# Patient Record
Sex: MALE | Race: BLACK OR AFRICAN AMERICAN | NOT HISPANIC OR LATINO | Employment: STUDENT | URBAN - METROPOLITAN AREA
[De-identification: names, ages, dates, MRNs, and addresses within clinical notes are randomized per-mention and may not be internally consistent; named-entity substitution may affect disease eponyms.]

---
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
## 2024-01-02 ENCOUNTER — TELEPHONE (OUTPATIENT)
Dept: PEDIATRIC HEMATOLOGY/ONCOLOGY | Facility: HOSPITAL | Age: 19
End: 2024-01-02
Payer: MEDICAID

## 2024-01-02 DIAGNOSIS — D57.00 SICKLE CELL PAIN CRISIS (MULTI): Primary | ICD-10-CM

## 2024-01-02 DIAGNOSIS — D57.20: ICD-10-CM

## 2024-01-02 RX ORDER — OXYCODONE HYDROCHLORIDE 5 MG/1
5 TABLET ORAL EVERY 6 HOURS PRN
Qty: 12 TABLET | Refills: 0 | Status: SHIPPED | OUTPATIENT
Start: 2024-01-02 | End: 2024-01-10 | Stop reason: HOSPADM

## 2024-01-02 RX ORDER — NAPROXEN 500 MG/1
500 TABLET ORAL EVERY 12 HOURS PRN
Qty: 60 TABLET | Refills: 0 | Status: SHIPPED | OUTPATIENT
Start: 2024-01-02 | End: 2024-01-10 | Stop reason: HOSPADM

## 2024-01-03 ENCOUNTER — APPOINTMENT (OUTPATIENT)
Dept: CARDIOLOGY | Facility: HOSPITAL | Age: 19
End: 2024-01-03
Payer: MEDICAID

## 2024-01-03 ENCOUNTER — APPOINTMENT (OUTPATIENT)
Dept: RADIOLOGY | Facility: HOSPITAL | Age: 19
End: 2024-01-03
Payer: MEDICAID

## 2024-01-03 ENCOUNTER — HOSPITAL ENCOUNTER (EMERGENCY)
Facility: HOSPITAL | Age: 19
Discharge: HOME | End: 2024-01-03
Attending: EMERGENCY MEDICINE
Payer: MEDICAID

## 2024-01-03 VITALS
HEIGHT: 70 IN | SYSTOLIC BLOOD PRESSURE: 107 MMHG | OXYGEN SATURATION: 97 % | RESPIRATION RATE: 15 BRPM | DIASTOLIC BLOOD PRESSURE: 80 MMHG | HEART RATE: 77 BPM | BODY MASS INDEX: 20.87 KG/M2 | WEIGHT: 145.8 LBS

## 2024-01-03 DIAGNOSIS — M79.602 LEFT ARM PAIN: Primary | ICD-10-CM

## 2024-01-03 LAB
ALBUMIN SERPL BCP-MCNC: 4.4 G/DL (ref 3.4–5)
ALP SERPL-CCNC: 49 U/L (ref 33–120)
ALT SERPL W P-5'-P-CCNC: 12 U/L (ref 10–52)
ANION GAP SERPL CALC-SCNC: 14 MMOL/L (ref 10–20)
AST SERPL W P-5'-P-CCNC: 24 U/L (ref 9–39)
ATRIAL RATE: 79 BPM
BASOPHILS # BLD MANUAL: 0 X10*3/UL (ref 0–0.1)
BASOPHILS NFR BLD MANUAL: 0 %
BILIRUB SERPL-MCNC: 1.7 MG/DL (ref 0–1.2)
BUN SERPL-MCNC: 12 MG/DL (ref 6–23)
CALCIUM SERPL-MCNC: 9.2 MG/DL (ref 8.6–10.3)
CHLORIDE SERPL-SCNC: 101 MMOL/L (ref 98–107)
CO2 SERPL-SCNC: 22 MMOL/L (ref 21–32)
CREAT SERPL-MCNC: 0.65 MG/DL (ref 0.5–1.3)
EOSINOPHIL # BLD MANUAL: 0.09 X10*3/UL (ref 0–0.7)
EOSINOPHIL NFR BLD MANUAL: 1 %
ERYTHROCYTE [DISTWIDTH] IN BLOOD BY AUTOMATED COUNT: 14 % (ref 11.5–14.5)
GFR SERPL CREATININE-BSD FRML MDRD: >90 ML/MIN/1.73M*2
GLUCOSE BLD MANUAL STRIP-MCNC: 71 MG/DL (ref 74–99)
GLUCOSE SERPL-MCNC: 83 MG/DL (ref 74–99)
HCT VFR BLD AUTO: 28.5 % (ref 41–52)
HGB BLD-MCNC: 10.7 G/DL (ref 13.5–17.5)
HGB RETIC QN: 35 PG (ref 28–38)
IMM GRANULOCYTES # BLD AUTO: 0.02 X10*3/UL (ref 0–0.7)
IMM GRANULOCYTES NFR BLD AUTO: 0.2 % (ref 0–0.9)
IMMATURE RETIC FRACTION: 32.9 %
INR PPP: 1.2 (ref 0.9–1.1)
LDH SERPL L TO P-CCNC: 251 U/L (ref 84–246)
LYMPHOCYTES # BLD MANUAL: 1.74 X10*3/UL (ref 1.2–4.8)
LYMPHOCYTES NFR BLD MANUAL: 20 %
MCH RBC QN AUTO: 33.1 PG (ref 26–34)
MCHC RBC AUTO-ENTMCNC: 37.5 G/DL (ref 32–36)
MCV RBC AUTO: 88 FL (ref 80–100)
MONOCYTES # BLD MANUAL: 0 X10*3/UL (ref 0.1–1)
MONOCYTES NFR BLD MANUAL: 0 %
NEUTS SEG # BLD MANUAL: 6.7 X10*3/UL (ref 1.2–7)
NEUTS SEG NFR BLD MANUAL: 77 %
NRBC BLD-RTO: 0.6 /100 WBCS (ref 0–0)
OVALOCYTES BLD QL SMEAR: ABNORMAL
P AXIS: 72 DEGREES
P OFFSET: 196 MS
P ONSET: 136 MS
PLATELET # BLD AUTO: 282 X10*3/UL (ref 150–450)
POLYCHROMASIA BLD QL SMEAR: ABNORMAL
POTASSIUM SERPL-SCNC: 3.9 MMOL/L (ref 3.5–5.3)
PR INTERVAL: 168 MS
PROT SERPL-MCNC: 6.9 G/DL (ref 6.4–8.2)
PROTHROMBIN TIME: 13.3 SECONDS (ref 9.8–12.8)
Q ONSET: 220 MS
QRS COUNT: 13 BEATS
QRS DURATION: 112 MS
QT INTERVAL: 384 MS
QTC CALCULATION(BAZETT): 440 MS
QTC FREDERICIA: 421 MS
R AXIS: 77 DEGREES
RBC # BLD AUTO: 3.23 X10*6/UL (ref 4.5–5.9)
RBC MORPH BLD: ABNORMAL
RETICS #: 0.14 X10*6/UL (ref 0.02–0.12)
RETICS/RBC NFR AUTO: 4.4 % (ref 0.5–2)
SCHISTOCYTES BLD QL SMEAR: ABNORMAL
SODIUM SERPL-SCNC: 133 MMOL/L (ref 136–145)
T AXIS: 67 DEGREES
T OFFSET: 412 MS
TARGETS BLD QL SMEAR: ABNORMAL
TOTAL CELLS COUNTED BLD: 100
VARIANT LYMPHS # BLD MANUAL: 0.17 X10*3/UL (ref 0–0.5)
VARIANT LYMPHS NFR BLD: 2 %
VENTRICULAR RATE: 79 BPM
WBC # BLD AUTO: 8.7 X10*3/UL (ref 4.4–11.3)

## 2024-01-03 PROCEDURE — 85007 BL SMEAR W/DIFF WBC COUNT: CPT | Performed by: EMERGENCY MEDICINE

## 2024-01-03 PROCEDURE — 2550000001 HC RX 255 CONTRASTS: Performed by: EMERGENCY MEDICINE

## 2024-01-03 PROCEDURE — 36415 COLL VENOUS BLD VENIPUNCTURE: CPT | Performed by: EMERGENCY MEDICINE

## 2024-01-03 PROCEDURE — 70498 CT ANGIOGRAPHY NECK: CPT | Performed by: RADIOLOGY

## 2024-01-03 PROCEDURE — 2500000002 HC RX 250 W HCPCS SELF ADMINISTERED DRUGS (ALT 637 FOR MEDICARE OP, ALT 636 FOR OP/ED): Mod: MUE | Performed by: EMERGENCY MEDICINE

## 2024-01-03 PROCEDURE — 80053 COMPREHEN METABOLIC PANEL: CPT | Performed by: EMERGENCY MEDICINE

## 2024-01-03 PROCEDURE — 85027 COMPLETE CBC AUTOMATED: CPT | Performed by: EMERGENCY MEDICINE

## 2024-01-03 PROCEDURE — 83615 LACTATE (LD) (LDH) ENZYME: CPT | Performed by: EMERGENCY MEDICINE

## 2024-01-03 PROCEDURE — 93005 ELECTROCARDIOGRAM TRACING: CPT

## 2024-01-03 PROCEDURE — 70450 CT HEAD/BRAIN W/O DYE: CPT | Performed by: RADIOLOGY

## 2024-01-03 PROCEDURE — 85610 PROTHROMBIN TIME: CPT | Performed by: EMERGENCY MEDICINE

## 2024-01-03 PROCEDURE — 85045 AUTOMATED RETICULOCYTE COUNT: CPT | Performed by: EMERGENCY MEDICINE

## 2024-01-03 PROCEDURE — 99285 EMERGENCY DEPT VISIT HI MDM: CPT | Performed by: EMERGENCY MEDICINE

## 2024-01-03 PROCEDURE — 82947 ASSAY GLUCOSE BLOOD QUANT: CPT | Mod: 59

## 2024-01-03 PROCEDURE — 70450 CT HEAD/BRAIN W/O DYE: CPT | Mod: 59

## 2024-01-03 PROCEDURE — 71045 X-RAY EXAM CHEST 1 VIEW: CPT | Performed by: STUDENT IN AN ORGANIZED HEALTH CARE EDUCATION/TRAINING PROGRAM

## 2024-01-03 PROCEDURE — 70496 CT ANGIOGRAPHY HEAD: CPT | Performed by: RADIOLOGY

## 2024-01-03 PROCEDURE — 71045 X-RAY EXAM CHEST 1 VIEW: CPT

## 2024-01-03 PROCEDURE — 70496 CT ANGIOGRAPHY HEAD: CPT

## 2024-01-03 RX ORDER — DIAZEPAM 5 MG/1
5 TABLET ORAL ONCE
Status: COMPLETED | OUTPATIENT
Start: 2024-01-03 | End: 2024-01-03

## 2024-01-03 RX ORDER — BACLOFEN 20 MG/1
20 TABLET ORAL 3 TIMES DAILY
Qty: 15 TABLET | Refills: 0 | Status: SHIPPED | OUTPATIENT
Start: 2024-01-03 | End: 2024-01-10 | Stop reason: HOSPADM

## 2024-01-03 RX ORDER — DIAZEPAM 5 MG/1
5 TABLET ORAL NIGHTLY PRN
Qty: 5 TABLET | Refills: 0 | Status: SHIPPED | OUTPATIENT
Start: 2024-01-03 | End: 2024-01-10 | Stop reason: HOSPADM

## 2024-01-03 RX ADMIN — DIAZEPAM 5 MG: 5 TABLET ORAL at 12:30

## 2024-01-03 RX ADMIN — IOHEXOL 75 ML: 350 INJECTION, SOLUTION INTRAVENOUS at 12:07

## 2024-01-03 ASSESSMENT — PAIN DESCRIPTION - LOCATION: LOCATION: ARM

## 2024-01-03 ASSESSMENT — PAIN SCALES - GENERAL: PAINLEVEL_OUTOF10: 10 - WORST POSSIBLE PAIN

## 2024-01-03 ASSESSMENT — PAIN DESCRIPTION - ORIENTATION: ORIENTATION: LEFT

## 2024-01-03 ASSESSMENT — PAIN - FUNCTIONAL ASSESSMENT: PAIN_FUNCTIONAL_ASSESSMENT: 0-10

## 2024-01-03 NOTE — ED TRIAGE NOTES
Pt arrived via ems d/t L arm pain and weakness, pt aaox4, vss, 10/10 pain on L arm says he's unable to move it.    HX: sickle cell

## 2024-01-04 ENCOUNTER — APPOINTMENT (OUTPATIENT)
Dept: RADIOLOGY | Facility: HOSPITAL | Age: 19
End: 2024-01-04
Payer: MEDICAID

## 2024-01-04 ENCOUNTER — HOSPITAL ENCOUNTER (INPATIENT)
Facility: HOSPITAL | Age: 19
LOS: 6 days | Discharge: HOME | End: 2024-01-10
Attending: PEDIATRICS | Admitting: STUDENT IN AN ORGANIZED HEALTH CARE EDUCATION/TRAINING PROGRAM
Payer: MEDICAID

## 2024-01-04 DIAGNOSIS — D57.00 SICKLE CELL CRISIS (MULTI): Primary | ICD-10-CM

## 2024-01-04 DIAGNOSIS — D57.00 SICKLE CELL DISEASE WITH CRISIS (MULTI): ICD-10-CM

## 2024-01-04 LAB
ABO GROUP (TYPE) IN BLOOD: NORMAL
ALBUMIN SERPL BCP-MCNC: 5.2 G/DL (ref 3.4–5)
ALP SERPL-CCNC: 60 U/L (ref 33–120)
ALT SERPL W P-5'-P-CCNC: 16 U/L (ref 10–52)
ANION GAP SERPL CALC-SCNC: 16 MMOL/L (ref 10–20)
ANTIBODY SCREEN: NORMAL
AST SERPL W P-5'-P-CCNC: 28 U/L (ref 9–39)
BASOPHILS # BLD MANUAL: 0 X10*3/UL (ref 0–0.1)
BASOPHILS NFR BLD MANUAL: 0 %
BILIRUB DIRECT SERPL-MCNC: 0.3 MG/DL (ref 0–0.3)
BILIRUB SERPL-MCNC: 2 MG/DL (ref 0–1.2)
BUN SERPL-MCNC: 11 MG/DL (ref 6–23)
CALCIUM SERPL-MCNC: 10.6 MG/DL (ref 8.6–10.6)
CHLORIDE SERPL-SCNC: 103 MMOL/L (ref 98–107)
CO2 SERPL-SCNC: 22 MMOL/L (ref 21–32)
CREAT SERPL-MCNC: 0.66 MG/DL (ref 0.5–1.3)
EOSINOPHIL # BLD MANUAL: 0 X10*3/UL (ref 0–0.7)
EOSINOPHIL NFR BLD MANUAL: 0 %
ERYTHROCYTE [DISTWIDTH] IN BLOOD BY AUTOMATED COUNT: 14 % (ref 11.5–14.5)
GFR SERPL CREATININE-BSD FRML MDRD: >90 ML/MIN/1.73M*2
GLUCOSE SERPL-MCNC: 84 MG/DL (ref 74–99)
HCT VFR BLD AUTO: 31.9 % (ref 41–52)
HGB BLD-MCNC: 12.2 G/DL (ref 13.5–17.5)
HGB RETIC QN: 34 PG (ref 28–38)
IMM GRANULOCYTES # BLD AUTO: 0.02 X10*3/UL (ref 0–0.7)
IMM GRANULOCYTES NFR BLD AUTO: 0.2 % (ref 0–0.9)
IMMATURE RETIC FRACTION: 33.4 %
LYMPHOCYTES # BLD MANUAL: 3.19 X10*3/UL (ref 1.2–4.8)
LYMPHOCYTES NFR BLD MANUAL: 37.1 %
MCH RBC QN AUTO: 32.2 PG (ref 26–34)
MCHC RBC AUTO-ENTMCNC: 38.2 G/DL (ref 32–36)
MCV RBC AUTO: 84 FL (ref 80–100)
MONOCYTES # BLD MANUAL: 0.52 X10*3/UL (ref 0.1–1)
MONOCYTES NFR BLD MANUAL: 6 %
NEUTS SEG # BLD MANUAL: 4.38 X10*3/UL (ref 1.2–7)
NEUTS SEG NFR BLD MANUAL: 50.9 %
NRBC BLD-RTO: 0.9 /100 WBCS (ref 0–0)
PHOSPHATE SERPL-MCNC: 3.4 MG/DL (ref 2.5–4.9)
PLATELET # BLD AUTO: 329 X10*3/UL (ref 150–450)
POTASSIUM SERPL-SCNC: 4.4 MMOL/L (ref 3.5–5.3)
PROT SERPL-MCNC: 8 G/DL (ref 6.4–8.2)
RBC # BLD AUTO: 3.79 X10*6/UL (ref 4.5–5.9)
RBC MORPH BLD: ABNORMAL
RETICS #: 0.16 X10*6/UL (ref 0.02–0.12)
RETICS/RBC NFR AUTO: 4.4 % (ref 0.5–2)
RH FACTOR (ANTIGEN D): NORMAL
SODIUM SERPL-SCNC: 137 MMOL/L (ref 136–145)
TARGETS BLD QL SMEAR: ABNORMAL
TOTAL CELLS COUNTED BLD: 116
VARIANT LYMPHS # BLD MANUAL: 0.52 X10*3/UL (ref 0–0.5)
VARIANT LYMPHS NFR BLD: 6 %
WBC # BLD AUTO: 8.6 X10*3/UL (ref 4.4–11.3)

## 2024-01-04 PROCEDURE — 85007 BL SMEAR W/DIFF WBC COUNT: CPT | Performed by: STUDENT IN AN ORGANIZED HEALTH CARE EDUCATION/TRAINING PROGRAM

## 2024-01-04 PROCEDURE — 2500000001 HC RX 250 WO HCPCS SELF ADMINISTERED DRUGS (ALT 637 FOR MEDICARE OP)

## 2024-01-04 PROCEDURE — 2500000004 HC RX 250 GENERAL PHARMACY W/ HCPCS (ALT 636 FOR OP/ED): Mod: SE | Performed by: PEDIATRICS

## 2024-01-04 PROCEDURE — 82248 BILIRUBIN DIRECT: CPT | Performed by: STUDENT IN AN ORGANIZED HEALTH CARE EDUCATION/TRAINING PROGRAM

## 2024-01-04 PROCEDURE — 85045 AUTOMATED RETICULOCYTE COUNT: CPT | Performed by: STUDENT IN AN ORGANIZED HEALTH CARE EDUCATION/TRAINING PROGRAM

## 2024-01-04 PROCEDURE — 80069 RENAL FUNCTION PANEL: CPT | Mod: CCI

## 2024-01-04 PROCEDURE — 84100 ASSAY OF PHOSPHORUS: CPT | Performed by: STUDENT IN AN ORGANIZED HEALTH CARE EDUCATION/TRAINING PROGRAM

## 2024-01-04 PROCEDURE — 99223 1ST HOSP IP/OBS HIGH 75: CPT | Performed by: STUDENT IN AN ORGANIZED HEALTH CARE EDUCATION/TRAINING PROGRAM

## 2024-01-04 PROCEDURE — 71046 X-RAY EXAM CHEST 2 VIEWS: CPT

## 2024-01-04 PROCEDURE — 85027 COMPLETE CBC AUTOMATED: CPT | Performed by: STUDENT IN AN ORGANIZED HEALTH CARE EDUCATION/TRAINING PROGRAM

## 2024-01-04 PROCEDURE — 96374 THER/PROPH/DIAG INJ IV PUSH: CPT

## 2024-01-04 PROCEDURE — 86900 BLOOD TYPING SEROLOGIC ABO: CPT | Performed by: STUDENT IN AN ORGANIZED HEALTH CARE EDUCATION/TRAINING PROGRAM

## 2024-01-04 PROCEDURE — 2500000004 HC RX 250 GENERAL PHARMACY W/ HCPCS (ALT 636 FOR OP/ED): Performed by: PEDIATRICS

## 2024-01-04 PROCEDURE — 1130000001 HC PRIVATE PED ROOM DAILY

## 2024-01-04 PROCEDURE — 2500000004 HC RX 250 GENERAL PHARMACY W/ HCPCS (ALT 636 FOR OP/ED): Mod: SE | Performed by: STUDENT IN AN ORGANIZED HEALTH CARE EDUCATION/TRAINING PROGRAM

## 2024-01-04 PROCEDURE — 96375 TX/PRO/DX INJ NEW DRUG ADDON: CPT

## 2024-01-04 PROCEDURE — 36415 COLL VENOUS BLD VENIPUNCTURE: CPT | Performed by: STUDENT IN AN ORGANIZED HEALTH CARE EDUCATION/TRAINING PROGRAM

## 2024-01-04 PROCEDURE — 71046 X-RAY EXAM CHEST 2 VIEWS: CPT | Performed by: RADIOLOGY

## 2024-01-04 PROCEDURE — 2500000004 HC RX 250 GENERAL PHARMACY W/ HCPCS (ALT 636 FOR OP/ED)

## 2024-01-04 PROCEDURE — 99285 EMERGENCY DEPT VISIT HI MDM: CPT | Performed by: PEDIATRICS

## 2024-01-04 PROCEDURE — 96376 TX/PRO/DX INJ SAME DRUG ADON: CPT

## 2024-01-04 RX ORDER — KETOROLAC TROMETHAMINE 30 MG/ML
15 INJECTION, SOLUTION INTRAMUSCULAR; INTRAVENOUS EVERY 6 HOURS SCHEDULED
Status: COMPLETED | OUTPATIENT
Start: 2024-01-04 | End: 2024-01-07

## 2024-01-04 RX ORDER — BISACODYL 5 MG
5 TABLET, DELAYED RELEASE (ENTERIC COATED) ORAL DAILY
Status: DISCONTINUED | OUTPATIENT
Start: 2024-01-04 | End: 2024-01-10 | Stop reason: HOSPADM

## 2024-01-04 RX ORDER — DEXTROSE MONOHYDRATE AND SODIUM CHLORIDE 5; .9 G/100ML; G/100ML
3 INJECTION, SOLUTION INTRAVENOUS CONTINUOUS
Status: DISCONTINUED | OUTPATIENT
Start: 2024-01-04 | End: 2024-01-10

## 2024-01-04 RX ORDER — SENNOSIDES 8.6 MG/1
17.2 TABLET ORAL 2 TIMES DAILY
Status: DISCONTINUED | OUTPATIENT
Start: 2024-01-04 | End: 2024-01-04

## 2024-01-04 RX ORDER — HYDROXYUREA 500 MG/1
1000 CAPSULE ORAL DAILY
Status: DISCONTINUED | OUTPATIENT
Start: 2024-01-04 | End: 2024-01-04

## 2024-01-04 RX ORDER — POLYETHYLENE GLYCOL 3350 17 G/17G
17 POWDER, FOR SOLUTION ORAL DAILY
Status: DISCONTINUED | OUTPATIENT
Start: 2024-01-04 | End: 2024-01-10 | Stop reason: HOSPADM

## 2024-01-04 RX ORDER — MORPHINE SULFATE 4 MG/ML
4 INJECTION INTRAVENOUS ONCE
Status: COMPLETED | OUTPATIENT
Start: 2024-01-04 | End: 2024-01-04

## 2024-01-04 RX ORDER — ACETAMINOPHEN 10 MG/ML
1000 INJECTION, SOLUTION INTRAVENOUS ONCE
Status: COMPLETED | OUTPATIENT
Start: 2024-01-04 | End: 2024-01-04

## 2024-01-04 RX ORDER — ONDANSETRON HYDROCHLORIDE 2 MG/ML
4 INJECTION, SOLUTION INTRAVENOUS EVERY 6 HOURS PRN
Status: DISCONTINUED | OUTPATIENT
Start: 2024-01-04 | End: 2024-01-10 | Stop reason: HOSPADM

## 2024-01-04 RX ORDER — OLANZAPINE 5 MG/1
5 TABLET, ORALLY DISINTEGRATING ORAL NIGHTLY
Status: DISCONTINUED | OUTPATIENT
Start: 2024-01-04 | End: 2024-01-10 | Stop reason: HOSPADM

## 2024-01-04 RX ORDER — KETOROLAC TROMETHAMINE 30 MG/ML
15 INJECTION, SOLUTION INTRAMUSCULAR; INTRAVENOUS ONCE
Status: COMPLETED | OUTPATIENT
Start: 2024-01-04 | End: 2024-01-04

## 2024-01-04 RX ORDER — MORPHINE SULFATE 4 MG/ML
7.5 INJECTION INTRAVENOUS
Status: DISCONTINUED | OUTPATIENT
Start: 2024-01-04 | End: 2024-01-04

## 2024-01-04 RX ORDER — ACETAMINOPHEN 10 MG/ML
15.79 INJECTION, SOLUTION INTRAVENOUS EVERY 6 HOURS
Status: COMPLETED | OUTPATIENT
Start: 2024-01-04 | End: 2024-01-05

## 2024-01-04 RX ORDER — MORPHINE SULFATE 4 MG/ML
7.5 INJECTION INTRAVENOUS ONCE
Status: COMPLETED | OUTPATIENT
Start: 2024-01-04 | End: 2024-01-04

## 2024-01-04 RX ORDER — HYDROXYUREA 500 MG/1
1000 CAPSULE ORAL
Status: DISCONTINUED | OUTPATIENT
Start: 2024-01-04 | End: 2024-01-10 | Stop reason: HOSPADM

## 2024-01-04 RX ORDER — SENNOSIDES 8.6 MG/1
17.2 TABLET ORAL DAILY
Status: DISCONTINUED | OUTPATIENT
Start: 2024-01-05 | End: 2024-01-10 | Stop reason: HOSPADM

## 2024-01-04 RX ORDER — FAMOTIDINE 20 MG/1
20 TABLET, FILM COATED ORAL 2 TIMES DAILY
Status: DISCONTINUED | OUTPATIENT
Start: 2024-01-04 | End: 2024-01-10 | Stop reason: HOSPADM

## 2024-01-04 RX ORDER — MORPHINE SULFATE 4 MG/ML
6.5 INJECTION INTRAVENOUS
Status: DISCONTINUED | OUTPATIENT
Start: 2024-01-04 | End: 2024-01-06

## 2024-01-04 RX ADMIN — MORPHINE SULFATE 4 MG: 4 INJECTION INTRAVENOUS at 07:34

## 2024-01-04 RX ADMIN — HYDROXYUREA 1000 MG: 500 CAPSULE ORAL at 13:16

## 2024-01-04 RX ADMIN — MORPHINE SULFATE 6.5 MG: 4 INJECTION INTRAVENOUS at 23:10

## 2024-01-04 RX ADMIN — FAMOTIDINE 20 MG: 20 TABLET ORAL at 20:56

## 2024-01-04 RX ADMIN — POLYETHYLENE GLYCOL 3350 17 G: 17 POWDER, FOR SOLUTION ORAL at 11:29

## 2024-01-04 RX ADMIN — MORPHINE SULFATE 7.5 MG: 4 INJECTION INTRAVENOUS at 06:00

## 2024-01-04 RX ADMIN — MORPHINE SULFATE 6.5 MG: 4 INJECTION INTRAVENOUS at 18:36

## 2024-01-04 RX ADMIN — MORPHINE SULFATE 6.5 MG: 4 INJECTION INTRAVENOUS at 17:02

## 2024-01-04 RX ADMIN — FAMOTIDINE 20 MG: 20 TABLET ORAL at 14:58

## 2024-01-04 RX ADMIN — SENNOSIDES 17.2 MG: 8.6 TABLET, FILM COATED ORAL at 11:29

## 2024-01-04 RX ADMIN — ACETAMINOPHEN 1000 MG: 10 INJECTION, SOLUTION INTRAVENOUS at 08:40

## 2024-01-04 RX ADMIN — KETOROLAC TROMETHAMINE 15 MG: 30 INJECTION, SOLUTION INTRAMUSCULAR; INTRAVENOUS at 07:34

## 2024-01-04 RX ADMIN — ACETAMINOPHEN 1000 MG: 10 INJECTION, SOLUTION INTRAVENOUS at 15:02

## 2024-01-04 RX ADMIN — KETOROLAC TROMETHAMINE 15 MG: 30 INJECTION, SOLUTION INTRAMUSCULAR; INTRAVENOUS at 17:57

## 2024-01-04 RX ADMIN — ACETAMINOPHEN 1000 MG: 10 INJECTION, SOLUTION INTRAVENOUS at 20:58

## 2024-01-04 RX ADMIN — KETOROLAC TROMETHAMINE 15 MG: 30 INJECTION, SOLUTION INTRAMUSCULAR; INTRAVENOUS at 23:10

## 2024-01-04 RX ADMIN — NALOXONE HYDROCHLORIDE 1 MCG/KG/HR: 0.4 INJECTION, SOLUTION INTRAMUSCULAR; INTRAVENOUS; SUBCUTANEOUS at 14:58

## 2024-01-04 RX ADMIN — DEXTROSE AND SODIUM CHLORIDE 75 ML/HR: 5; 900 INJECTION, SOLUTION INTRAVENOUS at 23:12

## 2024-01-04 RX ADMIN — ONDANSETRON 4 MG: 2 INJECTION INTRAMUSCULAR; INTRAVENOUS at 11:29

## 2024-01-04 RX ADMIN — KETOROLAC TROMETHAMINE 15 MG: 30 INJECTION, SOLUTION INTRAMUSCULAR; INTRAVENOUS at 11:28

## 2024-01-04 RX ADMIN — MORPHINE SULFATE 6.5 MG: 4 INJECTION INTRAVENOUS at 14:58

## 2024-01-04 RX ADMIN — BISACODYL 5 MG: 5 TABLET ORAL at 11:29

## 2024-01-04 RX ADMIN — DEXTROSE AND SODIUM CHLORIDE 75 ML/HR: 5; 900 INJECTION, SOLUTION INTRAVENOUS at 11:29

## 2024-01-04 RX ADMIN — MORPHINE SULFATE 6.5 MG: 4 INJECTION INTRAVENOUS at 13:18

## 2024-01-04 RX ADMIN — MORPHINE SULFATE 4 MG: 4 INJECTION INTRAVENOUS at 08:39

## 2024-01-04 RX ADMIN — MORPHINE SULFATE 6.5 MG: 4 INJECTION INTRAVENOUS at 20:56

## 2024-01-04 RX ADMIN — OLANZAPINE 5 MG: 5 TABLET, ORALLY DISINTEGRATING ORAL at 22:27

## 2024-01-04 RX ADMIN — MORPHINE SULFATE 6.5 MG: 4 INJECTION INTRAVENOUS at 11:28

## 2024-01-04 SDOH — SOCIAL STABILITY: SOCIAL INSECURITY: WERE YOU ABLE TO COMPLETE ALL THE BEHAVIORAL HEALTH SCREENINGS?: YES

## 2024-01-04 SDOH — SOCIAL STABILITY: SOCIAL INSECURITY: ABUSE: PEDIATRIC

## 2024-01-04 SDOH — SOCIAL STABILITY: SOCIAL INSECURITY: HAVE YOU HAD ANY THOUGHTS OF HARMING ANYONE ELSE?: NO

## 2024-01-04 SDOH — SOCIAL STABILITY: SOCIAL INSECURITY: ARE THERE ANY APPARENT SIGNS OF INJURIES/BEHAVIORS THAT COULD BE RELATED TO ABUSE/NEGLECT?: NO

## 2024-01-04 SDOH — ECONOMIC STABILITY: HOUSING INSECURITY: DO YOU FEEL UNSAFE GOING BACK TO THE PLACE WHERE YOU LIVE?: NO

## 2024-01-04 SDOH — SOCIAL STABILITY: SOCIAL INSECURITY
ASK PARENT OR GUARDIAN: ARE THERE TIMES WHEN YOU, YOUR CHILD(REN), OR ANY MEMBER OF YOUR HOUSEHOLD FEEL UNSAFE, HARMED, OR THREATENED AROUND PERSONS WITH WHOM YOU KNOW OR LIVE?: UNABLE TO ASSESS

## 2024-01-04 ASSESSMENT — ACTIVITIES OF DAILY LIVING (ADL)
GROOMING: INDEPENDENT
PATIENT'S MEMORY ADEQUATE TO SAFELY COMPLETE DAILY ACTIVITIES?: YES
WALKS IN HOME: INDEPENDENT
HEARING - RIGHT EAR: FUNCTIONAL
HEARING - LEFT EAR: FUNCTIONAL
ADEQUATE_TO_COMPLETE_ADL: YES
JUDGMENT_ADEQUATE_SAFELY_COMPLETE_DAILY_ACTIVITIES: YES
FEEDING YOURSELF: INDEPENDENT
TOILETING: INDEPENDENT
BATHING: INDEPENDENT
DRESSING YOURSELF: INDEPENDENT

## 2024-01-04 ASSESSMENT — PAIN - FUNCTIONAL ASSESSMENT
PAIN_FUNCTIONAL_ASSESSMENT: 0-10
PAIN_FUNCTIONAL_ASSESSMENT: UNABLE TO SELF-REPORT
PAIN_FUNCTIONAL_ASSESSMENT: 0-10
PAIN_FUNCTIONAL_ASSESSMENT: UNABLE TO SELF-REPORT
PAIN_FUNCTIONAL_ASSESSMENT: 0-10
PAIN_FUNCTIONAL_ASSESSMENT: 0-10

## 2024-01-04 ASSESSMENT — LIFESTYLE VARIABLES
SKIP TO QUESTIONS 9-10: 0
AUDIT-C TOTAL SCORE: -1
PRESCIPTION_ABUSE_PAST_12_MONTHS: NO
AUDIT-C TOTAL SCORE: -1
HOW OFTEN DO YOU HAVE 6 OR MORE DRINKS ON ONE OCCASION: LESS THAN MONTHLY
HOW OFTEN DO YOU HAVE A DRINK CONTAINING ALCOHOL: MONTHLY OR LESS
HOW MANY STANDARD DRINKS CONTAINING ALCOHOL DO YOU HAVE ON A TYPICAL DAY: PATIENT DECLINED
SUBSTANCE_ABUSE_PAST_12_MONTHS: YES

## 2024-01-04 ASSESSMENT — PAIN SCALES - GENERAL
PAINLEVEL_OUTOF10: 9
PAINLEVEL_OUTOF10: 10 - WORST POSSIBLE PAIN
PAINLEVEL_OUTOF10: 9
PAINLEVEL_OUTOF10: 9
PAINLEVEL_OUTOF10: 10 - WORST POSSIBLE PAIN

## 2024-01-04 NOTE — H&P
History & Physical  Service: Pediatric Hematology / Oncology  Attending: Sandro Christianson*    Subjective   Reason for Admission: VOE    HPI:  Josue Rangel is a 19yo M with Hgb SC presenting with 10/10 pain of his left arm. The pain started 3 days ago but worsened yesterday after working at [employer]. The pain is consistent with his prior VOE's. He tried oxycodone, naproxen, and tylenol at home but this was inadequate so he presented initially to [facility]'s ED yesterday (1/3/24). There, he was unable to move the left arm and had weakness, so a CT head wo contrat and CT angio of the head was performed which were negative. He also had a chest xray performed which was negative for signs of acute chest. He was discharged home with naproxen and oxycodone prescriptions. However, pain worsened, so he soon re-presented to the ED via EMS, this time main campus. He endorses pain throughout entirety of left arm. No trauma to the site. It hurts too much to make attempts to move it. No pain elsewhere. No fevers, cough, congestion, nor chest pain. He does have a diagnosis of asthma but cannot recall the last time he needed rescue inhaler because it has been so long. He does endorse mild shortness of breath, but it is improving since presentation to the ED today.    ED Course:  Vitals: T HR BP RR SpO2  Labs:  - CBCd: 8.6>12.2/31.9<329 (baseline hgb possibly ~11.5)  - retic: 4.4% (baseline possibly 4.3)  - RFP: 137/4.4103/2211/0.66<84 Ca 10.6, Phos 3.4, Alb 5.2*  - HFP: AST 28, ALT 16, AP 60, Tbili 2.0*, Dbili 0.3, Tpro 8.0  - T&S collected  Imaging: chest x-ray repeated for endorsement of shortness of breath, similar to prior exam, no consolidation  Interventions: IV placed, gave 6.5 (0.1mg/kg) of IV morphine and IV toradol. Pain not improved, given 4mg (0.06mg/kg) IV morphine. Not improved, repeated 4mg dose and given IV tylenol. Decision made to admit for pain control.     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 Smokes marijuana but will not discuss frequency    Past Medical History:  Past Medical History:   Diagnosis Date    Personal history of diseases of the blood and blood-forming organs and certain disorders involving the immune mechanism     History of sickle cell anemia       Surgical History:  Past Surgical History:   Procedure Laterality Date    CT GUIDED PERCUTANEOUS BIOPSY BONE DEEP  7/17/2023    CT GUIDED PERCUTANEOUS BIOPSY BONE DEEP [place] [facility] [place] HEAD ANGIO WO IV CONTRAST  10/8/2014    MR HEAD ANGIO WO IV CONTRAST 10/8/2014 [facility] CLINICAL LEGACY    MR HEAD ANGIO WO IV CONTRAST  7/10/2023    MR HEAD ANGIO WO IV CONTRAST [place] [facility] [place]    [place] NECK ANGIO WO IV CONTRAST  10/8/2014    MR NECK ANGIO WO IV CONTRAST 10/8/2014 [facility] CLINICAL LEGACY    MR NECK ANGIO WO IV CONTRAST  7/10/2023    MR NECK ANGIO WO IV CONTRAST [place]2023 [facility] [place]       Family History:  Family History   Problem Relation Name Age of Onset    Diabetes Paternal Grandmother      Diabetes Paternal Grandfather         Medications Prior to Admission:  Current Outpatient Medications   Medication Instructions    acetaminophen (Tylenol) 325 mg tablet TAKE 2 TABLETS BY MOUTH EVERY 6 HOURS AS NEEDED    albuterol 2.5 mg /3 mL (0.083 %) nebulizer solution USE 1 UNIT DOSE EVERY 4-6 HOURS AS NEEDED FOR WHEEZING .    albuterol 90 mcg/actuation inhaler Inhale 2-4 puffs every 4-6 hours as needed for shortness of breath or wheezing    alum-mag hydroxide-simeth (Mylanta) 200-200-20 mg/5 mL oral suspension Antacid 200-200-20 MG/5ML Oral Suspension   Quantity: 180  Refills: 0        Start : 20-Jul-2022   Active    amoxicillin-pot clavulanate (Augmentin) 875-125 mg  tablet TAKE 1 TABLET BY MOUTH EVERY 12 HOURS UNTIL 8/12/23    amphetamine-dextroamphetamine XR (Adderall XR) 15 mg 24 hr capsule Adderall XR 15 MG Oral Capsule Extended Release 24 Hour   Quantity: 30  Refills: 0        Start : 2-Sep-2016   Active    ARIPiprazole (Abilify) 5 mg tablet ARIPiprazole 5 MG Oral Tablet   Quantity: 3  Refills: 0        Start : 12-Aug-2022   Active    baclofen (LIORESAL) 20 mg, oral, 3 times daily, Take as needed for muscle spasms    cholecalciferol (Vitamin D-3) 50 mcg (2,000 unit) capsule 1 capsule, oral, Daily    cyclobenzaprine (Flexeril) 5 mg tablet TAKE 1/2 TABLET BY MOUTH EVERY 8 HOURS    diazePAM (VALIUM) 5 mg, oral, Nightly PRN    docusate sodium (Colace) 100 mg capsule TAKE 1 CAPSULE BY MOUTH TWICE DAILY AS NEEDED FOR CONSTIPATION    folic acid (FOLVITE) 1 mg, oral, Daily    food supplemt, lactose-reduced (Ensure Original) 0.04-1.05 gram-kcal/mL liquid Ensure Oral Liquid   Refills: 0        Start : 3-Aug-2017   Active    hydroxyurea (HYDREA) 1,000 mg, oral, Daily, Take at the same time each day.    ibuprofen 200 mg tablet 1 tablet, oral, Daily PRN    loratadine (Claritin) 10 mg tablet 1 tablet, oral, Nightly    multivitamin (Chewable-Flory) tablet,chewable 1 tablet, oral, Daily    naproxen (NAPROSYN) 500 mg, oral, Every 12 hours PRN    OLANZapine (ZYPREXA) 2.5 mg, oral, Nightly    OLANZapine (ZYPREXA) 5 mg, oral, Nightly    oxyCODONE (Roxicodone) 10 mg immediate release tablet TAKE 1 TABLET BY MOUTH EVERY 6 HOURS    oxyCODONE (ROXICODONE) 5 mg, oral, Every 6 hours PRN    polyethylene glycol (MIRALAX) 17 g, oral, Daily    pseudoephedrine (Sudafed) 30 mg tablet TAKE 2 TABLETS BY MOUTH EVERY NIGHT AT BEDTIME WHILE HAVING PRIPAISM TAKE 1 TABLET BY MOUTH EVERY 6 HOURS AS NEEDED FOR PRIAPISM UNTIL GOES AWAY    sennosides (Senokot) 8.6 mg tablet 1 tablet, oral, Daily PRN    Vitamin D3 400 Units, oral, Daily       Allergies:  No Known Allergies     Social History:  Social History  "    Socioeconomic History    Marital status: Single     Spouse name: Not on file    Number of children: Not on file    Years of education: Not on file    Highest education level: Not on file   Occupational History    Not on file   Tobacco Use    Smoking status: Never    Smokeless tobacco: Never   Substance and Sexual Activity    Alcohol use: Yes     Comment: per patient; socially    Drug use: Yes     Types: Marijuana    Sexual activity: Not on file   Other Topics Concern    Not on file   Social History Narrative    Not on file     Social Determinants of Health     Financial Resource Strain: Not on file   Food Insecurity: Not on file   Transportation Needs: Not on file   Physical Activity: Not on file   Stress: Not on file   Social Connections: Not on file   Intimate Partner Violence: Not on file   Housing Stability: Not on file       Review of Systems   All other systems reviewed and are negative.  Except as described in the HPI    Objective   Vitals:      1/4/2024     7:36 AM 1/4/2024     8:43 AM 1/4/2024     9:25 AM 1/4/2024    10:20 AM 1/4/2024    10:28 AM 1/4/2024     1:00 PM 1/4/2024     4:20 PM   Vitals   Systolic 144    122 120 101   Diastolic 88    72 70 54   Heart Rate 68 71 68  68 65 60   Temp 36.8 °C (98.3 °F)    36.4 °C (97.5 °F) 36.6 °C (97.9 °F) 36.4 °C (97.5 °F)   Resp 18 18 16  24 24 20   Height (in)     1.778 m (5' 10\")     Weight (lb)    137.35 137.35     BMI    19.71 kg/m2 19.71 kg/m2     BSA (m2)    1.75 m2 1.75 m2         Physical Exam  Constitutional:       Appearance: He is not ill-appearing.   HENT:      Head: Normocephalic and atraumatic.      Mouth/Throat:      Mouth: Mucous membranes are moist.   Eyes:      Extraocular Movements: Extraocular movements intact.      Conjunctiva/sclera: Conjunctivae normal.   Cardiovascular:      Rate and Rhythm: Normal rate and regular rhythm.      Heart sounds: No murmur heard.  Pulmonary:      Effort: Pulmonary effort is normal. No respiratory distress.    "   Breath sounds: Normal breath sounds.   Abdominal:      General: There is no distension.      Palpations: Abdomen is soft.      Tenderness: There is no abdominal tenderness.   Musculoskeletal:      Comments: Holding left arm against his torso inside his hoodie. Unable to move fingers, elbow, nor shoulder on the left due to pain. Can barely move left wrist and winces in pain. With passive motion of left fingers and wrist, grunts and winces in pain. Right arm and bilateral legs unaffected   Skin:     General: Skin is warm and dry.      Capillary Refill: Capillary refill takes less than 2 seconds.   Neurological:      Mental Status: He is alert.   Psychiatric:      Comments: Oriented x3 but will give non-serious answers first prior to giving accurate answers. Laughing, smiling despite reporting 9/10 pain. Very talkative and makes jokes.         Lab Results:  Results for orders placed or performed during the hospital encounter of 01/04/24 (from the past 24 hour(s))   CBC and Auto Differential   Result Value Ref Range    WBC 8.6 4.4 - 11.3 x10*3/uL    nRBC 0.9 (H) 0.0 - 0.0 /100 WBCs    RBC 3.79 (L) 4.50 - 5.90 x10*6/uL    Hemoglobin 12.2 (L) 13.5 - 17.5 g/dL    Hematocrit 31.9 (L) 41.0 - 52.0 %    MCV 84 80 - 100 fL    MCH 32.2 26.0 - 34.0 pg    MCHC 38.2 (H) 32.0 - 36.0 g/dL    RDW 14.0 11.5 - 14.5 %    Platelets 329 150 - 450 x10*3/uL    Immature Granulocytes %, Automated 0.2 0.0 - 0.9 %    Immature Granulocytes Absolute, Automated 0.02 0.00 - 0.70 x10*3/uL   Hepatic Function Panel   Result Value Ref Range    Albumin 5.2 (H) 3.4 - 5.0 g/dL    Bilirubin, Total 2.0 (H) 0.0 - 1.2 mg/dL    Bilirubin, Direct 0.3 0.0 - 0.3 mg/dL    Alkaline Phosphatase 60 33 - 120 U/L    ALT 16 10 - 52 U/L    AST 28 9 - 39 U/L    Total Protein 8.0 6.4 - 8.2 g/dL   Reticulocytes   Result Value Ref Range    Retic % 4.4 (H) 0.5 - 2.0 %    Retic Absolute 0.164 (H) 0.022 - 0.118 x10*6/uL    Reticulocyte Hemoglobin 34 28 - 38 pg    Immature  Retic fraction 33.4 (H) <=16.0 %   Type And Screen   Result Value Ref Range    ABO TYPE O     Rh TYPE POS     ANTIBODY SCREEN NEG    Phosphorus   Result Value Ref Range    Phosphorus 3.4 2.5 - 4.9 mg/dL   Basic Metabolic Panel   Result Value Ref Range    Glucose 84 74 - 99 mg/dL    Sodium 137 136 - 145 mmol/L    Potassium 4.4 3.5 - 5.3 mmol/L    Chloride 103 98 - 107 mmol/L    Bicarbonate 22 21 - 32 mmol/L    Anion Gap 16 10 - 20 mmol/L    Urea Nitrogen 11 6 - 23 mg/dL    Creatinine 0.66 0.50 - 1.30 mg/dL    eGFR >90 >60 mL/min/1.73m*2    Calcium 10.6 8.6 - 10.6 mg/dL   Manual Differential   Result Value Ref Range    Neutrophils %, Manual 50.9 40.0 - 80.0 %    Lymphocytes %, Manual 37.1 13.0 - 44.0 %    Monocytes %, Manual 6.0 2.0 - 10.0 %    Eosinophils %, Manual 0.0 0.0 - 6.0 %    Basophils %, Manual 0.0 0.0 - 2.0 %    Atypical Lymphocytes %, Manual 6.0 0.0 - 2.0 %    Seg Neutrophils Absolute, Manual 4.38 1.20 - 7.00 x10*3/uL    Lymphocytes Absolute, Manual 3.19 1.20 - 4.80 x10*3/uL    Monocytes Absolute, Manual 0.52 0.10 - 1.00 x10*3/uL    Eosinophils Absolute, Manual 0.00 0.00 - 0.70 x10*3/uL    Basophils Absolute, Manual 0.00 0.00 - 0.10 x10*3/uL    Atypical Lymphs Absolute, Manual 0.52 (H) 0.00 - 0.50 x10*3/uL    Total Cells Counted 116     RBC Morphology See Below     Target Cells Many        Imaging Results:  XR chest 2 views  Narrative: Interpreted By:  Aj Fry  and Saba Zaman   STUDY:  XR CHEST 2 VIEWS; ;  1/4/2024 6:32 am      INDICATION:  Signs/Symptoms:back pain, sickle  cell.      COMPARISON:  Chest radiograph on 01/03/2024.      ACCESSION NUMBER(S):  [id number]      ORDERING CLINICIAN:  ISSA JACOBS      FINDINGS:  PA and lateral views of the chest.      The cardiomediastinal silhouette is at the upper limits of normal in  size.      Mild perihilar interstitial prominence bilaterally is similar to the  prior exam. No consolidation, pleural effusion, or pneumothorax.      No acute  osseous abnormality. H-shaped vertebral bodies again noted,  consistent with patient's history of sickle cell disease.      Impression: Mild interstitial prominence similar to prior exam. No new infiltrate  seen.      I personally reviewed the images/study and I agree with the findings  as stated. This study was interpreted at [facility], [place], [place], [place].      MACRO:  None      Signed by: Aj Fry 1/4/2024 8:58 AM  Dictation workstation:   [id number]      Assessment/Plan   Hospital Problems:  Principal Problem:    Sickle cell crisis (CMS/HCC)      Josue is a 18 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 He received morphine x3, IV toradol, and IV tylenol in the ED. Pain now 9/10. Despite his pain, he is laughing, smiling, and making jokes. Considered this to be a reaction to IV morphine, so starting him on a smaller dose of scheduled morphine - 0.1mg/kg rather than 0.12mg/kg as to not cause further adverse effects. However in speaking with our sickle cell team who knows him well, this may also be his baseline personality. We will monitor pain closely and escalate to 0.12mg/kg of morphine if needed. He is complaining of itching so will initiate narcan drip. Reassured that his shortness of breath is improving since presentation to the ED, and no evidence of ACS on CXR performed there. However he does have multiple prior episodes of ACS so we will monitor closely.    Plan:  HEME  #HgB SC with VOE  [x] blood consent signed in EMR  - IV morphine 0.1mg/kg (6.5mg) q2h scheduled  - IV Toradol 0.5mg/kg q6h scheduled   - IV Tylenol q6h scheduled  - c/h Hydroxyurea 1000mg daily M-F  - baseline Hgb ~11.5, baseline retic ~4.3  #Opiate induced pruritus  - Narcan drip 1mcg/kg/hr    CV  #Access: pIV    RESP  #Acute Chest Syndrome Prevention  [x] RT notified  - IS and EZPAP    FEN/GI  #Nutrition  - regular diet  #Hydration  - 3/4 mIVF with  D5NS  #Constipation prevention  - Senna 17.2mg daily  - Miralax 17g daily  #Nausea  - IV zofran 4mg q6h PRN  #GI prophylaxis  - PO Pepcid 20mg BID while on toradol    ID  #Fever plan  - if febrile > or = 38.5C, blood culture and ceftriaxone. If respiratory concerns, obtain 2 view CXR to evaluate for ACS, add azithromycin if positive    PSYCHOSOCIAL  #Depression  - c/h olanzapine 5mg at bedtime    Labs:  - CBCd, retic qAM; T&S q72h (next due 1/7)      Patient discussed with Dr. Estuardo Rodriguez MD   Pediatrics PGY-2

## 2024-01-04 NOTE — HOSPITAL COURSE
Heme/Onc Course 1/4/24 - ***  Josue was admitted for VOE of his left arm. We treated him per our sickle cell VOE pathway with scheduled IV morphine, IV tylenol, and IV toradol. He transitioned to ***. He developed itching so we started him on a narcan drip.

## 2024-01-04 NOTE — LETTER
January 10, 2024     Patient: Josue Rangel   YOB: 2005   Date of Visit: 1/4/2024       To Whom It May Concern:    Jouse Rangel was admitted from 1/4/2024 to 1/10/2024?. Please excuse Josue for his absence from work on these days as he was admitted to [facility]. He is clear to go back to work on 1/12/2024.    If you have any questions or concerns, please don't hesitate to call.         Sincerely,       Luciana Vásquez MD  Pediatrics        CC: No Recipients

## 2024-01-04 NOTE — ED PROVIDER NOTES
HPI   Chief Complaint   Patient presents with    Sickle Cell Pain Crisis       17 yo with sickle cell  here with pain crisis. He is reporting left arm pain. He thinks it got worse after working at [employer] yesterday. No known injuries. Seen at [facility] yesterday. CXR neg. Sent home on oxycodone and naprosyn.   Pain acutely worsened overnight and he called 911.   Rates pain a 10/10 on arrival and is moaning.   Afebrile.                            Edison Coma Scale Score: 15         NIH Stroke Scale: 3          Patient History   Past Medical History:   Diagnosis Date    Personal history of diseases of the blood and blood-forming organs and certain disorders involving the immune mechanism     History of sickle cell anemia     Past Surgical History:   Procedure Laterality Date    CT GUIDED PERCUTANEOUS BIOPSY BONE DEEP  7/17/2023    CT GUIDED PERCUTANEOUS BIOPSY BONE DEEP 7[place]17/2023 [facility] [place]    MR HEAD ANGIO WO IV CONTRAST  10/8/2014    MR HEAD ANGIO WO IV CONTRAST 10/8/2014 [facility] CLINICAL LEGACY    MR HEAD ANGIO WO IV CONTRAST  7/10/2023    MR HEAD ANGIO WO IV CONTRAST 7/10/2023 [facility] [place]    MR NECK ANGIO WO IV CONTRAST  10/8/2014    MR NECK ANGIO WO IV CONTRAST 10/8/2014 [facility] CLINICAL LEGACY    MR NECK ANGIO WO IV CONTRAST  7/10/2023    MR NECK ANGIO WO IV CONTRAST 7/10/2023 [facility] [place] [facility]     Family History   Problem Relation Name Age of Onset    Diabetes Paternal Grandmother      Diabetes Paternal Grandfather       Social History     Tobacco Use    Smoking status: Not on file    Smokeless tobacco: Not on file   Substance Use Topics    Alcohol use: Not on file    Drug use: Not on file       Physical Exam   ED Triage Vitals [01/04/24 0547]   Temp Heart Rate Resp BP   36.5 °C (97.7 °F) 94 26 102/82      SpO2 Temp Source Heart Rate Source Patient Position   99 % Axillary -- --      BP Location FiO2 (%)     -- --       Physical Exam  Vitals and nursing note reviewed.   Constitutional:       Appearance: He is well-developed.       Comments: Moaning, tensing arms, closing eyes tightly, appears very uncomfortable.    HENT:      Head: Normocephalic and atraumatic.   Eyes:      Extraocular Movements: Extraocular movements intact.      Conjunctiva/sclera: Conjunctivae normal.      Pupils: Pupils are equal, round, and reactive to light.   Cardiovascular:      Rate and Rhythm: Regular rhythm. Tachycardia present.      Pulses: Normal pulses.      Heart sounds: No murmur heard.  Pulmonary:      Effort: Pulmonary effort is normal. No respiratory distress.      Breath sounds: Normal breath sounds.   Abdominal:      Palpations: Abdomen is soft.      Tenderness: There is no abdominal tenderness.   Musculoskeletal:         General: No swelling or deformity.   Skin:     General: Skin is warm and dry.      Capillary Refill: Capillary refill takes less than 2 seconds.   Neurological:      Mental Status: He is alert.   Psychiatric:         Mood and Affect: Mood normal.         ED Course & MDM   Diagnoses as of 01/04/24 0827   Sickle cell crisis (CMS/[facility])       Medical Decision Making  17 yo with sickle cell disease here with pain crisis.   Started with PIV, labs, and IV morphine.   Following, he appeared much more comfortable but still rated pain a 10/10. Given second dose of IV morphine and toradol.   Labs and cxr pending.   Signed out to Dr. Melton at 0700.         Procedure  Procedures    0828 ATTENDING ADDENDUM  Pt signed out to me by Dr. Brock at 0700. Pt is still reporting 10/10 pain. IV morphine and IV acetaminophen ordered. Discussed with hematology who accepted him for admission to the floor. CXR showed no sign of acute chest syndrome. Stable for transfer to floor.   KASI Brock MD  01/04/24 0888

## 2024-01-05 LAB
ALBUMIN SERPL BCP-MCNC: 5.2 G/DL (ref 3.4–5)
ANION GAP SERPL CALC-SCNC: 20 MMOL/L (ref 10–20)
BASOPHILS # BLD AUTO: 0.04 X10*3/UL (ref 0–0.1)
BASOPHILS NFR BLD AUTO: 0.6 %
BUN SERPL-MCNC: 11 MG/DL (ref 6–23)
CALCIUM SERPL-MCNC: 10.4 MG/DL (ref 8.6–10.6)
CHLORIDE SERPL-SCNC: 103 MMOL/L (ref 98–107)
CO2 SERPL-SCNC: 20 MMOL/L (ref 21–32)
CREAT SERPL-MCNC: 0.65 MG/DL (ref 0.5–1.3)
EOSINOPHIL # BLD AUTO: 0.25 X10*3/UL (ref 0–0.7)
EOSINOPHIL NFR BLD AUTO: 3.5 %
ERYTHROCYTE [DISTWIDTH] IN BLOOD BY AUTOMATED COUNT: 13.8 % (ref 11.5–14.5)
GFR SERPL CREATININE-BSD FRML MDRD: >90 ML/MIN/1.73M*2
GLUCOSE BLD MANUAL STRIP-MCNC: 78 MG/DL (ref 74–99)
GLUCOSE SERPL-MCNC: 81 MG/DL (ref 74–99)
HCT VFR BLD AUTO: 28.2 % (ref 41–52)
HGB BLD-MCNC: 10.4 G/DL (ref 13.5–17.5)
HGB RETIC QN: 33 PG (ref 28–38)
IMM GRANULOCYTES # BLD AUTO: 0.01 X10*3/UL (ref 0–0.7)
IMM GRANULOCYTES NFR BLD AUTO: 0.1 % (ref 0–0.9)
IMMATURE RETIC FRACTION: 30.1 %
LYMPHOCYTES # BLD AUTO: 3.36 X10*3/UL (ref 1.2–4.8)
LYMPHOCYTES NFR BLD AUTO: 47.2 %
MCH RBC QN AUTO: 32.4 PG (ref 26–34)
MCHC RBC AUTO-ENTMCNC: 36.9 G/DL (ref 32–36)
MCV RBC AUTO: 88 FL (ref 80–100)
MONOCYTES # BLD AUTO: 0.69 X10*3/UL (ref 0.1–1)
MONOCYTES NFR BLD AUTO: 9.7 %
NEUTROPHILS # BLD AUTO: 2.77 X10*3/UL (ref 1.2–7.7)
NEUTROPHILS NFR BLD AUTO: 38.9 %
NRBC BLD-RTO: 0.8 /100 WBCS (ref 0–0)
PHOSPHATE SERPL-MCNC: 3.3 MG/DL (ref 2.5–4.9)
PLATELET # BLD AUTO: 276 X10*3/UL (ref 150–450)
POLYCHROMASIA BLD QL SMEAR: NORMAL
POTASSIUM SERPL-SCNC: 4.5 MMOL/L (ref 3.5–5.3)
RBC # BLD AUTO: 3.21 X10*6/UL (ref 4.5–5.9)
RBC MORPH BLD: NORMAL
RETICS #: 0.13 X10*6/UL (ref 0.02–0.12)
RETICS/RBC NFR AUTO: 4 % (ref 0.5–2)
SCHISTOCYTES BLD QL SMEAR: NORMAL
SODIUM SERPL-SCNC: 138 MMOL/L (ref 136–145)
TARGETS BLD QL SMEAR: NORMAL
WBC # BLD AUTO: 7.1 X10*3/UL (ref 4.4–11.3)

## 2024-01-05 PROCEDURE — 85025 COMPLETE CBC W/AUTO DIFF WBC: CPT

## 2024-01-05 PROCEDURE — 99233 SBSQ HOSP IP/OBS HIGH 50: CPT | Performed by: STUDENT IN AN ORGANIZED HEALTH CARE EDUCATION/TRAINING PROGRAM

## 2024-01-05 PROCEDURE — 1130000001 HC PRIVATE PED ROOM DAILY

## 2024-01-05 PROCEDURE — 2500000004 HC RX 250 GENERAL PHARMACY W/ HCPCS (ALT 636 FOR OP/ED)

## 2024-01-05 PROCEDURE — 36415 COLL VENOUS BLD VENIPUNCTURE: CPT

## 2024-01-05 PROCEDURE — 2500000001 HC RX 250 WO HCPCS SELF ADMINISTERED DRUGS (ALT 637 FOR MEDICARE OP)

## 2024-01-05 PROCEDURE — 85045 AUTOMATED RETICULOCYTE COUNT: CPT

## 2024-01-05 PROCEDURE — 82947 ASSAY GLUCOSE BLOOD QUANT: CPT

## 2024-01-05 RX ORDER — MORPHINE SULFATE 4 MG/ML
3.5 INJECTION INTRAVENOUS EVERY 4 HOURS PRN
Status: DISCONTINUED | OUTPATIENT
Start: 2024-01-05 | End: 2024-01-05

## 2024-01-05 RX ORDER — MORPHINE SULFATE 4 MG/ML
3.5 INJECTION INTRAVENOUS EVERY 2 HOUR PRN
Status: DISCONTINUED | OUTPATIENT
Start: 2024-01-05 | End: 2024-01-06

## 2024-01-05 RX ORDER — ACETAMINOPHEN 10 MG/ML
15.79 INJECTION, SOLUTION INTRAVENOUS EVERY 6 HOURS
Status: DISPENSED | OUTPATIENT
Start: 2024-01-05 | End: 2024-01-06

## 2024-01-05 RX ADMIN — HYDROXYUREA 1000 MG: 500 CAPSULE ORAL at 09:34

## 2024-01-05 RX ADMIN — MORPHINE SULFATE 6.5 MG: 4 INJECTION INTRAVENOUS at 16:50

## 2024-01-05 RX ADMIN — MORPHINE SULFATE 6.5 MG: 4 INJECTION INTRAVENOUS at 05:05

## 2024-01-05 RX ADMIN — FAMOTIDINE 20 MG: 20 TABLET ORAL at 20:41

## 2024-01-05 RX ADMIN — POLYETHYLENE GLYCOL 3350 17 G: 17 POWDER, FOR SOLUTION ORAL at 09:34

## 2024-01-05 RX ADMIN — MORPHINE SULFATE 6.5 MG: 4 INJECTION INTRAVENOUS at 01:00

## 2024-01-05 RX ADMIN — MORPHINE SULFATE 6.5 MG: 4 INJECTION INTRAVENOUS at 13:21

## 2024-01-05 RX ADMIN — SENNOSIDES 17.2 MG: 8.6 TABLET, FILM COATED ORAL at 09:34

## 2024-01-05 RX ADMIN — ACETAMINOPHEN 1000 MG: 10 INJECTION, SOLUTION INTRAVENOUS at 03:06

## 2024-01-05 RX ADMIN — MORPHINE SULFATE 6.5 MG: 4 INJECTION INTRAVENOUS at 20:00

## 2024-01-05 RX ADMIN — MORPHINE SULFATE 6.5 MG: 4 INJECTION INTRAVENOUS at 03:06

## 2024-01-05 RX ADMIN — BISACODYL 5 MG: 5 TABLET ORAL at 09:34

## 2024-01-05 RX ADMIN — MORPHINE SULFATE 6.5 MG: 4 INJECTION INTRAVENOUS at 22:28

## 2024-01-05 RX ADMIN — ONDANSETRON 4 MG: 2 INJECTION INTRAMUSCULAR; INTRAVENOUS at 11:11

## 2024-01-05 RX ADMIN — ACETAMINOPHEN 1000 MG: 10 INJECTION, SOLUTION INTRAVENOUS at 09:34

## 2024-01-05 RX ADMIN — OLANZAPINE 5 MG: 5 TABLET, ORALLY DISINTEGRATING ORAL at 20:41

## 2024-01-05 RX ADMIN — MORPHINE SULFATE 3.5 MG: 4 INJECTION INTRAVENOUS at 23:01

## 2024-01-05 RX ADMIN — KETOROLAC TROMETHAMINE 15 MG: 30 INJECTION, SOLUTION INTRAMUSCULAR; INTRAVENOUS at 05:10

## 2024-01-05 RX ADMIN — FAMOTIDINE 20 MG: 20 TABLET ORAL at 09:46

## 2024-01-05 RX ADMIN — KETOROLAC TROMETHAMINE 15 MG: 30 INJECTION, SOLUTION INTRAMUSCULAR; INTRAVENOUS at 18:04

## 2024-01-05 RX ADMIN — KETOROLAC TROMETHAMINE 15 MG: 30 INJECTION, SOLUTION INTRAMUSCULAR; INTRAVENOUS at 12:08

## 2024-01-05 RX ADMIN — MORPHINE SULFATE 6.5 MG: 4 INJECTION INTRAVENOUS at 11:11

## 2024-01-05 RX ADMIN — MORPHINE SULFATE 6.5 MG: 4 INJECTION INTRAVENOUS at 09:34

## 2024-01-05 RX ADMIN — MORPHINE SULFATE 6.5 MG: 4 INJECTION INTRAVENOUS at 15:01

## 2024-01-05 RX ADMIN — MORPHINE SULFATE 6.5 MG: 4 INJECTION INTRAVENOUS at 07:12

## 2024-01-05 RX ADMIN — ACETAMINOPHEN 1000 MG: 10 INJECTION, SOLUTION INTRAVENOUS at 22:28

## 2024-01-05 RX ADMIN — DEXTROSE AND SODIUM CHLORIDE 75 ML/HR: 5; 900 INJECTION, SOLUTION INTRAVENOUS at 15:01

## 2024-01-05 ASSESSMENT — PAIN SCALES - GENERAL
PAINLEVEL_OUTOF10: 9
PAINLEVEL_OUTOF10: 10 - WORST POSSIBLE PAIN
PAINLEVEL_OUTOF10: 9

## 2024-01-05 ASSESSMENT — PAIN - FUNCTIONAL ASSESSMENT
PAIN_FUNCTIONAL_ASSESSMENT: 0-10

## 2024-01-05 NOTE — CONSULTS
"Nutrition Initial Assessment:     Josue is a 18 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     Nutrition History:  Food and Nutrient History: Met with Josue today at bedside. Initially, he stated he ate \"1.5 meals\" per day and was drinking his Ensure Plus (350kcal, 13g protein) here and there. He stated he doesn't feel like eating, he doesn't get hungry, and he has to force himself to eat. As the conversation went on, Josue somewhat went back on these statements and said he does drink the Ensure multiple times per day, eats snacks throughout the workday, and eats a large meal for dinner at the end of the day (whole frozen pizza plus a bag of chicken nuggets). He is aware that he has lost weight. His goal is to gain weight and build muscle. Stated his goal was 180lbs, but he would be happy to hit 160lbs. He states he has tried many things to gain weight like making shakes, making smoothies, trying to eat more fats, drinking 4 Ensures/d, etc. He feels like he is too active to gain weight. 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 Does his best to stay hydrated but sometimes falls behind on drinking throughout the day. He feels frustrated at lack of progress and repeated many times that he has tried everything and it doesn't work for him. Unclear to this writer how long he stays on an intervention before he decides it isn't working for him. 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 Reports he has been on an appetite stimulant in the past and he felt it didn't work. Was not open to trying one again as he doesn't want another medication to take.    Appetite: poor - states he does not get hungry  Energy intake: difficult to asses  Oral Problems: None  Nutrition Assistance Programs: Home care: [facility]    Current Anthropometrics:  Weight: 62.7 kg (138 lb 3.7 oz), 22 %ile (Z= -0.76) " "based on CDC (Boys, 2-20 Years) weight-for-age data using vitals from 1/5/2024.  Height/Length: 177.8 cm (5' 10\"), 56 %ile (Z= 0.14) based on CDC (Boys, 2-20 Years) Stature-for-age data based on Stature recorded on 1/4/2024.  BMI: Body mass index is 19.83 kg/m²., 11 %ile (Z= -1.23) based on CDC (Boys, 2-20 Years) BMI-for-age data using weight from 1/5/2024 and height from 1/4/2024.  Head Circumference:  , No head circumference on file for this encounter.  Mid Upper Arm Circumference (cm): 26.5 (~5%ile based on Aba 1981)   Desirable Body Weight: IBW/kg (Dietitian Calculated):  (82kg per patient goal),       Anthropometric History:   Wt Readings from Last 6 Encounters:   01/05/24 62.7 kg (138 lb 3.7 oz) (22 %, Z= -0.76)*   01/03/24 66.1 kg (145 lb 12.8 oz) (35 %, Z= -0.39)*   10/30/23 66.9 kg (147 lb 7.8 oz) (38 %, Z= -0.30)*   06/28/21 63.1 kg (42 %, Z= -0.21)*   02/08/21 62.2 kg (43 %, Z= -0.19)*   02/26/20 56 kg (32 %, Z= -0.47)*     * Growth percentiles are based on CDC (Boys, 2-20 Years) data.     Additional weights:  7/19/23: 63.3kg  10/20/22: 64.4kg (stated weight)  1/23: 67.2kg  4/20: 69.6kg  6/1: 54.4kg (ED)  7/6: 64.5kg (ED)  7/8: 70kg  7/17: 63.3kg    BMI Readings from Last 6 Encounters:   01/05/24 19.83 kg/m² (11 %, Z= -1.23)*   01/03/24 20.92 kg/m² (23 %, Z= -0.74)*   10/30/23 21.38 kg/m² (30 %, Z= -0.51)*   06/28/21 20.63 kg/m² (39 %, Z= -0.28)*   02/08/21 20.38 kg/m² (39 %, Z= -0.28)*   02/26/20 19.29 kg/m² (32 %, Z= -0.47)*     * Growth percentiles are based on CDC (Boys, 2-20 Years) data.       Nutrition Focused Physical Exam Findings:  Subcutaneous Fat Loss:   Orbital Fat Pads: Mild-Moderate (slight dark circles and slight hollowing)  Buccal Fat Pads: Mild-Moderate (flat cheeks, minimal bounce)  Triceps: Mild-moderate (less than ample fat tissue) (well developed bicep muscle)  Ribs Lower Back Mid-Axillary Line: Mild-Moderate (ribs protrude)  Muscle Wasting:  Temporalis: Well nourished " (well-defined muscle)  Pectoralis (Clavicular Region): Mild-Moderate (some protrusion of clavicle)  Deltoid/Trapezius: Mild-Moderate (slight protrusion of acromion process)  Trapezius/Infraspinatus/Supraspinatus (Scapular Region): Well nourished (bones not prominent, muscle taut)  Physical Findings:  Hair: Negative  Eyes: Negative  Mouth: Negative  Nails: Negative  Skin: Negative    Nutrition Significant Labs, Tests, Procedures: CBC Trend:   Results from last 7 days   Lab Units 01/05/24  0849 01/04/24  0600 01/03/24  1211   WBC AUTO x10*3/uL 7.1 8.6 8.7   RBC AUTO x10*6/uL 3.21* 3.79* 3.23*   HEMOGLOBIN g/dL 10.4* 12.2* 10.7*   HEMATOCRIT % 28.2* 31.9* 28.5*   MCV fL 88 84 88   PLATELETS AUTO x10*3/uL 276 329 282        Current Facility-Administered Medications:     bisacodyl (Dulcolax) EC tablet 5 mg, 5 mg, oral, Daily, Patt Mcbride MD, 5 mg at 01/05/24 0934    D5 % and 0.9 % sodium chloride infusion, 75 mL/hr, intravenous, Continuous, Patt Mcbride MD, Last Rate: 75 mL/hr at 01/05/24 1501, 75 mL/hr at 01/05/24 1501    famotidine (Pepcid) tablet 20 mg, 20 mg, oral, BID, Patt Mcbride MD, 20 mg at 01/05/24 0946    hydroxyurea (Hydrea) capsule 1,000 mg, 1,000 mg, oral, Once per day on Mon Tue Wed Thu Fri, Mary Rodriguez MD, 1,000 mg at 01/05/24 0934    ketorolac (Toradol) injection 15 mg, 15 mg, intravenous, q6h SERVANDO, Patt Mcbride MD, 15 mg at 01/05/24 1208    morphine injection 3.5 mg, 3.5 mg, intravenous, q2h PRN, Patt Mcbride MD    morphine injection 6.5 mg, 6.5 mg, intravenous, q2h, Mary Rodriguez MD, 6.5 mg at 01/05/24 1501    naloxone (Narcan) 5 mg in dextrose 5 % in water (D5W) 50 mL (0.1 mg/mL) infusion, 1.5 mcg/kg/hr (Dosing Weight), intravenous, Continuous, Rg Francois MD, Last Rate: 0.95 mL/hr at 01/04/24 1822, 1.5 mcg/kg/hr at 01/04/24 1822    OLANZapine zydis (ZyPREXA) disintegrating tablet 5 mg, 5 mg, oral, Nightly, Mary Rodriguez MD, 5 mg at 01/04/24 7322     ondansetron (Zofran) injection 4 mg, 4 mg, intravenous, q6h PRN, Patt Mcbride MD, 4 mg at 01/05/24 1111    polyethylene glycol (Glycolax, Miralax) packet 17 g, 17 g, oral, Daily, Patt Mcbride MD, 17 g at 01/05/24 0934    sennosides (Senokot) tablet 17.2 mg, 17.2 mg, oral, Daily, Mary Rodriguez MD, 17.2 mg at 01/05/24 0934  I/O:   Intake/Output Summary (Last 24 hours) at 1/5/2024 1633  Last data filed at 1/5/2024 1508  Gross per 24 hour   Intake 2196.95 ml   Output 1 ml   Net 2195.95 ml       Current Diet/Nutrition Support:   Diet: regular    Estimated Needs:   Total Energy Estimated Needs (kCal): 3407 kCal (0989-6070)   Method for Estimating Needs: WHO x 1.3 (AF) x 1.5-1.7 (SF)   Total Protein Estimated Needs (g/kg): 1.7 g/kg (1.6-1.8)  Method for Estimating Needs: increased needs for goals of muscle growth  Total Fluid Estimated Needs (mL): 2354 mL   Method for Estimating Needs: holiday-segar     Nutrition Diagnosis:  Diagnosis Status: New  Malnutrition Diagnosis: 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  Additional Assessment Information: Josue has lost weight recently, which he attributes to his very physical job and his activity level. He does receive Ensure through homecare, and has placed orders for the past 4 months. He feels he has tried many interventions to gain weight but nothing has been successful.      Nutrition Intervention:   Nutrition Prescription  Individualized Nutrition Prescription Provided for : high calorie diet, continue ONS  Food and/or Nutrient Delivery Interventions  Interventions: Meals and snacks  Goal: Goal of ~3400kcal/d    Recommendations and Plan:   - Provided handouts on high calorie nutrition therapy  - Will provide calorie count goal to give guidance for Josue  - Continue Ensure 2/d  minimum. Consider increasing amount to 3-4x/d.  - RD to follow.     Monitoring/Evaluation:   Body Composition/Growth/Weight History  Monitoring and Evaluation Plan: Weight  Weight: Measured weight  Criteria: > 63kg    Follow up: Provided inpatient RDN contact information    Time Spent (min): 60 minutes  Nutrition Follow-Up Needed?: Dietitian to reassess per policy    MARYCARMEN Gutierrez, RDN, LDN  Pager: [phone]  Phone: [phone]

## 2024-01-05 NOTE — PROGRESS NOTES
Josue Rangel is a 18 y.o. male on day 1 of admission presenting with Sickle cell crisis (CMS/HCC).    Subjective   Hemodynamically stable overnight. Narcan drip increased from 1 to 1.5 mcg/kg for continued pruritus. He rates pain in his left arm a 9/10 this morning. No stool since admission. Yesterday there was concern about reaction to morphine based on how he was acting so morphine was kept at 0.1mg/kg, but sickle cell team knows him well and reports that this is his baseline personality. However, morphine remained at 0.1mg/kg with option to go up if necessary, which he did not require.        Objective     Physical Exam  Constitutional:       General: He is not in acute distress.     Appearance: Normal appearance. He is not ill-appearing or toxic-appearing.   HENT:      Head: Normocephalic.      Nose: Nose normal.      Mouth/Throat:      Mouth: Mucous membranes are moist.   Eyes:      Extraocular Movements: Extraocular movements intact.      Conjunctiva/sclera: Conjunctivae normal.   Cardiovascular:      Rate and Rhythm: Normal rate and regular rhythm.      Pulses: Normal pulses.      Heart sounds: Normal heart sounds.   Pulmonary:      Effort: Pulmonary effort is normal.      Breath sounds: Normal breath sounds.   Abdominal:      General: Abdomen is flat.      Palpations: Abdomen is soft.      Tenderness: There is no abdominal tenderness. There is no guarding.   Musculoskeletal:      Cervical back: Normal range of motion and neck supple.      Comments: Holding left arm extended and not wanting to move it. Winces with slight passive range of motion of the left hand    Skin:     General: Skin is warm and dry.      Capillary Refill: Capillary refill takes less than 2 seconds.   Neurological:      General: No focal deficit present.      Mental Status: He is alert and oriented to person, place, and time. Mental status is at baseline.   Psychiatric:         Mood and Affect: Mood normal.         Behavior: Behavior  "normal.         Thought Content: Thought content normal.         Judgment: Judgment normal.         Last Recorded Vitals  Blood pressure 123/70, pulse 59, temperature 36.7 °C (98.1 °F), temperature source Oral, resp. rate 18, height 1.778 m (5' 10\"), weight 62.3 kg (137 lb 5.6 oz), SpO2 97 %.  Intake/Output last 24 hours:    Intake/Output Summary (Last 24 hours) at 1/5/2024 0942  Last data filed at 1/5/2024 0719  Gross per 24 hour   Intake 2112.75 ml   Output 0 ml   Net 2112.75 ml        Relevant Results  Results for orders placed or performed during the hospital encounter of 01/04/24 (from the past 96 hour(s))   CBC and Auto Differential   Result Value Ref Range    WBC 8.6 4.4 - 11.3 x10*3/uL    nRBC 0.9 (H) 0.0 - 0.0 /100 WBCs    RBC 3.79 (L) 4.50 - 5.90 x10*6/uL    Hemoglobin 12.2 (L) 13.5 - 17.5 g/dL    Hematocrit 31.9 (L) 41.0 - 52.0 %    MCV 84 80 - 100 fL    MCH 32.2 26.0 - 34.0 pg    MCHC 38.2 (H) 32.0 - 36.0 g/dL    RDW 14.0 11.5 - 14.5 %    Platelets 329 150 - 450 x10*3/uL    Immature Granulocytes %, Automated 0.2 0.0 - 0.9 %    Immature Granulocytes Absolute, Automated 0.02 0.00 - 0.70 x10*3/uL   Hepatic Function Panel   Result Value Ref Range    Albumin 5.2 (H) 3.4 - 5.0 g/dL    Bilirubin, Total 2.0 (H) 0.0 - 1.2 mg/dL    Bilirubin, Direct 0.3 0.0 - 0.3 mg/dL    Alkaline Phosphatase 60 33 - 120 U/L    ALT 16 10 - 52 U/L    AST 28 9 - 39 U/L    Total Protein 8.0 6.4 - 8.2 g/dL   Reticulocytes   Result Value Ref Range    Retic % 4.4 (H) 0.5 - 2.0 %    Retic Absolute 0.164 (H) 0.022 - 0.118 x10*6/uL    Reticulocyte Hemoglobin 34 28 - 38 pg    Immature Retic fraction 33.4 (H) <=16.0 %   Type And Screen   Result Value Ref Range    ABO TYPE O     Rh TYPE POS     ANTIBODY SCREEN NEG    Phosphorus   Result Value Ref Range    Phosphorus 3.4 2.5 - 4.9 mg/dL   Basic Metabolic Panel   Result Value Ref Range    Glucose 84 74 - 99 mg/dL    Sodium 137 136 - 145 mmol/L    Potassium 4.4 3.5 - 5.3 mmol/L    Chloride 103 " 98 - 107 mmol/L    Bicarbonate 22 21 - 32 mmol/L    Anion Gap 16 10 - 20 mmol/L    Urea Nitrogen 11 6 - 23 mg/dL    Creatinine 0.66 0.50 - 1.30 mg/dL    eGFR >90 >60 mL/min/1.73m*2    Calcium 10.6 8.6 - 10.6 mg/dL   Manual Differential   Result Value Ref Range    Neutrophils %, Manual 50.9 40.0 - 80.0 %    Lymphocytes %, Manual 37.1 13.0 - 44.0 %    Monocytes %, Manual 6.0 2.0 - 10.0 %    Eosinophils %, Manual 0.0 0.0 - 6.0 %    Basophils %, Manual 0.0 0.0 - 2.0 %    Atypical Lymphocytes %, Manual 6.0 0.0 - 2.0 %    Seg Neutrophils Absolute, Manual 4.38 1.20 - 7.00 x10*3/uL    Lymphocytes Absolute, Manual 3.19 1.20 - 4.80 x10*3/uL    Monocytes Absolute, Manual 0.52 0.10 - 1.00 x10*3/uL    Eosinophils Absolute, Manual 0.00 0.00 - 0.70 x10*3/uL    Basophils Absolute, Manual 0.00 0.00 - 0.10 x10*3/uL    Atypical Lymphs Absolute, Manual 0.52 (H) 0.00 - 0.50 x10*3/uL    Total Cells Counted 116     RBC Morphology See Below     Target Cells Many    CBC and Auto Differential   Result Value Ref Range    WBC 7.1 4.4 - 11.3 x10*3/uL    nRBC 0.8 (H) 0.0 - 0.0 /100 WBCs    RBC 3.21 (L) 4.50 - 5.90 x10*6/uL    Hemoglobin 10.4 (L) 13.5 - 17.5 g/dL    Hematocrit 28.2 (L) 41.0 - 52.0 %    MCV 88 80 - 100 fL    MCH 32.4 26.0 - 34.0 pg    MCHC 36.9 (H) 32.0 - 36.0 g/dL    RDW 13.8 11.5 - 14.5 %    Platelets      Neutrophils %      Immature Granulocytes %, Automated      Lymphocytes %      Monocytes %      Eosinophils %      Basophils %      Neutrophils Absolute      Lymphocytes Absolute      Monocytes Absolute      Eosinophils Absolute      Basophils Absolute     Reticulocytes   Result Value Ref Range    Retic % 4.0 (H) 0.5 - 2.0 %    Retic Absolute 0.128 (H) 0.022 - 0.118 x10*6/uL    Reticulocyte Hemoglobin 33 28 - 38 pg    Immature Retic fraction 30.1 (H) <=16.0 %           Assessment/Plan   Principal Problem:    Sickle cell crisis (CMS/HCC)    Josue is a 18 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  This morning, he continues to rate left arm pain at a 9/10. He was holding his left arm still but was comfortable appearing and thus will keep morphine at 0.1mg/kg dose at this time and add 2 PRN morphine half doses (3mg) for breakthrough pain. If he requires both PRN morphine doses, then will increase his scheduled morphine to 0.12mg/kg. Will continue Narcan drip at 1.5mcg/kg and monitor pruritus. If significantly worsening or impairing for him, will increase to 2mcg/kg, however want to avoid this for now so as not to reverse the effects of morphine.   He was short of breath at presentation to the ED, and no evidence of ACS on CXR performed there and today he reports comfortable work of breathing and does not appear short of breathing. However he does have multiple prior episodes of ACS so we will monitor closely.  He has not had a stool yet, we will continue to monitor throughout the day and if he is still not having a bowel movement, we will consider adding on lactulose.      Plan:  HEME  #HgB SC with VOE  [x] blood consent signed in EMR  - IV morphine 0.1mg/kg (6.5mg) q2h scheduled  - x2 doses morphine 3.5 mg (0.05 mg/kg) q2h PRN for breakthrough pain  - IV Toradol 0.5mg/kg q6h scheduled   - IV Tylenol q6h scheduled  - c/h Hydroxyurea 1000mg daily M-F  - baseline Hgb ~11.5, baseline retic ~4.3  #Opiate induced pruritus  - Narcan drip 1.5mcg/kg/hr  - Continue to monitor     CV  #Access: pIV     RESP  #Acute Chest Syndrome Prevention  [x] RT notified  - IS and EZPAP     FEN/GI  #Nutrition  - regular diet  #Hydration  - 3/4 mIVF with D5NS  #Constipation prevention  - Senna 17.2mg daily  - Miralax 17g daily  - Dulcolax 5mg daily  - Consider lactulose   #Nausea  - IV zofran 4mg q6h PRN  #GI prophylaxis  - PO Pepcid 20mg BID while on toradol     ID  #Fever plan  - if febrile > or = 38.5C, blood culture and ceftriaxone. If respiratory concerns, obtain 2 view CXR to evaluate for ACS, add azithromycin if positive      PSYCHOSOCIAL  #Depression  - c/h olanzapine 5mg at bedtime     Labs:  - CBCd, retic, RFP qAM; T&S q72h (next due 1/7)      Discussed with Dr. Estuardo Chavez MD  Pediatrics, PGY-1

## 2024-01-06 LAB
ALBUMIN SERPL BCP-MCNC: 4.4 G/DL (ref 3.4–5)
ANION GAP SERPL CALC-SCNC: 13 MMOL/L (ref 10–20)
BASOPHILS # BLD MANUAL: 0.15 X10*3/UL (ref 0–0.1)
BASOPHILS NFR BLD MANUAL: 1.7 %
BUN SERPL-MCNC: 10 MG/DL (ref 6–23)
CALCIUM SERPL-MCNC: 9.6 MG/DL (ref 8.6–10.6)
CHLORIDE SERPL-SCNC: 104 MMOL/L (ref 98–107)
CO2 SERPL-SCNC: 25 MMOL/L (ref 21–32)
CREAT SERPL-MCNC: 0.71 MG/DL (ref 0.5–1.3)
EOSINOPHIL # BLD MANUAL: 0.15 X10*3/UL (ref 0–0.7)
EOSINOPHIL NFR BLD MANUAL: 1.7 %
ERYTHROCYTE [DISTWIDTH] IN BLOOD BY AUTOMATED COUNT: 13.9 % (ref 11.5–14.5)
GFR SERPL CREATININE-BSD FRML MDRD: >90 ML/MIN/1.73M*2
GLUCOSE SERPL-MCNC: 103 MG/DL (ref 74–99)
HCT VFR BLD AUTO: 28.9 % (ref 41–52)
HGB BLD-MCNC: 11 G/DL (ref 13.5–17.5)
HGB RETIC QN: 32 PG (ref 28–38)
IMM GRANULOCYTES # BLD AUTO: 0.03 X10*3/UL (ref 0–0.7)
IMM GRANULOCYTES NFR BLD AUTO: 0.3 % (ref 0–0.9)
IMMATURE RETIC FRACTION: 31.9 %
LYMPHOCYTES # BLD MANUAL: 2.9 X10*3/UL (ref 1.2–4.8)
LYMPHOCYTES NFR BLD MANUAL: 31.9 %
MCH RBC QN AUTO: 32.4 PG (ref 26–34)
MCHC RBC AUTO-ENTMCNC: 38.1 G/DL (ref 32–36)
MCV RBC AUTO: 85 FL (ref 80–100)
MONOCYTES # BLD MANUAL: 0.32 X10*3/UL (ref 0.1–1)
MONOCYTES NFR BLD MANUAL: 3.5 %
NEUTS SEG # BLD MANUAL: 5.57 X10*3/UL (ref 1.2–7)
NEUTS SEG NFR BLD MANUAL: 61.2 %
NRBC BLD-RTO: 0.7 /100 WBCS (ref 0–0)
PHOSPHATE SERPL-MCNC: 4.4 MG/DL (ref 2.5–4.9)
PLATELET # BLD AUTO: 293 X10*3/UL (ref 150–450)
POTASSIUM SERPL-SCNC: 4.5 MMOL/L (ref 3.5–5.3)
RBC # BLD AUTO: 3.39 X10*6/UL (ref 4.5–5.9)
RBC MORPH BLD: ABNORMAL
RETICS #: 0.13 X10*6/UL (ref 0.02–0.12)
RETICS/RBC NFR AUTO: 3.7 % (ref 0.5–2)
SCHISTOCYTES BLD QL SMEAR: ABNORMAL
SODIUM SERPL-SCNC: 137 MMOL/L (ref 136–145)
STOMATOCYTES BLD QL SMEAR: ABNORMAL
TARGETS BLD QL SMEAR: ABNORMAL
TOTAL CELLS COUNTED BLD: 116
WBC # BLD AUTO: 9.1 X10*3/UL (ref 4.4–11.3)

## 2024-01-06 PROCEDURE — 85007 BL SMEAR W/DIFF WBC COUNT: CPT

## 2024-01-06 PROCEDURE — 85027 COMPLETE CBC AUTOMATED: CPT

## 2024-01-06 PROCEDURE — 2500000004 HC RX 250 GENERAL PHARMACY W/ HCPCS (ALT 636 FOR OP/ED)

## 2024-01-06 PROCEDURE — 36415 COLL VENOUS BLD VENIPUNCTURE: CPT

## 2024-01-06 PROCEDURE — 99233 SBSQ HOSP IP/OBS HIGH 50: CPT | Performed by: STUDENT IN AN ORGANIZED HEALTH CARE EDUCATION/TRAINING PROGRAM

## 2024-01-06 PROCEDURE — 85045 AUTOMATED RETICULOCYTE COUNT: CPT

## 2024-01-06 PROCEDURE — 1130000001 HC PRIVATE PED ROOM DAILY

## 2024-01-06 PROCEDURE — 2500000001 HC RX 250 WO HCPCS SELF ADMINISTERED DRUGS (ALT 637 FOR MEDICARE OP)

## 2024-01-06 PROCEDURE — 80069 RENAL FUNCTION PANEL: CPT

## 2024-01-06 RX ORDER — MORPHINE SULFATE 4 MG/ML
4.7 INJECTION INTRAVENOUS
Status: DISCONTINUED | OUTPATIENT
Start: 2024-01-06 | End: 2024-01-07

## 2024-01-06 RX ORDER — ACETAMINOPHEN 10 MG/ML
1000 INJECTION, SOLUTION INTRAVENOUS EVERY 6 HOURS
Status: DISPENSED | OUTPATIENT
Start: 2024-01-06 | End: 2024-01-07

## 2024-01-06 RX ORDER — ACETAMINOPHEN 325 MG/1
975 TABLET ORAL ONCE
Status: COMPLETED | OUTPATIENT
Start: 2024-01-06 | End: 2024-01-06

## 2024-01-06 RX ORDER — OXYCODONE HYDROCHLORIDE 5 MG/1
10 TABLET ORAL ONCE
Status: COMPLETED | OUTPATIENT
Start: 2024-01-06 | End: 2024-01-06

## 2024-01-06 RX ADMIN — ACETAMINOPHEN 1000 MG: 10 INJECTION, SOLUTION INTRAVENOUS at 10:46

## 2024-01-06 RX ADMIN — DEXTROSE AND SODIUM CHLORIDE 75 ML/HR: 5; 900 INJECTION, SOLUTION INTRAVENOUS at 05:42

## 2024-01-06 RX ADMIN — MORPHINE SULFATE 4.7 MG: 4 INJECTION INTRAVENOUS at 18:59

## 2024-01-06 RX ADMIN — KETOROLAC TROMETHAMINE 15 MG: 30 INJECTION, SOLUTION INTRAMUSCULAR; INTRAVENOUS at 05:39

## 2024-01-06 RX ADMIN — ACETAMINOPHEN 975 MG: 325 TABLET ORAL at 04:14

## 2024-01-06 RX ADMIN — MORPHINE SULFATE 4.7 MG: 4 INJECTION INTRAVENOUS at 23:05

## 2024-01-06 RX ADMIN — FAMOTIDINE 20 MG: 20 TABLET ORAL at 08:51

## 2024-01-06 RX ADMIN — ACETAMINOPHEN 1000 MG: 10 INJECTION, SOLUTION INTRAVENOUS at 23:05

## 2024-01-06 RX ADMIN — OLANZAPINE 5 MG: 5 TABLET, ORALLY DISINTEGRATING ORAL at 21:17

## 2024-01-06 RX ADMIN — MORPHINE SULFATE 6.5 MG: 4 INJECTION INTRAVENOUS at 08:34

## 2024-01-06 RX ADMIN — KETOROLAC TROMETHAMINE 15 MG: 30 INJECTION, SOLUTION INTRAMUSCULAR; INTRAVENOUS at 00:01

## 2024-01-06 RX ADMIN — BISACODYL 5 MG: 5 TABLET ORAL at 08:35

## 2024-01-06 RX ADMIN — MORPHINE SULFATE 4.7 MG: 4 INJECTION INTRAVENOUS at 17:17

## 2024-01-06 RX ADMIN — MORPHINE SULFATE 4.7 MG: 4 INJECTION INTRAVENOUS at 21:17

## 2024-01-06 RX ADMIN — FAMOTIDINE 20 MG: 20 TABLET ORAL at 21:17

## 2024-01-06 RX ADMIN — SENNOSIDES 17.2 MG: 8.6 TABLET, FILM COATED ORAL at 08:35

## 2024-01-06 RX ADMIN — MORPHINE SULFATE 4.7 MG: 4 INJECTION INTRAVENOUS at 13:15

## 2024-01-06 RX ADMIN — MORPHINE SULFATE 4.7 MG: 4 INJECTION INTRAVENOUS at 15:38

## 2024-01-06 RX ADMIN — ACETAMINOPHEN 1000 MG: 10 INJECTION, SOLUTION INTRAVENOUS at 17:17

## 2024-01-06 RX ADMIN — OXYCODONE HYDROCHLORIDE 10 MG: 5 TABLET ORAL at 03:21

## 2024-01-06 RX ADMIN — MORPHINE SULFATE 6.5 MG: 4 INJECTION INTRAVENOUS at 10:46

## 2024-01-06 RX ADMIN — KETOROLAC TROMETHAMINE 15 MG: 30 INJECTION, SOLUTION INTRAMUSCULAR; INTRAVENOUS at 19:59

## 2024-01-06 RX ADMIN — POLYETHYLENE GLYCOL 3350 17 G: 17 POWDER, FOR SOLUTION ORAL at 08:35

## 2024-01-06 RX ADMIN — KETOROLAC TROMETHAMINE 15 MG: 30 INJECTION, SOLUTION INTRAMUSCULAR; INTRAVENOUS at 14:02

## 2024-01-06 RX ADMIN — MORPHINE SULFATE 6.5 MG: 4 INJECTION INTRAVENOUS at 00:00

## 2024-01-06 RX ADMIN — MORPHINE SULFATE 6.5 MG: 4 INJECTION INTRAVENOUS at 05:39

## 2024-01-06 ASSESSMENT — PAIN SCALES - GENERAL
PAINLEVEL_OUTOF10: 10 - WORST POSSIBLE PAIN
PAINLEVEL_OUTOF10: 9

## 2024-01-06 ASSESSMENT — PAIN - FUNCTIONAL ASSESSMENT
PAIN_FUNCTIONAL_ASSESSMENT: UNABLE TO SELF-REPORT
PAIN_FUNCTIONAL_ASSESSMENT: 0-10

## 2024-01-06 NOTE — PROGRESS NOTES
"Josue Rangel is a 18 y.o. male on day 2 of admission presenting with Sickle cell crisis (CMS/HCC).    Subjective   Overnight he had increased pain around 2300 so he received x1 breakthrough dose of morphine. He lost PIV at 0200 so he was given 1x oxycodone 10 mg and PO tylenol. PIV was replaced at 0500 and he was put back on scheduled morphine. His narcan drip tube was clogged so he had pruritus this morning but tube was fixed and pruritus has since been improving.     Objective     Physical Exam  Constitutional:       Comments: Half asleep, comfortable, no acute distress. Itching   HENT:      Head: Normocephalic.      Nose: Nose normal.   Eyes:      Extraocular Movements: Extraocular movements intact.      Conjunctiva/sclera: Conjunctivae normal.   Cardiovascular:      Rate and Rhythm: Normal rate and regular rhythm.      Pulses: Normal pulses.      Heart sounds: Normal heart sounds.   Pulmonary:      Effort: Pulmonary effort is normal.      Breath sounds: Normal breath sounds.   Abdominal:      General: Abdomen is flat.      Palpations: Abdomen is soft.   Musculoskeletal:      Cervical back: Normal range of motion and neck supple.      Comments: Appreciated him moving left arm during sleep, improved from yesterday where he was not able to move left arm   Skin:     General: Skin is warm.      Capillary Refill: Capillary refill takes less than 2 seconds.   Neurological:      Mental Status: Mental status is at baseline.       Last Recorded Vitals  Blood pressure 111/54, pulse 77, temperature 36.5 °C (97.7 °F), temperature source Oral, resp. rate 16, height 1.778 m (5' 10\"), weight 62.7 kg (138 lb 3.7 oz), SpO2 95 %.  Intake/Output last 24 hours:    Intake/Output Summary (Last 24 hours) at 1/6/2024 0753  Last data filed at 1/6/2024 0400  Gross per 24 hour   Intake 2115.66 ml   Output 1 ml   Net 2114.66 ml       Relevant Results  Results for orders placed or performed during the hospital encounter of 01/04/24 (from " the past 24 hour(s))   POCT GLUCOSE   Result Value Ref Range    POCT Glucose 78 74 - 99 mg/dL   CBC and Auto Differential   Result Value Ref Range    WBC 9.1 4.4 - 11.3 x10*3/uL    nRBC 0.7 (H) 0.0 - 0.0 /100 WBCs    RBC 3.39 (L) 4.50 - 5.90 x10*6/uL    Hemoglobin 11.0 (L) 13.5 - 17.5 g/dL    Hematocrit 28.9 (L) 41.0 - 52.0 %    MCV 85 80 - 100 fL    MCH 32.4 26.0 - 34.0 pg    MCHC 38.1 (H) 32.0 - 36.0 g/dL    RDW 13.9 11.5 - 14.5 %    Platelets 293 150 - 450 x10*3/uL    Immature Granulocytes %, Automated 0.3 0.0 - 0.9 %    Immature Granulocytes Absolute, Automated 0.03 0.00 - 0.70 x10*3/uL   Reticulocytes   Result Value Ref Range    Retic % 3.7 (H) 0.5 - 2.0 %    Retic Absolute 0.127 (H) 0.022 - 0.118 x10*6/uL    Reticulocyte Hemoglobin 32 28 - 38 pg    Immature Retic fraction 31.9 (H) <=16.0 %   Renal Function Panel   Result Value Ref Range    Glucose 103 (H) 74 - 99 mg/dL    Sodium 137 136 - 145 mmol/L    Potassium 4.5 3.5 - 5.3 mmol/L    Chloride 104 98 - 107 mmol/L    Bicarbonate 25 21 - 32 mmol/L    Anion Gap 13 10 - 20 mmol/L    Urea Nitrogen 10 6 - 23 mg/dL    Creatinine 0.71 0.50 - 1.30 mg/dL    eGFR >90 >60 mL/min/1.73m*2    Calcium 9.6 8.6 - 10.6 mg/dL    Phosphorus 4.4 2.5 - 4.9 mg/dL    Albumin 4.4 3.4 - 5.0 g/dL   Manual Differential   Result Value Ref Range    Neutrophils %, Manual 61.2 40.0 - 80.0 %    Lymphocytes %, Manual 31.9 13.0 - 44.0 %    Monocytes %, Manual 3.5 2.0 - 10.0 %    Eosinophils %, Manual 1.7 0.0 - 6.0 %    Basophils %, Manual 1.7 0.0 - 2.0 %    Seg Neutrophils Absolute, Manual 5.57 1.20 - 7.00 x10*3/uL    Lymphocytes Absolute, Manual 2.90 1.20 - 4.80 x10*3/uL    Monocytes Absolute, Manual 0.32 0.10 - 1.00 x10*3/uL    Eosinophils Absolute, Manual 0.15 0.00 - 0.70 x10*3/uL    Basophils Absolute, Manual 0.15 (H) 0.00 - 0.10 x10*3/uL    Total Cells Counted 116     RBC Morphology See Below     RBC Fragments Few     Target Cells Many     Stomatocytes Few           Assessment/Plan    Principal Problem:    Sickle cell crisis (CMS/HCC)    Josue is a 18 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 He was asleep this morning and appeared comfortable. During my exam, I appreciated that he was able to move his left arm around during his half asleep state whereas on examinations yesterday he was not able to move his left arm. Because of improved exam, will switch him to 0.075mg/kg (4.7mg) morphine this morning and monitor pain status. Will continue Narcan drip at 1.5mcg/kg and monitor pruritus. Will continue scheduled IV Toradol and IV tylenol.   He was short of breath at presentation to the ED, and no evidence of ACS on CXR performed there. He does have multiple prior episodes of ACS so we will monitor closely. There have not been any concerns for acute chest for this admission.      Plan:  HEME  #HgB SC with VOE  [x] blood consent signed in EMR  - IV morphine 0.0.75mg/kg (4.7mg) q2h scheduled  - IV Toradol 0.5mg/kg q6h scheduled   - IV Tylenol q6h scheduled  - c/h Hydroxyurea 1000mg daily M-F  - baseline Hgb ~11.5, baseline retic ~4.3  #Opiate induced pruritus  - Narcan drip 1.5mcg/kg/hr  - Continue to monitor     CV  #Access: pIV     RESP  #Acute Chest Syndrome Prevention  [x] RT notified  - IS and EZPAP     FEN/GI  #Nutrition  - regular diet  #Hydration  - 3/4 mIVF with D5NS  #Constipation prevention  - Senna 17.2mg daily  - Miralax 17g daily  - Dulcolax 5mg daily  #Nausea  - IV zofran 4mg q6h PRN  #GI prophylaxis  - PO Pepcid 20mg BID while on toradol     ID  #Fever plan  - if febrile > or = 38.5C, blood culture and ceftriaxone. If respiratory concerns, obtain 2 view CXR to evaluate for ACS, add azithromycin if positive     PSYCHOSOCIAL  #Depression  - c/h olanzapine 5mg at bedtime     Labs:  - CBCd, retic, RFP qAM; T&S q72h (next due 1/7)      Discussed with Dr. Estuardo Chavez MD  Pediatrics, PGY-1

## 2024-01-07 ENCOUNTER — APPOINTMENT (OUTPATIENT)
Dept: RADIOLOGY | Facility: HOSPITAL | Age: 19
End: 2024-01-07
Payer: MEDICAID

## 2024-01-07 LAB
ABO GROUP (TYPE) IN BLOOD: NORMAL
ALBUMIN SERPL BCP-MCNC: 4.2 G/DL (ref 3.4–5)
ANION GAP SERPL CALC-SCNC: 11 MMOL/L (ref 10–20)
ANTIBODY SCREEN: NORMAL
BASOPHILS # BLD MANUAL: 0 X10*3/UL (ref 0–0.1)
BASOPHILS NFR BLD MANUAL: 0 %
BUN SERPL-MCNC: 7 MG/DL (ref 6–23)
CALCIUM SERPL-MCNC: 9.4 MG/DL (ref 8.6–10.6)
CHLORIDE SERPL-SCNC: 105 MMOL/L (ref 98–107)
CO2 SERPL-SCNC: 27 MMOL/L (ref 21–32)
CREAT SERPL-MCNC: 0.57 MG/DL (ref 0.5–1.3)
EOSINOPHIL # BLD MANUAL: 0.25 X10*3/UL (ref 0–0.7)
EOSINOPHIL NFR BLD MANUAL: 3.5 %
ERYTHROCYTE [DISTWIDTH] IN BLOOD BY AUTOMATED COUNT: 13.8 % (ref 11.5–14.5)
GFR SERPL CREATININE-BSD FRML MDRD: >90 ML/MIN/1.73M*2
GLUCOSE SERPL-MCNC: 83 MG/DL (ref 74–99)
HCT VFR BLD AUTO: 26.8 % (ref 41–52)
HGB BLD-MCNC: 10.2 G/DL (ref 13.5–17.5)
HGB RETIC QN: 31 PG (ref 28–38)
IMM GRANULOCYTES # BLD AUTO: 0.02 X10*3/UL (ref 0–0.7)
IMM GRANULOCYTES NFR BLD AUTO: 0.3 % (ref 0–0.9)
IMMATURE RETIC FRACTION: 30.5 %
LYMPHOCYTES # BLD MANUAL: 2.02 X10*3/UL (ref 1.2–4.8)
LYMPHOCYTES NFR BLD MANUAL: 28.1 %
MCH RBC QN AUTO: 33.8 PG (ref 26–34)
MCHC RBC AUTO-ENTMCNC: 38.1 G/DL (ref 32–36)
MCV RBC AUTO: 89 FL (ref 80–100)
MONOCYTES # BLD MANUAL: 0.5 X10*3/UL (ref 0.1–1)
MONOCYTES NFR BLD MANUAL: 7 %
NEUTS SEG # BLD MANUAL: 4.23 X10*3/UL (ref 1.2–7)
NEUTS SEG NFR BLD MANUAL: 58.8 %
NRBC BLD-RTO: 0.4 /100 WBCS (ref 0–0)
PHOSPHATE SERPL-MCNC: 4.5 MG/DL (ref 2.5–4.9)
PLATELET # BLD AUTO: 262 X10*3/UL (ref 150–450)
POTASSIUM SERPL-SCNC: 4.1 MMOL/L (ref 3.5–5.3)
RBC # BLD AUTO: 3.02 X10*6/UL (ref 4.5–5.9)
RBC MORPH BLD: NORMAL
RETICS #: 0.09 X10*6/UL (ref 0.02–0.12)
RETICS/RBC NFR AUTO: 3.1 % (ref 0.5–2)
RH FACTOR (ANTIGEN D): NORMAL
SODIUM SERPL-SCNC: 139 MMOL/L (ref 136–145)
STOMATOCYTES BLD QL SMEAR: NORMAL
TARGETS BLD QL SMEAR: NORMAL
TOTAL CELLS COUNTED BLD: 114
VARIANT LYMPHS # BLD MANUAL: 0.19 X10*3/UL (ref 0–0.5)
VARIANT LYMPHS NFR BLD: 2.6 %
WBC # BLD AUTO: 7.2 X10*3/UL (ref 4.4–11.3)

## 2024-01-07 PROCEDURE — 85027 COMPLETE CBC AUTOMATED: CPT

## 2024-01-07 PROCEDURE — 85045 AUTOMATED RETICULOCYTE COUNT: CPT

## 2024-01-07 PROCEDURE — 85007 BL SMEAR W/DIFF WBC COUNT: CPT

## 2024-01-07 PROCEDURE — 99233 SBSQ HOSP IP/OBS HIGH 50: CPT | Performed by: STUDENT IN AN ORGANIZED HEALTH CARE EDUCATION/TRAINING PROGRAM

## 2024-01-07 PROCEDURE — 73060 X-RAY EXAM OF HUMERUS: CPT | Mod: LEFT SIDE | Performed by: INTERNAL MEDICINE

## 2024-01-07 PROCEDURE — 2500000004 HC RX 250 GENERAL PHARMACY W/ HCPCS (ALT 636 FOR OP/ED)

## 2024-01-07 PROCEDURE — 2500000001 HC RX 250 WO HCPCS SELF ADMINISTERED DRUGS (ALT 637 FOR MEDICARE OP)

## 2024-01-07 PROCEDURE — 73030 X-RAY EXAM OF SHOULDER: CPT | Mod: LT

## 2024-01-07 PROCEDURE — 73090 X-RAY EXAM OF FOREARM: CPT | Mod: LEFT SIDE | Performed by: INTERNAL MEDICINE

## 2024-01-07 PROCEDURE — 73030 X-RAY EXAM OF SHOULDER: CPT | Mod: LEFT SIDE | Performed by: INTERNAL MEDICINE

## 2024-01-07 PROCEDURE — 73130 X-RAY EXAM OF HAND: CPT | Mod: LEFT SIDE | Performed by: INTERNAL MEDICINE

## 2024-01-07 PROCEDURE — 73090 X-RAY EXAM OF FOREARM: CPT | Mod: LT

## 2024-01-07 PROCEDURE — 73080 X-RAY EXAM OF ELBOW: CPT | Mod: LEFT SIDE | Performed by: INTERNAL MEDICINE

## 2024-01-07 PROCEDURE — 73130 X-RAY EXAM OF HAND: CPT | Mod: LT

## 2024-01-07 PROCEDURE — 80069 RENAL FUNCTION PANEL: CPT

## 2024-01-07 PROCEDURE — 36415 COLL VENOUS BLD VENIPUNCTURE: CPT

## 2024-01-07 PROCEDURE — 73060 X-RAY EXAM OF HUMERUS: CPT | Mod: LT

## 2024-01-07 PROCEDURE — 1130000001 HC PRIVATE PED ROOM DAILY

## 2024-01-07 PROCEDURE — 73080 X-RAY EXAM OF ELBOW: CPT | Mod: LT

## 2024-01-07 PROCEDURE — 86850 RBC ANTIBODY SCREEN: CPT

## 2024-01-07 RX ORDER — MORPHINE SULFATE 4 MG/ML
3.1 INJECTION INTRAVENOUS ONCE
Status: COMPLETED | OUTPATIENT
Start: 2024-01-07 | End: 2024-01-07

## 2024-01-07 RX ORDER — MORPHINE SULFATE 4 MG/ML
7.5 INJECTION INTRAVENOUS
Status: DISCONTINUED | OUTPATIENT
Start: 2024-01-07 | End: 2024-01-09

## 2024-01-07 RX ORDER — NAPROXEN 375 MG/1
375 TABLET ORAL EVERY 12 HOURS
Status: DISCONTINUED | OUTPATIENT
Start: 2024-01-07 | End: 2024-01-10 | Stop reason: HOSPADM

## 2024-01-07 RX ORDER — MORPHINE SULFATE 4 MG/ML
6.3 INJECTION INTRAVENOUS
Status: DISCONTINUED | OUTPATIENT
Start: 2024-01-07 | End: 2024-01-07

## 2024-01-07 RX ORDER — ACETAMINOPHEN 10 MG/ML
1000 INJECTION, SOLUTION INTRAVENOUS EVERY 6 HOURS
Status: COMPLETED | OUTPATIENT
Start: 2024-01-07 | End: 2024-01-08

## 2024-01-07 RX ADMIN — FAMOTIDINE 20 MG: 20 TABLET ORAL at 09:27

## 2024-01-07 RX ADMIN — MORPHINE SULFATE 6.3 MG: 4 INJECTION INTRAVENOUS at 17:05

## 2024-01-07 RX ADMIN — NAPROXEN 375 MG: 375 TABLET ORAL at 20:47

## 2024-01-07 RX ADMIN — MORPHINE SULFATE 6.3 MG: 4 INJECTION INTRAVENOUS at 15:27

## 2024-01-07 RX ADMIN — MORPHINE SULFATE 4.7 MG: 4 INJECTION INTRAVENOUS at 11:37

## 2024-01-07 RX ADMIN — BISACODYL 5 MG: 5 TABLET ORAL at 09:27

## 2024-01-07 RX ADMIN — ACETAMINOPHEN 1000 MG: 10 INJECTION, SOLUTION INTRAVENOUS at 17:05

## 2024-01-07 RX ADMIN — MORPHINE SULFATE 4.7 MG: 4 INJECTION INTRAVENOUS at 02:51

## 2024-01-07 RX ADMIN — MORPHINE SULFATE 7.5 MG: 4 INJECTION INTRAVENOUS at 23:14

## 2024-01-07 RX ADMIN — OLANZAPINE 5 MG: 5 TABLET, ORALLY DISINTEGRATING ORAL at 20:47

## 2024-01-07 RX ADMIN — FAMOTIDINE 20 MG: 20 TABLET ORAL at 20:47

## 2024-01-07 RX ADMIN — MORPHINE SULFATE 4.7 MG: 4 INJECTION INTRAVENOUS at 01:05

## 2024-01-07 RX ADMIN — DEXTROSE AND SODIUM CHLORIDE 75 ML/HR: 5; 900 INJECTION, SOLUTION INTRAVENOUS at 05:06

## 2024-01-07 RX ADMIN — MORPHINE SULFATE 6.3 MG: 4 INJECTION INTRAVENOUS at 18:55

## 2024-01-07 RX ADMIN — POLYETHYLENE GLYCOL 3350 17 G: 17 POWDER, FOR SOLUTION ORAL at 09:28

## 2024-01-07 RX ADMIN — MORPHINE SULFATE 3.1 MG: 4 INJECTION INTRAVENOUS at 22:16

## 2024-01-07 RX ADMIN — NALOXONE HYDROCHLORIDE 1.5 MCG/KG/HR: 0.4 INJECTION, SOLUTION INTRAMUSCULAR; INTRAVENOUS; SUBCUTANEOUS at 17:40

## 2024-01-07 RX ADMIN — KETOROLAC TROMETHAMINE 15 MG: 30 INJECTION, SOLUTION INTRAMUSCULAR; INTRAVENOUS at 07:44

## 2024-01-07 RX ADMIN — MORPHINE SULFATE 4.7 MG: 4 INJECTION INTRAVENOUS at 06:49

## 2024-01-07 RX ADMIN — MORPHINE SULFATE 4.7 MG: 4 INJECTION INTRAVENOUS at 05:06

## 2024-01-07 RX ADMIN — DEXTROSE AND SODIUM CHLORIDE 75 ML/HR: 5; 900 INJECTION, SOLUTION INTRAVENOUS at 21:23

## 2024-01-07 RX ADMIN — MORPHINE SULFATE 3.1 MG: 4 INJECTION INTRAVENOUS at 13:05

## 2024-01-07 RX ADMIN — KETOROLAC TROMETHAMINE 15 MG: 30 INJECTION, SOLUTION INTRAMUSCULAR; INTRAVENOUS at 01:46

## 2024-01-07 RX ADMIN — ACETAMINOPHEN 1000 MG: 10 INJECTION, SOLUTION INTRAVENOUS at 23:14

## 2024-01-07 RX ADMIN — ACETAMINOPHEN 1000 MG: 10 INJECTION, SOLUTION INTRAVENOUS at 11:37

## 2024-01-07 RX ADMIN — ACETAMINOPHEN 1000 MG: 10 INJECTION, SOLUTION INTRAVENOUS at 05:06

## 2024-01-07 RX ADMIN — MORPHINE SULFATE 4.7 MG: 4 INJECTION INTRAVENOUS at 09:27

## 2024-01-07 RX ADMIN — SENNOSIDES 17.2 MG: 8.6 TABLET, FILM COATED ORAL at 09:27

## 2024-01-07 RX ADMIN — MORPHINE SULFATE 6.3 MG: 4 INJECTION INTRAVENOUS at 20:47

## 2024-01-07 ASSESSMENT — PAIN - FUNCTIONAL ASSESSMENT
PAIN_FUNCTIONAL_ASSESSMENT: 0-10

## 2024-01-07 ASSESSMENT — PAIN SCALES - GENERAL
PAINLEVEL_OUTOF10: 10 - WORST POSSIBLE PAIN

## 2024-01-07 NOTE — PROGRESS NOTES
"Josue Rangel is a 18 y.o. male on day 3 of admission presenting with Sickle cell crisis (CMS/HCC).    Subjective   No acute events overnight. Continues to have 9-10 pain in his left arm and now in his lower back.        Objective     Physical Exam  Constitutional:       Appearance: He is not toxic-appearing.      Comments: Uncomfortable, groaning   HENT:      Head: Normocephalic.   Eyes:      Conjunctiva/sclera: Conjunctivae normal.   Cardiovascular:      Rate and Rhythm: Normal rate and regular rhythm.      Pulses: Normal pulses.      Heart sounds: Normal heart sounds.      Comments: Left arm brachial, radial pulse 2+   Carotid pulses 2+  Pulmonary:      Effort: Pulmonary effort is normal.      Breath sounds: Normal breath sounds.   Abdominal:      General: Abdomen is flat.      Palpations: Abdomen is soft.   Musculoskeletal:         General: No swelling.      Cervical back: Normal range of motion and neck supple.      Comments: Laying with left arm still slightly bent at elbow, unable to move it due to pain. Expresses discomfort on light palpation/manipulation of left arm   Skin:     General: Skin is warm.      Capillary Refill: Capillary refill takes less than 2 seconds.   Neurological:      General: No focal deficit present.      Mental Status: He is alert.   Psychiatric:         Thought Content: Thought content normal.         Last Recorded Vitals  Blood pressure 115/55, pulse 65, temperature 36.9 °C (98.4 °F), temperature source Axillary, resp. rate 16, height 1.778 m (5' 10\"), weight 62.7 kg (138 lb 3.7 oz), SpO2 97 %.  Intake/Output last 24 hours:    Intake/Output Summary (Last 24 hours) at 1/7/2024 0658  Last data filed at 1/7/2024 0651  Gross per 24 hour   Intake 3329.35 ml   Output --   Net 3329.35 ml        Relevant Results  Results for orders placed or performed during the hospital encounter of 01/04/24 (from the past 96 hour(s))   CBC and Auto Differential   Result Value Ref Range    WBC 8.6 4.4 - " 11.3 x10*3/uL    nRBC 0.9 (H) 0.0 - 0.0 /100 WBCs    RBC 3.79 (L) 4.50 - 5.90 x10*6/uL    Hemoglobin 12.2 (L) 13.5 - 17.5 g/dL    Hematocrit 31.9 (L) 41.0 - 52.0 %    MCV 84 80 - 100 fL    MCH 32.2 26.0 - 34.0 pg    MCHC 38.2 (H) 32.0 - 36.0 g/dL    RDW 14.0 11.5 - 14.5 %    Platelets 329 150 - 450 x10*3/uL    Immature Granulocytes %, Automated 0.2 0.0 - 0.9 %    Immature Granulocytes Absolute, Automated 0.02 0.00 - 0.70 x10*3/uL   Hepatic Function Panel   Result Value Ref Range    Albumin 5.2 (H) 3.4 - 5.0 g/dL    Bilirubin, Total 2.0 (H) 0.0 - 1.2 mg/dL    Bilirubin, Direct 0.3 0.0 - 0.3 mg/dL    Alkaline Phosphatase 60 33 - 120 U/L    ALT 16 10 - 52 U/L    AST 28 9 - 39 U/L    Total Protein 8.0 6.4 - 8.2 g/dL   Reticulocytes   Result Value Ref Range    Retic % 4.4 (H) 0.5 - 2.0 %    Retic Absolute 0.164 (H) 0.022 - 0.118 x10*6/uL    Reticulocyte Hemoglobin 34 28 - 38 pg    Immature Retic fraction 33.4 (H) <=16.0 %   Type And Screen   Result Value Ref Range    ABO TYPE O     Rh TYPE POS     ANTIBODY SCREEN NEG    Phosphorus   Result Value Ref Range    Phosphorus 3.4 2.5 - 4.9 mg/dL   Basic Metabolic Panel   Result Value Ref Range    Glucose 84 74 - 99 mg/dL    Sodium 137 136 - 145 mmol/L    Potassium 4.4 3.5 - 5.3 mmol/L    Chloride 103 98 - 107 mmol/L    Bicarbonate 22 21 - 32 mmol/L    Anion Gap 16 10 - 20 mmol/L    Urea Nitrogen 11 6 - 23 mg/dL    Creatinine 0.66 0.50 - 1.30 mg/dL    eGFR >90 >60 mL/min/1.73m*2    Calcium 10.6 8.6 - 10.6 mg/dL   Manual Differential   Result Value Ref Range    Neutrophils %, Manual 50.9 40.0 - 80.0 %    Lymphocytes %, Manual 37.1 13.0 - 44.0 %    Monocytes %, Manual 6.0 2.0 - 10.0 %    Eosinophils %, Manual 0.0 0.0 - 6.0 %    Basophils %, Manual 0.0 0.0 - 2.0 %    Atypical Lymphocytes %, Manual 6.0 0.0 - 2.0 %    Seg Neutrophils Absolute, Manual 4.38 1.20 - 7.00 x10*3/uL    Lymphocytes Absolute, Manual 3.19 1.20 - 4.80 x10*3/uL    Monocytes Absolute, Manual 0.52 0.10 - 1.00  x10*3/uL    Eosinophils Absolute, Manual 0.00 0.00 - 0.70 x10*3/uL    Basophils Absolute, Manual 0.00 0.00 - 0.10 x10*3/uL    Atypical Lymphs Absolute, Manual 0.52 (H) 0.00 - 0.50 x10*3/uL    Total Cells Counted 116     RBC Morphology See Below     Target Cells Many    Renal Function Panel   Result Value Ref Range    Glucose 81 74 - 99 mg/dL    Sodium 138 136 - 145 mmol/L    Potassium 4.5 3.5 - 5.3 mmol/L    Chloride 103 98 - 107 mmol/L    Bicarbonate 20 (L) 21 - 32 mmol/L    Anion Gap 20 10 - 20 mmol/L    Urea Nitrogen 11 6 - 23 mg/dL    Creatinine 0.65 0.50 - 1.30 mg/dL    eGFR >90 >60 mL/min/1.73m*2    Calcium 10.4 8.6 - 10.6 mg/dL    Phosphorus 3.3 2.5 - 4.9 mg/dL    Albumin 5.2 (H) 3.4 - 5.0 g/dL   CBC and Auto Differential   Result Value Ref Range    WBC 7.1 4.4 - 11.3 x10*3/uL    nRBC 0.8 (H) 0.0 - 0.0 /100 WBCs    RBC 3.21 (L) 4.50 - 5.90 x10*6/uL    Hemoglobin 10.4 (L) 13.5 - 17.5 g/dL    Hematocrit 28.2 (L) 41.0 - 52.0 %    MCV 88 80 - 100 fL    MCH 32.4 26.0 - 34.0 pg    MCHC 36.9 (H) 32.0 - 36.0 g/dL    RDW 13.8 11.5 - 14.5 %    Platelets 276 150 - 450 x10*3/uL    Neutrophils % 38.9 40.0 - 80.0 %    Immature Granulocytes %, Automated 0.1 0.0 - 0.9 %    Lymphocytes % 47.2 13.0 - 44.0 %    Monocytes % 9.7 2.0 - 10.0 %    Eosinophils % 3.5 0.0 - 6.0 %    Basophils % 0.6 0.0 - 2.0 %    Neutrophils Absolute 2.77 1.20 - 7.70 x10*3/uL    Immature Granulocytes Absolute, Automated 0.01 0.00 - 0.70 x10*3/uL    Lymphocytes Absolute 3.36 1.20 - 4.80 x10*3/uL    Monocytes Absolute 0.69 0.10 - 1.00 x10*3/uL    Eosinophils Absolute 0.25 0.00 - 0.70 x10*3/uL    Basophils Absolute 0.04 0.00 - 0.10 x10*3/uL   Reticulocytes   Result Value Ref Range    Retic % 4.0 (H) 0.5 - 2.0 %    Retic Absolute 0.128 (H) 0.022 - 0.118 x10*6/uL    Reticulocyte Hemoglobin 33 28 - 38 pg    Immature Retic fraction 30.1 (H) <=16.0 %   Morphology   Result Value Ref Range    RBC Morphology See Below     Polychromasia Mild     RBC Fragments Few      Target Cells Many    POCT GLUCOSE   Result Value Ref Range    POCT Glucose 78 74 - 99 mg/dL   CBC and Auto Differential   Result Value Ref Range    WBC 9.1 4.4 - 11.3 x10*3/uL    nRBC 0.7 (H) 0.0 - 0.0 /100 WBCs    RBC 3.39 (L) 4.50 - 5.90 x10*6/uL    Hemoglobin 11.0 (L) 13.5 - 17.5 g/dL    Hematocrit 28.9 (L) 41.0 - 52.0 %    MCV 85 80 - 100 fL    MCH 32.4 26.0 - 34.0 pg    MCHC 38.1 (H) 32.0 - 36.0 g/dL    RDW 13.9 11.5 - 14.5 %    Platelets 293 150 - 450 x10*3/uL    Immature Granulocytes %, Automated 0.3 0.0 - 0.9 %    Immature Granulocytes Absolute, Automated 0.03 0.00 - 0.70 x10*3/uL   Reticulocytes   Result Value Ref Range    Retic % 3.7 (H) 0.5 - 2.0 %    Retic Absolute 0.127 (H) 0.022 - 0.118 x10*6/uL    Reticulocyte Hemoglobin 32 28 - 38 pg    Immature Retic fraction 31.9 (H) <=16.0 %   Renal Function Panel   Result Value Ref Range    Glucose 103 (H) 74 - 99 mg/dL    Sodium 137 136 - 145 mmol/L    Potassium 4.5 3.5 - 5.3 mmol/L    Chloride 104 98 - 107 mmol/L    Bicarbonate 25 21 - 32 mmol/L    Anion Gap 13 10 - 20 mmol/L    Urea Nitrogen 10 6 - 23 mg/dL    Creatinine 0.71 0.50 - 1.30 mg/dL    eGFR >90 >60 mL/min/1.73m*2    Calcium 9.6 8.6 - 10.6 mg/dL    Phosphorus 4.4 2.5 - 4.9 mg/dL    Albumin 4.4 3.4 - 5.0 g/dL   Manual Differential   Result Value Ref Range    Neutrophils %, Manual 61.2 40.0 - 80.0 %    Lymphocytes %, Manual 31.9 13.0 - 44.0 %    Monocytes %, Manual 3.5 2.0 - 10.0 %    Eosinophils %, Manual 1.7 0.0 - 6.0 %    Basophils %, Manual 1.7 0.0 - 2.0 %    Seg Neutrophils Absolute, Manual 5.57 1.20 - 7.00 x10*3/uL    Lymphocytes Absolute, Manual 2.90 1.20 - 4.80 x10*3/uL    Monocytes Absolute, Manual 0.32 0.10 - 1.00 x10*3/uL    Eosinophils Absolute, Manual 0.15 0.00 - 0.70 x10*3/uL    Basophils Absolute, Manual 0.15 (H) 0.00 - 0.10 x10*3/uL    Total Cells Counted 116     RBC Morphology See Below     RBC Fragments Few     Target Cells Many     Stomatocytes Few            Assessment/Plan    Principal Problem:    Sickle cell crisis (CMS/HCC)    Josue is a 18 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 He appears more uncomfortable today, visibly grunting and unwilling/unable to move left arm due to pain. Now with newly developed lower back pain as well. This is most likely a worsening vaso-occlusive episode coinciding with the decreased morphine dose from yesterday. Will obtain x-ray of the left shoulder and left arm to evaluate for left arm pain outside of VOE since it has been ongoing for multiple days now. Will give him a 0.05mg/kg dose of morphine and increase scheduled morphine back to 0.1mg/kg. Will continue Narcan drip at 1.5mcg/kg and monitor pruritus. Will continue scheduled IV tylenol. He completed 3 days of IV toradol so will transition to PO naproxen.   He has not had any bowel movements today, but had one yesterday. We will continue current bowel regimen and continue to monitor output.   He was short of breath at presentation to the ED, and no evidence of ACS on CXR performed there. He does have multiple prior episodes of ACS so we will monitor closely. There have not been any concerns for acute chest for this admission.      Plan:  HEME  #HgB SC with VOE  [x] blood consent signed in EMR  [ ] Follow-up left arm x-ray (1/7)  - IV Morphine 0.1mg/kg (6.3mg) q2h scheduled  - IV Tylenol 1000mg q6h scheduled  - PO Naproxen 375mg q12h scheduled   - s/p IV Toradol (1/4-1/7)   - s/p Morphine 0.05mg/kg for breakthrough pain (1/6, 1/7)  - c/h Hydroxyurea 1000mg daily M-F  - baseline Hgb ~11.5, baseline retic ~4.3  #Opiate induced pruritus  - Narcan drip 1.5mcg/kg/hr  - Continue to monitor     CV  #Access: pIV     RESP  #Acute Chest Syndrome Prevention  [x] RT notified  - IS and EZPAP     FEN/GI  #Nutrition  - regular diet  #Hydration  - 3/4 mIVF with D5NS  #Constipation prevention  - Senna 17.2mg daily  - Miralax 17g daily  - Dulcolax 5mg daily  #Nausea  - IV zofran 4mg  q6h PRN  #GI prophylaxis  - PO Pepcid 20mg BID while on toradol     ID  #Fever plan  - if febrile > or = 38.5C, blood culture and ceftriaxone. If respiratory concerns, obtain 2 view CXR to evaluate for ACS, add azithromycin if positive     PSYCHOSOCIAL  #Depression  - c/h olanzapine 5mg at bedtime     Labs:  - CBCd, retic, RFP qAM; T&S q72h (next due 1/10)        Discussed with Dr. Estuardo Chavez MD  Pediatrics, PGY-1

## 2024-01-08 LAB
ALBUMIN SERPL BCP-MCNC: 4.4 G/DL (ref 3.4–5)
ANION GAP SERPL CALC-SCNC: 12 MMOL/L (ref 10–20)
BASOPHILS # BLD AUTO: 0.04 X10*3/UL (ref 0–0.1)
BASOPHILS NFR BLD AUTO: 0.5 %
BUN SERPL-MCNC: 9 MG/DL (ref 6–23)
CALCIUM SERPL-MCNC: 9.6 MG/DL (ref 8.6–10.6)
CHLORIDE SERPL-SCNC: 103 MMOL/L (ref 98–107)
CO2 SERPL-SCNC: 26 MMOL/L (ref 21–32)
CREAT SERPL-MCNC: 0.68 MG/DL (ref 0.5–1.3)
EOSINOPHIL # BLD AUTO: 0.28 X10*3/UL (ref 0–0.7)
EOSINOPHIL NFR BLD AUTO: 3.4 %
ERYTHROCYTE [DISTWIDTH] IN BLOOD BY AUTOMATED COUNT: 14 % (ref 11.5–14.5)
GFR SERPL CREATININE-BSD FRML MDRD: >90 ML/MIN/1.73M*2
GLUCOSE SERPL-MCNC: 110 MG/DL (ref 74–99)
HCT VFR BLD AUTO: 27.7 % (ref 41–52)
HGB BLD-MCNC: 10.1 G/DL (ref 13.5–17.5)
HGB RETIC QN: 32 PG (ref 28–38)
IMM GRANULOCYTES # BLD AUTO: 0.03 X10*3/UL (ref 0–0.7)
IMM GRANULOCYTES NFR BLD AUTO: 0.4 % (ref 0–0.9)
IMMATURE RETIC FRACTION: 30 %
LYMPHOCYTES # BLD AUTO: 3.26 X10*3/UL (ref 1.2–4.8)
LYMPHOCYTES NFR BLD AUTO: 39.9 %
MCH RBC QN AUTO: 32.6 PG (ref 26–34)
MCHC RBC AUTO-ENTMCNC: 36.5 G/DL (ref 32–36)
MCV RBC AUTO: 89 FL (ref 80–100)
MONOCYTES # BLD AUTO: 0.83 X10*3/UL (ref 0.1–1)
MONOCYTES NFR BLD AUTO: 10.2 %
NEUTROPHILS # BLD AUTO: 3.73 X10*3/UL (ref 1.2–7.7)
NEUTROPHILS NFR BLD AUTO: 45.6 %
NRBC BLD-RTO: 0.6 /100 WBCS (ref 0–0)
PHOSPHATE SERPL-MCNC: 4.7 MG/DL (ref 2.5–4.9)
PLATELET # BLD AUTO: 266 X10*3/UL (ref 150–450)
POTASSIUM SERPL-SCNC: 4.1 MMOL/L (ref 3.5–5.3)
RBC # BLD AUTO: 3.1 X10*6/UL (ref 4.5–5.9)
RETICS #: 0.09 X10*6/UL (ref 0.02–0.12)
RETICS/RBC NFR AUTO: 2.8 % (ref 0.5–2)
SODIUM SERPL-SCNC: 137 MMOL/L (ref 136–145)
WBC # BLD AUTO: 8.2 X10*3/UL (ref 4.4–11.3)

## 2024-01-08 PROCEDURE — 80069 RENAL FUNCTION PANEL: CPT

## 2024-01-08 PROCEDURE — 2500000004 HC RX 250 GENERAL PHARMACY W/ HCPCS (ALT 636 FOR OP/ED)

## 2024-01-08 PROCEDURE — 1130000001 HC PRIVATE PED ROOM DAILY

## 2024-01-08 PROCEDURE — 36415 COLL VENOUS BLD VENIPUNCTURE: CPT

## 2024-01-08 PROCEDURE — 85025 COMPLETE CBC W/AUTO DIFF WBC: CPT

## 2024-01-08 PROCEDURE — 2500000001 HC RX 250 WO HCPCS SELF ADMINISTERED DRUGS (ALT 637 FOR MEDICARE OP)

## 2024-01-08 PROCEDURE — 99233 SBSQ HOSP IP/OBS HIGH 50: CPT | Performed by: PEDIATRICS

## 2024-01-08 PROCEDURE — 85045 AUTOMATED RETICULOCYTE COUNT: CPT

## 2024-01-08 PROCEDURE — 97161 PT EVAL LOW COMPLEX 20 MIN: CPT | Mod: GP

## 2024-01-08 RX ORDER — ACETAMINOPHEN 10 MG/ML
1000 INJECTION, SOLUTION INTRAVENOUS EVERY 6 HOURS SCHEDULED
Status: DISCONTINUED | OUTPATIENT
Start: 2024-01-08 | End: 2024-01-10

## 2024-01-08 RX ADMIN — OLANZAPINE 5 MG: 5 TABLET, ORALLY DISINTEGRATING ORAL at 21:14

## 2024-01-08 RX ADMIN — MORPHINE SULFATE 7.5 MG: 4 INJECTION INTRAVENOUS at 02:58

## 2024-01-08 RX ADMIN — NAPROXEN 375 MG: 375 TABLET ORAL at 21:14

## 2024-01-08 RX ADMIN — ACETAMINOPHEN 1000 MG: 10 INJECTION, SOLUTION INTRAVENOUS at 11:41

## 2024-01-08 RX ADMIN — MORPHINE SULFATE 7.5 MG: 4 INJECTION INTRAVENOUS at 15:13

## 2024-01-08 RX ADMIN — MORPHINE SULFATE 7.5 MG: 4 INJECTION INTRAVENOUS at 13:21

## 2024-01-08 RX ADMIN — ACETAMINOPHEN 1000 MG: 10 INJECTION, SOLUTION INTRAVENOUS at 17:38

## 2024-01-08 RX ADMIN — HYDROXYUREA 1000 MG: 500 CAPSULE ORAL at 09:28

## 2024-01-08 RX ADMIN — MORPHINE SULFATE 7.5 MG: 4 INJECTION INTRAVENOUS at 17:09

## 2024-01-08 RX ADMIN — MORPHINE SULFATE 7.5 MG: 4 INJECTION INTRAVENOUS at 08:32

## 2024-01-08 RX ADMIN — MORPHINE SULFATE 7.5 MG: 4 INJECTION INTRAVENOUS at 19:04

## 2024-01-08 RX ADMIN — MORPHINE SULFATE 7.5 MG: 4 INJECTION INTRAVENOUS at 23:43

## 2024-01-08 RX ADMIN — BISACODYL 5 MG: 5 TABLET ORAL at 08:32

## 2024-01-08 RX ADMIN — SENNOSIDES 17.2 MG: 8.6 TABLET, FILM COATED ORAL at 08:32

## 2024-01-08 RX ADMIN — MORPHINE SULFATE 7.5 MG: 4 INJECTION INTRAVENOUS at 05:35

## 2024-01-08 RX ADMIN — MORPHINE SULFATE 7.5 MG: 4 INJECTION INTRAVENOUS at 21:14

## 2024-01-08 RX ADMIN — MORPHINE SULFATE 7.5 MG: 4 INJECTION INTRAVENOUS at 01:02

## 2024-01-08 RX ADMIN — NAPROXEN 375 MG: 375 TABLET ORAL at 08:32

## 2024-01-08 RX ADMIN — POLYETHYLENE GLYCOL 3350 17 G: 17 POWDER, FOR SOLUTION ORAL at 08:32

## 2024-01-08 RX ADMIN — ACETAMINOPHEN 1000 MG: 10 INJECTION, SOLUTION INTRAVENOUS at 05:35

## 2024-01-08 RX ADMIN — MORPHINE SULFATE 7.5 MG: 4 INJECTION INTRAVENOUS at 07:17

## 2024-01-08 RX ADMIN — DEXTROSE AND SODIUM CHLORIDE 75 ML/HR: 5; 900 INJECTION, SOLUTION INTRAVENOUS at 08:32

## 2024-01-08 RX ADMIN — MORPHINE SULFATE 7.5 MG: 4 INJECTION INTRAVENOUS at 11:41

## 2024-01-08 RX ADMIN — FAMOTIDINE 20 MG: 20 TABLET ORAL at 21:14

## 2024-01-08 RX ADMIN — FAMOTIDINE 20 MG: 20 TABLET ORAL at 09:28

## 2024-01-08 ASSESSMENT — PAIN SCALES - GENERAL
PAINLEVEL_OUTOF10: 9
PAINLEVEL_OUTOF10: 7
PAINLEVEL_OUTOF10: 10 - WORST POSSIBLE PAIN
PAINLEVEL_OUTOF10: 9
PAINLEVEL_OUTOF10: 10 - WORST POSSIBLE PAIN
PAINLEVEL_OUTOF10: 10 - WORST POSSIBLE PAIN
PAINLEVEL_OUTOF10: 9
PAINLEVEL_OUTOF10: 9
PAINLEVEL_OUTOF10: 8
PAINLEVEL_OUTOF10: 9

## 2024-01-08 ASSESSMENT — PAIN - FUNCTIONAL ASSESSMENT
PAIN_FUNCTIONAL_ASSESSMENT: 0-10

## 2024-01-08 ASSESSMENT — PAIN DESCRIPTION - LOCATION
LOCATION: SHOULDER
LOCATION: ARM

## 2024-01-08 NOTE — PROGRESS NOTES
Physical Therapy                                           Physical Therapy Evaluation    Patient Name: Josue Rangel  MRN: [id number]  Today's Date: 1/8/2024   Time Calculation  Start Time: 1433  Stop Time: 1442  Time Calculation (min): 9 min       Assessment/Plan   Assessment:  PT Assessment  PT Assessment Results: Decreased range of motion, Decreased strength, Pain  Rehab Prognosis: Good  Plan:  IP PT Plan  Treatment/Interventions: Strengthening, Range of motion, Therapeutic exercise  PT Plan: Skilled PT  PT Frequency: 5 times per week  PT Discharge Recommendations: No further acute PT    Subjective   General Visit Information:  General  Reason for Referral: Adm with sickle cell pain crisis involving L UE, progressing to LBP  Past Medical History Relevant to Rehab: Hx of sickle cell dz, previous ACS, depression  Family/Caregiver Present: No  Prior to Session Communication: Physician  Patient Position Received: Bed, 3 rail up  Developmental History:     Prior Function:     Pain:  Pain Assessment  Pain Assessment: 0-10  Pain Score: 9  Pain Location: Arm  Pain Orientation: Left  Clinical Progression:  (worse with touch or active movement)     Objective   Medical History:     Precautions:     Home Living:     Education:     Vital Signs:      Behavior:       Activity Tolerance:        Extremity Assessments:  RUE   RUE : Within Functional Limits, LUE   LUE:  (Minimal active movement due to pain; able to move fingers and elevate shoulder girdle but nothing through his elbow. Able to isometrically contract throughout L UE), RLE   RLE : Within Functional Limits, LLE   LLE : Within Functional Limits  Functional Assessments:  Bed Mobility  Bed Mobility:  (Would not demonstrate, but reported that he was independently able to get to the BR and back supporting his R UE)      OP EDUCATION:  Education  Individual(s) Educated:  (patient)  Verbal Home Program:  (sit in chair every time he gets up to the BR; isometrics through L  UE; actively move fingers and shoulder L)    Encounter Problems       Encounter Problems (Active)       IP PT Peds Mobility       Patient will demonstrate increased strength by demonstrating 4/5 strength in L UE        Start:  01/08/24    Expected End:  01/15/24            Patient will demonstrate baseline PROM of LUE across 2 sessions        Start:  01/08/24    Expected End:  01/15/24

## 2024-01-08 NOTE — PROGRESS NOTES
"Daily Progress Note  [facility]  Pediatric Hematology & Oncology    Patient's Name: Josue Rangel  : 2005  MR#: [id number]  Attending Physician: Dwayne Chaudhry MD M*  LOS: Hospital Day: 5    Subjective   Received 0.05 mg/kg morphine bolus x1 and increased scheduled morphine to 0.12mg/kg for \"off the charts\" pain. This morning reports pain is 9.5/10, but does not want to continue to go up in pain medicine.         Objective     Diet:  Dietary Orders (From admission, onward)       Start     Ordered    24 1058  Pediatric diet Regular  Diet effective now        Question:  Diet type  Answer:  Regular    24 1057    24 1034  May Participate in Room Service  Once        Question:  .  Answer:  Yes    24 1033                    Medications:  Scheduled Meds: bisacodyl, 5 mg, oral, Daily  famotidine, 20 mg, oral, BID  hydroxyurea, 1,000 mg, oral, Once per day on   morphine, 7.5 mg, intravenous, q2h  naproxen, 375 mg, oral, q12h  OLANZapine zydis, 5 mg, oral, Nightly  polyethylene glycol, 17 g, oral, Daily  sennosides, 17.2 mg, oral, Daily      Continuous Infusions: D5 % and 0.9 % sodium chloride, 75 mL/hr, Last Rate: 75 mL/hr (24 0832)  naloxone, 1.5 mcg/kg/hr (Dosing Weight), Last Rate: 1.5 mcg/kg/hr (24 1740)      PRN Meds: PRN medications: ondansetron    Peripheral IV 24 20 G Right Forearm (Active)   Site Assessment Clean;Dry;Intact 24 1326   Dressing Type Transparent 24 1326   Line Status Infusing 24 1326   Tubing Change Tubing changed 24 0544   Dressing Status Clean;Dry;Occlusive 24 1326       Vitals:  Temp:  [36.4 °C (97.5 °F)-37.2 °C (99 °F)] 36.6 °C (97.9 °F)  Heart Rate:  [61-72] 63  Resp:  [14-18] 16  BP: ()/(47-85) 116/63  Temp (24hrs), Av.7 °C (98 °F), Min:36.4 °C (97.5 °F), Max:37.2 °C (99 °F)    Wt Readings from Last 3 Encounters:   24 62.7 kg (138 lb 3.7 oz) (22 %, " Z= -0.76)*   01/03/24 66.1 kg (145 lb 12.8 oz) (35 %, Z= -0.39)*   10/30/23 66.9 kg (147 lb 7.8 oz) (38 %, Z= -0.30)*     * Growth percentiles are based on [facility] (Boys, 2-20 Years) data.        I/O:    Intake/Output Summary (Last 24 hours) at 1/8/2024 1415  Last data filed at 1/8/2024 1326  Gross per 24 hour   Intake 2310.08 ml   Output 1 ml   Net 2309.08 ml        Exam:   Physical Exam  Constitutional:       General: He is not in acute distress.  HENT:      Head: Normocephalic and atraumatic.      Nose: Nose normal.      Mouth/Throat:      Mouth: Mucous membranes are moist.   Eyes:      Extraocular Movements: Extraocular movements intact.      Conjunctiva/sclera: Conjunctivae normal.   Cardiovascular:      Rate and Rhythm: Normal rate and regular rhythm.      Heart sounds: No murmur heard.     No friction rub. No gallop.   Pulmonary:      Effort: Pulmonary effort is normal.      Breath sounds: Normal breath sounds.   Abdominal:      General: There is no distension.      Palpations: Abdomen is soft.      Tenderness: There is no abdominal tenderness.   Musculoskeletal:      Comments: L arm flexed at elbow when asleep this AM, but held extended on exam. LUE ROM significantly limited by pain but able to wiggle fingers, shrug shoulder, and isometrically contract forearm & upper arm (evaluated w/ PT)   Skin:     General: Skin is warm and dry.      Capillary Refill: Capillary refill takes less than 2 seconds.   Neurological:      General: No focal deficit present.      Mental Status: He is alert and oriented to person, place, and time.   Psychiatric:         Mood and Affect: Mood normal.         Behavior: Behavior normal.         Lab Studies Reviewed:  Results for orders placed or performed during the hospital encounter of 01/04/24 (from the past 24 hour(s))   CBC and Auto Differential   Result Value Ref Range    WBC 8.2 4.4 - 11.3 x10*3/uL    nRBC 0.6 (H) 0.0 - 0.0 /100 WBCs    RBC 3.10 (L) 4.50 - 5.90 x10*6/uL     Hemoglobin 10.1 (L) 13.5 - 17.5 g/dL    Hematocrit 27.7 (L) 41.0 - 52.0 %    MCV 89 80 - 100 fL    MCH 32.6 26.0 - 34.0 pg    MCHC 36.5 (H) 32.0 - 36.0 g/dL    RDW 14.0 11.5 - 14.5 %    Platelets 266 150 - 450 x10*3/uL    Neutrophils % 45.6 40.0 - 80.0 %    Immature Granulocytes %, Automated 0.4 0.0 - 0.9 %    Lymphocytes % 39.9 13.0 - 44.0 %    Monocytes % 10.2 2.0 - 10.0 %    Eosinophils % 3.4 0.0 - 6.0 %    Basophils % 0.5 0.0 - 2.0 %    Neutrophils Absolute 3.73 1.20 - 7.70 x10*3/uL    Immature Granulocytes Absolute, Automated 0.03 0.00 - 0.70 x10*3/uL    Lymphocytes Absolute 3.26 1.20 - 4.80 x10*3/uL    Monocytes Absolute 0.83 0.10 - 1.00 x10*3/uL    Eosinophils Absolute 0.28 0.00 - 0.70 x10*3/uL    Basophils Absolute 0.04 0.00 - 0.10 x10*3/uL   Reticulocytes   Result Value Ref Range    Retic % 2.8 (H) 0.5 - 2.0 %    Retic Absolute 0.086 0.022 - 0.118 x10*6/uL    Reticulocyte Hemoglobin 32 28 - 38 pg    Immature Retic fraction 30.0 (H) <=16.0 %   Renal Function Panel   Result Value Ref Range    Glucose 110 (H) 74 - 99 mg/dL    Sodium 137 136 - 145 mmol/L    Potassium 4.1 3.5 - 5.3 mmol/L    Chloride 103 98 - 107 mmol/L    Bicarbonate 26 21 - 32 mmol/L    Anion Gap 12 10 - 20 mmol/L    Urea Nitrogen 9 6 - 23 mg/dL    Creatinine 0.68 0.50 - 1.30 mg/dL    eGFR >90 >60 mL/min/1.73m*2    Calcium 9.6 8.6 - 10.6 mg/dL    Phosphorus 4.7 2.5 - 4.9 mg/dL    Albumin 4.4 3.4 - 5.0 g/dL       Imaging Studies Reviewed:  1/7 LUE XR  FINDINGS:  Three views of the left hand. AP and lateral views of the left  forearm. Four views of the left elbow. AP and lateral views of the  left humerus. AP and Y-views of the left shoulder.      No acute osseous changes. No retained radiopaque foreign body. Joint  spaces are unremarkable. No elbow joint effusion.       Impression:     No acute osseous changes of the left upper extremity.           Assessment/Plan   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 We will not attempt  to wean his morphine today given that he backtracked two steps on the VOE pain pathway yesterday after an attempted wean. We will consider a PCA if his pain worsens. Detailed plan below.    VOE of HgbSC  - Morphine 0.12mg/kg q2hr  - Naproxen 375mg q12hr  - Tylenol q6hr  - Hydroxyurea 1000mg M-F  - 3/4 mIVF D5NS  - PT    Acute chest syndrome prophylaxis  - Incentive spirometry  - EZPAP    Opiate induced pruritus  - Narcan 1.5mcg/kg/hr    Bowel regimen  - Senna 17.2mg daily  - Miralax 17g daily  - Dulcolax 5mg daily    GI prophylaxis  - Pepcid 20mg BID     Depression  - C/h olanzapine 5mg nightly    Labs: Daily CBC, retic, RFP    Patient seen and discussed with my attending, Dr. Gonzalez.    Marie Saleh MD  PGY-1, Pediatrics      HEMATOLOGY-ONCOLOGY ATTENDING ADDENDUM NOTE  I have read the resident's note above and made corrections and additions directly within the note. I personally examined the patient and obtained a history from the patient/guardian. Josue is admitted with vasocclussive pain related to his sickle cell disease, most notable in his L upper extremity. He believes his pain is better than yesterday, but not completely captured, but would prefer not to continue to escalate his pain regimen. On exam, he is conversing appropriately and pleasant, in no distress, but holding his L arm flexed and using his R arm only for tasks. He has no evidence of acute neurological dysfunction. At this time, will make no changes to his pain regimen and hope to capture his pain. He requires admission for IV pain management that is currently not completely controlled.     Dwayne Chaudhry MD MPH  1/8/2024  Pediatric Hematology-Oncology Attending

## 2024-01-09 ENCOUNTER — TELEPHONE (OUTPATIENT)
Dept: PEDIATRIC HEMATOLOGY/ONCOLOGY | Facility: HOSPITAL | Age: 19
End: 2024-01-09
Payer: MEDICAID

## 2024-01-09 LAB
ALBUMIN SERPL BCP-MCNC: 4.2 G/DL (ref 3.4–5)
ANION GAP SERPL CALC-SCNC: 13 MMOL/L (ref 10–20)
BASOPHILS # BLD AUTO: 0.03 X10*3/UL (ref 0–0.1)
BASOPHILS NFR BLD AUTO: 0.4 %
BUN SERPL-MCNC: 11 MG/DL (ref 6–23)
CALCIUM SERPL-MCNC: 9.5 MG/DL (ref 8.6–10.6)
CHLORIDE SERPL-SCNC: 104 MMOL/L (ref 98–107)
CO2 SERPL-SCNC: 25 MMOL/L (ref 21–32)
CREAT SERPL-MCNC: 0.62 MG/DL (ref 0.5–1.3)
EGFRCR SERPLBLD CKD-EPI 2021: >90 ML/MIN/1.73M*2
EOSINOPHIL # BLD AUTO: 0.34 X10*3/UL (ref 0–0.7)
EOSINOPHIL NFR BLD AUTO: 4.2 %
ERYTHROCYTE [DISTWIDTH] IN BLOOD BY AUTOMATED COUNT: 13.8 % (ref 11.5–14.5)
GLUCOSE SERPL-MCNC: 92 MG/DL (ref 74–99)
HCT VFR BLD AUTO: 26.6 % (ref 41–52)
HGB BLD-MCNC: 9.9 G/DL (ref 13.5–17.5)
HGB RETIC QN: 31 PG (ref 28–38)
IMM GRANULOCYTES # BLD AUTO: 0.01 X10*3/UL (ref 0–0.7)
IMM GRANULOCYTES NFR BLD AUTO: 0.1 % (ref 0–0.9)
IMMATURE RETIC FRACTION: 29.7 %
LYMPHOCYTES # BLD AUTO: 2.84 X10*3/UL (ref 1.2–4.8)
LYMPHOCYTES NFR BLD AUTO: 35 %
MCH RBC QN AUTO: 32.6 PG (ref 26–34)
MCHC RBC AUTO-ENTMCNC: 37.2 G/DL (ref 32–36)
MCV RBC AUTO: 88 FL (ref 80–100)
MONOCYTES # BLD AUTO: 0.51 X10*3/UL (ref 0.1–1)
MONOCYTES NFR BLD AUTO: 6.3 %
NEUTROPHILS # BLD AUTO: 4.38 X10*3/UL (ref 1.2–7.7)
NEUTROPHILS NFR BLD AUTO: 54 %
NRBC BLD-RTO: 0.7 /100 WBCS (ref 0–0)
PHOSPHATE SERPL-MCNC: 4.6 MG/DL (ref 2.5–4.9)
PLATELET # BLD AUTO: 241 X10*3/UL (ref 150–450)
POTASSIUM SERPL-SCNC: 4.4 MMOL/L (ref 3.5–5.3)
RBC # BLD AUTO: 3.04 X10*6/UL (ref 4.5–5.9)
RETICS #: 0.09 X10*6/UL (ref 0.02–0.12)
RETICS/RBC NFR AUTO: 3.1 % (ref 0.5–2)
SODIUM SERPL-SCNC: 138 MMOL/L (ref 136–145)
WBC # BLD AUTO: 8.1 X10*3/UL (ref 4.4–11.3)

## 2024-01-09 PROCEDURE — 2500000001 HC RX 250 WO HCPCS SELF ADMINISTERED DRUGS (ALT 637 FOR MEDICARE OP)

## 2024-01-09 PROCEDURE — 2500000004 HC RX 250 GENERAL PHARMACY W/ HCPCS (ALT 636 FOR OP/ED)

## 2024-01-09 PROCEDURE — 1130000001 HC PRIVATE PED ROOM DAILY

## 2024-01-09 PROCEDURE — 80069 RENAL FUNCTION PANEL: CPT

## 2024-01-09 PROCEDURE — 85025 COMPLETE CBC W/AUTO DIFF WBC: CPT

## 2024-01-09 PROCEDURE — 85045 AUTOMATED RETICULOCYTE COUNT: CPT

## 2024-01-09 PROCEDURE — 2500000005 HC RX 250 GENERAL PHARMACY W/O HCPCS

## 2024-01-09 PROCEDURE — 99233 SBSQ HOSP IP/OBS HIGH 50: CPT | Performed by: PEDIATRICS

## 2024-01-09 PROCEDURE — 97110 THERAPEUTIC EXERCISES: CPT | Mod: GP

## 2024-01-09 PROCEDURE — 36415 COLL VENOUS BLD VENIPUNCTURE: CPT

## 2024-01-09 RX ORDER — MORPHINE SULFATE 4 MG/ML
6.3 INJECTION INTRAVENOUS
Status: DISCONTINUED | OUTPATIENT
Start: 2024-01-09 | End: 2024-01-09

## 2024-01-09 RX ORDER — MORPHINE SULFATE 4 MG/ML
5 INJECTION INTRAVENOUS
Status: DISCONTINUED | OUTPATIENT
Start: 2024-01-09 | End: 2024-01-10

## 2024-01-09 RX ADMIN — ACETAMINOPHEN 1000 MG: 10 INJECTION, SOLUTION INTRAVENOUS at 06:11

## 2024-01-09 RX ADMIN — MORPHINE SULFATE 5 MG: 4 INJECTION INTRAVENOUS at 21:41

## 2024-01-09 RX ADMIN — MORPHINE SULFATE 6.3 MG: 4 INJECTION INTRAVENOUS at 17:51

## 2024-01-09 RX ADMIN — SODIUM BICARBONATE 0.2 ML: 84 INJECTION, SOLUTION INTRAVENOUS at 19:29

## 2024-01-09 RX ADMIN — MORPHINE SULFATE 7.5 MG: 4 INJECTION INTRAVENOUS at 05:26

## 2024-01-09 RX ADMIN — ACETAMINOPHEN 1000 MG: 10 INJECTION, SOLUTION INTRAVENOUS at 12:16

## 2024-01-09 RX ADMIN — ACETAMINOPHEN 1000 MG: 10 INJECTION, SOLUTION INTRAVENOUS at 18:02

## 2024-01-09 RX ADMIN — BISACODYL 5 MG: 5 TABLET ORAL at 09:18

## 2024-01-09 RX ADMIN — MORPHINE SULFATE 7.5 MG: 4 INJECTION INTRAVENOUS at 09:17

## 2024-01-09 RX ADMIN — MORPHINE SULFATE 6.3 MG: 4 INJECTION INTRAVENOUS at 19:29

## 2024-01-09 RX ADMIN — OLANZAPINE 5 MG: 5 TABLET, ORALLY DISINTEGRATING ORAL at 21:27

## 2024-01-09 RX ADMIN — FAMOTIDINE 20 MG: 20 TABLET ORAL at 09:17

## 2024-01-09 RX ADMIN — NAPROXEN 375 MG: 375 TABLET ORAL at 09:17

## 2024-01-09 RX ADMIN — DEXTROSE AND SODIUM CHLORIDE 75 ML/HR: 5; 900 INJECTION, SOLUTION INTRAVENOUS at 03:39

## 2024-01-09 RX ADMIN — MORPHINE SULFATE 7.5 MG: 4 INJECTION INTRAVENOUS at 07:06

## 2024-01-09 RX ADMIN — MORPHINE SULFATE 7.5 MG: 4 INJECTION INTRAVENOUS at 03:33

## 2024-01-09 RX ADMIN — MORPHINE SULFATE 6.3 MG: 4 INJECTION INTRAVENOUS at 15:06

## 2024-01-09 RX ADMIN — ACETAMINOPHEN 1000 MG: 10 INJECTION, SOLUTION INTRAVENOUS at 00:20

## 2024-01-09 RX ADMIN — NAPROXEN 375 MG: 375 TABLET ORAL at 21:27

## 2024-01-09 RX ADMIN — MORPHINE SULFATE 7.5 MG: 4 INJECTION INTRAVENOUS at 12:25

## 2024-01-09 RX ADMIN — NALOXONE HYDROCHLORIDE 1.5 MCG/KG/HR: 0.4 INJECTION, SOLUTION INTRAMUSCULAR; INTRAVENOUS; SUBCUTANEOUS at 21:27

## 2024-01-09 RX ADMIN — MORPHINE SULFATE 7.5 MG: 4 INJECTION INTRAVENOUS at 01:32

## 2024-01-09 RX ADMIN — POLYETHYLENE GLYCOL 3350 17 G: 17 POWDER, FOR SOLUTION ORAL at 09:16

## 2024-01-09 RX ADMIN — MORPHINE SULFATE 5 MG: 4 INJECTION INTRAVENOUS at 23:39

## 2024-01-09 RX ADMIN — SENNOSIDES 17.2 MG: 8.6 TABLET, FILM COATED ORAL at 09:16

## 2024-01-09 RX ADMIN — HYDROXYUREA 1000 MG: 500 CAPSULE ORAL at 09:17

## 2024-01-09 RX ADMIN — FAMOTIDINE 20 MG: 20 TABLET ORAL at 21:27

## 2024-01-09 ASSESSMENT — PAIN - FUNCTIONAL ASSESSMENT
PAIN_FUNCTIONAL_ASSESSMENT: 0-10

## 2024-01-09 ASSESSMENT — PAIN SCALES - GENERAL
PAINLEVEL_OUTOF10: 8
PAINLEVEL_OUTOF10: 6
PAINLEVEL_OUTOF10: 8
PAINLEVEL_OUTOF10: 7
PAINLEVEL_OUTOF10: 8
PAINLEVEL_OUTOF10: 8

## 2024-01-09 ASSESSMENT — PAIN DESCRIPTION - LOCATION
LOCATION: ARM
LOCATION: ARM
LOCATION: SHOULDER
LOCATION: ARM
LOCATION: ARM
LOCATION: SHOULDER
LOCATION: ARM
LOCATION: ARM

## 2024-01-09 ASSESSMENT — PAIN DESCRIPTION - ORIENTATION
ORIENTATION: LEFT

## 2024-01-09 NOTE — PROGRESS NOTES
Daily Progress Note  [facility]  Pediatric Hematology & Oncology    Patient's Name: Josue Rangel  : 2005  MR#: [id number]  Attending Physician: Dwayne Chaudhry MD MPH  LOS: Hospital Day: 6    Subjective   No acute events overnight. He did not require any breakthrough morphine doses in the past 24hrs. Pain scores improved to 7/8 overnight.         Objective     Diet:  Dietary Orders (From admission, onward)       Start     Ordered    24 1058  Pediatric diet Regular  Diet effective now        Question:  Diet type  Answer:  Regular    24 1057    24 1034  May Participate in Room Service  Once        Question:  .  Answer:  Yes    24 1033                    Medications:  Scheduled Meds: acetaminophen, 1,000 mg, intravenous, q6h SERVANDO  bisacodyl, 5 mg, oral, Daily  famotidine, 20 mg, oral, BID  hydroxyurea, 1,000 mg, oral, Once per day on   morphine, 7.5 mg, intravenous, q2h  naproxen, 375 mg, oral, q12h  OLANZapine zydis, 5 mg, oral, Nightly  polyethylene glycol, 17 g, oral, Daily  sennosides, 17.2 mg, oral, Daily      Continuous Infusions: D5 % and 0.9 % sodium chloride, 75 mL/hr, Last Rate: 75 mL/hr (24 0339)  naloxone, 1.5 mcg/kg/hr (Dosing Weight), Last Rate: 1.5 mcg/kg/hr (24 1740)      PRN Meds: PRN medications: ondansetron    Peripheral IV 24 20 G Right Forearm (Active)   Site Assessment Clean;Dry;Intact 24   Dressing Type Transparent 24   Line Status Infusing 24   Tubing Change Tubing changed 24 0544   Dressing Status Clean;Dry;Occlusive 24       Vitals:  Temp:  [36.5 °C (97.7 °F)-37.1 °C (98.8 °F)] 36.6 °C (97.9 °F)  Heart Rate:  [63-89] 70  Resp:  [14-20] 16  BP: (102-131)/(51-76) 102/58  Temp (24hrs), Av.7 °C (98.1 °F), Min:36.5 °C (97.7 °F), Max:37.1 °C (98.8 °F)    Wt Readings from Last 3 Encounters:   24 62.7 kg (138 lb 3.7 oz) (22 %, Z= -0.76)*    01/03/24 66.1 kg (145 lb 12.8 oz) (35 %, Z= -0.39)*   10/30/23 66.9 kg (147 lb 7.8 oz) (38 %, Z= -0.30)*     * Growth percentiles are based on [facility] (Boys, 2-20 Years) data.        I/O:    Intake/Output Summary (Last 24 hours) at 1/9/2024 0844  Last data filed at 1/9/2024 0700  Gross per 24 hour   Intake 2893.33 ml   Output 0 ml   Net 2893.33 ml        Exam:   Physical Exam  Constitutional:       General: He is not in acute distress.  HENT:      Head: Normocephalic and atraumatic.      Nose: Nose normal.      Mouth/Throat:      Mouth: Mucous membranes are moist.   Eyes:      Extraocular Movements: Extraocular movements intact.      Conjunctiva/sclera: Conjunctivae normal.   Cardiovascular:      Rate and Rhythm: Normal rate and regular rhythm.      Heart sounds: No murmur heard.     No friction rub. No gallop.   Pulmonary:      Effort: Pulmonary effort is normal.      Breath sounds: Normal breath sounds.   Abdominal:      General: There is no distension.      Palpations: Abdomen is soft.      Tenderness: There is no abdominal tenderness.   Musculoskeletal:      Comments: Spontaneously moving L arm, though still favoring to small degree   Skin:     General: Skin is warm and dry.      Capillary Refill: Capillary refill takes less than 2 seconds.   Neurological:      General: No focal deficit present.      Mental Status: He is alert and oriented to person, place, and time.   Psychiatric:         Mood and Affect: Mood normal.         Behavior: Behavior normal.         Lab Studies Reviewed:  Results for orders placed or performed during the hospital encounter of 01/04/24 (from the past 24 hour(s))   CBC and Auto Differential   Result Value Ref Range    WBC 8.1 4.4 - 11.3 x10*3/uL    nRBC 0.7 (H) 0.0 - 0.0 /100 WBCs    RBC 3.04 (L) 4.50 - 5.90 x10*6/uL    Hemoglobin 9.9 (L) 13.5 - 17.5 g/dL    Hematocrit 26.6 (L) 41.0 - 52.0 %    MCV 88 80 - 100 fL    MCH 32.6 26.0 - 34.0 pg    MCHC 37.2 (H) 32.0 - 36.0 g/dL    RDW 13.8  11.5 - 14.5 %    Platelets      Neutrophils %      Immature Granulocytes %, Automated      Lymphocytes %      Monocytes %      Eosinophils %      Basophils %      Neutrophils Absolute      Lymphocytes Absolute      Monocytes Absolute      Eosinophils Absolute      Basophils Absolute     Reticulocytes   Result Value Ref Range    Retic % 3.1 (H) 0.5 - 2.0 %    Retic Absolute 0.093 0.022 - 0.118 x10*6/uL    Reticulocyte Hemoglobin 31 28 - 38 pg    Immature Retic fraction 29.7 (H) <=16.0 %           Assessment/Plan   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 His pain scores have improved for the first time since admission and he is active and moving his L arm spontaneously so we will wean his morphine and monitor his response. Detailed plan below.     VOE of HgbSC  - Morphine 0.10mg/kg q2hr  - Naproxen 375mg q12hr  - Tylenol q6hr  - Hydroxyurea 1000mg M-F  - 3/4 mIVF D5NS, will transition to KVO as drinking   - PT     Acute chest syndrome prophylaxis  - Incentive spirometry  - EZPAP     Opiate induced pruritus  - Narcan 1.5mcg/kg/hr     Bowel regimen  - Senna 17.2mg daily  - Miralax 17g daily  - Dulcolax 5mg daily     GI prophylaxis  - Pepcid 20mg BID      Depression  - C/h olanzapine 5mg nightly     Labs: Daily CBC, retic, RFP    Patient seen and discussed with my attending, Dr. Gonzalez.    Marie Saleh MD  PGY-1, Pediatrics

## 2024-01-09 NOTE — PROGRESS NOTES
Physical Therapy                            Physical Therapy Treatment    Patient Name: Josue Rangel  MRN: [id number]  Today's Date: 1/9/2024   Time Calculation  Start Time: 1150  Stop Time: 1213  Time Calculation (min): 23 min       Assessment/Plan   Assessment:  PT Assessment  PT Assessment Results: Decreased range of motion, Decreased strength, Pain  Rehab Prognosis: Good  Plan:  IP PT Plan  Treatment/Interventions: Strengthening, Range of motion, Therapeutic activity  PT Plan: Skilled PT  PT Frequency: 5 times per week  PT Discharge Recommendations: No further acute PT    Subjective   General Visit Info:  PT  Visit  PT Received On: 01/09/24  General  Reason for Referral: Adm with sickle cell pain crisis involving L UE, progressing to LBP  Past Medical History Relevant to Rehab: Hx of sickle cell dz, previous ACS, depression  Family/Caregiver Present: Yes  Prior to Session Communication: Physician  Patient Position Received: Bed, 3 rail up  Pain:  Pain Assessment  Pain Assessment: 0-10  Pain Score: 6  Pain Location: Arm  Pain Orientation: Left  Clinical Progression:  (worse with touch or active movement)  Response to Interventions: reported that his back was much better today     Objective   Behavior:    Behavior  Behavior: Cooperative  Cognition:       Treatment:  Therapeutic Activity  Therapeutic Activity Performed: Yes  Therapeutic Activity 1: Pt. walked ~300 feet to the activity room; using both arms, was able to throw a mini-basketball towards a hoop over 100 times, L UE leading the way. Able to fully flex shoulder and extend elbow while playing, and did not report pain limiting his activity.  Education  Individual(s) Educated:  (patient)  Verbal Home Program:  (sit in chair every time he gets up to the BR; isometrics through L UE; actively move fingers and shoulder L)    Encounter Problems       Encounter Problems (Active)       IP PT Peds Mobility       Patient will demonstrate increased strength by  demonstrating 4/5 strength in L UE  (Progressing)       Start:  01/08/24    Expected End:  01/15/24            Patient will demonstrate baseline PROM of LUE across 2 sessions  (Progressing)       Start:  01/08/24    Expected End:  01/15/24

## 2024-01-09 NOTE — PROGRESS NOTES
Music Therapy Note    Josueparam Rangel was referred by Marie Saleh     Therapy Session  Referral Type: New referral this admission  Visit Type: New visit  Session Start Time: 1400  Session End Time: 1520  Intervention Delivery: In-person  Conflict of Service: None               Treatment/Interventions  Areas of Focus: Locus of control, Normalization, Relaxation, Self-expression, Socialization  Music Therapy Interventions: Active music engagement, Music sharing/discussion, Improvisation, Songwriting/composition  Interruption: Yes  Interrupted by: Staff  Interruption Duration (min): 5 minutes  Interruption Outcome: Session resumed  Patient Fell Asleep at End of Session: No    Post-assessment  Total Session Time (min): 80 minutes         Music Therapy Intern (MTI) introduced himself and services to pt and asked pt about their favorite genres of music and instruments. Pt agreed to having a session and asked for the pt to bring in the keyboard. Pt and MTI worked through [employer] on the keyboard and once pt had become familiar with the main coleman, MTI added guitar chords to accompany them. Pt and MTI spent the remainder of the session creating various songs together using the piano, drums, and guitar. Pt stated that the session re-ignited their excitement for music and the enjoyment to play the piano.     Will continue to provide music therapy services to pt.     Brad Mckinnon  Music Therapy Intern

## 2024-01-09 NOTE — TELEPHONE ENCOUNTER
Grandmother called to inquire if [facility] to have HU refill during this hospital stay using current lab results.  We will refill HU closer to DC dates and review labs at that time.

## 2024-01-10 ENCOUNTER — APPOINTMENT (OUTPATIENT)
Dept: RADIOLOGY | Facility: HOSPITAL | Age: 19
End: 2024-01-10
Payer: MEDICAID

## 2024-01-10 VITALS
HEIGHT: 70 IN | BODY MASS INDEX: 19.79 KG/M2 | RESPIRATION RATE: 20 BRPM | DIASTOLIC BLOOD PRESSURE: 61 MMHG | TEMPERATURE: 98.6 F | HEART RATE: 96 BPM | WEIGHT: 138.23 LBS | SYSTOLIC BLOOD PRESSURE: 124 MMHG | OXYGEN SATURATION: 100 %

## 2024-01-10 LAB
ABO GROUP (TYPE) IN BLOOD: NORMAL
ALBUMIN SERPL BCP-MCNC: 3.9 G/DL (ref 3.4–5)
ANION GAP SERPL CALC-SCNC: 12 MMOL/L (ref 10–20)
ANTIBODY SCREEN: NORMAL
BASOPHILS # BLD AUTO: 0.04 X10*3/UL (ref 0–0.1)
BASOPHILS NFR BLD AUTO: 0.5 %
BUN SERPL-MCNC: 9 MG/DL (ref 6–23)
CALCIUM SERPL-MCNC: 9.6 MG/DL (ref 8.6–10.6)
CHLORIDE SERPL-SCNC: 103 MMOL/L (ref 98–107)
CO2 SERPL-SCNC: 27 MMOL/L (ref 21–32)
CREAT SERPL-MCNC: 0.58 MG/DL (ref 0.5–1.3)
EGFRCR SERPLBLD CKD-EPI 2021: >90 ML/MIN/1.73M*2
EOSINOPHIL # BLD AUTO: 0.29 X10*3/UL (ref 0–0.7)
EOSINOPHIL NFR BLD AUTO: 3.9 %
ERYTHROCYTE [DISTWIDTH] IN BLOOD BY AUTOMATED COUNT: 13.7 % (ref 11.5–14.5)
GLUCOSE SERPL-MCNC: 72 MG/DL (ref 74–99)
HCT VFR BLD AUTO: 24.7 % (ref 41–52)
HGB BLD-MCNC: 9.6 G/DL (ref 13.5–17.5)
HGB RETIC QN: 30 PG (ref 28–38)
IMM GRANULOCYTES # BLD AUTO: 0.01 X10*3/UL (ref 0–0.7)
IMM GRANULOCYTES NFR BLD AUTO: 0.1 % (ref 0–0.9)
IMMATURE RETIC FRACTION: 37.6 %
LYMPHOCYTES # BLD AUTO: 3.26 X10*3/UL (ref 1.2–4.8)
LYMPHOCYTES NFR BLD AUTO: 43.9 %
MCH RBC QN AUTO: 34.4 PG (ref 26–34)
MCHC RBC AUTO-ENTMCNC: 38.9 G/DL (ref 32–36)
MCV RBC AUTO: 89 FL (ref 80–100)
MONOCYTES # BLD AUTO: 0.68 X10*3/UL (ref 0.1–1)
MONOCYTES NFR BLD AUTO: 9.2 %
NEUTROPHILS # BLD AUTO: 3.15 X10*3/UL (ref 1.2–7.7)
NEUTROPHILS NFR BLD AUTO: 42.4 %
NRBC BLD-RTO: 0.8 /100 WBCS (ref 0–0)
PHOSPHATE SERPL-MCNC: 4.7 MG/DL (ref 2.5–4.9)
PLATELET # BLD AUTO: 232 X10*3/UL (ref 150–450)
POTASSIUM SERPL-SCNC: 4.2 MMOL/L (ref 3.5–5.3)
RBC # BLD AUTO: 2.79 X10*6/UL (ref 4.5–5.9)
RETICS #: 0.09 X10*6/UL (ref 0.02–0.12)
RETICS/RBC NFR AUTO: 3.2 % (ref 0.5–2)
RH FACTOR (ANTIGEN D): NORMAL
SODIUM SERPL-SCNC: 138 MMOL/L (ref 136–145)
WBC # BLD AUTO: 7.4 X10*3/UL (ref 4.4–11.3)

## 2024-01-10 PROCEDURE — 97530 THERAPEUTIC ACTIVITIES: CPT | Mod: GP

## 2024-01-10 PROCEDURE — 94760 N-INVAS EAR/PLS OXIMETRY 1: CPT

## 2024-01-10 PROCEDURE — 2500000001 HC RX 250 WO HCPCS SELF ADMINISTERED DRUGS (ALT 637 FOR MEDICARE OP)

## 2024-01-10 PROCEDURE — 71046 X-RAY EXAM CHEST 2 VIEWS: CPT

## 2024-01-10 PROCEDURE — 36415 COLL VENOUS BLD VENIPUNCTURE: CPT

## 2024-01-10 PROCEDURE — 85025 COMPLETE CBC W/AUTO DIFF WBC: CPT

## 2024-01-10 PROCEDURE — 2500000004 HC RX 250 GENERAL PHARMACY W/ HCPCS (ALT 636 FOR OP/ED)

## 2024-01-10 PROCEDURE — 99239 HOSP IP/OBS DSCHRG MGMT >30: CPT | Performed by: STUDENT IN AN ORGANIZED HEALTH CARE EDUCATION/TRAINING PROGRAM

## 2024-01-10 PROCEDURE — 82565 ASSAY OF CREATININE: CPT

## 2024-01-10 PROCEDURE — 86900 BLOOD TYPING SEROLOGIC ABO: CPT

## 2024-01-10 PROCEDURE — 85045 AUTOMATED RETICULOCYTE COUNT: CPT

## 2024-01-10 PROCEDURE — 99233 SBSQ HOSP IP/OBS HIGH 50: CPT | Performed by: PEDIATRICS

## 2024-01-10 RX ORDER — ACETAMINOPHEN 325 MG/1
650 TABLET ORAL EVERY 6 HOURS
Qty: 90 TABLET | Refills: 2 | Status: ON HOLD | OUTPATIENT
Start: 2024-01-10 | End: 2024-02-15 | Stop reason: SDUPTHER

## 2024-01-10 RX ORDER — OXYCODONE HYDROCHLORIDE 5 MG/1
5 TABLET ORAL EVERY 6 HOURS
Status: DISCONTINUED | OUTPATIENT
Start: 2024-01-10 | End: 2024-01-10

## 2024-01-10 RX ORDER — OXYCODONE HYDROCHLORIDE 5 MG/1
TABLET ORAL
Qty: 10 TABLET | Refills: 0 | Status: SHIPPED | OUTPATIENT
Start: 2024-01-10 | End: 2024-01-14

## 2024-01-10 RX ORDER — OXYCODONE HYDROCHLORIDE 5 MG/1
7.5 TABLET ORAL EVERY 6 HOURS
Status: DISCONTINUED | OUTPATIENT
Start: 2024-01-10 | End: 2024-01-10 | Stop reason: HOSPADM

## 2024-01-10 RX ORDER — BISACODYL 5 MG
5 TABLET, DELAYED RELEASE (ENTERIC COATED) ORAL DAILY PRN
Qty: 30 TABLET | Refills: 0 | Status: ON HOLD | OUTPATIENT
Start: 2024-01-10 | End: 2024-02-15

## 2024-01-10 RX ORDER — ACETAMINOPHEN 325 MG/1
650 TABLET ORAL EVERY 6 HOURS
Status: DISCONTINUED | OUTPATIENT
Start: 2024-01-10 | End: 2024-01-10 | Stop reason: HOSPADM

## 2024-01-10 RX ORDER — NAPROXEN 375 MG/1
375 TABLET ORAL EVERY 12 HOURS
Qty: 60 TABLET | Refills: 2 | Status: ON HOLD | OUTPATIENT
Start: 2024-01-10 | End: 2024-02-15 | Stop reason: SDUPTHER

## 2024-01-10 RX ORDER — POLYETHYLENE GLYCOL 3350 17 G/17G
17 POWDER, FOR SOLUTION ORAL DAILY
Qty: 30 PACKET | Refills: 3 | Status: ON HOLD | OUTPATIENT
Start: 2024-01-11 | End: 2024-02-15 | Stop reason: SDUPTHER

## 2024-01-10 RX ORDER — SENNOSIDES 8.6 MG/1
1 TABLET ORAL DAILY
Qty: 30 TABLET | Refills: 0 | Status: ON HOLD | OUTPATIENT
Start: 2024-01-11 | End: 2024-02-15

## 2024-01-10 RX ADMIN — MORPHINE SULFATE 5 MG: 4 INJECTION INTRAVENOUS at 07:02

## 2024-01-10 RX ADMIN — NAPROXEN 375 MG: 375 TABLET ORAL at 09:03

## 2024-01-10 RX ADMIN — HYDROXYUREA 1000 MG: 500 CAPSULE ORAL at 09:03

## 2024-01-10 RX ADMIN — POLYETHYLENE GLYCOL 3350 17 G: 17 POWDER, FOR SOLUTION ORAL at 09:04

## 2024-01-10 RX ADMIN — ACETAMINOPHEN 1000 MG: 10 INJECTION, SOLUTION INTRAVENOUS at 00:06

## 2024-01-10 RX ADMIN — MORPHINE SULFATE 5 MG: 4 INJECTION INTRAVENOUS at 05:06

## 2024-01-10 RX ADMIN — MORPHINE SULFATE 5 MG: 4 INJECTION INTRAVENOUS at 09:03

## 2024-01-10 RX ADMIN — OXYCODONE HYDROCHLORIDE 7.5 MG: 5 TABLET ORAL at 10:18

## 2024-01-10 RX ADMIN — OXYCODONE HYDROCHLORIDE 7.5 MG: 5 TABLET ORAL at 16:33

## 2024-01-10 RX ADMIN — FAMOTIDINE 20 MG: 20 TABLET ORAL at 09:09

## 2024-01-10 RX ADMIN — ACETAMINOPHEN 1000 MG: 10 INJECTION, SOLUTION INTRAVENOUS at 06:01

## 2024-01-10 RX ADMIN — SENNOSIDES 17.2 MG: 8.6 TABLET, FILM COATED ORAL at 09:03

## 2024-01-10 RX ADMIN — BISACODYL 5 MG: 5 TABLET ORAL at 09:04

## 2024-01-10 RX ADMIN — MORPHINE SULFATE 5 MG: 4 INJECTION INTRAVENOUS at 01:30

## 2024-01-10 RX ADMIN — MORPHINE SULFATE 5 MG: 4 INJECTION INTRAVENOUS at 03:17

## 2024-01-10 RX ADMIN — ACETAMINOPHEN 650 MG: 325 TABLET ORAL at 13:19

## 2024-01-10 RX ADMIN — ACETAMINOPHEN 650 MG: 325 TABLET ORAL at 18:56

## 2024-01-10 ASSESSMENT — PAIN SCALES - GENERAL: PAINLEVEL_OUTOF10: 8

## 2024-01-10 ASSESSMENT — PAIN DESCRIPTION - ORIENTATION
ORIENTATION: LEFT

## 2024-01-10 ASSESSMENT — PAIN - FUNCTIONAL ASSESSMENT
PAIN_FUNCTIONAL_ASSESSMENT: 0-10

## 2024-01-10 ASSESSMENT — PAIN DESCRIPTION - LOCATION
LOCATION: SHOULDER

## 2024-01-10 NOTE — DISCHARGE INSTRUCTIONS
You have an appointment with Dr. Smith at 1:15PM on     For the next couple days please do the following:  - Take Tylenol every 6 hours as prescribed for the next 2-3 days and then only as needed for pain  - Take Naproxen every 12 hours as prescribed for the next 2-3 days and then only as needed for pain  - Take Oxycodone 5 mg (1 tablet) every 6 hours when you get home for 24 hours, then every 8 hours for 24 hours, and then every 12 hours for 24 hours, and then only once as needed for your pain    Please also make sure to take your bowel regimen daily as prescribed to prevent constipation. If you begin to stool every day or have diarrhea, you can stop taking Dulcolax. If the diarrhea continues, you can stop taking Senna.

## 2024-01-10 NOTE — PROGRESS NOTES
"Josue Rangel is a 18 y.o. male on day 6 of admission presenting with Sickle cell crisis (CMS/HCC).      Subjective   Lost his peripheral IV access overnight, which was replaced. Morphine was weaned to 0.075 mg/kg q2h at approximately 2100 for improving pain scores. Did not require and PRN breakthrough doses. Pain scores ranged from 6-8, but overall reports he has been more active with his left arm, which was affected on the onset of his VOE. Has not stooled in last 24 hours, but has been taking and amenable to his full stool regimen.        Objective     Last Recorded Vitals  Blood pressure 127/71, pulse 84, temperature 36.5 °C (97.7 °F), temperature source Oral, resp. rate 20, height 1.778 m (5' 10\"), weight 62.7 kg (138 lb 3.7 oz), SpO2 97 %.    Intake/Output Summary (Last 24 hours) at 1/10/2024 1015  Last data filed at 1/10/2024 0919  Gross per 24 hour   Intake 1761.03 ml   Output --   Net 1761.03 ml       Physical Exam:  Physical Exam  Constitutional:       General: He is not in acute distress. Walking to and from bathroom. Responsive to questioning.  HENT:      Head: Normocephalic and atraumatic.      Nose: Nose normal.      Mouth: Mucous membranes are moist.   Eyes:      Extraocular Movements: Extraocular movements intact.      Conjunctiva/sclera: Conjunctivae normal.   Cardiovascular:      Rate and Rhythm: Normal rate and regular rhythm.      Heart sounds: No murmur heard.   Pulmonary:      Effort: Pulmonary effort is normal.      Breath sounds: Normal breath sounds.   Abdominal:      General: There is no distension.      Palpations: Abdomen is soft.      Tenderness: There is no abdominal tenderness.   Musculoskeletal:      Comments: Moving and using both arms equally, no tenderness to palpation   Skin:     General: Skin is warm and dry.      Capillary Refill: Capillary refill takes less than 2 seconds.   Neurological:      General: No focal deficit present.      Mental Status: He is alert and oriented to " person, place, and time.   Psychiatric:         Mood and Affect: Mood normal.         Behavior: Behavior normal.     Current Medications  acetaminophen, 650 mg, oral, q6h  bisacodyl, 5 mg, oral, Daily  famotidine, 20 mg, oral, BID  hydroxyurea, 1,000 mg, oral, Once per day on Mon Tue Wed Thu Fri  naproxen, 375 mg, oral, q12h  OLANZapine zydis, 5 mg, oral, Nightly  oxyCODONE, 7.5 mg, oral, q6h  polyethylene glycol, 17 g, oral, Daily  sennosides, 17.2 mg, oral, Daily          Continuous medications  D5 % and 0.9 % sodium chloride, 3 mL/hr, Last Rate: 3 mL/hr (01/09/24 1216)  naloxone, 1.5 mcg/kg/hr (Dosing Weight), Last Rate: 1.5 mcg/kg/hr (01/09/24 2127)      PRN medications  PRN medications: ondansetron         Results for orders placed or performed during the hospital encounter of 01/04/24 (from the past 24 hour(s))   CBC and Auto Differential   Result Value Ref Range    WBC 7.4 4.4 - 11.3 x10*3/uL    nRBC 0.8 (H) 0.0 - 0.0 /100 WBCs    RBC 2.79 (L) 4.50 - 5.90 x10*6/uL    Hemoglobin 9.6 (L) 13.5 - 17.5 g/dL    Hematocrit 24.7 (L) 41.0 - 52.0 %    MCV 89 80 - 100 fL    MCH 34.4 (H) 26.0 - 34.0 pg    MCHC 38.9 (H) 32.0 - 36.0 g/dL    RDW 13.7 11.5 - 14.5 %    Platelets 232 150 - 450 x10*3/uL    Neutrophils % 42.4 40.0 - 80.0 %    Immature Granulocytes %, Automated 0.1 0.0 - 0.9 %    Lymphocytes % 43.9 13.0 - 44.0 %    Monocytes % 9.2 2.0 - 10.0 %    Eosinophils % 3.9 0.0 - 6.0 %    Basophils % 0.5 0.0 - 2.0 %    Neutrophils Absolute 3.15 1.20 - 7.70 x10*3/uL    Immature Granulocytes Absolute, Automated 0.01 0.00 - 0.70 x10*3/uL    Lymphocytes Absolute 3.26 1.20 - 4.80 x10*3/uL    Monocytes Absolute 0.68 0.10 - 1.00 x10*3/uL    Eosinophils Absolute 0.29 0.00 - 0.70 x10*3/uL    Basophils Absolute 0.04 0.00 - 0.10 x10*3/uL   Reticulocytes   Result Value Ref Range    Retic % 3.2 (H) 0.5 - 2.0 %    Retic Absolute 0.089 0.022 - 0.118 x10*6/uL    Reticulocyte Hemoglobin 30 28 - 38 pg    Immature Retic fraction 37.6 (H)  <=16.0 %     Results from last 7 days   Lab Units 01/04/24  0600   ALK PHOS U/L 60   BILIRUBIN TOTAL mg/dL 2.0*   BILIRUBIN DIRECT mg/dL 0.3   PROTEIN TOTAL g/dL 8.0   ALT U/L 16   AST U/L 28     Results from last 7 days   Lab Units 01/09/24  0555 01/08/24  0609 01/07/24  0545   SODIUM mmol/L 138 137 139   POTASSIUM mmol/L 4.4 4.1 4.1   CHLORIDE mmol/L 104 103 105   CO2 mmol/L 25 26 27   BUN mg/dL 11 9 7   CREATININE mg/dL 0.62 0.68 0.57   GLUCOSE mg/dL 92 110* 83   CALCIUM mg/dL 9.5 9.6 9.4     Results from last 7 days   Lab Units 01/10/24  0542 01/09/24  0555 01/08/24  0609   WBC AUTO x10*3/uL 7.4 8.1 8.2   HEMOGLOBIN g/dL 9.6* 9.9* 10.1*   HEMATOCRIT % 24.7* 26.6* 27.7*   PLATELETS AUTO x10*3/uL 232 241 266   NEUTROS PCT AUTO % 42.4 54.0 45.6   LYMPHS PCT AUTO % 43.9 35.0 39.9   MONOS PCT AUTO % 9.2 6.3 10.2   EOS PCT AUTO % 3.9 4.2 3.4     Results from last 7 days   Lab Units 01/09/24  0555 01/08/24  0609 01/07/24  0545 01/06/24  0538 01/04/24  0600 01/03/24  1211   SODIUM mmol/L 138 137 139   < > 138  137 133*   POTASSIUM mmol/L 4.4 4.1 4.1   < > 4.5  4.4 3.9   CHLORIDE mmol/L 104 103 105   < > 103  103 101   CO2 mmol/L 25 26 27   < > 20*  22 22   BUN mg/dL 11 9 7   < > 11  11 12   CREATININE mg/dL 0.62 0.68 0.57   < > 0.65  0.66 0.65   CALCIUM mg/dL 9.5 9.6 9.4   < > 10.4  10.6 9.2   PROTEIN TOTAL g/dL  --   --   --   --  8.0 6.9   BILIRUBIN TOTAL mg/dL  --   --   --   --  2.0* 1.7*   ALK PHOS U/L  --   --   --   --  60 49   ALT U/L  --   --   --   --  16 12   AST U/L  --   --   --   --  28 24   GLUCOSE mg/dL 92 110* 83   < > 81  84 83    < > = values in this interval not displayed.                 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  I agree with the  dietitian's malnutrition diagnosis.         Assessment/Plan   Principal Problem:    Sickle cell crisis (CMS/HCC)    Josue is a 17yo w/ HgbSC admitted w/ VOE of the left arm. His pain scores  continue to improve and he is active and moving his L arm spontaneously, is on minimal morphine q2h at this time and is able to wean to oral opiates today for more prudent pain management. Since patient has been on prolonged course of opiates, will work with pharmacy team to determine oxycodone dosing and wean for him. If tolerating PO pain medication and more active, can consider discharge later today.      VOE of HgbSC  - Discontinue Morphine 0.075mg/kg q2hr -> begin PO oxycodone 7.5 mg PO Q 6 hours then will taper over 3 days  - Naproxen 375mg q12hr  - IV Tylenol q6hr -> PO Tylenol 650 mg q6h  - Hydroxyurea 1000mg M-F  - D5NS KVO -> Discontinue when tolerating all PO medications  - PT     Acute chest syndrome prophylaxis  - Incentive spirometry  - EZPAP     Opiate induced pruritus  - Narcan 1.5mcg/kg/hr -> Discontinue when off morphine infusion     Bowel regimen  - Senna 17.2mg daily  - Miralax 17g daily  - Dulcolax 5mg daily   - Can consider Lactulose or Mag citrate if continues to be unable to stool    GI prophylaxis  - Pepcid 20mg BID      Depression  - C/h olanzapine 5mg nightly     Labs: Daily CBC, retic, RFP if admission continued        Patient seen and staffed    Luciana Vásquez MD  I saw and evaluated the patient. I personally obtained the key and critical portions of the history and physical exam or was physically present for key and critical portions performed by the resident/fellow. I reviewed the resident/fellow's documentation and discussed the patient with the resident/fellow. I agree with the resident/fellow's medical decision making as documented in the note.    Paula Cody MD

## 2024-01-10 NOTE — ED PROVIDER NOTES
HPI   Chief Complaint   Patient presents with    Extremity Weakness       Chief complaint: Left arm pain    History of present illness: Patient is a 18-year-old male with history of sickle cell anemia presenting to the emergency department with complaints of inability to move his left arm.  According to the patient, his symptoms started yesterday evening with discomfort however the patient states that today, he lost the ability to move his left arm.  The patient states that his left arm is in a lot of pain.  The patient denies any chest pain or shortness of breath.  EMS was contacted.  The patient was brought to the emergency department for further evaluation.         Last Known Well Time: 09:00     ------------------------------------------------------------------------------------------------------------------------------------------      Interval: Baseline  Time: 9:09 AM  Person Administering Scale: Derick Olmedo MD     1a  Level of consciousness: 0=alert; keenly responsive  1b. LOC questions:  0=Performs both tasks correctly  1c. LOC commands: 0=Performs both tasks correctly  2.  Best Gaze: 0=normal  3. Visual: 0=No visual loss  4. Facial Palsy: 0=Normal symmetric movement  5a. Motor left arm: 0=No drift, limb holds 90 (or 45) degrees for full 10 seconds  5b.  Motor right arm: 0=No drift, limb holds 90 (or 45) degrees for full 10 seconds  6a. motor left le=No drift, limb holds 90 (or 45) degrees for full 10 seconds  6b  Motor right le=No drift, limb holds 90 (or 45) degrees for full 10 seconds  7. Limb Ataxia: 0=Absent  8.  Sensory: 0=Normal; no sensory loss  9. Best Language:  0=No aphasia, normal  10. Dysarthria: 0=Normal  11. Extinction and Inattention: 0=No abnormality    Total:   0        VAN: VAN: Negative     --------------------------------------------------------------------------------------------------------------    IV Thrombolysis Given: No; Thrombolysis contraindication reason: Working  diagnosis is NOT a suspected ischemic stroke          History provided by:  EMS personnel and patient   used: No                        No data recorded                Patient History   Past Medical History:   Diagnosis Date    Personal history of diseases of the blood and blood-forming organs and certain disorders involving the immune mechanism     History of sickle cell anemia     Past Surgical History:   Procedure Laterality Date    CT GUIDED PERCUTANEOUS BIOPSY BONE DEEP  7/17/2023    CT GUIDED PERCUTANEOUS BIOPSY BONE DEEP 7[place]17/2023 [facility] [place]    [place] HEAD ANGIO WO IV CONTRAST  10/8/2014    MR HEAD ANGIO WO IV CONTRAST 10/8/2014 [facility]    MR HEAD ANGIO WO IV CONTRAST  7/10/2023    MR HEAD ANGIO WO IV CONTRAST 7/10/2023 [facility] [place]    [place] NECK ANGIO WO IV CONTRAST  10/8/2014    MR NECK ANGIO WO IV CONTRAST 10/8/2014 [facility]    MR NECK ANGIO WO IV CONTRAST  7/10/2023    MR NECK ANGIO WO IV CONTRAST 7/10/2023 [facility] [place]     Family History   Problem Relation Name Age of Onset    Diabetes Paternal Grandmother      Diabetes Paternal Grandfather       Social History     Tobacco Use    Smoking status: Never    Smokeless tobacco: Never   Substance Use Topics    Alcohol use: Yes     Comment: per patient; socially    Drug use: Yes     Types: Marijuana       Physical Exam   ED Triage Vitals   Temp Heart Rate Resp BP   -- 01/03/24 1211 01/03/24 1315 01/03/24 1211    77 15 119/77      SpO2 Temp src Heart Rate Source Patient Position   01/03/24 1211 -- -- 01/03/24 1211   99 %   Sitting      BP Location FiO2 (%)     01/03/24 1211 --     Left arm        Physical Exam  Vitals and nursing note reviewed.   Constitutional:       General: He is not in acute distress.     Appearance: He is well-developed.   HENT:      Head: Normocephalic and atraumatic.   Eyes:      Conjunctiva/sclera: Conjunctivae normal.   Cardiovascular:      Rate and Rhythm: Normal rate and regular rhythm.      Heart sounds: No  murmur heard.  Pulmonary:      Effort: Pulmonary effort is normal. No respiratory distress.      Breath sounds: Normal breath sounds.   Abdominal:      Palpations: Abdomen is soft.      Tenderness: There is no abdominal tenderness.   Musculoskeletal:         General: No swelling.      Left upper arm: Tenderness present. No bony tenderness.      Cervical back: Neck supple.   Skin:     General: Skin is warm and dry.      Capillary Refill: Capillary refill takes less than 2 seconds.   Neurological:      Mental Status: He is alert.   Psychiatric:         Mood and Affect: Mood normal.         ED Course & MDM   Diagnoses as of 01/10/24 0907   Left arm pain       Medical Decision Making  Prior to his arrival in the emergency department, given the patient was unable to move his extremity, prehospital stroke team was activated.  I met the patient at the ambulance bay door however, given the patient's pain as well as his ability to move his left upper extremity, it quickly became apparent that the patient's presentation was not consistent with an ischemic stroke.    CBC demonstrated a hemoglobin of 10.7 but no other significant abnormalities the patient's Chem-7 and LFTs were essentially within normal limits.  The patient's reticulocyte count was appropriate and INR is 1.2.  Chest x-ray demonstrated no significant acute abnormalities CAT scan of the patient's head demonstrated no evidence of intracranial hemorrhage or infarct.  CT angiography of the patient's head and neck demonstrated no significant acute abnormalities.  The patient's EKG demonstrated a normal sinus rhythm with a rate of 79 bpm isoelectric ST segments narrow QRS complexes and a QTc of 440.    Patient presents to the emergency department with complaints of pain in his left upper extremity.  On presentation to the emergency department, it appears the patient is having musculoskeletal versus sickle cell pain of his left upper extremity.  The patient was given a  dose of Valium in the emergency department with significant improvement of his symptoms.  Given the patient's otherwise negative workup, I am comfortable the patient being discharged home this time.  The patient was given a prescription for baclofen and Valium for home use he was instructed to return immediately to the emergency department for worsening symptoms but otherwise follow-up with his primary care physician.  The patient expressed understanding and agreement.  The patient was then discharged home in otherwise stable condition.    Amount and/or Complexity of Data Reviewed  Labs: ordered. Decision-making details documented in ED Course.  Radiology: ordered. Decision-making details documented in ED Course.  ECG/medicine tests: ordered and independent interpretation performed.        Procedure  Procedures     Derick Olmedo MD  01/10/24 0914

## 2024-01-10 NOTE — PROGRESS NOTES
Physical Therapy                            Physical Therapy Treatment    Patient Name: Josue Rangel  MRN: [id number]  Today's Date: 1/10/2024   Time Calculation  Start Time: 1346  Stop Time: 1406  Time Calculation (min): 20 min       Assessment/Plan   Assessment:  PT Assessment  PT Assessment Results:  (Doing well; OK for discharge from PT perspective)  Plan:  IP PT Plan  Treatment/Interventions: Strengthening, Range of motion, Therapeutic activity  PT Plan: Skilled PT  PT Frequency: 5 times per week  PT Discharge Recommendations: No further acute PT    Subjective   General Visit Info:  PT  Visit  PT Received On: 01/10/24  General  Family/Caregiver Present: No  Patient Position Received: Bed, 3 rail up  General Comment: Hoping to go home today  Pain:  Pain Assessment  Clinical Progression:  (not rated; reported that it was much better today. No limitations in activity due to pain)     Objective   Behavior:    Behavior  Behavior: Alert, Cooperative  Cognition:       Treatment:  Therapeutic Activity  Therapeutic Activity Performed: Yes  Therapeutic Activity 1: Lots of basketball shooting this session, reaching overhead, leading with L UE. Not limited by pain in L UE       Encounter Problems       Encounter Problems (Active)       IP PT Peds Mobility       Patient will demonstrate increased strength by demonstrating 4/5 strength in L UE  (Progressing)       Start:  01/08/24    Expected End:  01/15/24            Patient will demonstrate baseline PROM of LUE across 2 sessions  (Progressing)       Start:  01/08/24    Expected End:  01/15/24

## 2024-01-11 DIAGNOSIS — D57.20: ICD-10-CM

## 2024-01-11 RX ORDER — HYDROXYUREA 500 MG/1
1000 CAPSULE ORAL DAILY
Qty: 60 CAPSULE | Refills: 0 | Status: SHIPPED | OUTPATIENT
Start: 2024-01-11 | End: 2024-03-01 | Stop reason: SDUPTHER

## 2024-01-11 NOTE — DISCHARGE SUMMARY
Discharge Diagnosis  Sickle cell crisis (CMS/[facility])      Test Results Pending At Discharge  Pending Labs       No current pending labs.            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 XRs of forearm, humerus and shoulder were obtained which was overall reassuring and showed no acute osseous changes. Justino was initiated on the sickle cell pain pathway with scheduled Morphine, Toradol and Tylenol. Morphine was slowly weaned as patient's pain improved, and was transitioned to oral analgesia with oxycodone with continued improvement. Throughout his hospitalization, Justino received physical therapy, in which he overall had improved ROM of left arm. He complained of shortness of breath and pleuritic chest pain, in which a CXR was obtained and there were no findings to suggest acute chest syndrome. Justino did not have nay fever or increased O2 requirement throughout his hospitalization. He did not require any PRBC transfusions. He is overall clinically improved and is comfortable being discharged home with scheduled oxycodone, and naproxen x 2 days and then as needed. He will follow up with the sickle cell team on 2/1/24.    Pertinent Physical Exam At Time of Discharge  Physical Exam  Vitals and nursing note reviewed.   Constitutional:       General: He is not in acute distress.     Appearance: Normal appearance. He is not toxic-appearing.   HENT:      Head: Normocephalic and atraumatic.      Nose: Nose normal.      Mouth/Throat:      Mouth: Mucous membranes are moist.   Eyes:      Extraocular Movements: Extraocular movements intact.   Cardiovascular:      Rate and Rhythm: Normal rate and regular rhythm.      Pulses: Normal pulses.      Heart sounds: Normal heart sounds. No murmur heard.  Pulmonary:      Effort: Pulmonary effort is normal. No respiratory distress.      Breath sounds:  Normal breath sounds. No rhonchi.   Abdominal:      General: Abdomen is flat. Bowel sounds are normal. There is no distension.      Palpations: Abdomen is soft.      Tenderness: There is no abdominal tenderness.   Musculoskeletal:      Cervical back: Neck supple.      Comments: Improvement ROM of left elbow and shoulder   Skin:     General: Skin is warm.      Capillary Refill: Capillary refill takes less than 2 seconds.      Findings: No rash.   Neurological:      General: No focal deficit present.      Mental Status: He is alert and oriented to person, place, and time. Mental status is at baseline.   Psychiatric:         Mood and Affect: Mood normal.         Home Medications     Medication List      START taking these medications     bisacodyl 5 mg EC tablet; Commonly known as: Dulcolax; Take 1 tablet (5   mg) by mouth once daily as needed for constipation. Do not crush, chew, or   split.   polyethylene glycol 17 gram packet; Commonly known as: Glycolax,   Miralax; Take 17 g by mouth once daily. Do not start before January 11, 2024.; Replaces: polyethylene glycol 17 gram/dose powder     CHANGE how you take these medications     acetaminophen 325 mg tablet; Commonly known as: Tylenol; Take 2 tablets   (650 mg) by mouth every 6 hours.; What changed: when to take this, reasons   to take this   albuterol 90 mcg/actuation inhaler; What changed: Another medication   with the same name was removed. Continue taking this medication, and   follow the directions you see here.   naproxen 375 mg tablet; Commonly known as: Naprosyn; Take 1 tablet (375   mg) by mouth every 12 hours. For the next 2-3 days, take every 12 hours,   then only as needed for continued pain.; What changed: medication   strength, how much to take, when to take this, reasons to take this,   additional instructions   oxyCODONE 5 mg immediate release tablet; Commonly known as: Roxicodone;   Take 1 tablet (5 mg) by mouth every 6 hours for 1 day, THEN 1  tablet (5   mg) every 8 hours for 1 day, THEN 1 tablet (5 mg) every 12 hours for 1   day, THEN 1 tablet (5 mg) if needed for severe pain (7 - 10) for up to 1   day.; Start taking on: January 10, 2024; What changed: medication   strength, See the new instructions., Another medication with the same name   was removed. Continue taking this medication, and follow the directions   you see here.   sennosides 8.6 mg tablet; Commonly known as: Senokot; Take 1 tablet (8.6   mg) by mouth once daily. Do not start before January 11, 2024.; What   changed: when to take this, reasons to take this     CONTINUE taking these medications     Adderall XR 15 mg 24 hr capsule; Generic drug:   amphetamine-dextroamphetamine XR   alum-mag hydroxide-simeth 200-200-20 mg/5 mL oral suspension; Commonly   known as: Mylanta   Chewable-Flory tablet,chewable   Ensure Original 0.04-1.05 gram-kcal/mL liquid; Generic drug: food   supplemt, lactose-reduced   folic acid 1 mg tablet; Commonly known as: Folvite; Take 1 tablet (1 mg)   by mouth once daily.   loratadine 10 mg tablet; Commonly known as: Claritin     STOP taking these medications     amoxicillin-pot clavulanate 875-125 mg tablet; Commonly known as:   Augmentin   ARIPiprazole 5 mg tablet; Commonly known as: Abilify   baclofen 20 mg tablet; Commonly known as: Lioresal   cholecalciferol 50 mcg (2,000 unit) capsule; Commonly known as: Vitamin   D-3   cyclobenzaprine 5 mg tablet; Commonly known as: Flexeril   diazePAM 5 mg tablet; Commonly known as: Valium   docusate sodium 100 mg capsule; Commonly known as: Colace   ibuprofen 200 mg tablet   OLANZapine 2.5 mg tablet; Commonly known as: ZyPREXA   OLANZapine 5 mg tablet; Commonly known as: ZyPREXA   polyethylene glycol 17 gram/dose powder; Commonly known as: Miralax;   Replaced by: polyethylene glycol 17 gram packet   pseudoephedrine 30 mg tablet; Commonly known as: Sudafed   Vitamin D3 10 MCG (400 UNIT) tablet; Generic drug: cholecalciferol        Outpatient Follow-Up  Future Appointments   Date Time Provider Department Center   2/1/2024  1:15 PM Janeth Abdi MD [id number] [facility]       Heather Howadr MD

## 2024-01-26 NOTE — PROGRESS NOTES
Patient ID: Josue Rangel is a 18 y.o. male with sickle cell disease,  type SC, here today for a comprehensive visit with labs. He has had several complications related to his disease necessitating initiation of hydroxyurea therapy. He is here independently as he is of legal age.   Referring Physician: Janeth Abdi MD  [id number]  Pediatrics-[place] [address]  Primary Care Provider: Lauren Witt MD    Date of Service:  2/1/2024    SUBJECTIVE:  VISIT TYPE:   Sickle Cell Follow Up  Comprehensive Visit         INTERVAL HISTORY:  Since patient's last sickle cell follow up visit on 10/30/2024:   ED:   1/3/24 Left arm pain and r/o stroke due to reports of unable to move Lt arm  Hospitalizations:   1/4 -1/10/2024   Illness: None   Sickle Cell Pain: Only this last crisis that stated on 1/3/24 that he feels is still ongoing today.  Denies the offer of sending medications to [facility] to pickup on his way out today to have some available.  Concerns: Pain not resolving continues to have pain today and every day since discharge     SPECIALIST AND HEALTH SURVEILLANCE STATUS:   Dermatology on 3/14/20  for a mole on his forehead and they said the mole remains  healthy and does not need removal. Follow up PRN; UTD   [facility] last done on: 8/12/2022 - Dr. Lauren Church; Due now  Ophthalmolology last seen on: Follow up 6/16/2023; scheduled 1/5/2024 was no show- Due now  Dental last seen: 08/2019 - Due now   Pulmonology last seen 1n: 06/21, diagnosed with mild intermittent asthma, was to follow up with pulm in 1 year so is overdue. Sleep study was done which showed increased periodic limb movements and poor sleep (frequent awakenings, decreased REM and patient  had his mobile device with him when falling asleep and was on it for the prolonged periods of times he awakened in between sleep cycles).  Cardiology :April 2022. Due now for ECHO and to be seen by a provider  Psych- Currently not  seeing Psychologist at [place] with plan to transfer care to [facility]  He is not seeing LAMONT Kimball at this time but willing to schedule an appointment today   Adult Opioid Agreement - last completed on 7/31/2023;UTD   Pain Screen (> 8 yrs) -due today   Immunizations due today: none due- flu given 10/30/23; UTD   Labs due today:  HU labs  Medication Refills Needed: None    MEDICATION ADHERENCE (missed doses within the last 2 weeks)  HU- missed last night as ws not home ( Last refill was   Sudafed - missed last night as was not home  Olanzapine- missed it today as was not at home   Ensure- taking intermittently     PMH: Several admissions to [facility] for both fever and acute chest syndrome. He was admitted to [facility] in 2005 for  fever, in December 2005 for acute chest syndrome necessitating transfusion, and in December 2006 for RSV. He was admitted in June 2012 for acute chest syndrome requiring ICU management and exchange transfusion. Following this episode of acute chest syndrome,  he was initiated on hydroxyurea therapy.  Since that time, he has been doing much better.  He also has ADHD and was on meds in the past but is no longer.  He had a sleep study in Aug 2012 for history of snoring and restless sleep which was not concerning.  Admission in October 2015 for fever.  Admitted Sept 2016 with pain and ACS - was transfused. Admitted December 2017 for pain.     Josue was hospitalized on 1/16/2022 for pain and seizure- like activity in the ED with mental status changes while inpatient that necessitated an EEG. EEG was normal.  Hospitalized 4/10-12/2023- VOE shoulder and neck, eye pain  Hospitalized 6/1/2023 for CP, VOE and  altered mental status. CT head to r/o stroke was negative   Hospitalized 7/8/2023 for VOE, and altered mental status. Neurology workup normal. Right leg pain eventually localized to right SI joint and hip, prompting an MRI of the right hip, which revealed right  SI joint effusion and surrounding marrow edema concerning for septic arthritis versus osteomyelitis versus infarction. He was started on Rocephin per ID recommendations and underwent drainage of the joint effusion and bone biopsy with IR, which revealed no pathogens (though pre-treated with Rocephin). Blood cultures x2 showed no growth. Pain and gait improved and he was transitioned to a prolonged course of oral antibiotics with Augmentin per ID recommendations prior to discharge   Hospitalized 1/4/2024 for Left arm pain. XR's of left shoulder, forearm and humerus were negative for AVN. He also c/o SOB and pleuritic chest pain-CXR done and negative for ACS     Fully vaccinated against COVID 19     Surgical History:  None    Social History:  Josue reports that he has never smoked. He has never used smokeless tobacco. He reports current alcohol use. He reports current drug use. Drug: Marijuana.    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    Patient was previously enrolled as a 10th grade student at [employer] for [employer] in [place]  and was completing a credit recovery program.  Currently not in school.  Patient has not been back to this school since school ended June 2022.  Patient is unsure if he passed all his classes and earned all his credits. Patient believes he had about 10 credits. Works at [employer] 5 days per week (40hrs). He is not registered for school or enrolled.  He is currently in [place] and is taking Olanzapine. See note by YAIMA Rao       Review of Systems   Constitutional:  Positive for fatigue (Tired all the time). Negative for activity change, appetite change (Ensure intermittenly) and fever.        Working out a couple hours/day intermittently   HENT:  Negative for congestion, dental problem, rhinorrhea, sneezing and sore throat.    Eyes:  Negative for pain, discharge and visual disturbance.   Respiratory:  Positive for shortness of breath (with  "activity). Negative for cough.    Cardiovascular:  Negative for chest pain.   Gastrointestinal:  Negative for abdominal pain, constipation, diarrhea, nausea and vomiting.   Genitourinary:  Negative for dysuria, hematuria and penile pain.   Musculoskeletal:  Positive for arthralgias (Today he states still has pain LT arm, 5/10 back pain and left arm has not taken any medication in the last 2 days as he has not had his medications as not at home where the medications are located.) and back pain (5/10 middle back pain that started 2 days ago- Tylenol this morning.). Negative for joint swelling.   Skin:  Positive for rash (left side of face is red).   Neurological:  Positive for dizziness (All the time-takes a nap) and headaches (2 times/ week-Tylenol. Drinks a lot of water). Negative for light-headedness.   Psychiatric/Behavioral:  Positive for decreased concentration, dysphoric mood and sleep disturbance (4 hours of sleep per night but reports sleeping all day. Mind wonders all the time). Negative for hallucinations, self-injury and suicidal ideas. The patient is nervous/anxious (He states he has been well but has been tired a lot and sleeps a lot.  Report being tired and miserable, mind is all over the place, always thinking about everything especially his health. States he has other worries that he would not like to discuss.).         Stopped taking Olanzapine, not seeing counselor at this time     OBJECTIVE:    VS:  /76 (BP Location: Right arm, Patient Position: Sitting, BP Cuff Size: Large adult)   Pulse 91   Temp 36.8 °C (98.2 °F) (Oral)   Resp 20   Ht 1.772 m (5' 9.76\")   Wt 64.1 kg (141 lb 5 oz)   SpO2 97%   BMI 20.41 kg/m²   BSA: 1.78 meters squared    Physical Exam  Vitals reviewed.   Constitutional:       General: He is not in acute distress.     Appearance: Normal appearance. He is normal weight. He is not ill-appearing, toxic-appearing or diaphoretic.   HENT:      Head: Normocephalic and " atraumatic.      Right Ear: Tympanic membrane, ear canal and external ear normal. There is no impacted cerumen.      Left Ear: Tympanic membrane, ear canal and external ear normal. There is no impacted cerumen.      Nose: No congestion or rhinorrhea.      Mouth/Throat:      Mouth: Mucous membranes are moist.      Pharynx: No oropharyngeal exudate or posterior oropharyngeal erythema.   Eyes:      General: Scleral icterus (mild) present.         Right eye: No discharge.         Left eye: No discharge.      Extraocular Movements: Extraocular movements intact.      Conjunctiva/sclera: Conjunctivae normal.      Pupils: Pupils are equal, round, and reactive to light.   Cardiovascular:      Rate and Rhythm: Normal rate and regular rhythm.      Pulses: Normal pulses.      Heart sounds: Normal heart sounds. No murmur heard.     No gallop.   Pulmonary:      Effort: Pulmonary effort is normal. No respiratory distress.      Breath sounds: Normal breath sounds. No stridor. No wheezing, rhonchi or rales.   Chest:      Chest wall: No tenderness.   Abdominal:      General: Abdomen is flat. Bowel sounds are normal. There is no distension.      Palpations: Abdomen is soft. There is no mass.      Tenderness: There is no abdominal tenderness. There is no right CVA tenderness, left CVA tenderness, guarding or rebound.      Hernia: No hernia is present.   Musculoskeletal:         General: No swelling or tenderness. Normal range of motion.      Cervical back: Normal range of motion. No tenderness.   Lymphadenopathy:      Cervical: No cervical adenopathy.   Skin:     General: Skin is warm.      Capillary Refill: Capillary refill takes less than 2 seconds.   Neurological:      General: No focal deficit present.      Mental Status: He is alert. Mental status is at baseline.   Psychiatric:         Mood and Affect: Mood is depressed. Affect is flat.         Speech: Speech normal.         Behavior: Behavior is cooperative.          Laboratory:    Results for orders placed or performed during the hospital encounter of 02/01/24   CBC and Auto Differential   Result Value Ref Range    WBC 6.9 4.4 - 11.3 x10*3/uL    nRBC 0.6 (H) 0.0 - 0.0 /100 WBCs    RBC 3.54 (L) 4.50 - 5.90 x10*6/uL    Hemoglobin 11.7 (L) 13.5 - 17.5 g/dL    Hematocrit 30.2 (L) 41.0 - 52.0 %    MCV 85 80 - 100 fL    MCH 33.1 26.0 - 34.0 pg    MCHC 38.7 (H) 32.0 - 36.0 g/dL    RDW 13.5 11.5 - 14.5 %    Platelets 366 150 - 450 x10*3/uL    Neutrophils % 56.0 40.0 - 80.0 %    Immature Granulocytes %, Automated 0.3 0.0 - 0.9 %    Lymphocytes % 29.1 13.0 - 44.0 %    Monocytes % 9.7 2.0 - 10.0 %    Eosinophils % 4.3 0.0 - 6.0 %    Basophils % 0.6 0.0 - 2.0 %    Neutrophils Absolute 3.88 1.20 - 7.70 x10*3/uL    Immature Granulocytes Absolute, Automated 0.02 0.00 - 0.70 x10*3/uL    Lymphocytes Absolute 2.02 1.20 - 4.80 x10*3/uL    Monocytes Absolute 0.67 0.10 - 1.00 x10*3/uL    Eosinophils Absolute 0.30 0.00 - 0.70 x10*3/uL    Basophils Absolute 0.04 0.00 - 0.10 x10*3/uL   Reticulocytes   Result Value Ref Range    Retic % 3.7 (H) 0.5 - 2.0 %    Retic Absolute 0.132 (H) 0.022 - 0.118 x10*6/uL    Reticulocyte Hemoglobin 33 28 - 38 pg    Immature Retic fraction 33.6 (H) <=16.0 %       Current Outpatient Medications   Medication Instructions    acetaminophen (TYLENOL) 650 mg, oral, Every 6 hours    albuterol 90 mcg/actuation inhaler Inhale 2-4 puffs every 4-6 hours as needed for shortness of breath or wheezing    amphetamine-dextroamphetamine XR (Adderall XR) 15 mg 24 hr capsule Adderall XR 15 MG Oral Capsule Extended Release 24 Hour   Quantity: 30  Refills: 0        Start : 2-Sep-2016   Active    bisacodyl (DULCOLAX) 5 mg, oral, Daily PRN, Do not crush, chew, or split.    dimethicone-colloidal oatmeaL (Aveeno) 1.2 % lotion 1 Application, Every 12 hours    docusate sodium (COLACE) 100 mg, oral, 2 times daily    food supplemt, lactose-reduced (Ensure Original) 0.04-1.05 gram-kcal/mL  liquid Ensure Oral Liquid   Refills: 0        Start : 3-Aug-2017   Active    hydroxyurea (HYDREA) 1,000 mg, oral, Daily, Take at the same time each day.    loratadine (Claritin) 10 mg tablet 1 tablet, oral, Nightly    multivitamin (Chewable-Flory) tablet,chewable 1 tablet, oral, Daily    naproxen (NAPROSYN) 375 mg, oral, Every 12 hours, For the next 2-3 days, take every 12 hours, then only as needed for continued pain.    polyethylene glycol (GLYCOLAX, MIRALAX) 17 g, oral, Daily    sennosides (SENOKOT) 8.6 mg, oral, Daily    Vitamin D3 2,000 Units, oral, Daily        ASSESSMENT and PLAN:    Josue is a 18-year-old boy with sickle cell disease, type SC, here today for a HU monitoring visit. He has a significant psych history that includes depression, anxiety, insomnia,  SI,  auditory hallucinations, alcohol/ Marijuana use. He is not getting counseling through [facility] or taking Olanzapine that was prescribed. Adherence to HU has been suboptimal based on his consistent MCV of 85. His CBC is consistent with hemoglobin SC disease with an ANC above MTD. His most recent ECHO shows trivial mitral valve regurgitation but otherwise normal. He is not enrolled in school at this  time. Treating depression will help him outline his future goals (graduating high school or learning a trade or both).  His other active issues include:   1.He continues to have mild malnutrition due to increased metabolic demands of sickle cell disease.  2. 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  Per opthalmology, they will continue to observe for now; may do laser Panretinal Photocoagulation (laser treatement)  to the temporal retina if progression occurs. Pain survey classifies his pain as Asymptomatic, high risk for chronic pain. His active issues include:   5/10 back and left leg pain.        Plan     Hydroxyurea  HU  dose is 1000mg daily Monday to Friday. His ANC is 3,880 (equivalent to 11mg/kg/5 days)  No changes made today as his ANC is slightly above MTD along with suboptimal adherence overall.   HU labs due once every 4 weeks    Sickle Cell Pain  Naprosyn 500mg once given in clinic today    Sickle Cell Retinopathy  Re-schedule with ophthalmology for missed appointment   Potential Panretinal Photocoagulation laser treatment for progression  Patient given number to schedule an  appointment     Depression   To continue Olanzapine 5mg at bedtime  Appt made with LAMONT Kimball for 2024     Psychosocial  His overall mental health needs to be taken care of to be successful in any school endeavors.   School resources for adults and trade programs have been discussed with Josue and his grandmother in previous visits.  Transition skills discussed and patient demonstrated effectively (refilling prescriptions and scheduling appointments)     Mild Malnutrition  He is to continue Ensure BID and would like Chocolate or Vanilla flavor.     Teaching:   Impending Transition to the adult sickle cell team   Mental health management      Meds sent to pharmacy: Naprosyn increased to 500mg [id number]      Specialist Appts Due:   [facility]   Ophthalmology   Dental  Pulmonology   Cardiology (ECHO and to be seen by a provider)  Denisa Amaro; Appt made for 2024     Pain screenin24 Asymptomatic, high risk for chronic pain. Will not repeat at this time. Patient will transfer to  adult sickle cell team after 2024 appointment.        RTC in 2months  with monthly labs for HU monitoring. 2024 will be his final visit with the pediatric sickle cell team       DOTTY Henderson, ALISSONP-C

## 2024-02-01 ENCOUNTER — DOCUMENTATION (OUTPATIENT)
Dept: PEDIATRIC HEMATOLOGY/ONCOLOGY | Facility: HOSPITAL | Age: 19
End: 2024-02-01
Payer: MEDICAID

## 2024-02-01 ENCOUNTER — HOSPITAL ENCOUNTER (OUTPATIENT)
Dept: PEDIATRIC HEMATOLOGY/ONCOLOGY | Facility: HOSPITAL | Age: 19
Discharge: HOME | End: 2024-02-01
Payer: MEDICAID

## 2024-02-01 ENCOUNTER — SOCIAL WORK (OUTPATIENT)
Dept: PEDIATRIC HEMATOLOGY/ONCOLOGY | Facility: HOSPITAL | Age: 19
End: 2024-02-01
Payer: MEDICAID

## 2024-02-01 VITALS
RESPIRATION RATE: 20 BRPM | SYSTOLIC BLOOD PRESSURE: 117 MMHG | HEIGHT: 70 IN | OXYGEN SATURATION: 97 % | HEART RATE: 91 BPM | WEIGHT: 141.31 LBS | BODY MASS INDEX: 20.23 KG/M2 | DIASTOLIC BLOOD PRESSURE: 76 MMHG | TEMPERATURE: 98.2 F

## 2024-02-01 DIAGNOSIS — D57.1 SICKLE CELL DISEASE WITHOUT CRISIS (MULTI): Primary | ICD-10-CM

## 2024-02-01 DIAGNOSIS — D57.20: ICD-10-CM

## 2024-02-01 DIAGNOSIS — D57.00 SICKLE-CELL DISEASE WITH PAIN (MULTI): ICD-10-CM

## 2024-02-01 LAB
BASOPHILS # BLD AUTO: 0.04 X10*3/UL (ref 0–0.1)
BASOPHILS NFR BLD AUTO: 0.6 %
EOSINOPHIL # BLD AUTO: 0.3 X10*3/UL (ref 0–0.7)
EOSINOPHIL NFR BLD AUTO: 4.3 %
ERYTHROCYTE [DISTWIDTH] IN BLOOD BY AUTOMATED COUNT: 13.5 % (ref 11.5–14.5)
HCT VFR BLD AUTO: 30.2 % (ref 41–52)
HGB BLD-MCNC: 11.7 G/DL (ref 13.5–17.5)
HGB RETIC QN: 33 PG (ref 28–38)
IMM GRANULOCYTES # BLD AUTO: 0.02 X10*3/UL (ref 0–0.7)
IMM GRANULOCYTES NFR BLD AUTO: 0.3 % (ref 0–0.9)
IMMATURE RETIC FRACTION: 33.6 %
LYMPHOCYTES # BLD AUTO: 2.02 X10*3/UL (ref 1.2–4.8)
LYMPHOCYTES NFR BLD AUTO: 29.1 %
MCH RBC QN AUTO: 33.1 PG (ref 26–34)
MCHC RBC AUTO-ENTMCNC: 38.7 G/DL (ref 32–36)
MCV RBC AUTO: 85 FL (ref 80–100)
MONOCYTES # BLD AUTO: 0.67 X10*3/UL (ref 0.1–1)
MONOCYTES NFR BLD AUTO: 9.7 %
NEUTROPHILS # BLD AUTO: 3.88 X10*3/UL (ref 1.2–7.7)
NEUTROPHILS NFR BLD AUTO: 56 %
NRBC BLD-RTO: 0.6 /100 WBCS (ref 0–0)
PLATELET # BLD AUTO: 366 X10*3/UL (ref 150–450)
RBC # BLD AUTO: 3.54 X10*6/UL (ref 4.5–5.9)
RETICS #: 0.13 X10*6/UL (ref 0.02–0.12)
RETICS/RBC NFR AUTO: 3.7 % (ref 0.5–2)
WBC # BLD AUTO: 6.9 X10*3/UL (ref 4.4–11.3)

## 2024-02-01 PROCEDURE — 36415 COLL VENOUS BLD VENIPUNCTURE: CPT | Performed by: NURSE PRACTITIONER

## 2024-02-01 PROCEDURE — 85045 AUTOMATED RETICULOCYTE COUNT: CPT | Performed by: NURSE PRACTITIONER

## 2024-02-01 PROCEDURE — 2500000001 HC RX 250 WO HCPCS SELF ADMINISTERED DRUGS (ALT 637 FOR MEDICARE OP): Mod: SE | Performed by: NURSE PRACTITIONER

## 2024-02-01 PROCEDURE — 85025 COMPLETE CBC W/AUTO DIFF WBC: CPT | Performed by: NURSE PRACTITIONER

## 2024-02-01 PROCEDURE — 99215 OFFICE O/P EST HI 40 MIN: CPT | Performed by: PEDIATRICS

## 2024-02-01 RX ORDER — NAPROXEN 250 MG/1
500 TABLET ORAL ONCE
Status: COMPLETED | OUTPATIENT
Start: 2024-02-01 | End: 2024-02-01

## 2024-02-01 RX ORDER — DOCUSATE SODIUM 100 MG/1
100 CAPSULE, LIQUID FILLED ORAL 2 TIMES DAILY
Status: ON HOLD | COMMUNITY
Start: 2024-01-11 | End: 2024-02-15 | Stop reason: SDUPTHER

## 2024-02-01 RX ORDER — IBUPROFEN 800 MG
2000 TABLET ORAL DAILY
COMMUNITY
Start: 2024-01-10 | End: 2024-05-02 | Stop reason: WASHOUT

## 2024-02-01 RX ADMIN — NAPROXEN 500 MG: 250 TABLET ORAL at 14:29

## 2024-02-01 ASSESSMENT — ENCOUNTER SYMPTOMS
FATIGUE: 1
HALLUCINATIONS: 0
CONSTIPATION: 0
APPETITE CHANGE: 0
HEMATURIA: 0
LIGHT-HEADEDNESS: 0
EYE DISCHARGE: 0
LOSS OF SENSATION IN FEET: 0
EYE PAIN: 0
VOMITING: 0
DYSURIA: 0
COUGH: 0
BACK PAIN: 1
SORE THROAT: 0
DEPRESSION: 0
SHORTNESS OF BREATH: 1
DYSPHORIC MOOD: 1
DIARRHEA: 0
NAUSEA: 0
RHINORRHEA: 0
SLEEP DISTURBANCE: 1
DECREASED CONCENTRATION: 1
ABDOMINAL PAIN: 0
JOINT SWELLING: 0
HEADACHES: 1
DIZZINESS: 1
OCCASIONAL FEELINGS OF UNSTEADINESS: 0
ACTIVITY CHANGE: 0
FEVER: 0

## 2024-02-01 ASSESSMENT — PAIN DESCRIPTION - LOCATION: LOCATION: BACK

## 2024-02-01 ASSESSMENT — PAIN INTENSITY VAS: VAS_PAIN_GENERAL: 5

## 2024-02-01 ASSESSMENT — PAIN SCALES - GENERAL: PAINLEVEL: 4

## 2024-02-01 ASSESSMENT — PAIN - FUNCTIONAL ASSESSMENT: PAIN_FUNCTIONAL_ASSESSMENT: VAS (VISUAL ANALOG SCALE)

## 2024-02-01 NOTE — PATIENT INSTRUCTIONS
Please call if there is a fever greater than 101, pallor, lethargy, pain not responsive to home pain medications, signs and symptoms of splenic sequestration or any other questions or concerns regarding your child's care.    You can reach a member of the sickle cell care team at any time by calling [phone].     Please make an appointment with your PMD  Please make an appointment with the ophthalmologist by [phone]   To schedule an appoinment with [facility] please call [phone]   To schedule an appointment with the lung doctor (pulmonology) please call [phone]   Please schedule an appointment with pediatric cardiology by calling [phone] for an ECHO    Return to clinic in 2023 for your final visit with the pediatric sickle cell team prior to transferring to adult sickle cell care

## 2024-02-07 NOTE — PROGRESS NOTES
"SW met with pt while in [place]. Pt comes to appt by himself as he is now over 17.     Pt lives with Dylan, her , gr Aunt and Dylan's son, 18 year old-Demetrio. Grandparents are , but Dylan is remarried.  Pat Dylan- Addis Bo, had custody of pt until age of 17.  No guardian now. FISH has spoken to Dylan about guardianship for pt. in the past due to mental health.  He does not have any diagnosed cognitive delays despite struggles in school.     Custody: As a minor- Grandmother, Grandfather    Mom was incarcerated. Dylan stated that bio mom tried to get custody back of all five of her children in past years. However, Dylan didn't feel it was a good plan.  Her legal issues were significant. Mom had visits 1-2 times per month on weekends.  Pt rotated between relatives for visits ([place], X- Gpa, Bio mom and gma). Bio mom is  now.  Mom had approached court to get custody back of pt in 2019.  Dylan felt it was financial in nature. Mom did not show up in court.     Dad incarcerated.  He is in [facility].     Ins: [facility], [facility]-needs new application since over 17    Income: SSI, job, FS    Pt is working at [employer], 3rd shift, FT X 4 months.     Add:[id number] [facility] [id number]    Phone: [phone]-, [phone]-Gma cell  -aunt Karolyn- [phone]  -pt-[phone]    Today, in 2/24, pt has poor eye contact, is on his phone playing a game, but engaged in conversation.  He explained that his \"mindset wanders and it keeps me from doing things.\" Per pt, he \"wouldn't mind changing that problem\". Pt acknowledges that he still has both auditory and visual hallucinations at times.  He was not that talkative about these symptoms today. He is interested in meds to help quiet his thoughts.  After meeting with Denisa Amaro today in clinic, he has scheduled an appt with her for further evaluation on 2/13/24. He would rather meet with Denisa than go to a mental health agency at this time " "because he feels comfortable coming to [facility] clinic, per pt.     In 7/23, pt was not taking any meds for his mental health.  He no longer felt he was depressed.  He admitted to having about two panic attacks weekly and would wake up with anxiety sometimes.  He also admitted to having visual and auditory hallucinations.  At times, he would ask others if they see and hear what he does. He did not feel they impact his life so he was not concerned about it.  He talked about using several calming and meditation apps to relax. When he has anxiety, his body goes numb so he drinks water. Sw spoke with a and she is trying very hard to guide him in adult life and tried to support him, especially around his medical health.    In 4/23-Pt was tired but talkative.  He stated that he is taking his psych. meds which gives him more energy. Pt isn't sure where he is going for his mental health care at this point ([facility]  He feels like he has a few pills left.   Sw talked with pt about how important it was to stay on his medicine, to not run out of his medicine. Pt stated he was still working at [employer] 24 hours/week, 5 days a week. He was not attending school and does not want to be in any school or GED program. He will let us know when he was ready for a program. [facility] has provided info on several programs without follow through. Sw talked with a around this issue and his desire to live independently, despite his cognitive and mental health issues.    At 10/22 clinic appt pt was withdrawn, \"tired\" and made very poor to no eye contact.  He sat up when FISH requested him to, but after a few minutes layed back down. Pt stated that he has increased insomnia, causing his tiredness, has decreased energy, and has felt down for about two weeks.  He states that he is no longer taking his medication as he \"didn't feel his meds. were working anymore\". He also stated that he didn't want to take meds anymore as he gets tired of " "taking his medicine. He stated \"I want to be normal and feel like a human again\". He denies SI today but stated his last thought was a month ago, without plan, but unsure if he would follow through. His goal is \"making everyday worth it.\"  Pt clearly was showing signs of depression, similar to how he was prior to treatment with Zoloft.     In 10/22, NABIL discussed signs and symptoms of depression with pt.  Pt acknowledged changes in himself.  He had not talked with his therapist in a few weeks.  He was seeing Jennifer through [facility] at the time.  NABIL called Jennifer during the meeting with pt that day.  She scheduled an in person appt with pt in two days.  NABIL requested that a psychiatry appt also be coordinated, hopefully on Monday as well.  Jennifer will contact his psych NP at [facility] to arrange.  Jennifer shared with Gma and NABIL confirmed that Gma will take pt to that appt.  Nabil also put the appt in pt's phone calendar.  Pt did not seem to need an ED evaluation as he denies SI today.  MD concurred.     Pt is no longer working at [employer] at the .  He doesn't like the job. He is unemployed.  NABIL gave him the handout for [employer], a program where he can get his diploma/ged and get vocational training. They also help educate around life skills for adulthood. NABIL faxed referral and provided pt contact info. NABIL also gave Gma the [employer] of [employer] contact info as they could help with vocational and supported housing as well.     In 5/21, pt was talkative, smiling, casey a beautiful picture with a thoughtful saying on the dry erase board in the clinic room.  This is much more like his baseline behavior. NABIL encouraged pt to continue taking his meds as it appears as it is helping his depression.  NABIL told pt that it was nice to see the positive changes in him. Pt and Aunt state that he is lifting weights at home and takes walks down the street as well as being a bit more social with family.  Aunt did mention when meeting with her alone " "about Gma's frustration level.  FISH gave Aunt  EAP program to offer Gma since she works at .   Pt does remember his last visit when he sat in the corner of the room, with his hoodie up and made no eye contact and didn't speak to staff.     FISH spoke with Yasir Richardson in 4/21 via phone as she was frustrated with pt's defiance and lethargy. FISH encouraged Dylan to remind herself that his behavior changes are likely caused by his depression.  He has started on Certraline and continues his Adderol.  FISH suggested that Dylan speak with his counselor and psych NP regarding potential med. increase as Gma starting to see minor positive changes since on the medicine.    FISH spoke with Yasir Richardson at length prior to the appt. on 2/22/21.  She has significant concerns regarding his mental health.  She explained that it started when he connected to the son of his bio dad's ex girlfrend.  He is a bad influence, per Gma.  He opened pt up to social media, websites involving sexual contacts virtually with females.  Per gma, pt became disrespectful, on computer and phone throughout the night, and therefore, tired for school and not completing work. He isolates himself in his dark bedroom and won't open up or talk about what is bothering him. Dylan believes that he is cutting as she has seen thin lines on his hands.  FISH explained reasons for cutting for Dylan as she was unaware. At the appt today, pt admitted to being \"sad\" with RN and upon questioning about SI, pt stated \"you don't want to know the answer to that\". Per MD, pt does not have an active suicide plan. Pt's affect was very flat, he kept his hoodie on and did not make eye contact.  He sat in the corner of the room and did not converse with staff as he had done in the past.  This was a remarkable difference for him, even compared to the subtle changes at his appt in our clinic three months ago.  FISH spoke with pt's therapist from [facility] via phone during the appt- Tiny [phone].  She " had an appt at 6PM that day with pt. and would continue to assess the situation. FISH gained Tiny's insight regarding the need to send pt to the ED from clinic.  Tiny stated it sounded like pt's current baseline at this time.  She would continue to assess at their appt at 6PM and send him to the ED if SI were expressed.  Per Tiny, he has been slow to open up about his emotions and to put forth the effort into working on his issues.  He will not do a video appt with Tiny, only via phone.  He continues to be resistant in working on symptoms of depression.  She has been tracking self harm thoughts. FISH also spoke with Gma at the appt so everyone was on the same page.  Both Gma and Aunt were told how to make house safe.  There are no guns in the house, she will remove the knives from kitchen and meds are always locked up.  Family to monitor pt frequently and encourage less time in his room alone.  FISH provided Suicide Hotline 24/7 number to pt directly and to Gma.      Pt was also seeing a psychiatric NP, [facility] at [facility]. Pt started on Cirtraline on 2/21.  Tiny is contacting his psychiatrist at [facility] regarding whether this decline is due to the new med. Pt is also completing cognitive testing with Christina to put full picture in place.    11/2020- Had incident a few weeks ago when he was texting with a boy who was sharing bad advice.  Aunt felt the boy is a terrible influence. Pt made some negative and concerning comments and then tried to cut on his arms, leaving very superficial lines. Fish discussed feelings with pt but he didn't really want to talk, which is very unusual for him.  [facility] set up an urgent counseling session which helped.  FISH provided the Suicide Prevention Hotline to pt, explaining there is someone 24/7 he could talk with. He and Aunt put number right in their phones.    Education: Pt is no longer in 11th grade at [employer]. In the past, he was excited to be at his last HS as he loves to draw and  loves [employer] games. Received  in past. His grades went from A,Bs to D F's.  He does not want to be in school at this time. [employer] and Nabil have shared various programs with him to complete diploma/GED and for vocational programming.    Pt had repeated 3rd grade twice.  The first time it was due to overall academics.  The second time it was because he failed the [employer] reading test by 4 pts.  a states that he struggles on tests.  However, he still has issues with attention, speaking out of turn and getting out of seat. Teachers in past years did not feel he needed an IEP. He does have a 504 plan.  It is more an issue of attention and focus.     Dylan attended all the school mtgs. Pt was back on ADHD meds in the past mainly due to hyperactivity but his behavior had been better so he was taken off those meds.  a did get him back on meds for ADHD, as his behaviors got in the way of his learning.  a  described that he was very talkative in class, didn't finish reading directions on class work, and didn't always listen in class. Last year, teacher sent daily notes home for all students so Dylan can see on a daily basis how he is doing behaviorally. He typically is moving around during the clinic visit but very polite.  However, he tried his best to not interrupt the conversation with his questions. Pt had MFE completed through [employer] [facility] in 2012 but no IEP was needed. He was retested by Dr. Johnson.  Per a, Dr Johnson feels he has slight dyslexia and ADHD.  [facility] has given school the report but an IEP has not been written.   He has a 504. KULWINDER HAMILTON met with [facility] and will assist in getting an IEP. Pt took Adderol 15 mg. SW discussed with a about getting another opinion about his ADHD meds from psychiatry as he continues to have attention and focusing problems in school. The dose of his Adderol hadn't been changed in several years.     Close family member went through a traumatic event of a kidnapping.     Pt attended summer  school. Kids began making fun of pt as he is two years older then most. Sw to made counseling referral to [facility] as pt had a lot of things to handle with family situation.    Pt had started attending groups through [employer].  They went on field trips, volunteer in the community and have lectures about positive issues. He liked it.    Transp: Bus    P: Remain avail for supportive counselling and to coordinate school accommodations.  Collaborated with [facility] medical team about needs of patient and family.  -psychiatry info provided. Dr Joelle Meza again  referral to [facility]  Denisa Amaro  [facility] 360 program  Bd of LEIGH[facility]

## 2024-02-07 NOTE — PROGRESS NOTES
School  ([employer]) briefly met with patient during clinic visit.      Patient is not currently enrolled in school.  Patient last completed 10th grade at [employer] in [place], June 2022. Patient is unsure if he passed all his classes and the number of credits he earned. Patient believes he had about 10 credits. Patient enrolled at [employer] at the start of 2023 but only attended for a few weeks.  Patient stopped because he didn't like that all the work was on the computer; it was not a good fit for him.      Today patient states he is not interested in returning to school at this time. Patient has been working at [employer] the past 3 months.      Navigation-Patient requested assistance with scheduling overdue specialists visits.     [facility] will remain available for educational support.

## 2024-02-10 ENCOUNTER — CLINICAL SUPPORT (OUTPATIENT)
Dept: EMERGENCY MEDICINE | Facility: HOSPITAL | Age: 19
End: 2024-02-10
Payer: MEDICAID

## 2024-02-10 ENCOUNTER — HOSPITAL ENCOUNTER (EMERGENCY)
Facility: HOSPITAL | Age: 19
Discharge: OTHER INSTITUTION | End: 2024-02-11
Attending: EMERGENCY MEDICINE | Admitting: PEDIATRICS
Payer: MEDICAID

## 2024-02-10 DIAGNOSIS — D57.00 VASO-OCCLUSIVE PAIN DUE TO SICKLE CELL DISEASE (MULTI): Primary | ICD-10-CM

## 2024-02-10 LAB
ALBUMIN SERPL BCP-MCNC: 4.7 G/DL (ref 3.4–5)
ALP SERPL-CCNC: 65 U/L (ref 33–120)
ALT SERPL W P-5'-P-CCNC: 13 U/L (ref 10–52)
ANION GAP SERPL CALC-SCNC: 14 MMOL/L (ref 10–20)
AST SERPL W P-5'-P-CCNC: 37 U/L (ref 9–39)
BASOPHILS # BLD AUTO: 0.04 X10*3/UL (ref 0–0.1)
BASOPHILS NFR BLD AUTO: 0.4 %
BILIRUB SERPL-MCNC: 1.2 MG/DL (ref 0–1.2)
BUN SERPL-MCNC: 11 MG/DL (ref 6–23)
CALCIUM SERPL-MCNC: 10.2 MG/DL (ref 8.6–10.6)
CHLORIDE SERPL-SCNC: 106 MMOL/L (ref 98–107)
CO2 SERPL-SCNC: 25 MMOL/L (ref 21–32)
CREAT SERPL-MCNC: 0.72 MG/DL (ref 0.5–1.3)
EGFRCR SERPLBLD CKD-EPI 2021: >90 ML/MIN/1.73M*2
EOSINOPHIL # BLD AUTO: 0.27 X10*3/UL (ref 0–0.7)
EOSINOPHIL NFR BLD AUTO: 3 %
ERYTHROCYTE [DISTWIDTH] IN BLOOD BY AUTOMATED COUNT: 13.8 % (ref 11.5–14.5)
GLUCOSE SERPL-MCNC: 84 MG/DL (ref 74–99)
HCT VFR BLD AUTO: 30.2 % (ref 41–52)
HGB BLD-MCNC: 11.7 G/DL (ref 13.5–17.5)
IMM GRANULOCYTES # BLD AUTO: 0.04 X10*3/UL (ref 0–0.7)
IMM GRANULOCYTES NFR BLD AUTO: 0.4 % (ref 0–0.9)
LYMPHOCYTES # BLD AUTO: 2.56 X10*3/UL (ref 1.2–4.8)
LYMPHOCYTES NFR BLD AUTO: 28.1 %
MCH RBC QN AUTO: 33 PG (ref 26–34)
MCHC RBC AUTO-ENTMCNC: 38.7 G/DL (ref 32–36)
MCV RBC AUTO: 85 FL (ref 80–100)
MONOCYTES # BLD AUTO: 0.72 X10*3/UL (ref 0.1–1)
MONOCYTES NFR BLD AUTO: 7.9 %
NEUTROPHILS # BLD AUTO: 5.48 X10*3/UL (ref 1.2–7.7)
NEUTROPHILS NFR BLD AUTO: 60.2 %
NRBC BLD-RTO: 0.9 /100 WBCS (ref 0–0)
PLATELET # BLD AUTO: 262 X10*3/UL (ref 150–450)
POTASSIUM SERPL-SCNC: 4.8 MMOL/L (ref 3.5–5.3)
PROT SERPL-MCNC: 8.2 G/DL (ref 6.4–8.2)
RBC # BLD AUTO: 3.55 X10*6/UL (ref 4.5–5.9)
SODIUM SERPL-SCNC: 140 MMOL/L (ref 136–145)
WBC # BLD AUTO: 9.1 X10*3/UL (ref 4.4–11.3)

## 2024-02-10 PROCEDURE — 80053 COMPREHEN METABOLIC PANEL: CPT | Performed by: EMERGENCY MEDICINE

## 2024-02-10 PROCEDURE — 36415 COLL VENOUS BLD VENIPUNCTURE: CPT | Performed by: EMERGENCY MEDICINE

## 2024-02-10 PROCEDURE — 85025 COMPLETE CBC W/AUTO DIFF WBC: CPT | Performed by: EMERGENCY MEDICINE

## 2024-02-10 PROCEDURE — 2500000004 HC RX 250 GENERAL PHARMACY W/ HCPCS (ALT 636 FOR OP/ED): Mod: SE | Performed by: EMERGENCY MEDICINE

## 2024-02-10 PROCEDURE — 96374 THER/PROPH/DIAG INJ IV PUSH: CPT

## 2024-02-10 PROCEDURE — 93005 ELECTROCARDIOGRAM TRACING: CPT

## 2024-02-10 PROCEDURE — 99285 EMERGENCY DEPT VISIT HI MDM: CPT | Performed by: EMERGENCY MEDICINE

## 2024-02-10 PROCEDURE — 99285 EMERGENCY DEPT VISIT HI MDM: CPT | Mod: 25 | Performed by: EMERGENCY MEDICINE

## 2024-02-10 PROCEDURE — 96376 TX/PRO/DX INJ SAME DRUG ADON: CPT

## 2024-02-10 PROCEDURE — 2500000004 HC RX 250 GENERAL PHARMACY W/ HCPCS (ALT 636 FOR OP/ED): Mod: SE

## 2024-02-10 RX ORDER — MORPHINE SULFATE 4 MG/ML
8 INJECTION INTRAVENOUS ONCE
Status: COMPLETED | OUTPATIENT
Start: 2024-02-10 | End: 2024-02-10

## 2024-02-10 RX ORDER — MORPHINE SULFATE 4 MG/ML
8 INJECTION INTRAVENOUS EVERY 2 HOUR PRN
Status: COMPLETED | OUTPATIENT
Start: 2024-02-10 | End: 2024-02-10

## 2024-02-10 RX ORDER — MORPHINE SULFATE 4 MG/ML
INJECTION INTRAVENOUS
Status: COMPLETED
Start: 2024-02-10 | End: 2024-02-10

## 2024-02-10 RX ADMIN — MORPHINE SULFATE 8 MG: 4 INJECTION INTRAVENOUS at 20:57

## 2024-02-10 RX ADMIN — MORPHINE SULFATE 8 MG: 4 INJECTION INTRAVENOUS at 23:35

## 2024-02-10 RX ADMIN — MORPHINE SULFATE 8 MG: 4 INJECTION INTRAVENOUS at 21:29

## 2024-02-10 ASSESSMENT — PAIN SCALES - GENERAL
PAINLEVEL_OUTOF10: 10 - WORST POSSIBLE PAIN

## 2024-02-10 ASSESSMENT — PAIN DESCRIPTION - PAIN TYPE: TYPE: CHRONIC PAIN;ACUTE PAIN

## 2024-02-10 ASSESSMENT — PAIN - FUNCTIONAL ASSESSMENT
PAIN_FUNCTIONAL_ASSESSMENT: 0-10

## 2024-02-10 ASSESSMENT — PAIN DESCRIPTION - FREQUENCY: FREQUENCY: CONSTANT/CONTINUOUS

## 2024-02-10 ASSESSMENT — PAIN DESCRIPTION - ONSET: ONSET: ONGOING

## 2024-02-10 ASSESSMENT — COLUMBIA-SUICIDE SEVERITY RATING SCALE - C-SSRS
2. HAVE YOU ACTUALLY HAD ANY THOUGHTS OF KILLING YOURSELF?: NO
6. HAVE YOU EVER DONE ANYTHING, STARTED TO DO ANYTHING, OR PREPARED TO DO ANYTHING TO END YOUR LIFE?: NO
1. IN THE PAST MONTH, HAVE YOU WISHED YOU WERE DEAD OR WISHED YOU COULD GO TO SLEEP AND NOT WAKE UP?: NO

## 2024-02-10 ASSESSMENT — PAIN DESCRIPTION - LOCATION: LOCATION: GENERALIZED

## 2024-02-10 ASSESSMENT — PAIN DESCRIPTION - PROGRESSION: CLINICAL_PROGRESSION: NOT CHANGED

## 2024-02-10 ASSESSMENT — PAIN DESCRIPTION - DESCRIPTORS: DESCRIPTORS: ACHING;BURNING;STABBING

## 2024-02-11 ENCOUNTER — APPOINTMENT (OUTPATIENT)
Dept: RADIOLOGY | Facility: HOSPITAL | Age: 19
End: 2024-02-11
Payer: MEDICAID

## 2024-02-11 ENCOUNTER — HOSPITAL ENCOUNTER (INPATIENT)
Facility: HOSPITAL | Age: 19
LOS: 5 days | Discharge: HOME | End: 2024-02-16
Attending: PEDIATRICS | Admitting: PEDIATRICS
Payer: MEDICAID

## 2024-02-11 VITALS
OXYGEN SATURATION: 99 % | HEIGHT: 70 IN | RESPIRATION RATE: 16 BRPM | BODY MASS INDEX: 20.04 KG/M2 | HEART RATE: 80 BPM | WEIGHT: 140 LBS | DIASTOLIC BLOOD PRESSURE: 78 MMHG | SYSTOLIC BLOOD PRESSURE: 129 MMHG

## 2024-02-11 DIAGNOSIS — D57.00 SICKLE CELL DISEASE WITH CRISIS (MULTI): ICD-10-CM

## 2024-02-11 DIAGNOSIS — R07.9 CHEST PAIN, UNSPECIFIED TYPE: ICD-10-CM

## 2024-02-11 DIAGNOSIS — D57.00 VASOOCCLUSIVE SICKLE CELL CRISIS (MULTI): Primary | ICD-10-CM

## 2024-02-11 DIAGNOSIS — F31.81 BIPOLAR 2 DISORDER (MULTI): ICD-10-CM

## 2024-02-11 LAB
ATRIAL RATE: 101 BPM
P AXIS: 134 DEGREES
P OFFSET: 207 MS
P ONSET: 138 MS
PR INTERVAL: 162 MS
Q ONSET: 219 MS
QRS COUNT: 17 BEATS
QRS DURATION: 78 MS
QT INTERVAL: 314 MS
QTC CALCULATION(BAZETT): 419 MS
QTC FREDERICIA: 380 MS
R AXIS: 72 DEGREES
T AXIS: -58 DEGREES
T OFFSET: 376 MS
VENTRICULAR RATE: 107 BPM

## 2024-02-11 PROCEDURE — 2500000001 HC RX 250 WO HCPCS SELF ADMINISTERED DRUGS (ALT 637 FOR MEDICARE OP)

## 2024-02-11 PROCEDURE — 2500000004 HC RX 250 GENERAL PHARMACY W/ HCPCS (ALT 636 FOR OP/ED): Mod: SE | Performed by: STUDENT IN AN ORGANIZED HEALTH CARE EDUCATION/TRAINING PROGRAM

## 2024-02-11 PROCEDURE — 2500000004 HC RX 250 GENERAL PHARMACY W/ HCPCS (ALT 636 FOR OP/ED)

## 2024-02-11 PROCEDURE — 96376 TX/PRO/DX INJ SAME DRUG ADON: CPT

## 2024-02-11 PROCEDURE — 1210000001 HC SEMI-PRIVATE ROOM DAILY

## 2024-02-11 PROCEDURE — 99223 1ST HOSP IP/OBS HIGH 75: CPT

## 2024-02-11 PROCEDURE — 71046 X-RAY EXAM CHEST 2 VIEWS: CPT | Performed by: RADIOLOGY

## 2024-02-11 PROCEDURE — 93010 ELECTROCARDIOGRAM REPORT: CPT | Performed by: PEDIATRICS

## 2024-02-11 PROCEDURE — 71046 X-RAY EXAM CHEST 2 VIEWS: CPT

## 2024-02-11 PROCEDURE — 1130000001 HC PRIVATE PED ROOM DAILY

## 2024-02-11 RX ORDER — HYDROXYUREA 500 MG/1
1000 CAPSULE ORAL DAILY
Status: DISCONTINUED | OUTPATIENT
Start: 2024-02-11 | End: 2024-02-11

## 2024-02-11 RX ORDER — POLYETHYLENE GLYCOL 3350 17 G/17G
0.5 POWDER, FOR SOLUTION ORAL DAILY
Status: DISCONTINUED | OUTPATIENT
Start: 2024-02-11 | End: 2024-02-12

## 2024-02-11 RX ORDER — HYDROXYUREA 500 MG/1
1000 CAPSULE ORAL
Status: DISCONTINUED | OUTPATIENT
Start: 2024-02-12 | End: 2024-02-11

## 2024-02-11 RX ORDER — MORPHINE SULFATE 4 MG/ML
5 INJECTION INTRAVENOUS EVERY 4 HOURS
Status: DISCONTINUED | OUTPATIENT
Start: 2024-02-11 | End: 2024-02-13

## 2024-02-11 RX ORDER — KETOROLAC TROMETHAMINE 15 MG/ML
15 INJECTION, SOLUTION INTRAMUSCULAR; INTRAVENOUS EVERY 6 HOURS SCHEDULED
Status: CANCELLED | OUTPATIENT
Start: 2024-02-11 | End: 2024-02-14

## 2024-02-11 RX ORDER — CHOLECALCIFEROL (VITAMIN D3) 25 MCG
1000 TABLET ORAL DAILY
Status: DISCONTINUED | OUTPATIENT
Start: 2024-02-11 | End: 2024-02-16 | Stop reason: HOSPADM

## 2024-02-11 RX ORDER — MORPHINE SULFATE 4 MG/ML
6 INJECTION INTRAVENOUS ONCE
Status: DISCONTINUED | OUTPATIENT
Start: 2024-02-11 | End: 2024-02-11

## 2024-02-11 RX ORDER — MORPHINE SULFATE 4 MG/ML
8 INJECTION INTRAVENOUS ONCE
Status: COMPLETED | OUTPATIENT
Start: 2024-02-11 | End: 2024-02-11

## 2024-02-11 RX ORDER — MORPHINE SULFATE 4 MG/ML
7 INJECTION INTRAVENOUS
Status: DISCONTINUED | OUTPATIENT
Start: 2024-02-11 | End: 2024-02-11

## 2024-02-11 RX ORDER — MORPHINE SULFATE 4 MG/ML
8 INJECTION INTRAVENOUS
Status: CANCELLED | OUTPATIENT
Start: 2024-02-11

## 2024-02-11 RX ORDER — SENNOSIDES 8.6 MG/1
2 TABLET ORAL 2 TIMES DAILY
Status: CANCELLED | OUTPATIENT
Start: 2024-02-11

## 2024-02-11 RX ORDER — KETOROLAC TROMETHAMINE 30 MG/ML
15 INJECTION, SOLUTION INTRAMUSCULAR; INTRAVENOUS EVERY 6 HOURS SCHEDULED
Status: COMPLETED | OUTPATIENT
Start: 2024-02-11 | End: 2024-02-14

## 2024-02-11 RX ORDER — BISACODYL 5 MG
5 TABLET, DELAYED RELEASE (ENTERIC COATED) ORAL DAILY
Status: DISCONTINUED | OUTPATIENT
Start: 2024-02-11 | End: 2024-02-16 | Stop reason: HOSPADM

## 2024-02-11 RX ORDER — POLYETHYLENE GLYCOL 3350 17 G/17G
17 POWDER, FOR SOLUTION ORAL DAILY
Status: CANCELLED | OUTPATIENT
Start: 2024-02-11

## 2024-02-11 RX ORDER — ACETAMINOPHEN 10 MG/ML
15 INJECTION, SOLUTION INTRAVENOUS EVERY 6 HOURS SCHEDULED
Status: CANCELLED | OUTPATIENT
Start: 2024-02-11 | End: 2024-02-12

## 2024-02-11 RX ORDER — MORPHINE SULFATE 4 MG/ML
6 INJECTION INTRAVENOUS
Status: DISCONTINUED | OUTPATIENT
Start: 2024-02-11 | End: 2024-02-11

## 2024-02-11 RX ORDER — CYANOCOBALAMIN (VITAMIN B-12) 500 MCG
400 TABLET ORAL DAILY
Status: DISCONTINUED | OUTPATIENT
Start: 2024-02-11 | End: 2024-02-11

## 2024-02-11 RX ORDER — DOCUSATE SODIUM 50 MG/5ML
100 LIQUID ORAL 2 TIMES DAILY
Status: CANCELLED | OUTPATIENT
Start: 2024-02-11

## 2024-02-11 RX ORDER — FAMOTIDINE 40 MG/1
20 TABLET, FILM COATED ORAL 2 TIMES DAILY
Status: CANCELLED | OUTPATIENT
Start: 2024-02-11

## 2024-02-11 RX ORDER — SENNOSIDES 8.6 MG/1
17.2 TABLET ORAL 2 TIMES DAILY
Status: DISCONTINUED | OUTPATIENT
Start: 2024-02-11 | End: 2024-02-16 | Stop reason: HOSPADM

## 2024-02-11 RX ORDER — ACETAMINOPHEN 10 MG/ML
1000 INJECTION, SOLUTION INTRAVENOUS EVERY 6 HOURS SCHEDULED
Status: COMPLETED | OUTPATIENT
Start: 2024-02-11 | End: 2024-02-12

## 2024-02-11 RX ORDER — HYDROXYUREA 500 MG/1
1000 CAPSULE ORAL
Status: DISCONTINUED | OUTPATIENT
Start: 2024-02-12 | End: 2024-02-12

## 2024-02-11 RX ORDER — DEXTROSE MONOHYDRATE AND SODIUM CHLORIDE 5; .9 G/100ML; G/100ML
75 INJECTION, SOLUTION INTRAVENOUS CONTINUOUS
Status: DISCONTINUED | OUTPATIENT
Start: 2024-02-11 | End: 2024-02-14

## 2024-02-11 RX ORDER — MORPHINE SULFATE 4 MG/ML
8 INJECTION INTRAVENOUS
Status: DISCONTINUED | OUTPATIENT
Start: 2024-02-11 | End: 2024-02-11

## 2024-02-11 RX ORDER — MORPHINE SULFATE 4 MG/ML
6 INJECTION INTRAVENOUS EVERY 4 HOURS
Status: DISCONTINUED | OUTPATIENT
Start: 2024-02-11 | End: 2024-02-11

## 2024-02-11 RX ADMIN — MORPHINE SULFATE 8 MG: 4 INJECTION INTRAVENOUS at 07:12

## 2024-02-11 RX ADMIN — MORPHINE SULFATE 5 MG: 4 INJECTION INTRAVENOUS at 23:09

## 2024-02-11 RX ADMIN — DEXTROSE AND SODIUM CHLORIDE 75 ML/HR: 5; 900 INJECTION, SOLUTION INTRAVENOUS at 07:12

## 2024-02-11 RX ADMIN — KETOROLAC TROMETHAMINE 15 MG: 30 INJECTION, SOLUTION INTRAMUSCULAR; INTRAVENOUS at 23:57

## 2024-02-11 RX ADMIN — NALOXONE HYDROCHLORIDE 1 MCG/KG/HR: 0.4 INJECTION, SOLUTION INTRAMUSCULAR; INTRAVENOUS; SUBCUTANEOUS at 12:46

## 2024-02-11 RX ADMIN — ACETAMINOPHEN 1000 MG: 10 INJECTION, SOLUTION INTRAVENOUS at 21:14

## 2024-02-11 RX ADMIN — ACETAMINOPHEN 1000 MG: 10 INJECTION, SOLUTION INTRAVENOUS at 14:59

## 2024-02-11 RX ADMIN — MORPHINE SULFATE 5 MG: 4 INJECTION INTRAVENOUS at 18:51

## 2024-02-11 RX ADMIN — MORPHINE SULFATE 5 MG: 4 INJECTION INTRAVENOUS at 12:47

## 2024-02-11 RX ADMIN — DEXTROSE AND SODIUM CHLORIDE 75 ML/HR: 5; 900 INJECTION, SOLUTION INTRAVENOUS at 21:15

## 2024-02-11 RX ADMIN — KETOROLAC TROMETHAMINE 15 MG: 30 INJECTION, SOLUTION INTRAMUSCULAR; INTRAVENOUS at 07:17

## 2024-02-11 RX ADMIN — FAMOTIDINE 20 MG: 10 INJECTION INTRAVENOUS at 11:07

## 2024-02-11 RX ADMIN — KETOROLAC TROMETHAMINE 15 MG: 30 INJECTION, SOLUTION INTRAMUSCULAR; INTRAVENOUS at 18:51

## 2024-02-11 RX ADMIN — ACETAMINOPHEN 1000 MG: 10 INJECTION, SOLUTION INTRAVENOUS at 09:26

## 2024-02-11 RX ADMIN — FAMOTIDINE 20 MG: 10 INJECTION INTRAVENOUS at 21:14

## 2024-02-11 RX ADMIN — BISACODYL 5 MG: 5 TABLET, COATED ORAL at 09:27

## 2024-02-11 RX ADMIN — SENNOSIDES 17.2 MG: 8.6 TABLET, FILM COATED ORAL at 21:14

## 2024-02-11 RX ADMIN — KETOROLAC TROMETHAMINE 15 MG: 30 INJECTION, SOLUTION INTRAMUSCULAR; INTRAVENOUS at 12:46

## 2024-02-11 RX ADMIN — MORPHINE SULFATE 8 MG: 4 INJECTION INTRAVENOUS at 01:05

## 2024-02-11 SDOH — SOCIAL STABILITY: SOCIAL INSECURITY: WERE YOU ABLE TO COMPLETE ALL THE BEHAVIORAL HEALTH SCREENINGS?: YES

## 2024-02-11 SDOH — ECONOMIC STABILITY: HOUSING INSECURITY: DO YOU FEEL UNSAFE GOING BACK TO THE PLACE WHERE YOU LIVE?: NO

## 2024-02-11 SDOH — SOCIAL STABILITY: SOCIAL INSECURITY: DOES ANYONE TRY TO KEEP YOU FROM HAVING/CONTACTING OTHER FRIENDS OR DOING THINGS OUTSIDE YOUR HOME?: NO

## 2024-02-11 SDOH — SOCIAL STABILITY: SOCIAL INSECURITY: DO YOU FEEL ANYONE HAS EXPLOITED OR TAKEN ADVANTAGE OF YOU FINANCIALLY OR OF YOUR PERSONAL PROPERTY?: NO

## 2024-02-11 SDOH — SOCIAL STABILITY: SOCIAL INSECURITY: HAVE YOU HAD ANY THOUGHTS OF HARMING ANYONE ELSE?: NO

## 2024-02-11 SDOH — SOCIAL STABILITY: SOCIAL INSECURITY: DO YOU FEEL UNSAFE GOING BACK TO THE PLACE WHERE YOU ARE LIVING?: NO

## 2024-02-11 SDOH — SOCIAL STABILITY: SOCIAL INSECURITY: ABUSE: PEDIATRIC

## 2024-02-11 SDOH — SOCIAL STABILITY: SOCIAL INSECURITY: ARE THERE ANY APPARENT SIGNS OF INJURIES/BEHAVIORS THAT COULD BE RELATED TO ABUSE/NEGLECT?: NO

## 2024-02-11 SDOH — SOCIAL STABILITY: SOCIAL INSECURITY: ARE YOU OR HAVE YOU BEEN THREATENED OR ABUSED PHYSICALLY, EMOTIONALLY, OR SEXUALLY BY ANYONE?: NO

## 2024-02-11 SDOH — SOCIAL STABILITY: SOCIAL INSECURITY: HAS ANYONE EVER THREATENED TO HURT YOUR FAMILY OR YOUR PETS?: NO

## 2024-02-11 ASSESSMENT — PAIN SCALES - GENERAL
PAINLEVEL_OUTOF10: 10 - WORST POSSIBLE PAIN

## 2024-02-11 ASSESSMENT — LIFESTYLE VARIABLES
HAVE PEOPLE ANNOYED YOU BY CRITICIZING YOUR DRINKING: NO
EVER HAD A DRINK FIRST THING IN THE MORNING TO STEADY YOUR NERVES TO GET RID OF A HANGOVER: NO
EVER FELT BAD OR GUILTY ABOUT YOUR DRINKING: NO
HAVE YOU EVER FELT YOU SHOULD CUT DOWN ON YOUR DRINKING: NO

## 2024-02-11 ASSESSMENT — ACTIVITIES OF DAILY LIVING (ADL)
GROOMING: INDEPENDENT
FEEDING YOURSELF: INDEPENDENT
PATIENT'S MEMORY ADEQUATE TO SAFELY COMPLETE DAILY ACTIVITIES?: YES
HEARING - LEFT EAR: FUNCTIONAL
DRESSING YOURSELF: INDEPENDENT
ADEQUATE_TO_COMPLETE_ADL: YES
BATHING: INDEPENDENT
JUDGMENT_ADEQUATE_SAFELY_COMPLETE_DAILY_ACTIVITIES: YES
WALKS IN HOME: INDEPENDENT
HEARING - RIGHT EAR: FUNCTIONAL
TOILETING: INDEPENDENT

## 2024-02-11 ASSESSMENT — PAIN - FUNCTIONAL ASSESSMENT
PAIN_FUNCTIONAL_ASSESSMENT: 0-10

## 2024-02-11 ASSESSMENT — PAIN DESCRIPTION - LOCATION: LOCATION: LEG

## 2024-02-11 NOTE — ED TRIAGE NOTES
Pt to ED c/o sickle cell pain that started earlier today. Pt states his pain is all over, including chest pain with shortness of breath. Pt took his home meds for SCC with little to no relief.

## 2024-02-11 NOTE — HOSPITAL COURSE
"Josue Rangel is a 18 y.o. 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 History is taken from the patient, it is limited due to pain and difficulty responding to questions. He states he started to feel pain \"all over\" yesterday. He denies sick symptoms, no cough, fever, congestion. Denies sick contacts. He does describe some SOB \"when trying to run.\" When asked if he has SOB at rest, does not provide clear answer. He endorses chest pain though that there is pain all over, pain is worse in left leg. He presented to the  adult ED yesterday evening due to continued pain unrelieved by tylenol and naproxen at home.     ED Course:  VS T not taken HR 99 /84 RR 18  Pain 10/10  Labs:  CBCd 9.1>11.7/30.2<262  CMP 14/04.8/106/25/11/0.72<84 LFT normal  EKG: Abnormal  Imaging:   - none  Interventions:  - Morphine 0.12 mg/kg x 4    Hospital course (2/11-*)    Overnight on 2/12 transitioned to Dilaudid with improved pain. Weaned to oral pain regimen *** Patient evaluated by Denisa and concern for bipolar disorder. Started Lamictal and olanzapine on 2/14.  "

## 2024-02-11 NOTE — H&P
"History Of Present Illness  Josue Rangel is a 18 y.o. 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 History is taken from the patient, it is limited due to pain and difficulty responding to questions. He states he started to feel pain \"all over\" yesterday. He denies sick symptoms, no cough, fever, congestion. Denies sick contacts. He does describe some SOB \"when trying to run.\" When asked if he has SOB at rest, does not provide clear answer. He endorses chest pain though that there is pain all over, pain is worse in left leg. He presented to the  adult ED yesterday evening due to continued pain unrelieved by tylenol and naproxen at home.   After arrival, patient very sleepy though arousable and able to answer basic questions. States he is very itchy, pulling his leads off.     PMH: HgbSC, retinopathy. Does have extensive psych history, prior notes stating he should be taking olanzapine though patient states he does not take this.   SH: Lives at home with grandma. Uses cannabis, did not use other drugs yesterday.   Meds: Hydroxyurea 1000mg daily, VitD  Allergies: NKDA    Adult ED Course:  VS T not taken HR 99 /84 RR 18  Pain 10/10  Labs:  CBCd 9.1>11.7/30.2<262  CMP 14/04.8/106/25/11/0.72<84 LFT normal  EKG: Abnormal  Imaging:   - none  Interventions:  - Morphine 0.12 mg/kg x 4     Past Medical History  Past Medical History:   Diagnosis Date    Personal history of diseases of the blood and blood-forming organs and certain disorders involving the immune mechanism     History of sickle cell anemia       Surgical History  Past Surgical History:   Procedure Laterality Date    CT GUIDED PERCUTANEOUS BIOPSY BONE DEEP  7/17/2023    CT GUIDED PERCUTANEOUS BIOPSY BONE D[place] [facility] [place] HEAD ANGIO WO IV CONTRAST  10/8/2014    MR HEAD ANGIO WO IV CONTRAST 10/8/2014 [facility]    MR HEAD ANGIO WO IV CONTRAST  7/10/2023    MR HEAD ANGIO WO IV " CONTRAST [place]2023 [facility] [place]    [place] NECK ANGIO WO IV CONTRAST  10/8/2014    MR NECK ANGIO WO IV CONTRAST 10/8/2014 [facility]    MR NECK ANGIO WO IV CONTRAST  7/10/2023    MR NECK ANGIO WO IV CONTRAST 7[place]10/2023 [facility] [place]        Social History  He reports that he has never smoked. He has never used smokeless tobacco. He reports current alcohol use. He reports current drug use. Drug: Marijuana.    Family History  Family History   Problem Relation Name Age of Onset    Diabetes Paternal Grandmother      Diabetes Paternal Grandfather          Allergies  Patient has no known allergies.    ROS reviewed and as stated in HPI.     Physical Exam  Vitals reviewed.   Constitutional:       General: He is in acute distress.      Appearance: He is not ill-appearing, toxic-appearing or diaphoretic.      Comments: Sleepy, in 10/10 pain   HENT:      Head: Normocephalic and atraumatic.      Right Ear: External ear normal.      Left Ear: External ear normal.      Nose: Nose normal. No congestion or rhinorrhea.      Mouth/Throat:      Mouth: Mucous membranes are moist.   Eyes:      Extraocular Movements: Extraocular movements intact.      Pupils: Pupils are equal, round, and reactive to light.   Cardiovascular:      Rate and Rhythm: Normal rate and regular rhythm.      Pulses: Normal pulses.      Heart sounds: No murmur heard.  Pulmonary:      Effort: Pulmonary effort is normal. No respiratory distress.      Breath sounds: Normal breath sounds. No stridor. No wheezing or rhonchi.   Abdominal:      General: Abdomen is flat. There is no distension.      Palpations: Abdomen is soft.      Tenderness: There is no abdominal tenderness. There is no guarding.   Musculoskeletal:         General: No swelling or tenderness. Normal range of motion.      Cervical back: Normal range of motion.      Comments: Tenderness to palpation of extremities. Normal ROM. No lesions.   Skin:     General: Skin is warm.      Capillary Refill: Capillary refill  takes less than 2 seconds.      Coloration: Skin is not pale.      Findings: No rash.   Neurological:      General: No focal deficit present.      Mental Status: He is alert and oriented to person, place, and time.      Motor: No weakness.          Last Recorded Vitals  Blood pressure 111/63, pulse 87, temperature 36.5 °C (97.7 °F), temperature source Oral, resp. rate 18, weight 64.6 kg (142 lb 6.7 oz), SpO2 98 %.    Relevant Results  Scheduled medications  acetaminophen, 1,000 mg, intravenous, q6h SERVANDO  bisacodyl, 5 mg, oral, Daily  cholecalciferol, 1,000 Units, oral, Daily  famotidine, 20 mg, intravenous, q12h SERVANDO  [START ON 2/12/2024] hydroxyurea, 1,000 mg, oral, Once per day on Mon Tue Wed Thu Fri  ketorolac, 15 mg, intravenous, q6h SERVANDO  morphine, 5 mg, intravenous, q4h  polyethylene glycol, 0.5 g/kg, oral, Daily  sennosides, 17.2 mg, oral, BID      Continuous medications  D5 % and 0.9 % sodium chloride, 75 mL/hr, Last Rate: 75 mL/hr (02/11/24 0948)  naloxone, 1 mcg/kg/hr      PRN medications  Results for orders placed or performed during the hospital encounter of 02/10/24 (from the past 24 hour(s))   ECG 12 lead   Result Value Ref Range    Ventricular Rate 107 BPM    Atrial Rate 101 BPM    VA Interval 162 ms    QRS Duration 78 ms    QT Interval 314 ms    QTC Calculation(Bazett) 419 ms    P Axis 134 degrees    R Axis 72 degrees    T Axis -58 degrees    QRS Count 17 beats    Q Onset 219 ms    P Onset 138 ms    P Offset 207 ms    T Offset 376 ms    QTC Fredericia 380 ms   Comprehensive metabolic panel   Result Value Ref Range    Glucose 84 74 - 99 mg/dL    Sodium 140 136 - 145 mmol/L    Potassium 4.8 3.5 - 5.3 mmol/L    Chloride 106 98 - 107 mmol/L    Bicarbonate 25 21 - 32 mmol/L    Anion Gap 14 10 - 20 mmol/L    Urea Nitrogen 11 6 - 23 mg/dL    Creatinine 0.72 0.50 - 1.30 mg/dL    eGFR >90 >60 mL/min/1.73m*2    Calcium 10.2 8.6 - 10.6 mg/dL    Albumin 4.7 3.4 - 5.0 g/dL    Alkaline Phosphatase 65 33 - 120 U/L     Total Protein 8.2 6.4 - 8.2 g/dL    AST 37 9 - 39 U/L    Bilirubin, Total 1.2 0.0 - 1.2 mg/dL    ALT 13 10 - 52 U/L   CBC and Auto Differential   Result Value Ref Range    WBC 9.1 4.4 - 11.3 x10*3/uL    nRBC 0.9 (H) 0.0 - 0.0 /100 WBCs    RBC 3.55 (L) 4.50 - 5.90 x10*6/uL    Hemoglobin 11.7 (L) 13.5 - 17.5 g/dL    Hematocrit 30.2 (L) 41.0 - 52.0 %    MCV 85 80 - 100 fL    MCH 33.0 26.0 - 34.0 pg    MCHC 38.7 (H) 32.0 - 36.0 g/dL    RDW 13.8 11.5 - 14.5 %    Platelets 262 150 - 450 x10*3/uL    Neutrophils % 60.2 40.0 - 80.0 %    Immature Granulocytes %, Automated 0.4 0.0 - 0.9 %    Lymphocytes % 28.1 13.0 - 44.0 %    Monocytes % 7.9 2.0 - 10.0 %    Eosinophils % 3.0 0.0 - 6.0 %    Basophils % 0.4 0.0 - 2.0 %    Neutrophils Absolute 5.48 1.20 - 7.70 x10*3/uL    Immature Granulocytes Absolute, Automated 0.04 0.00 - 0.70 x10*3/uL    Lymphocytes Absolute 2.56 1.20 - 4.80 x10*3/uL    Monocytes Absolute 0.72 0.10 - 1.00 x10*3/uL    Eosinophils Absolute 0.27 0.00 - 0.70 x10*3/uL    Basophils Absolute 0.04 0.00 - 0.10 x10*3/uL     ECG 12 lead    Result Date: 2/11/2024  Undetermined rhythm ST & T wave abnormality, consider inferior ischemia Abnormal ECG When compared with ECG of 03-JAN-2024 12:06, Current undetermined rhythm precludes rhythm comparison, needs review QRS duration has decreased ST now depressed in Inferior leads ST elevation now present in Lateral leads T wave inversion now evident in Inferior leads See ED provider note for full interpretation and clinical correlation Confirmed by Selena Granados ([id number]) on 2/11/2024 4:00:53 AM        Assessment/Plan   Principal Problem:    Vasoocclusive sickle cell crisis (CMS/HCC)    Josue is a 19 yo male with 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 He is currently hemodynamically stable though in significant pain upon arrival. Received morphine x4 in ED though not since 0100, thus started on pain plan with morphine, Toradol, tylenol on arrival. Due to  subjective SOB from patient in conjunction with chest pain, will obtain CXR to monitor for ACS. Patient has though remained afebrile, without tachypnea, and saturating appropriately on RA. ECG abnormal in ED, thus will repeat on floor in setting of chest pain. Hgb was stable at his baseline of 11 with retic 3.7, will monitor counts daily. Detailed plan below.     Addendum post rounds: Due to sleepiness, difficulty responding appropriately to questioning after morphine, morphine spaced to q4 and made 5mg. Pruritus thus started narcan ggt.     HEME  #HgB SC with VOE  [X] blood consent signed in EMR  - IV morphine 5mg q4h scheduled  - IV Toradol 0.5mg/kg q6h scheduled   - IV Tylenol 15 mg/kg q6h scheduled  - baseline Hgb 11, baseline retic 3.7  - Hydroxyurea 1000 mg daily (M-F)  #Opioid induced pruritus  - Narcan ggt    CV  #Access: pIV  [ ] repeat ECG    RESP  #Acute Chest Syndrome Prevention  [X] RT notified  - IS and EZPAP  [ ] 2 view CXR    FEN/GI  #Nutrition  - regular diet  - Vit D 1000U daily  #Hydration  - 3/4 mIVF with D5NS  #Constipation prevention  - Miralax Daily  - Dulcolax Daily  - Senna BID   #GI prophylaxis  - IV Pepcid 20mg BID while on toradol    ID  #Fever plan  - if febrile > or = 38.5C, blood culture and ceftriaxone    Psych  #Depression  - Olanzapine 5mg at bedtime    Labs:  - CBCd, retic qAM; T&S q72h (next due 2/12)     Discussed with Dr. Tomlin.     Eda Enriquez MD  Pediatrics, PGY-1

## 2024-02-11 NOTE — ED PROVIDER NOTES
CC: Sickle Cell Pain Crisis     HPI:  Patient is a 18-year-old male with past medical history significant for sickle cell disease.  Patient is presenting emergency department treatment of sickle cell pain crisis.  Patient reports no obvious provoking factors for his current pain crisis.  Patient reports that he has been using all prescribed medication without any significant relief in symptoms.  Patient is reporting pain that is worse in the lower extremities and otherwise diffuse in nature.  Patient denying any fevers/chills, cough, shortness of breath.    Records Reviewed:  Recent available ED and inpatient notes reviewed in EMR.    PMHx/PSHx:  Per HPI.   - has a past medical history of Personal history of diseases of the blood and blood-forming organs and certain disorders involving the immune mechanism.  - has a past surgical history that includes MR angio head wo IV contrast (10/8/2014); MR angio neck wo IV contrast (10/8/2014); MR angio head wo IV contrast (7/10/2023); MR angio neck wo IV contrast (7/10/2023); and CT guided percutaneous biopsy bone deep (7/17/2023).    Medications:  Reviewed in EMR. See EMR for complete list of medications and doses.    Allergies:  Patient has no known allergies.    Social History:  - Tobacco:  reports that he has never smoked. He has never used smokeless tobacco.   - Alcohol:  reports current alcohol use.   - Illicit Drugs:  reports current drug use. Drug: Marijuana.     ROS:  Per HPI.       ???????????????????????????????????????????????????????????????  Triage Vitals:  T    HR 99  /84  RR 18  O2 100 % None (Room air)    Physical Exam  Vitals and nursing note reviewed.   Constitutional:       Appearance: Normal appearance.   HENT:      Head: Normocephalic and atraumatic.      Nose: Nose normal.      Mouth/Throat:      Pharynx: Oropharynx is clear.   Eyes:      Extraocular Movements: Extraocular movements intact.      Pupils: Pupils are equal, round, and reactive to  light.   Cardiovascular:      Rate and Rhythm: Normal rate and regular rhythm.      Pulses: Normal pulses.   Pulmonary:      Effort: Pulmonary effort is normal.      Breath sounds: Normal breath sounds.   Abdominal:      General: There is no distension.      Tenderness: There is no abdominal tenderness. There is no right CVA tenderness, left CVA tenderness or guarding.   Musculoskeletal:         General: No swelling or deformity. Normal range of motion.      Cervical back: Normal range of motion.      Right lower leg: No edema.      Left lower leg: No edema.   Skin:     General: Skin is warm and dry.      Capillary Refill: Capillary refill takes less than 2 seconds.   Neurological:      General: No focal deficit present.      Mental Status: He is alert and oriented to person, place, and time.   Psychiatric:         Mood and Affect: Mood normal.         Behavior: Behavior normal.       ???????????????????????????????????????????????????????????????    Assessment and Plan:  Patient is a 18-year-old male with past medical history as noted above presented to emergency room with chief concern of sickle pain crisis.  Patient denying any fevers/chills/cough/severe chest pain/shortness of breath and therefore have a low suspicion for acute chest in this patient.  Patient's labs are obtained and appear to be at baseline.  Patient is administered 8 mg of morphine x 3 without any significant relief in pain.  Given patient's lack of relief patient is discussed with the admissions coordinator and I notified that patient is still a patient of pediatric hematology oncology.  I contacted pediatric hematology oncology team who accepts the patient for admission.  I discussed patient case with transfer center who will arrange internal transfer to [facility] for admission.  Patient admitted for sickle cell pain crisis in stable condition.    ED Course:  Diagnoses as of 02/12/24 1201   Vaso-occlusive pain due to  sickle cell disease (CMS/[facility])        Social Determinants Limiting Care:      Disposition:  Admit    Arpit Hi MD       Procedures ? SmartLinks last updated 2/11/2024 3:42 AM        Aprit Hi MD  Resident  02/12/24 1205

## 2024-02-12 ENCOUNTER — APPOINTMENT (OUTPATIENT)
Dept: PEDIATRIC CARDIOLOGY | Facility: HOSPITAL | Age: 19
End: 2024-02-12
Payer: MEDICAID

## 2024-02-12 ENCOUNTER — APPOINTMENT (OUTPATIENT)
Dept: RADIOLOGY | Facility: HOSPITAL | Age: 19
End: 2024-02-12
Payer: MEDICAID

## 2024-02-12 LAB
ABO GROUP (TYPE) IN BLOOD: NORMAL
ALBUMIN SERPL BCP-MCNC: 3.9 G/DL (ref 3.4–5)
ANION GAP SERPL CALC-SCNC: 11 MMOL/L (ref 10–20)
ANTIBODY SCREEN: NORMAL
ATRIAL RATE: 70 BPM
BASOPHILS # BLD AUTO: 0.04 X10*3/UL (ref 0–0.1)
BASOPHILS NFR BLD AUTO: 0.4 %
BUN SERPL-MCNC: 8 MG/DL (ref 6–23)
CALCIUM SERPL-MCNC: 9.6 MG/DL (ref 8.6–10.6)
CHLORIDE SERPL-SCNC: 105 MMOL/L (ref 98–107)
CO2 SERPL-SCNC: 28 MMOL/L (ref 21–32)
CREAT SERPL-MCNC: 0.73 MG/DL (ref 0.5–1.3)
EGFRCR SERPLBLD CKD-EPI 2021: >90 ML/MIN/1.73M*2
EOSINOPHIL # BLD AUTO: 0.29 X10*3/UL (ref 0–0.7)
EOSINOPHIL NFR BLD AUTO: 2.8 %
ERYTHROCYTE [DISTWIDTH] IN BLOOD BY AUTOMATED COUNT: 14 % (ref 11.5–14.5)
GLUCOSE SERPL-MCNC: 78 MG/DL (ref 74–99)
HCT VFR BLD AUTO: 27 % (ref 41–52)
HGB BLD-MCNC: 10 G/DL (ref 13.5–17.5)
HGB RETIC QN: 32 PG (ref 28–38)
IMM GRANULOCYTES # BLD AUTO: 0.03 X10*3/UL (ref 0–0.7)
IMM GRANULOCYTES NFR BLD AUTO: 0.3 % (ref 0–0.9)
IMMATURE RETIC FRACTION: 37.5 %
LYMPHOCYTES # BLD AUTO: 1.81 X10*3/UL (ref 1.2–4.8)
LYMPHOCYTES NFR BLD AUTO: 17.2 %
MCH RBC QN AUTO: 31.9 PG (ref 26–34)
MCHC RBC AUTO-ENTMCNC: 37 G/DL (ref 32–36)
MCV RBC AUTO: 86 FL (ref 80–100)
MONOCYTES # BLD AUTO: 1.03 X10*3/UL (ref 0.1–1)
MONOCYTES NFR BLD AUTO: 9.8 %
NEUTROPHILS # BLD AUTO: 7.34 X10*3/UL (ref 1.2–7.7)
NEUTROPHILS NFR BLD AUTO: 69.5 %
NRBC BLD-RTO: 0.6 /100 WBCS (ref 0–0)
P AXIS: 63 DEGREES
P OFFSET: 202 MS
P ONSET: 142 MS
PHOSPHATE SERPL-MCNC: 4.5 MG/DL (ref 2.5–4.9)
PLATELET # BLD AUTO: 229 X10*3/UL (ref 150–450)
POTASSIUM SERPL-SCNC: 4.1 MMOL/L (ref 3.5–5.3)
PR INTERVAL: 156 MS
Q ONSET: 220 MS
QRS COUNT: 12 BEATS
QRS DURATION: 106 MS
QT INTERVAL: 376 MS
QTC CALCULATION(BAZETT): 406 MS
QTC FREDERICIA: 396 MS
R AXIS: 44 DEGREES
RBC # BLD AUTO: 3.13 X10*6/UL (ref 4.5–5.9)
RETICS #: 0.11 X10*6/UL (ref 0.02–0.12)
RETICS/RBC NFR AUTO: 3.7 % (ref 0.5–2)
RH FACTOR (ANTIGEN D): NORMAL
SODIUM SERPL-SCNC: 140 MMOL/L (ref 136–145)
T AXIS: 11 DEGREES
T OFFSET: 408 MS
VENTRICULAR RATE: 70 BPM
WBC # BLD AUTO: 10.5 X10*3/UL (ref 4.4–11.3)

## 2024-02-12 PROCEDURE — 71046 X-RAY EXAM CHEST 2 VIEWS: CPT | Performed by: RADIOLOGY

## 2024-02-12 PROCEDURE — 36415 COLL VENOUS BLD VENIPUNCTURE: CPT

## 2024-02-12 PROCEDURE — 99233 SBSQ HOSP IP/OBS HIGH 50: CPT

## 2024-02-12 PROCEDURE — 2500000004 HC RX 250 GENERAL PHARMACY W/ HCPCS (ALT 636 FOR OP/ED): Performed by: STUDENT IN AN ORGANIZED HEALTH CARE EDUCATION/TRAINING PROGRAM

## 2024-02-12 PROCEDURE — 85045 AUTOMATED RETICULOCYTE COUNT: CPT

## 2024-02-12 PROCEDURE — 85025 COMPLETE CBC W/AUTO DIFF WBC: CPT

## 2024-02-12 PROCEDURE — 80069 RENAL FUNCTION PANEL: CPT

## 2024-02-12 PROCEDURE — 2500000004 HC RX 250 GENERAL PHARMACY W/ HCPCS (ALT 636 FOR OP/ED)

## 2024-02-12 PROCEDURE — 93005 ELECTROCARDIOGRAM TRACING: CPT

## 2024-02-12 PROCEDURE — 86901 BLOOD TYPING SEROLOGIC RH(D): CPT

## 2024-02-12 PROCEDURE — 2500000001 HC RX 250 WO HCPCS SELF ADMINISTERED DRUGS (ALT 637 FOR MEDICARE OP)

## 2024-02-12 PROCEDURE — 1130000001 HC PRIVATE PED ROOM DAILY

## 2024-02-12 PROCEDURE — 93010 ELECTROCARDIOGRAM REPORT: CPT | Performed by: STUDENT IN AN ORGANIZED HEALTH CARE EDUCATION/TRAINING PROGRAM

## 2024-02-12 PROCEDURE — 71046 X-RAY EXAM CHEST 2 VIEWS: CPT

## 2024-02-12 RX ORDER — HYDROMORPHONE HYDROCHLORIDE 1 MG/ML
1 INJECTION, SOLUTION INTRAMUSCULAR; INTRAVENOUS; SUBCUTANEOUS ONCE
Status: COMPLETED | OUTPATIENT
Start: 2024-02-12 | End: 2024-02-12

## 2024-02-12 RX ORDER — HYDROXYUREA 500 MG/1
1000 CAPSULE ORAL DAILY
Status: DISCONTINUED | OUTPATIENT
Start: 2024-02-13 | End: 2024-02-16 | Stop reason: HOSPADM

## 2024-02-12 RX ORDER — FAMOTIDINE 20 MG/1
20 TABLET, FILM COATED ORAL 2 TIMES DAILY
Status: DISCONTINUED | OUTPATIENT
Start: 2024-02-12 | End: 2024-02-14

## 2024-02-12 RX ORDER — MORPHINE SULFATE 4 MG/ML
2 INJECTION INTRAVENOUS ONCE AS NEEDED
Status: COMPLETED | OUTPATIENT
Start: 2024-02-12 | End: 2024-02-12

## 2024-02-12 RX ORDER — POLYETHYLENE GLYCOL 3350 17 G/17G
17 POWDER, FOR SOLUTION ORAL 2 TIMES DAILY
Status: DISCONTINUED | OUTPATIENT
Start: 2024-02-12 | End: 2024-02-16 | Stop reason: HOSPADM

## 2024-02-12 RX ORDER — ACETAMINOPHEN 10 MG/ML
1000 INJECTION, SOLUTION INTRAVENOUS EVERY 6 HOURS SCHEDULED
Status: COMPLETED | OUTPATIENT
Start: 2024-02-12 | End: 2024-02-13

## 2024-02-12 RX ADMIN — BISACODYL 5 MG: 5 TABLET, COATED ORAL at 09:28

## 2024-02-12 RX ADMIN — NALOXONE HYDROCHLORIDE 1 MCG/KG/HR: 0.4 INJECTION, SOLUTION INTRAMUSCULAR; INTRAVENOUS; SUBCUTANEOUS at 18:27

## 2024-02-12 RX ADMIN — HYDROMORPHONE HYDROCHLORIDE 1 MG: 1 INJECTION, SOLUTION INTRAMUSCULAR; INTRAVENOUS; SUBCUTANEOUS at 23:31

## 2024-02-12 RX ADMIN — ACETAMINOPHEN 1000 MG: 10 INJECTION, SOLUTION INTRAVENOUS at 18:26

## 2024-02-12 RX ADMIN — SENNOSIDES 17.2 MG: 8.6 TABLET, FILM COATED ORAL at 21:33

## 2024-02-12 RX ADMIN — DEXTROSE AND SODIUM CHLORIDE 75 ML/HR: 5; 900 INJECTION, SOLUTION INTRAVENOUS at 18:26

## 2024-02-12 RX ADMIN — POLYETHYLENE GLYCOL 3350 17 G: 17 POWDER, FOR SOLUTION ORAL at 09:31

## 2024-02-12 RX ADMIN — MORPHINE SULFATE 5 MG: 4 INJECTION INTRAVENOUS at 21:19

## 2024-02-12 RX ADMIN — KETOROLAC TROMETHAMINE 15 MG: 30 INJECTION, SOLUTION INTRAMUSCULAR; INTRAVENOUS at 06:03

## 2024-02-12 RX ADMIN — MORPHINE SULFATE 5 MG: 4 INJECTION INTRAVENOUS at 09:28

## 2024-02-12 RX ADMIN — MORPHINE SULFATE 5 MG: 4 INJECTION INTRAVENOUS at 04:32

## 2024-02-12 RX ADMIN — SENNOSIDES 17.2 MG: 8.6 TABLET, FILM COATED ORAL at 09:28

## 2024-02-12 RX ADMIN — ACETAMINOPHEN 1000 MG: 10 INJECTION, SOLUTION INTRAVENOUS at 12:14

## 2024-02-12 RX ADMIN — MORPHINE SULFATE 2 MG: 4 INJECTION INTRAVENOUS at 01:16

## 2024-02-12 RX ADMIN — ACETAMINOPHEN 1000 MG: 10 INJECTION, SOLUTION INTRAVENOUS at 04:33

## 2024-02-12 RX ADMIN — KETOROLAC TROMETHAMINE 15 MG: 30 INJECTION, SOLUTION INTRAMUSCULAR; INTRAVENOUS at 12:14

## 2024-02-12 RX ADMIN — POLYETHYLENE GLYCOL 3350 17 G: 17 POWDER, FOR SOLUTION ORAL at 21:33

## 2024-02-12 RX ADMIN — FAMOTIDINE 20 MG: 20 TABLET, FILM COATED ORAL at 21:33

## 2024-02-12 RX ADMIN — FAMOTIDINE 20 MG: 20 TABLET, FILM COATED ORAL at 11:20

## 2024-02-12 RX ADMIN — Medication 1000 UNITS: at 09:27

## 2024-02-12 RX ADMIN — MORPHINE SULFATE 5 MG: 4 INJECTION INTRAVENOUS at 12:45

## 2024-02-12 RX ADMIN — HYDROXYUREA 1000 MG: 500 CAPSULE ORAL at 09:26

## 2024-02-12 RX ADMIN — MORPHINE SULFATE 5 MG: 4 INJECTION INTRAVENOUS at 16:58

## 2024-02-12 RX ADMIN — KETOROLAC TROMETHAMINE 15 MG: 30 INJECTION, SOLUTION INTRAMUSCULAR; INTRAVENOUS at 18:26

## 2024-02-12 RX ADMIN — DEXTROSE AND SODIUM CHLORIDE 75 ML/HR: 5; 900 INJECTION, SOLUTION INTRAVENOUS at 12:24

## 2024-02-12 ASSESSMENT — PAIN SCALES - GENERAL
PAINLEVEL_OUTOF10: 10 - WORST POSSIBLE PAIN

## 2024-02-12 ASSESSMENT — PAIN - FUNCTIONAL ASSESSMENT
PAIN_FUNCTIONAL_ASSESSMENT: 0-10

## 2024-02-12 ASSESSMENT — VISUAL ACUITY: OU: 1

## 2024-02-12 ASSESSMENT — PAIN INTENSITY VAS: VAS_PAIN_GENERAL: 10

## 2024-02-12 NOTE — PROGRESS NOTES
"Josue Rangel is a 18 y.o. male on day 1 of admission presenting with Vasoocclusive sickle cell crisis (CMS/HCC).    Subjective   Overnight, the patient states he experienced an increase in pain primarily focused on the right side of his chest and abdomen. He was given one dose of 2mg morphine. This morning, he continues to state he is in severe pain, still focused on the right side. He also states he has increased shortness of breath and chest tightness. Josue took a long shower this morning in attempt to improve his symptoms. He denies any fever, chills, nausea, vomiting, cough, or congestion. He is not able to rate his pain on a scale of 1/10.     Due to his increased shortness of breath, a repeat CXR has been ordered this morning. Alterations have also been made to increase his bowel regiment, as he has not had a bowel movement for a few days.        Objective     Physical Exam  HENT:      Head: Normocephalic and atraumatic.      Right Ear: External ear normal.      Left Ear: External ear normal.      Nose: Nose normal.      Mouth/Throat:      Lips: Pink.      Mouth: Mucous membranes are moist.   Eyes:      General: Lids are normal. Vision grossly intact.   Cardiovascular:      Rate and Rhythm: Normal rate and regular rhythm.   Pulmonary:      Effort: Pulmonary effort is normal.      Breath sounds: Normal breath sounds.   Abdominal:      Tenderness: There is generalized abdominal tenderness.      Comments: Slight    Musculoskeletal:      Cervical back: Normal range of motion.   Skin:     General: Skin is warm and dry.   Neurological:      General: No focal deficit present.      Mental Status: He is alert and oriented to person, place, and time.   Psychiatric:         Attention and Perception: Attention normal.         Behavior: Behavior is cooperative.         Last Recorded Vitals  Blood pressure 117/58, pulse 97, temperature 36.8 °C (98.2 °F), temperature source Oral, resp. rate 18, height 1.778 m (5' 10\"), " weight 64.6 kg (142 lb 6.7 oz), SpO2 97 %.  Intake/Output last 3 Shifts:  I/O last 3 completed shifts:  In: 2916.2 (45.1 mL/kg) [P.O.:1200; I.V.:1326.1 (20.5 mL/kg); IV Piggyback:390.1]  Out: 0 (0 mL/kg)   Weight: 64.6 kg     Relevant Results    Scheduled medications  acetaminophen, 1,000 mg, intravenous, q6h SERVANDO  bisacodyl, 5 mg, oral, Daily  cholecalciferol, 1,000 Units, oral, Daily  famotidine, 20 mg, oral, BID  hydroxyurea, 1,000 mg, oral, Once per day on Mon Tue Wed Thu Fri  ketorolac, 15 mg, intravenous, q6h SERVANDO  morphine, 5 mg, intravenous, q4h  polyethylene glycol, 17 g, oral, BID  sennosides, 17.2 mg, oral, BID      Continuous medications  D5 % and 0.9 % sodium chloride, 75 mL/hr, Last Rate: 75 mL/hr (02/11/24 2115)  naloxone, 1 mcg/kg/hr, Last Rate: 1 mcg/kg/hr (02/11/24 1844)      Results for orders placed or performed during the hospital encounter of 02/11/24 (from the past 24 hour(s))   CBC and Auto Differential   Result Value Ref Range    WBC 10.5 4.4 - 11.3 x10*3/uL    nRBC 0.6 (H) 0.0 - 0.0 /100 WBCs    RBC 3.13 (L) 4.50 - 5.90 x10*6/uL    Hemoglobin 10.0 (L) 13.5 - 17.5 g/dL    Hematocrit 27.0 (L) 41.0 - 52.0 %    MCV 86 80 - 100 fL    MCH 31.9 26.0 - 34.0 pg    MCHC 37.0 (H) 32.0 - 36.0 g/dL    RDW 14.0 11.5 - 14.5 %    Platelets 229 150 - 450 x10*3/uL    Neutrophils % 69.5 40.0 - 80.0 %    Immature Granulocytes %, Automated 0.3 0.0 - 0.9 %    Lymphocytes % 17.2 13.0 - 44.0 %    Monocytes % 9.8 2.0 - 10.0 %    Eosinophils % 2.8 0.0 - 6.0 %    Basophils % 0.4 0.0 - 2.0 %    Neutrophils Absolute 7.34 1.20 - 7.70 x10*3/uL    Immature Granulocytes Absolute, Automated 0.03 0.00 - 0.70 x10*3/uL    Lymphocytes Absolute 1.81 1.20 - 4.80 x10*3/uL    Monocytes Absolute 1.03 (H) 0.10 - 1.00 x10*3/uL    Eosinophils Absolute 0.29 0.00 - 0.70 x10*3/uL    Basophils Absolute 0.04 0.00 - 0.10 x10*3/uL   Reticulocytes   Result Value Ref Range    Retic % 3.7 (H) 0.5 - 2.0 %    Retic Absolute 0.114 0.022 - 0.118  x10*6/uL    Reticulocyte Hemoglobin 32 28 - 38 pg    Immature Retic fraction 37.5 (H) <=16.0 %   Renal Function Panel   Result Value Ref Range    Glucose 78 74 - 99 mg/dL    Sodium 140 136 - 145 mmol/L    Potassium 4.1 3.5 - 5.3 mmol/L    Chloride 105 98 - 107 mmol/L    Bicarbonate 28 21 - 32 mmol/L    Anion Gap 11 10 - 20 mmol/L    Urea Nitrogen 8 6 - 23 mg/dL    Creatinine 0.73 0.50 - 1.30 mg/dL    eGFR >90 >60 mL/min/1.73m*2    Calcium 9.6 8.6 - 10.6 mg/dL    Phosphorus 4.5 2.5 - 4.9 mg/dL    Albumin 3.9 3.4 - 5.0 g/dL   ECG 12 lead   Result Value Ref Range    Ventricular Rate 70 BPM    Atrial Rate 70 BPM    WV Interval 156 ms    QRS Duration 106 ms    QT Interval 376 ms    QTC Calculation(Bazett) 406 ms    P Axis 63 degrees    R Axis 44 degrees    T Axis 11 degrees    QRS Count 12 beats    Q Onset 220 ms    P Onset 142 ms    P Offset 202 ms    T Offset 408 ms    QTC Fredericia 396 ms      ECG 12 lead    Result Date: 2/12/2024  Normal sinus rhythm with sinus arrhythmia Incomplete right bundle branch block Borderline ECG When compared with ECG of 12-FEB-2024 09:52, (unconfirmed) No significant change was found    ECG 12 lead    Result Date: 2/12/2024  Normal sinus rhythm with sinus arrhythmia Normal ECG When compared with ECG of 10-FEB-2024 20:21, Previous ECG has undetermined rhythm, needs review QRS duration has increased ST no longer depressed in Inferior leads ST elevation has replaced ST depression in Anterior leads ST less elevated in Lateral leads T wave inversion no longer evident in Inferior leads    XR chest 2 views    Result Date: 2/11/2024  Interpreted By:  Nina Tucker, STUDY: XR CHEST 2 VIEWS;  2/11/2024 12:28 pm   INDICATION: Signs/Symptoms:Concern for ACS, in and out please.   COMPARISON: 01/10/2024.   ACCESSION NUMBER(S): [id number]   ORDERING CLINICIAN: KESHIA CASTILLO   FINDINGS: Increased interstitial markings with peribronchial thickening.   No focal consolidation.   Lungs are well inflated.    No pleural effusion or pneumothorax.   Cardiac silhouette is normal in size. Pulmonary vessels are within normal limits.   Visualized upper abdomen is unremarkable.   Bony thorax is intact.       1.  Increased lung markings with peribronchial thickening, most consistent with viral type infection versus reactive airway disease. 2. No focal consolidation. Cardiac silhouette is normal.     Signed by: Nina Tucker 2/11/2024 2:15 PM Dictation workstation:   [id number]    ECG 12 lead    Result Date: 2/11/2024  Undetermined rhythm ST & T wave abnormality, consider inferior ischemia Abnormal ECG When compared with ECG of 03-JAN-2024 12:06, Current undetermined rhythm precludes rhythm comparison, needs review QRS duration has decreased ST now depressed in Inferior leads ST elevation now present in Lateral leads T wave inversion now evident in Inferior leads See ED provider note for full interpretation and clinical correlation Confirmed by Selena Granados ([id number]) on 2/11/2024 4:00:53 AM        Assessment/Plan   Principal Problem:    Vasoocclusive sickle cell crisis (CMS/HCC)  Josue is a 19 yo male with 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 He has remained hemodynamically stable with a significant increase in pain reported. He has been receiving morphine, Toradol, and tylenol for pain management with his pain still being reported as a 10/10. In discussion with sickle cell team, will continue current pain plan as has shown sensitivity to morphine during this hospital stay and in the past. With his reported SOB and chest tightness, a repeat CXR has been ordered today. However reassuring that O2 sats have remained stable % on RA, and air exchange is appropriate on exam. The patient was advised to continue his use of IS, and ambulation has been promoted. Due to the patient not having a bowel movement for a few days now, his bowel regiment was increased. His repeat EKG showed sinus rhythm, ruling out  any concerns for ST abnormalities from his previous EKGs. Detailed plan as follows:     HEME  #HgB SC with VOE  [X] blood consent signed in EMR  - IV morphine 5mg q4h scheduled  - IV Toradol 0.5mg/kg q6h scheduled   - IV Tylenol 15 mg/kg q6h scheduled  - baseline Hgb 11, baseline retic 3.7  - Hydroxyurea 1000 mg daily (M-F)  #Opioid induced pruritus  - Narcan ggt     CV  #Access: pIV  [ X] repeat ECG: sinus rhythm      RESP  #Acute Chest Syndrome Prevention  [X] RT notified  - IS and EZPAP  [X] 2 view CXR 2/11: Increased lung markings with peribronchial thickening, most  consistent with viral type infection versus reactive airway disease. No focal consolidation. Cardiac silhouette is normal.  [X] 2 view repeat CXR 2/12: No focal parenchymal consolidation identified.       FEN/GI  #Nutrition  - regular diet  - Vit D 1000U daily  #Hydration  - 3/4 mIVF with D5NS  #Constipation prevention  - Miralax BID  - Dulcolax Daily  - Senna BID   #GI prophylaxis  - PO Pepcid 20mg BID while on toradol     ID  #Fever plan  - if febrile > or = 38.5C, blood culture and ceftriaxone     Psych  #Depression  - Pt states he does not take his Olanzapine 5mg      Labs:  - CBCd, retic qAM; T&S q72h (next due 2/12)      Discussed with Dr. Tomlin.     THAO RETANA, PA-S       Eda Enriquez MD  Pediatrics, PGY-1

## 2024-02-13 LAB
BASOPHILS # BLD AUTO: 0.05 X10*3/UL (ref 0–0.1)
BASOPHILS NFR BLD AUTO: 0.4 %
EOSINOPHIL # BLD AUTO: 0.19 X10*3/UL (ref 0–0.7)
EOSINOPHIL NFR BLD AUTO: 1.7 %
ERYTHROCYTE [DISTWIDTH] IN BLOOD BY AUTOMATED COUNT: 13.8 % (ref 11.5–14.5)
HCT VFR BLD AUTO: 25.9 % (ref 41–52)
HGB BLD-MCNC: 9.7 G/DL (ref 13.5–17.5)
HGB RETIC QN: 31 PG (ref 28–38)
IMM GRANULOCYTES # BLD AUTO: 0.06 X10*3/UL (ref 0–0.7)
IMM GRANULOCYTES NFR BLD AUTO: 0.5 % (ref 0–0.9)
IMMATURE RETIC FRACTION: 31.4 %
LYMPHOCYTES # BLD AUTO: 1.63 X10*3/UL (ref 1.2–4.8)
LYMPHOCYTES NFR BLD AUTO: 14.6 %
MCH RBC QN AUTO: 32.1 PG (ref 26–34)
MCHC RBC AUTO-ENTMCNC: 37.5 G/DL (ref 32–36)
MCV RBC AUTO: 86 FL (ref 80–100)
MONOCYTES # BLD AUTO: 1.05 X10*3/UL (ref 0.1–1)
MONOCYTES NFR BLD AUTO: 9.4 %
NEUTROPHILS # BLD AUTO: 8.22 X10*3/UL (ref 1.2–7.7)
NEUTROPHILS NFR BLD AUTO: 73.4 %
NRBC BLD-RTO: 0.3 /100 WBCS (ref 0–0)
PLATELET # BLD AUTO: 243 X10*3/UL (ref 150–450)
RBC # BLD AUTO: 3.02 X10*6/UL (ref 4.5–5.9)
RETICS #: 0.12 X10*6/UL (ref 0.02–0.12)
RETICS/RBC NFR AUTO: 3.9 % (ref 0.5–2)
WBC # BLD AUTO: 11.2 X10*3/UL (ref 4.4–11.3)

## 2024-02-13 PROCEDURE — 85045 AUTOMATED RETICULOCYTE COUNT: CPT

## 2024-02-13 PROCEDURE — 1130000001 HC PRIVATE PED ROOM DAILY

## 2024-02-13 PROCEDURE — 2500000001 HC RX 250 WO HCPCS SELF ADMINISTERED DRUGS (ALT 637 FOR MEDICARE OP)

## 2024-02-13 PROCEDURE — 99233 SBSQ HOSP IP/OBS HIGH 50: CPT

## 2024-02-13 PROCEDURE — 85025 COMPLETE CBC W/AUTO DIFF WBC: CPT

## 2024-02-13 PROCEDURE — 2500000004 HC RX 250 GENERAL PHARMACY W/ HCPCS (ALT 636 FOR OP/ED): Performed by: STUDENT IN AN ORGANIZED HEALTH CARE EDUCATION/TRAINING PROGRAM

## 2024-02-13 PROCEDURE — 2500000004 HC RX 250 GENERAL PHARMACY W/ HCPCS (ALT 636 FOR OP/ED)

## 2024-02-13 PROCEDURE — 2500000002 HC RX 250 W HCPCS SELF ADMINISTERED DRUGS (ALT 637 FOR MEDICARE OP, ALT 636 FOR OP/ED): Performed by: STUDENT IN AN ORGANIZED HEALTH CARE EDUCATION/TRAINING PROGRAM

## 2024-02-13 PROCEDURE — 36415 COLL VENOUS BLD VENIPUNCTURE: CPT

## 2024-02-13 RX ORDER — LIDOCAINE 560 MG/1
1 PATCH PERCUTANEOUS; TOPICAL; TRANSDERMAL DAILY
Status: DISCONTINUED | OUTPATIENT
Start: 2024-02-13 | End: 2024-02-16 | Stop reason: HOSPADM

## 2024-02-13 RX ORDER — ACETAMINOPHEN 10 MG/ML
1000 INJECTION, SOLUTION INTRAVENOUS EVERY 6 HOURS SCHEDULED
Status: DISPENSED | OUTPATIENT
Start: 2024-02-13 | End: 2024-02-14

## 2024-02-13 RX ORDER — HYDROMORPHONE HYDROCHLORIDE 1 MG/ML
0.6 INJECTION, SOLUTION INTRAMUSCULAR; INTRAVENOUS; SUBCUTANEOUS ONCE
Status: COMPLETED | OUTPATIENT
Start: 2024-02-13 | End: 2024-02-13

## 2024-02-13 RX ORDER — HYDROMORPHONE HYDROCHLORIDE 1 MG/ML
0.6 INJECTION, SOLUTION INTRAMUSCULAR; INTRAVENOUS; SUBCUTANEOUS
Status: DISCONTINUED | OUTPATIENT
Start: 2024-02-13 | End: 2024-02-14

## 2024-02-13 RX ORDER — HYDROMORPHONE HYDROCHLORIDE 1 MG/ML
0.8 INJECTION, SOLUTION INTRAMUSCULAR; INTRAVENOUS; SUBCUTANEOUS ONCE
Status: COMPLETED | OUTPATIENT
Start: 2024-02-13 | End: 2024-02-13

## 2024-02-13 RX ORDER — OLANZAPINE 5 MG/1
5 TABLET, ORALLY DISINTEGRATING ORAL ONCE
Status: COMPLETED | OUTPATIENT
Start: 2024-02-13 | End: 2024-02-13

## 2024-02-13 RX ADMIN — HYDROMORPHONE HYDROCHLORIDE 0.6 MG: 1 INJECTION, SOLUTION INTRAMUSCULAR; INTRAVENOUS; SUBCUTANEOUS at 18:00

## 2024-02-13 RX ADMIN — FAMOTIDINE 20 MG: 20 TABLET, FILM COATED ORAL at 09:06

## 2024-02-13 RX ADMIN — Medication 1000 UNITS: at 09:06

## 2024-02-13 RX ADMIN — KETOROLAC TROMETHAMINE 15 MG: 30 INJECTION, SOLUTION INTRAMUSCULAR; INTRAVENOUS at 18:04

## 2024-02-13 RX ADMIN — ACETAMINOPHEN 1000 MG: 10 INJECTION, SOLUTION INTRAVENOUS at 12:27

## 2024-02-13 RX ADMIN — KETOROLAC TROMETHAMINE 15 MG: 30 INJECTION, SOLUTION INTRAMUSCULAR; INTRAVENOUS at 00:17

## 2024-02-13 RX ADMIN — NALOXONE HYDROCHLORIDE 1 MCG/KG/HR: 0.4 INJECTION, SOLUTION INTRAMUSCULAR; INTRAVENOUS; SUBCUTANEOUS at 17:19

## 2024-02-13 RX ADMIN — SENNOSIDES 17.2 MG: 8.6 TABLET, FILM COATED ORAL at 09:06

## 2024-02-13 RX ADMIN — HYDROMORPHONE HYDROCHLORIDE 0.6 MG: 1 INJECTION, SOLUTION INTRAMUSCULAR; INTRAVENOUS; SUBCUTANEOUS at 05:57

## 2024-02-13 RX ADMIN — ACETAMINOPHEN 1000 MG: 10 INJECTION, SOLUTION INTRAVENOUS at 00:18

## 2024-02-13 RX ADMIN — ACETAMINOPHEN 1000 MG: 10 INJECTION, SOLUTION INTRAVENOUS at 05:30

## 2024-02-13 RX ADMIN — HYDROMORPHONE HYDROCHLORIDE 0.6 MG: 1 INJECTION, SOLUTION INTRAMUSCULAR; INTRAVENOUS; SUBCUTANEOUS at 20:57

## 2024-02-13 RX ADMIN — POLYETHYLENE GLYCOL 3350 17 G: 17 POWDER, FOR SOLUTION ORAL at 20:58

## 2024-02-13 RX ADMIN — HYDROMORPHONE HYDROCHLORIDE 0.6 MG: 1 INJECTION, SOLUTION INTRAMUSCULAR; INTRAVENOUS; SUBCUTANEOUS at 15:06

## 2024-02-13 RX ADMIN — SENNOSIDES 17.2 MG: 8.6 TABLET, FILM COATED ORAL at 20:57

## 2024-02-13 RX ADMIN — POLYETHYLENE GLYCOL 3350 17 G: 17 POWDER, FOR SOLUTION ORAL at 09:06

## 2024-02-13 RX ADMIN — FAMOTIDINE 20 MG: 20 TABLET, FILM COATED ORAL at 20:58

## 2024-02-13 RX ADMIN — KETOROLAC TROMETHAMINE 15 MG: 30 INJECTION, SOLUTION INTRAMUSCULAR; INTRAVENOUS at 05:30

## 2024-02-13 RX ADMIN — HYDROMORPHONE HYDROCHLORIDE 0.6 MG: 1 INJECTION, SOLUTION INTRAMUSCULAR; INTRAVENOUS; SUBCUTANEOUS at 12:27

## 2024-02-13 RX ADMIN — OLANZAPINE 5 MG: 5 TABLET, ORALLY DISINTEGRATING ORAL at 20:57

## 2024-02-13 RX ADMIN — DEXTROSE AND SODIUM CHLORIDE 75 ML/HR: 5; 900 INJECTION, SOLUTION INTRAVENOUS at 02:47

## 2024-02-13 RX ADMIN — KETOROLAC TROMETHAMINE 15 MG: 30 INJECTION, SOLUTION INTRAMUSCULAR; INTRAVENOUS at 11:29

## 2024-02-13 RX ADMIN — BISACODYL 5 MG: 5 TABLET, COATED ORAL at 09:06

## 2024-02-13 RX ADMIN — ACETAMINOPHEN 1000 MG: 10 INJECTION, SOLUTION INTRAVENOUS at 18:04

## 2024-02-13 RX ADMIN — HYDROMORPHONE HYDROCHLORIDE 0.6 MG: 1 INJECTION, SOLUTION INTRAMUSCULAR; INTRAVENOUS; SUBCUTANEOUS at 09:06

## 2024-02-13 RX ADMIN — HYDROXYUREA 1000 MG: 500 CAPSULE ORAL at 09:05

## 2024-02-13 RX ADMIN — HYDROMORPHONE HYDROCHLORIDE 0.8 MG: 1 INJECTION, SOLUTION INTRAMUSCULAR; INTRAVENOUS; SUBCUTANEOUS at 02:45

## 2024-02-13 RX ADMIN — DEXTROSE AND SODIUM CHLORIDE 75 ML/HR: 5; 900 INJECTION, SOLUTION INTRAVENOUS at 17:19

## 2024-02-13 ASSESSMENT — PAIN SCALES - GENERAL
PAINLEVEL_OUTOF10: 10 - WORST POSSIBLE PAIN
PAINLEVEL_OUTOF10: 9
PAINLEVEL_OUTOF10: 10 - WORST POSSIBLE PAIN
PAINLEVEL_OUTOF10: 9
PAINLEVEL_OUTOF10: 10 - WORST POSSIBLE PAIN

## 2024-02-13 ASSESSMENT — PAIN - FUNCTIONAL ASSESSMENT
PAIN_FUNCTIONAL_ASSESSMENT: 0-10

## 2024-02-13 ASSESSMENT — VISUAL ACUITY: OU: 1

## 2024-02-13 NOTE — PROGRESS NOTES
Massage Therapy / Acupuncture Note:  I was asked to visit with Josue today. When I walked into the room he was screaming and withering in pain.  He asked me to work on his left leg and ribs.  I held his leg to have him get used to my touch at first.  When he calmed down I was able to use deep, long strokes up the leg.  I then moved to his ribs and started with holding them to have him get used to my touch. I was able to use long, smooth strokes up his side.  He stated that this helped with his pain.  I will continue to follow.

## 2024-02-13 NOTE — PROGRESS NOTES
Music Therapy Note    Therapy Session  Referral Type: Referral from previous admission  Visit Type: New visit  Session Start Time: 1440  Session End Time: 1600  Intervention Delivery: In-person  Conflict of Service: None  Family Present for Session: None               Treatment/Interventions  Areas of Focus: Coping, Locus of control, Normalization, Relaxation, Self-expression, Pain management, Anxiety reduction, Emotional support  Music Therapy Interventions: Active music engagement, Iso-principle, Empathic listening/validating emotions, Recorded music listening, Improvisation, Songwriting/composition  Interruption: No  Patient Fell Asleep at End of Session: No    Post-assessment  Total Session Time (min): 80 minutes       Music Therapy Intern (MTI) greeted patient and asked if he would like to make some music today. Patient remembered MTI from previous admission and was excited to have a session. MTI and patient improvised and explored different chord progressions and melodies together to create songs. Patient also wanted to drum to recorded tracks from various artists.  Patient was bright and happy throughout the session. MTI will continue to provide music therapy services.

## 2024-02-13 NOTE — PROGRESS NOTES
"Josue Rangel is a 18 y.o. male on day 2 of admission presenting with Vasoocclusive sickle cell crisis (CMS/HCC).    Subjective   Overnight, Josue complained of 10/10 pain not alleviated by morphine. Changed to dilaudid 1mg for one dose, then given 0.8. He is now on 0.6mg q3h which is equivalent to previous morphine dose.    Large bowel movement after increasing bowel regimen yesterday. No PO recorded.        Objective     Physical Exam  Constitutional:       General: He is not in acute distress.  HENT:      Head: Normocephalic and atraumatic.      Right Ear: External ear normal.      Left Ear: External ear normal.      Nose: Nose normal.      Mouth/Throat:      Lips: Pink.      Mouth: Mucous membranes are moist.   Eyes:      General: Lids are normal. Vision grossly intact.   Cardiovascular:      Rate and Rhythm: Normal rate and regular rhythm.      Pulses: Normal pulses.      Heart sounds: No murmur heard.  Pulmonary:      Effort: Pulmonary effort is normal.      Breath sounds: Normal breath sounds. No wheezing, rhonchi or rales.   Abdominal:      General: Abdomen is flat. Bowel sounds are normal. There is no distension.      Tenderness: There is no abdominal tenderness.   Musculoskeletal:      Cervical back: Normal range of motion.   Skin:     General: Skin is warm and dry.   Neurological:      General: No focal deficit present.      Mental Status: He is alert and oriented to person, place, and time.   Psychiatric:         Attention and Perception: Attention normal.         Behavior: Behavior is cooperative.         Last Recorded Vitals  Blood pressure 118/58, pulse 78, temperature 36.9 °C (98.4 °F), temperature source Oral, resp. rate 20, height 1.778 m (5' 10\"), weight 64.6 kg (142 lb 6.7 oz), SpO2 95 %.  Intake/Output last 3 Shifts:  I/O last 3 completed shifts:  In: 4183.7 (64.8 mL/kg) [P.O.:960; I.V.:2445.6 (37.9 mL/kg); IV Piggyback:778.1]  Out: 0 (0 mL/kg)   Dosing Weight: 64.6 kg     Relevant " Results    Scheduled medications  acetaminophen, 1,000 mg, intravenous, q6h SERVANDO  bisacodyl, 5 mg, oral, Daily  cholecalciferol, 1,000 Units, oral, Daily  famotidine, 20 mg, oral, BID  HYDROmorphone, 0.6 mg, intravenous, q3h  hydroxyurea, 1,000 mg, oral, Daily  ketorolac, 15 mg, intravenous, q6h SERVANDO  polyethylene glycol, 17 g, oral, BID  sennosides, 17.2 mg, oral, BID      Continuous medications  D5 % and 0.9 % sodium chloride, 75 mL/hr, Last Rate: 75 mL/hr (02/13/24 0247)  naloxone, 1 mcg/kg/hr, Last Rate: 1 mcg/kg/hr (02/12/24 7057)      Results for orders placed or performed during the hospital encounter of 02/11/24 (from the past 24 hour(s))   ECG 12 lead   Result Value Ref Range    Ventricular Rate 70 BPM    Atrial Rate 70 BPM    ID Interval 156 ms    QRS Duration 106 ms    QT Interval 376 ms    QTC Calculation(Bazett) 406 ms    P Axis 63 degrees    R Axis 44 degrees    T Axis 11 degrees    QRS Count 12 beats    Q Onset 220 ms    P Onset 142 ms    P Offset 202 ms    T Offset 408 ms    QTC Fredericia 396 ms      ECG 12 lead    Result Date: 2/12/2024  Normal sinus rhythm with sinus arrhythmia Incomplete right bundle branch block Borderline ECG When compared with ECG of 12-FEB-2024 09:52, (unconfirmed) No significant change was found    ECG 12 lead    Result Date: 2/12/2024  Normal sinus rhythm with sinus arrhythmia Normal ECG When compared with ECG of 10-FEB-2024 20:21, Previous ECG has undetermined rhythm, needs review QRS duration has increased ST no longer depressed in Inferior leads ST elevation has replaced ST depression in Anterior leads ST less elevated in Lateral leads T wave inversion no longer evident in Inferior leads    XR chest 2 views    Result Date: 2/11/2024  Interpreted By:  Nina Tucker, STUDY: XR CHEST 2 VIEWS;  2/11/2024 12:28 pm   INDICATION: Signs/Symptoms:Concern for ACS, in and out please.   COMPARISON: 01/10/2024.   ACCESSION NUMBER(S): [id number]   ORDERING CLINICIAN: KESHIA CASTILLO    FINDINGS: Increased interstitial markings with peribronchial thickening.   No focal consolidation.   Lungs are well inflated.   No pleural effusion or pneumothorax.   Cardiac silhouette is normal in size. Pulmonary vessels are within normal limits.   Visualized upper abdomen is unremarkable.   Bony thorax is intact.       1.  Increased lung markings with peribronchial thickening, most consistent with viral type infection versus reactive airway disease. 2. No focal consolidation. Cardiac silhouette is normal.     Signed by: Nina Tucker 2/11/2024 2:15 PM Dictation workstation:   [id number]    ECG 12 lead    Result Date: 2/11/2024  Undetermined rhythm ST & T wave abnormality, consider inferior ischemia Abnormal ECG When compared with ECG of 03-JAN-2024 12:06, Current undetermined rhythm precludes rhythm comparison, needs review QRS duration has decreased ST now depressed in Inferior leads ST elevation now present in Lateral leads T wave inversion now evident in Inferior leads See ED provider note for full interpretation and clinical correlation Confirmed by Selena Granados ([id number]) on 2/11/2024 4:00:53 AM        Assessment/Plan   Principal Problem:    Vasoocclusive sickle cell crisis (CMS/HCC)    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 Yesterday, he received IV morphine, toradol and tylenol. He had worsening pain overnight and was transitioned to equivalent Dilaudid dose. He reports pain is slightly better on dilaudid. Will not wean today as he is still reporting 9-10/10 pain. Plan to check in this afternoon and attempt to wean pain regimen at that time. He has been saturating well in room air with no increased work of breathing. No concerns for acute chest at this time.     Detailed plan as follows:     HEME  #HgB SC with VOE  [X] blood consent signed in EMR  - IV Dilaudid 0.6mg q3h scheduled  - IV Toradol 0.5mg/kg q6h scheduled   - IV Tylenol 15 mg/kg q6h scheduled  -  baseline Hgb 11, baseline retic 3.7  - Hydroxyurea 1000 mg daily (M-F)  #Opioid induced pruritus  - Narcan ggt     CV  #Access: pIV  [ X] repeat ECG: sinus rhythm      RESP  #Acute Chest Syndrome Prevention  [X] RT notified  - IS and EZPAP  [X] 2 view CXR 2/11: peribronchial thickening, No focal consolidation.   [X] 2 view repeat CXR 2/12: No focal parenchymal consolidation identified.        FEN/GI  #Nutrition  - regular diet  - Vit D 1000U daily  #Hydration  - 3/4 mIVF with D5NS  #Constipation prevention  - Miralax BID  - Dulcolax Daily  - Senna BID   #GI prophylaxis  - IV Pepcid 20mg BID while on toradol     ID  #Fever plan  - if febrile > or = 38.5C, blood culture and ceftriaxone     Psych  #Depression  - Prev on Olanzapine 5mg (not taking)     Labs:  - CBCd, retic qAM; T&S q72h (next due 2/15)      Discussed with Dr. Tomlin.     Melvi Porter MD  PGY-2 Pediatrics

## 2024-02-14 LAB
BASOPHILS # BLD AUTO: 0.03 X10*3/UL (ref 0–0.1)
BASOPHILS NFR BLD AUTO: 0.3 %
EOSINOPHIL # BLD AUTO: 0.25 X10*3/UL (ref 0–0.7)
EOSINOPHIL NFR BLD AUTO: 2.6 %
ERYTHROCYTE [DISTWIDTH] IN BLOOD BY AUTOMATED COUNT: 14.2 % (ref 11.5–14.5)
HCT VFR BLD AUTO: 26.4 % (ref 41–52)
HGB BLD-MCNC: 10.4 G/DL (ref 13.5–17.5)
HGB RETIC QN: 28 PG (ref 28–38)
IMM GRANULOCYTES # BLD AUTO: 0.03 X10*3/UL (ref 0–0.7)
IMM GRANULOCYTES NFR BLD AUTO: 0.3 % (ref 0–0.9)
IMMATURE RETIC FRACTION: 22.2 %
LYMPHOCYTES # BLD AUTO: 1.48 X10*3/UL (ref 1.2–4.8)
LYMPHOCYTES NFR BLD AUTO: 15.1 %
MCH RBC QN AUTO: 33.7 PG (ref 26–34)
MCHC RBC AUTO-ENTMCNC: 39.4 G/DL (ref 32–36)
MCV RBC AUTO: 85 FL (ref 80–100)
MONOCYTES # BLD AUTO: 1.18 X10*3/UL (ref 0.1–1)
MONOCYTES NFR BLD AUTO: 12 %
NEUTROPHILS # BLD AUTO: 6.83 X10*3/UL (ref 1.2–7.7)
NEUTROPHILS NFR BLD AUTO: 69.7 %
NRBC BLD-RTO: 0.3 /100 WBCS (ref 0–0)
PLATELET # BLD AUTO: 271 X10*3/UL (ref 150–450)
RBC # BLD AUTO: 3.09 X10*6/UL (ref 4.5–5.9)
RETICS #: 0.09 X10*6/UL (ref 0.02–0.12)
RETICS/RBC NFR AUTO: 2.9 % (ref 0.5–2)
WBC # BLD AUTO: 9.8 X10*3/UL (ref 4.4–11.3)

## 2024-02-14 PROCEDURE — 85025 COMPLETE CBC W/AUTO DIFF WBC: CPT

## 2024-02-14 PROCEDURE — 99233 SBSQ HOSP IP/OBS HIGH 50: CPT

## 2024-02-14 PROCEDURE — 2500000004 HC RX 250 GENERAL PHARMACY W/ HCPCS (ALT 636 FOR OP/ED): Performed by: STUDENT IN AN ORGANIZED HEALTH CARE EDUCATION/TRAINING PROGRAM

## 2024-02-14 PROCEDURE — 85045 AUTOMATED RETICULOCYTE COUNT: CPT

## 2024-02-14 PROCEDURE — 2500000001 HC RX 250 WO HCPCS SELF ADMINISTERED DRUGS (ALT 637 FOR MEDICARE OP)

## 2024-02-14 PROCEDURE — 2500000004 HC RX 250 GENERAL PHARMACY W/ HCPCS (ALT 636 FOR OP/ED)

## 2024-02-14 PROCEDURE — 2500000005 HC RX 250 GENERAL PHARMACY W/O HCPCS: Performed by: STUDENT IN AN ORGANIZED HEALTH CARE EDUCATION/TRAINING PROGRAM

## 2024-02-14 PROCEDURE — 36415 COLL VENOUS BLD VENIPUNCTURE: CPT

## 2024-02-14 PROCEDURE — 2500000002 HC RX 250 W HCPCS SELF ADMINISTERED DRUGS (ALT 637 FOR MEDICARE OP, ALT 636 FOR OP/ED)

## 2024-02-14 PROCEDURE — 1130000001 HC PRIVATE PED ROOM DAILY

## 2024-02-14 RX ORDER — OLANZAPINE 5 MG/1
2.5 TABLET, ORALLY DISINTEGRATING ORAL NIGHTLY
Status: DISCONTINUED | OUTPATIENT
Start: 2024-02-14 | End: 2024-02-15

## 2024-02-14 RX ORDER — ACETAMINOPHEN 10 MG/ML
1000 INJECTION, SOLUTION INTRAVENOUS EVERY 6 HOURS SCHEDULED
Status: DISCONTINUED | OUTPATIENT
Start: 2024-02-14 | End: 2024-02-14

## 2024-02-14 RX ORDER — NAPROXEN 500 MG/1
500 TABLET ORAL
Status: DISCONTINUED | OUTPATIENT
Start: 2024-02-14 | End: 2024-02-16 | Stop reason: HOSPADM

## 2024-02-14 RX ORDER — ACETAMINOPHEN 325 MG/1
650 TABLET ORAL EVERY 6 HOURS
Status: DISCONTINUED | OUTPATIENT
Start: 2024-02-14 | End: 2024-02-16 | Stop reason: HOSPADM

## 2024-02-14 RX ORDER — DICLOFENAC SODIUM 10 MG/G
4 GEL TOPICAL 4 TIMES DAILY PRN
Status: DISCONTINUED | OUTPATIENT
Start: 2024-02-14 | End: 2024-02-16 | Stop reason: HOSPADM

## 2024-02-14 RX ORDER — KETOROLAC TROMETHAMINE 30 MG/ML
15 INJECTION, SOLUTION INTRAMUSCULAR; INTRAVENOUS EVERY 6 HOURS
Status: DISCONTINUED | OUTPATIENT
Start: 2024-02-14 | End: 2024-02-14

## 2024-02-14 RX ORDER — HYDROMORPHONE HYDROCHLORIDE 1 MG/ML
0.4 INJECTION, SOLUTION INTRAMUSCULAR; INTRAVENOUS; SUBCUTANEOUS
Status: DISCONTINUED | OUTPATIENT
Start: 2024-02-14 | End: 2024-02-15

## 2024-02-14 RX ORDER — LAMOTRIGINE 25 MG/1
25 TABLET, ORALLY DISINTEGRATING ORAL NIGHTLY
Status: DISCONTINUED | OUTPATIENT
Start: 2024-02-14 | End: 2024-02-16 | Stop reason: HOSPADM

## 2024-02-14 RX ORDER — HYDROMORPHONE HYDROCHLORIDE 1 MG/ML
0.6 INJECTION, SOLUTION INTRAMUSCULAR; INTRAVENOUS; SUBCUTANEOUS
Status: DISCONTINUED | OUTPATIENT
Start: 2024-02-14 | End: 2024-02-14

## 2024-02-14 RX ADMIN — OLANZAPINE 2.5 MG: 5 TABLET, ORALLY DISINTEGRATING ORAL at 21:08

## 2024-02-14 RX ADMIN — Medication 1000 UNITS: at 09:40

## 2024-02-14 RX ADMIN — HYDROMORPHONE HYDROCHLORIDE 0.4 MG: 1 INJECTION, SOLUTION INTRAMUSCULAR; INTRAVENOUS; SUBCUTANEOUS at 13:07

## 2024-02-14 RX ADMIN — DEXTROSE AND SODIUM CHLORIDE 75 ML/HR: 5; 900 INJECTION, SOLUTION INTRAVENOUS at 05:31

## 2024-02-14 RX ADMIN — DICLOFENAC SODIUM TOPICAL GEL, 1% 4 G: 10 GEL TOPICAL at 11:40

## 2024-02-14 RX ADMIN — KETOROLAC TROMETHAMINE 15 MG: 30 INJECTION, SOLUTION INTRAMUSCULAR; INTRAVENOUS at 00:12

## 2024-02-14 RX ADMIN — ACETAMINOPHEN 650 MG: 325 TABLET ORAL at 11:39

## 2024-02-14 RX ADMIN — HYDROMORPHONE HYDROCHLORIDE 0.6 MG: 1 INJECTION, SOLUTION INTRAMUSCULAR; INTRAVENOUS; SUBCUTANEOUS at 05:29

## 2024-02-14 RX ADMIN — SENNOSIDES 17.2 MG: 8.6 TABLET, FILM COATED ORAL at 09:14

## 2024-02-14 RX ADMIN — KETOROLAC TROMETHAMINE 15 MG: 30 INJECTION, SOLUTION INTRAMUSCULAR; INTRAVENOUS at 05:28

## 2024-02-14 RX ADMIN — LAMOTRIGINE 25 MG: 25 TABLET, ORALLY DISINTEGRATING ORAL at 21:08

## 2024-02-14 RX ADMIN — ACETAMINOPHEN 1000 MG: 10 INJECTION, SOLUTION INTRAVENOUS at 05:40

## 2024-02-14 RX ADMIN — HYDROMORPHONE HYDROCHLORIDE 0.6 MG: 1 INJECTION, SOLUTION INTRAMUSCULAR; INTRAVENOUS; SUBCUTANEOUS at 00:12

## 2024-02-14 RX ADMIN — HYDROMORPHONE HYDROCHLORIDE 0.4 MG: 1 INJECTION, SOLUTION INTRAMUSCULAR; INTRAVENOUS; SUBCUTANEOUS at 21:07

## 2024-02-14 RX ADMIN — SENNOSIDES 17.2 MG: 8.6 TABLET, FILM COATED ORAL at 21:07

## 2024-02-14 RX ADMIN — POLYETHYLENE GLYCOL 3350 17 G: 17 POWDER, FOR SOLUTION ORAL at 09:13

## 2024-02-14 RX ADMIN — DICLOFENAC SODIUM TOPICAL GEL, 1% 4 G: 10 GEL TOPICAL at 18:06

## 2024-02-14 RX ADMIN — HYDROMORPHONE HYDROCHLORIDE 0.4 MG: 1 INJECTION, SOLUTION INTRAMUSCULAR; INTRAVENOUS; SUBCUTANEOUS at 18:06

## 2024-02-14 RX ADMIN — HYDROXYUREA 1000 MG: 500 CAPSULE ORAL at 09:40

## 2024-02-14 RX ADMIN — HYDROMORPHONE HYDROCHLORIDE 0.6 MG: 1 INJECTION, SOLUTION INTRAMUSCULAR; INTRAVENOUS; SUBCUTANEOUS at 02:54

## 2024-02-14 RX ADMIN — LIDOCAINE 1 PATCH: 4 PATCH TOPICAL at 03:07

## 2024-02-14 RX ADMIN — FAMOTIDINE 20 MG: 20 TABLET, FILM COATED ORAL at 09:14

## 2024-02-14 RX ADMIN — POLYETHYLENE GLYCOL 3350 17 G: 17 POWDER, FOR SOLUTION ORAL at 21:08

## 2024-02-14 RX ADMIN — NAPROXEN 500 MG: 500 TABLET ORAL at 16:22

## 2024-02-14 RX ADMIN — ACETAMINOPHEN 1000 MG: 10 INJECTION, SOLUTION INTRAVENOUS at 00:10

## 2024-02-14 RX ADMIN — HYDROMORPHONE HYDROCHLORIDE 0.4 MG: 1 INJECTION, SOLUTION INTRAMUSCULAR; INTRAVENOUS; SUBCUTANEOUS at 10:09

## 2024-02-14 RX ADMIN — BISACODYL 5 MG: 5 TABLET, COATED ORAL at 09:14

## 2024-02-14 RX ADMIN — ACETAMINOPHEN 650 MG: 325 TABLET ORAL at 18:05

## 2024-02-14 ASSESSMENT — PAIN SCALES - GENERAL
PAINLEVEL_OUTOF10: 5 - MODERATE PAIN
PAINLEVEL_OUTOF10: 10 - WORST POSSIBLE PAIN
PAINLEVEL_OUTOF10: 10 - WORST POSSIBLE PAIN
PAINLEVEL_OUTOF10: 8
PAINLEVEL_OUTOF10: 5 - MODERATE PAIN
PAINLEVEL_OUTOF10: 10 - WORST POSSIBLE PAIN

## 2024-02-14 ASSESSMENT — PAIN - FUNCTIONAL ASSESSMENT
PAIN_FUNCTIONAL_ASSESSMENT: 0-10

## 2024-02-14 ASSESSMENT — PAIN DESCRIPTION - ORIENTATION: ORIENTATION: RIGHT;LEFT

## 2024-02-14 ASSESSMENT — PAIN DESCRIPTION - LOCATION: LOCATION: LEG

## 2024-02-14 ASSESSMENT — PAIN INTENSITY VAS
VAS_PAIN_GENERAL: 10
VAS_PAIN_GENERAL: 10

## 2024-02-14 ASSESSMENT — VISUAL ACUITY: OU: 1

## 2024-02-14 NOTE — PROGRESS NOTES
"Josue Rangel is a 18 y.o. male on day 3 of admission presenting with Vasoocclusive sickle cell crisis (CMS/HCC).    Subjective   Patient complaining of severe leg pain yesterday. Lidocaine patches were applied which did seem to help with the pain, now rating 5/10.    Due to manic behavior in the evening, given one time dose of zyprexa 5mg.       Objective     Physical Exam  Constitutional:       General: He is not in acute distress.  HENT:      Head: Normocephalic and atraumatic.      Right Ear: External ear normal.      Left Ear: External ear normal.      Nose: Nose normal.      Mouth/Throat:      Lips: Pink.      Mouth: Mucous membranes are moist.   Eyes:      General: Lids are normal. Vision grossly intact.   Cardiovascular:      Rate and Rhythm: Normal rate and regular rhythm.      Pulses: Normal pulses.      Heart sounds: No murmur heard.  Pulmonary:      Effort: Pulmonary effort is normal.      Breath sounds: Normal breath sounds. No wheezing, rhonchi or rales.   Abdominal:      General: Abdomen is flat. Bowel sounds are normal. There is no distension.      Tenderness: There is no abdominal tenderness.   Musculoskeletal:      Cervical back: Normal range of motion.   Skin:     General: Skin is warm and dry.   Neurological:      General: No focal deficit present.      Mental Status: He is alert and oriented to person, place, and time.   Psychiatric:         Attention and Perception: Attention normal.         Behavior: Behavior is cooperative.         Last Recorded Vitals  Blood pressure 108/66, pulse 63, temperature 37 °C (98.6 °F), temperature source Oral, resp. rate 18, height 1.778 m (5' 10\"), weight 64.6 kg (142 lb 6.7 oz), SpO2 100 %.  Intake/Output last 3 Shifts:  I/O last 3 completed shifts:  In: 4553 (70.5 mL/kg) [P.O.:1200; I.V.:2575 (39.9 mL/kg); IV Piggyback:778]  Out: 0 (0 mL/kg)   Dosing Weight: 64.6 kg     Relevant Results    Scheduled medications  acetaminophen, 1,000 mg, intravenous, q6h " SERVANDO  bisacodyl, 5 mg, oral, Daily  cholecalciferol, 1,000 Units, oral, Daily  famotidine, 20 mg, oral, BID  HYDROmorphone, 0.6 mg, intravenous, q3h  hydroxyurea, 1,000 mg, oral, Daily  ketorolac, 15 mg, intravenous, q6h  lidocaine, 1 patch, transdermal, Daily  polyethylene glycol, 17 g, oral, BID  sennosides, 17.2 mg, oral, BID      Continuous medications  D5 % and 0.9 % sodium chloride, 75 mL/hr, Last Rate: 75 mL/hr (02/14/24 0641)  naloxone, 1 mcg/kg/hr, Last Rate: 1 mcg/kg/hr (02/13/24 7249)      Results for orders placed or performed during the hospital encounter of 02/11/24 (from the past 24 hour(s))   CBC and Auto Differential   Result Value Ref Range    WBC      RBC      Hemoglobin      Hematocrit      MCV      MCHC      RDW      Platelets      Neutrophils %      Immature Granulocytes %, Automated      Lymphocytes %      Monocytes %      Eosinophils %      Basophils %      Neutrophils Absolute      Lymphocytes Absolute      Monocytes Absolute      Eosinophils Absolute      Basophils Absolute     Reticulocytes   Result Value Ref Range    Retic % 2.9 (H) 0.5 - 2.0 %    Retic Absolute 0.088 0.022 - 0.118 x10*6/uL    Reticulocyte Hemoglobin 28 28 - 38 pg    Immature Retic fraction 22.2 (H) <=16.0 %      ECG 12 lead    Result Date: 2/12/2024  Normal sinus rhythm with sinus arrhythmia Incomplete right bundle branch block Borderline ECG When compared with ECG of 12-FEB-2024 09:52, (unconfirmed) No significant change was found    ECG 12 lead    Result Date: 2/12/2024  Normal sinus rhythm with sinus arrhythmia Normal ECG When compared with ECG of 10-FEB-2024 20:21, Previous ECG has undetermined rhythm, needs review QRS duration has increased ST no longer depressed in Inferior leads ST elevation has replaced ST depression in Anterior leads ST less elevated in Lateral leads T wave inversion no longer evident in Inferior leads    XR chest 2 views    Result Date: 2/11/2024  Interpreted By:  Nina Tucker, STUDY: XR CHEST 2  VIEWS;  2/11/2024 12:28 pm   INDICATION: Signs/Symptoms:Concern for ACS, in and out please.   COMPARISON: 01/10/2024.   ACCESSION NUMBER(S): [id number]   ORDERING CLINICIAN: KESHIA CASTILLO   FINDINGS: Increased interstitial markings with peribronchial thickening.   No focal consolidation.   Lungs are well inflated.   No pleural effusion or pneumothorax.   Cardiac silhouette is normal in size. Pulmonary vessels are within normal limits.   Visualized upper abdomen is unremarkable.   Bony thorax is intact.       1.  Increased lung markings with peribronchial thickening, most consistent with viral type infection versus reactive airway disease. 2. No focal consolidation. Cardiac silhouette is normal.     Signed by: Nina Tucker 2/11/2024 2:15 PM Dictation workstation:   [id number]    ECG 12 lead    Result Date: 2/11/2024  Undetermined rhythm ST & T wave abnormality, consider inferior ischemia Abnormal ECG When compared with ECG of 03-JAN-2024 12:06, Current undetermined rhythm precludes rhythm comparison, needs review QRS duration has decreased ST now depressed in Inferior leads ST elevation now present in Lateral leads T wave inversion now evident in Inferior leads See ED provider note for full interpretation and clinical correlation Confirmed by Selena Granados ([id number]) on 2/11/2024 4:00:53 AM        Assessment/Plan   Principal Problem:    Vasoocclusive sickle cell crisis (CMS/HCC)    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 Patient currently on IV dilaudid, toradol and tylenol. Will wean to oral Naproxen and tylenol today and decrease dilaudid to 0.4mg. Will attempt to wean to oral oxycodone in the evening if pain remains improved. Overnight, trialed lidocaine patches which were helpful with his leg pain. Can continue patches and will also try diclofenac gel as needed. He had good PO intake yesterday and will discontinue fluids. Our heme/onc psych team evaluated the  patient yesterday and we will follow up with their recommendations regarding starting a medication for Bipolar disorder. He has been saturating well in room air with no increased work of breathing. No concerns for acute chest at this time.     Detailed plan as follows:     HEME  #HgB SC with VOE  [X] blood consent signed in EMR  - IV Dilaudid 0.4mg q3h scheduled  - IV Toradol 0.5mg/kg q6h scheduled   - IV Tylenol 15 mg/kg q6h scheduled  - Diclofenac gel prn  - baseline Hgb 11, baseline retic 3.7  - Hydroxyurea 1000 mg daily (M-F)  #Opioid induced pruritus (resolved)  - Narcan ggt (completed)     CV  #Access: pIV  [ X] repeat ECG: sinus rhythm      RESP  #Acute Chest Syndrome Prevention  [X] RT notified  - IS and EZPAP  [X] 2 view CXR 2/11: peribronchial thickening, No focal consolidation.   [X] 2 view repeat CXR 2/12: No focal parenchymal consolidation identified.        FEN/GI  #Nutrition  - regular diet  - Vit D 1000U daily  #Constipation prevention  - Miralax BID  - Dulcolax Daily  - Senna BID      ID  #Fever plan  - if febrile > or = 38.5C, blood culture and ceftriaxone     Psych  #Depression  - Prev on Olanzapine 5mg (not taking)  #Bipolar  - Denisa following     Labs:  - CBCd, retic qAM; T&S q72h (next due 2/15)      Discussed with Dr. Tomlin.     Melvi Porter MD  PGY-2 Pediatrics

## 2024-02-14 NOTE — PROGRESS NOTES
Massage Therapy / Acupuncture Note:  When I visited with Josue today he was up out of bed and spending time with a family member that was visiting.  He was in great spirits.  I will continue to follow.
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
## 2024-02-14 NOTE — CARE PLAN
The patient's goals for the shift include  pain control    The clinical goals for the shift include Patient will state releived pain less than 7/10 with current pain medication plan by 0800 2/14/24    Over the shift, the patient did not make progress toward the following goals. Patient continues to complain of bilateral leg pain rated 10/10.  IV tylenol, toradol and every 3hr dialudid continue with IVF as well as narcan drip. Pain continues to be rated 10/10  Lidocaine patches to legs removed per patient

## 2024-02-15 ENCOUNTER — PHARMACY VISIT (OUTPATIENT)
Dept: PHARMACY | Facility: CLINIC | Age: 19
End: 2024-02-15
Payer: MEDICAID

## 2024-02-15 PROBLEM — D57.00 VASOOCCLUSIVE SICKLE CELL CRISIS (MULTI): Status: RESOLVED | Noted: 2024-02-11 | Resolved: 2024-02-15

## 2024-02-15 LAB
APPEARANCE UR: CLEAR
ATRIAL RATE: 69 BPM
BASOPHILS # BLD AUTO: 0.04 X10*3/UL (ref 0–0.1)
BASOPHILS NFR BLD AUTO: 0.5 %
BILIRUB UR STRIP.AUTO-MCNC: NEGATIVE MG/DL
COLOR UR: NORMAL
EOSINOPHIL # BLD AUTO: 0.39 X10*3/UL (ref 0–0.7)
EOSINOPHIL NFR BLD AUTO: 4.4 %
ERYTHROCYTE [DISTWIDTH] IN BLOOD BY AUTOMATED COUNT: 14.8 % (ref 11.5–14.5)
GLUCOSE UR STRIP.AUTO-MCNC: NORMAL MG/DL
HCT VFR BLD AUTO: 25.9 % (ref 41–52)
HGB BLD-MCNC: 9.6 G/DL (ref 13.5–17.5)
HGB RETIC QN: 26 PG (ref 28–38)
IMM GRANULOCYTES # BLD AUTO: 0.02 X10*3/UL (ref 0–0.7)
IMM GRANULOCYTES NFR BLD AUTO: 0.2 % (ref 0–0.9)
IMMATURE RETIC FRACTION: 30.8 %
KETONES UR STRIP.AUTO-MCNC: NEGATIVE MG/DL
LEUKOCYTE ESTERASE UR QL STRIP.AUTO: NEGATIVE
LYMPHOCYTES # BLD AUTO: 3.2 X10*3/UL (ref 1.2–4.8)
LYMPHOCYTES NFR BLD AUTO: 36.2 %
MCH RBC QN AUTO: 32 PG (ref 26–34)
MCHC RBC AUTO-ENTMCNC: 37.1 G/DL (ref 32–36)
MCV RBC AUTO: 86 FL (ref 80–100)
MONOCYTES # BLD AUTO: 0.89 X10*3/UL (ref 0.1–1)
MONOCYTES NFR BLD AUTO: 10.1 %
NEUTROPHILS # BLD AUTO: 4.3 X10*3/UL (ref 1.2–7.7)
NEUTROPHILS NFR BLD AUTO: 48.6 %
NITRITE UR QL STRIP.AUTO: NEGATIVE
NRBC BLD-RTO: 0.5 /100 WBCS (ref 0–0)
P AXIS: 24 DEGREES
P OFFSET: 196 MS
P ONSET: 149 MS
PH UR STRIP.AUTO: 6 [PH]
PLATELET # BLD AUTO: 289 X10*3/UL (ref 150–450)
PR INTERVAL: 146 MS
PROT UR STRIP.AUTO-MCNC: NEGATIVE MG/DL
Q ONSET: 222 MS
QRS COUNT: 12 BEATS
QRS DURATION: 110 MS
QT INTERVAL: 388 MS
QTC CALCULATION(BAZETT): 415 MS
QTC FREDERICIA: 406 MS
R AXIS: 35 DEGREES
RBC # BLD AUTO: 3 X10*6/UL (ref 4.5–5.9)
RBC # UR STRIP.AUTO: NEGATIVE /UL
RETICS #: 0.06 X10*6/UL (ref 0.02–0.12)
RETICS/RBC NFR AUTO: 2.1 % (ref 0.5–2)
SP GR UR STRIP.AUTO: 1.01
T AXIS: 43 DEGREES
T OFFSET: 416 MS
UROBILINOGEN UR STRIP.AUTO-MCNC: NORMAL MG/DL
VENTRICULAR RATE: 69 BPM
WBC # BLD AUTO: 8.8 X10*3/UL (ref 4.4–11.3)

## 2024-02-15 PROCEDURE — 81003 URINALYSIS AUTO W/O SCOPE: CPT

## 2024-02-15 PROCEDURE — 99233 SBSQ HOSP IP/OBS HIGH 50: CPT

## 2024-02-15 PROCEDURE — 2500000004 HC RX 250 GENERAL PHARMACY W/ HCPCS (ALT 636 FOR OP/ED)

## 2024-02-15 PROCEDURE — 2500000001 HC RX 250 WO HCPCS SELF ADMINISTERED DRUGS (ALT 637 FOR MEDICARE OP)

## 2024-02-15 PROCEDURE — 85045 AUTOMATED RETICULOCYTE COUNT: CPT

## 2024-02-15 PROCEDURE — 85025 COMPLETE CBC W/AUTO DIFF WBC: CPT

## 2024-02-15 PROCEDURE — RXMED WILLOW AMBULATORY MEDICATION CHARGE

## 2024-02-15 PROCEDURE — 1130000001 HC PRIVATE PED ROOM DAILY

## 2024-02-15 PROCEDURE — 36415 COLL VENOUS BLD VENIPUNCTURE: CPT

## 2024-02-15 PROCEDURE — 2500000002 HC RX 250 W HCPCS SELF ADMINISTERED DRUGS (ALT 637 FOR MEDICARE OP, ALT 636 FOR OP/ED)

## 2024-02-15 RX ORDER — OLANZAPINE 5 MG/1
2.5 TABLET, ORALLY DISINTEGRATING ORAL NIGHTLY
Qty: 15 TABLET | Refills: 0 | Status: SHIPPED | OUTPATIENT
Start: 2024-02-15 | End: 2024-02-15 | Stop reason: HOSPADM

## 2024-02-15 RX ORDER — OXYCODONE HYDROCHLORIDE 5 MG/1
5 TABLET ORAL EVERY 4 HOURS PRN
Qty: 12 TABLET | Refills: 0 | Status: ON HOLD | OUTPATIENT
Start: 2024-02-15 | End: 2024-04-19

## 2024-02-15 RX ORDER — POLYETHYLENE GLYCOL 3350 17 G/17G
17 POWDER, FOR SOLUTION ORAL DAILY
Qty: 30 PACKET | Refills: 0 | Status: SHIPPED | OUTPATIENT
Start: 2024-02-15 | End: 2024-03-16

## 2024-02-15 RX ORDER — NAPROXEN 375 MG/1
375 TABLET ORAL EVERY 12 HOURS PRN
Qty: 60 TABLET | Refills: 0 | Status: SHIPPED | OUTPATIENT
Start: 2024-02-15 | End: 2024-03-16

## 2024-02-15 RX ORDER — LAMOTRIGINE 25 MG/1
25 TABLET ORAL DAILY
Qty: 13 TABLET | Refills: 0 | Status: SHIPPED | OUTPATIENT
Start: 2024-02-15 | End: 2024-04-20 | Stop reason: HOSPADM

## 2024-02-15 RX ORDER — OLANZAPINE 2.5 MG/1
2.5 TABLET ORAL NIGHTLY
Qty: 30 TABLET | Refills: 0 | Status: SHIPPED | OUTPATIENT
Start: 2024-02-15 | End: 2024-02-16 | Stop reason: HOSPADM

## 2024-02-15 RX ORDER — OLANZAPINE 5 MG/1
5 TABLET, ORALLY DISINTEGRATING ORAL NIGHTLY
Status: DISCONTINUED | OUTPATIENT
Start: 2024-02-15 | End: 2024-02-16 | Stop reason: HOSPADM

## 2024-02-15 RX ORDER — HYDROMORPHONE HYDROCHLORIDE 1 MG/ML
0.5 INJECTION, SOLUTION INTRAMUSCULAR; INTRAVENOUS; SUBCUTANEOUS
Status: DISCONTINUED | OUTPATIENT
Start: 2024-02-15 | End: 2024-02-15

## 2024-02-15 RX ORDER — DOCUSATE SODIUM 100 MG/1
100 CAPSULE, LIQUID FILLED ORAL 2 TIMES DAILY
Qty: 60 CAPSULE | Refills: 0 | Status: SHIPPED | OUTPATIENT
Start: 2024-02-15 | End: 2024-05-08 | Stop reason: SDUPTHER

## 2024-02-15 RX ORDER — SENNOSIDES 8.6 MG/1
1 TABLET ORAL DAILY
Qty: 30 TABLET | Refills: 0 | Status: SHIPPED | OUTPATIENT
Start: 2024-02-15 | End: 2024-03-16

## 2024-02-15 RX ORDER — OXYCODONE HYDROCHLORIDE 5 MG/1
5 TABLET ORAL EVERY 4 HOURS
Status: DISCONTINUED | OUTPATIENT
Start: 2024-02-15 | End: 2024-02-15

## 2024-02-15 RX ORDER — HYDROMORPHONE HYDROCHLORIDE 1 MG/ML
0.5 INJECTION, SOLUTION INTRAMUSCULAR; INTRAVENOUS; SUBCUTANEOUS
Status: DISCONTINUED | OUTPATIENT
Start: 2024-02-15 | End: 2024-02-16

## 2024-02-15 RX ORDER — LAMOTRIGINE 25 MG/1
25 TABLET, ORALLY DISINTEGRATING ORAL NIGHTLY
Qty: 13 TABLET | Refills: 0 | Status: SHIPPED | OUTPATIENT
Start: 2024-02-15 | End: 2024-02-15 | Stop reason: HOSPADM

## 2024-02-15 RX ORDER — ACETAMINOPHEN 325 MG/1
650 TABLET ORAL EVERY 6 HOURS PRN
Qty: 240 TABLET | Refills: 0 | Status: SHIPPED | OUTPATIENT
Start: 2024-02-15 | End: 2024-03-16

## 2024-02-15 RX ORDER — BISACODYL 5 MG
5 TABLET, DELAYED RELEASE (ENTERIC COATED) ORAL DAILY PRN
Qty: 30 TABLET | Refills: 0 | Status: SHIPPED | OUTPATIENT
Start: 2024-02-15

## 2024-02-15 RX ADMIN — ACETAMINOPHEN 650 MG: 325 TABLET ORAL at 12:26

## 2024-02-15 RX ADMIN — HYDROMORPHONE HYDROCHLORIDE 0.5 MG: 1 INJECTION, SOLUTION INTRAMUSCULAR; INTRAVENOUS; SUBCUTANEOUS at 21:29

## 2024-02-15 RX ADMIN — HYDROXYUREA 1000 MG: 500 CAPSULE ORAL at 08:54

## 2024-02-15 RX ADMIN — NAPROXEN 500 MG: 500 TABLET ORAL at 07:28

## 2024-02-15 RX ADMIN — ACETAMINOPHEN 650 MG: 325 TABLET ORAL at 17:42

## 2024-02-15 RX ADMIN — LAMOTRIGINE 25 MG: 25 TABLET, ORALLY DISINTEGRATING ORAL at 21:30

## 2024-02-15 RX ADMIN — SENNOSIDES 17.2 MG: 8.6 TABLET, FILM COATED ORAL at 21:30

## 2024-02-15 RX ADMIN — HYDROMORPHONE HYDROCHLORIDE 0.5 MG: 1 INJECTION, SOLUTION INTRAMUSCULAR; INTRAVENOUS; SUBCUTANEOUS at 14:03

## 2024-02-15 RX ADMIN — ACETAMINOPHEN 650 MG: 325 TABLET ORAL at 06:39

## 2024-02-15 RX ADMIN — NALOXONE HYDROCHLORIDE 1 MCG/KG/HR: 0.4 INJECTION, SOLUTION INTRAMUSCULAR; INTRAVENOUS; SUBCUTANEOUS at 14:03

## 2024-02-15 RX ADMIN — HYDROMORPHONE HYDROCHLORIDE 0.4 MG: 1 INJECTION, SOLUTION INTRAMUSCULAR; INTRAVENOUS; SUBCUTANEOUS at 03:55

## 2024-02-15 RX ADMIN — OLANZAPINE 5 MG: 5 TABLET, ORALLY DISINTEGRATING ORAL at 21:30

## 2024-02-15 RX ADMIN — HYDROMORPHONE HYDROCHLORIDE 0.4 MG: 1 INJECTION, SOLUTION INTRAMUSCULAR; INTRAVENOUS; SUBCUTANEOUS at 08:53

## 2024-02-15 RX ADMIN — ACETAMINOPHEN 650 MG: 325 TABLET ORAL at 01:06

## 2024-02-15 RX ADMIN — OXYCODONE HYDROCHLORIDE 5 MG: 5 TABLET ORAL at 09:59

## 2024-02-15 RX ADMIN — NAPROXEN 500 MG: 500 TABLET ORAL at 17:42

## 2024-02-15 RX ADMIN — POLYETHYLENE GLYCOL 3350 17 G: 17 POWDER, FOR SOLUTION ORAL at 21:30

## 2024-02-15 RX ADMIN — SENNOSIDES 17.2 MG: 8.6 TABLET, FILM COATED ORAL at 08:54

## 2024-02-15 RX ADMIN — BISACODYL 5 MG: 5 TABLET, COATED ORAL at 08:54

## 2024-02-15 RX ADMIN — Medication 1000 UNITS: at 08:54

## 2024-02-15 RX ADMIN — POLYETHYLENE GLYCOL 3350 17 G: 17 POWDER, FOR SOLUTION ORAL at 09:00

## 2024-02-15 RX ADMIN — HYDROMORPHONE HYDROCHLORIDE 0.4 MG: 1 INJECTION, SOLUTION INTRAMUSCULAR; INTRAVENOUS; SUBCUTANEOUS at 01:06

## 2024-02-15 RX ADMIN — HYDROMORPHONE HYDROCHLORIDE 0.4 MG: 1 INJECTION, SOLUTION INTRAMUSCULAR; INTRAVENOUS; SUBCUTANEOUS at 06:39

## 2024-02-15 RX ADMIN — HYDROMORPHONE HYDROCHLORIDE 0.5 MG: 1 INJECTION, SOLUTION INTRAMUSCULAR; INTRAVENOUS; SUBCUTANEOUS at 17:42

## 2024-02-15 ASSESSMENT — PAIN SCALES - GENERAL
PAINLEVEL_OUTOF10: 10 - WORST POSSIBLE PAIN
PAINLEVEL_OUTOF10: 5 - MODERATE PAIN
PAINLEVEL_OUTOF10: 9
PAINLEVEL_OUTOF10: 8
PAINLEVEL_OUTOF10: 8

## 2024-02-15 ASSESSMENT — PAIN - FUNCTIONAL ASSESSMENT
PAIN_FUNCTIONAL_ASSESSMENT: 0-10

## 2024-02-15 ASSESSMENT — PAIN INTENSITY VAS
VAS_PAIN_GENERAL: 10
VAS_PAIN_GENERAL: 0

## 2024-02-15 ASSESSMENT — PAIN DESCRIPTION - LOCATION: LOCATION: CHEST

## 2024-02-15 NOTE — PROGRESS NOTES
"Josue Rangel is a 18 y.o. male on day 4 of admission presenting with Vasoocclusive sickle cell crisis (CMS/HCC).    Subjective   Overnight, there were no acute events. He reports his pain is improving, but is still reporting 8-10/10 pain when asked. He states the pain is located on his right and left flank, as well as his left arm. In discussion with the patient, he would like to be discharged either today or tomorrow. He is okay with transitioning dilaudid to oxycodone as he feels NSAIDs are most helpful to his pain. Pt denies any shortness of breath, cough, fever, or chills and states he is moving his bowels with no constipation or diarrhea.     Objective     Physical Exam  HENT:      Head: Normocephalic and atraumatic.      Right Ear: External ear normal.      Left Ear: External ear normal.      Nose: Nose normal.      Mouth/Throat:      Lips: Pink.      Mouth: Mucous membranes are moist.   Eyes:      General: Lids are normal.   Pulmonary:      Effort: Pulmonary effort is normal.      Breath sounds: Normal breath sounds.   Abdominal:      Tenderness: There is no abdominal tenderness.   Musculoskeletal:      Cervical back: Full passive range of motion without pain.   Skin:     General: Skin is warm and dry.   Neurological:      General: No focal deficit present.      Mental Status: He is alert and oriented to person, place, and time.   Psychiatric:         Attention and Perception: Attention and perception normal.         Mood and Affect: Mood normal.         Behavior: Behavior is cooperative.       Last Recorded Vitals  Blood pressure 109/64, pulse 88, temperature 36.8 °C (98.2 °F), temperature source Oral, resp. rate 18, height 1.778 m (5' 10\"), weight 63.4 kg (139 lb 12.4 oz), SpO2 96 %.  Intake/Output last 3 Shifts:  I/O last 3 completed shifts:  In: 4620.6 (71.5 mL/kg) [P.O.:2978; I.V.:1254.6 (19.4 mL/kg); IV Piggyback:388]  Out: 0 (0 mL/kg)   Dosing Weight: 64.6 kg     Relevant Results  Scheduled " medications  acetaminophen, 650 mg, oral, q6h  bisacodyl, 5 mg, oral, Daily  cholecalciferol, 1,000 Units, oral, Daily  hydroxyurea, 1,000 mg, oral, Daily  lamoTRIgine, 25 mg, oral, Nightly  lidocaine, 1 patch, transdermal, Daily  naproxen, 500 mg, oral, BID with meals  OLANZapine zydis, 2.5 mg, oral, Nightly  oxyCODONE, 5 mg, oral, q4h  polyethylene glycol, 17 g, oral, BID  sennosides, 17.2 mg, oral, BID      Continuous medications     PRN medications  PRN medications: diclofenac sodium     Results for orders placed or performed during the hospital encounter of 02/11/24 (from the past 24 hour(s))   CBC and Auto Differential   Result Value Ref Range    WBC 8.8 4.4 - 11.3 x10*3/uL    nRBC 0.5 (H) 0.0 - 0.0 /100 WBCs    RBC 3.00 (L) 4.50 - 5.90 x10*6/uL    Hemoglobin 9.6 (L) 13.5 - 17.5 g/dL    Hematocrit 25.9 (L) 41.0 - 52.0 %    MCV 86 80 - 100 fL    MCH 32.0 26.0 - 34.0 pg    MCHC 37.1 (H) 32.0 - 36.0 g/dL    RDW 14.8 (H) 11.5 - 14.5 %    Platelets 289 150 - 450 x10*3/uL    Neutrophils % 48.6 40.0 - 80.0 %    Immature Granulocytes %, Automated 0.2 0.0 - 0.9 %    Lymphocytes % 36.2 13.0 - 44.0 %    Monocytes % 10.1 2.0 - 10.0 %    Eosinophils % 4.4 0.0 - 6.0 %    Basophils % 0.5 0.0 - 2.0 %    Neutrophils Absolute 4.30 1.20 - 7.70 x10*3/uL    Immature Granulocytes Absolute, Automated 0.02 0.00 - 0.70 x10*3/uL    Lymphocytes Absolute 3.20 1.20 - 4.80 x10*3/uL    Monocytes Absolute 0.89 0.10 - 1.00 x10*3/uL    Eosinophils Absolute 0.39 0.00 - 0.70 x10*3/uL    Basophils Absolute 0.04 0.00 - 0.10 x10*3/uL   Reticulocytes   Result Value Ref Range    Retic % 2.1 (H) 0.5 - 2.0 %    Retic Absolute 0.060 0.022 - 0.118 x10*6/uL    Reticulocyte Hemoglobin 26 (L) 28 - 38 pg    Immature Retic fraction 30.8 (H) <=16.0 %      ECG 12 lead    Result Date: 2/12/2024  Normal sinus rhythm with sinus arrhythmia [normal finding] Incomplete right bundle branch block [normal finding] Normal ECG When compared with ECG of 11-FEB-2024 09:46  No significant change was found Confirmed by Mendoza Chavez ([id number]) on 2/12/2024 1:46:29 PM    XR chest 2 views    Result Date: 2/12/2024  Interpreted By:  Elina Hensley, STUDY: XR CHEST 2 VIEWS;  2/12/2024 11:57 am   INDICATION: Signs/Symptoms:SOB r/o ACS.   COMPARISON: 02/11/2024   ACCESSION NUMBER(S): [id number]   ORDERING CLINICIAN: ERUM VILLANUEVA   FINDINGS:     CARDIOMEDIASTINAL SILHOUETTE: Cardiomediastinal silhouette is normal in size and configuration.   LUNGS: Heart is at the upper limits of normal in size. There is mild prominence of the central pulmonary vascularity and interstitial markings which can be seen with sickle cell disease. No focal parenchymal consolidation is seen. There is no pleural effusion or pneumothorax.   ABDOMEN: No remarkable upper abdominal findings.   BONES: Vertebral body endplate changes are identified which can be seen with sickle cell disease.       1. No focal parenchymal consolidation identified.   Signed by: Elina Hensley 2/12/2024 12:55 PM Dictation workstation:   [id number]    ECG 12 lead    Result Date: 2/12/2024  Normal sinus rhythm with sinus arrhythmia Normal ECG When compared with ECG of 10-FEB-2024 20:21, Previous ECG has undetermined rhythm, needs review QRS duration has increased ST no longer depressed in Inferior leads ST elevation has replaced ST depression in Anterior leads ST less elevated in Lateral leads T wave inversion no longer evident in Inferior leads    XR chest 2 views    Result Date: 2/11/2024  Interpreted By:  Nina Tucker, STUDY: XR CHEST 2 VIEWS;  2/11/2024 12:28 pm   INDICATION: Signs/Symptoms:Concern for ACS, in and out please.   COMPARISON: 01/10/2024.   ACCESSION NUMBER(S): [id number]   ORDERING CLINICIAN: KESHIA CASTILLO   FINDINGS: Increased interstitial markings with peribronchial thickening.   No focal consolidation.   Lungs are well inflated.   No pleural effusion or pneumothorax.   Cardiac silhouette is normal in size.  Pulmonary vessels are within normal limits.   Visualized upper abdomen is unremarkable.   Bony thorax is intact.       1.  Increased lung markings with peribronchial thickening, most consistent with viral type infection versus reactive airway disease. 2. No focal consolidation. Cardiac silhouette is normal.     Signed by: Nina Tucker 2/11/2024 2:15 PM Dictation workstation:   [id number]    ECG 12 lead    Result Date: 2/11/2024  Undetermined rhythm ST & T wave abnormality, consider inferior ischemia Abnormal ECG When compared with ECG of 03-JAN-2024 12:06, Current undetermined rhythm precludes rhythm comparison, needs review QRS duration has decreased ST now depressed in Inferior leads ST elevation now present in Lateral leads T wave inversion now evident in Inferior leads See ED provider note for full interpretation and clinical correlation Confirmed by Selena Granados ([id number]) on 2/11/2024 4:00:53 AM        Assessment/Plan   Active Problems:  There are no active Hospital Problems.    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 Patient is currently on IV dilaudid, tylenol, and naproxen. Today, dilaudid will be transitioned to oxycodone with a goal of pain control and discharge. He continues to have good oral intake, and is moving his bowels with no constipation or diarrhea. Psych evaluated the patient and has started him on Lamotrigine and Zyprexa as the patient has depression and newly diagnosed bipolar disorder. Psych will continue to follow. As the patient is having right and left flank pain, a UA has been ordered to rule out any urinary infections or obstructions. He continues to saturate well on room air, and lungs sounds are clear. No concerns for acute chest at this time.      Detailed plan as follows:      HEME  #HgB SC with VOE  [X] blood consent signed in EMR  - Acetaminophen 650 mg Q6   - Oxycodone 5mg Q4   - Naproxen 500 mg BID   - Lidocaine patch PRN   -  Voltaren gel PRN   - baseline Hgb 11, baseline retic 3.7  - Hydroxyurea 1000 mg daily (M-F)  #Opioid induced pruritus (resolved)  - Narcan ggt (completed)     CV  #Access: pIV  [X] repeat ECG: sinus rhythm      RESP  #Acute Chest Syndrome Prevention  [X] RT notified  - IS and EZPAP  [X] 2 view CXR 2/11: peribronchial thickening, No focal consolidation.   [X] 2 view repeat CXR 2/12: No focal parenchymal consolidation identified.        FEN/GI  #Nutrition  - regular diet  - Vit D 1000U daily  #Constipation prevention  - Miralax BID  - Dulcolax Daily  - Senna BID      ID  #Fever plan  - if febrile > or = 38.5C, blood culture and ceftriaxone  [ ] UA: pending      Psych  #Depression  - Zyprexa 2.5 mg   #Bipolar  - Lamotrigine 25 mg   - Denisa following     Labs:  - CBCd, retic qAM; T&S q72h (next due 2/15)      Discussed with Dr. Tomlin.       THAO RETANA, PA-S     Senior Note: I was present for the history taking, exam, and decision making for this patient. I agree with the subjective, objective, and assessment portions of the above note. Edits were made as necessary.     The plan has been discussed on rounds with the team.  Melvi Porter MD

## 2024-02-15 NOTE — DISCHARGE INSTRUCTIONS
Josue was admitted for a sickle cell crisis. He needed IV fluids and IV pain medications, he is now only taking oral pain medications and his pain is improved. He is ready to go home!    Please continue to take Naproxen twice daily and Tylenol every six hours for the next day, then as needed. Oxycodone can be taken every four hours for severe pain.     You were started on a new medication Lamictal 25mg and Olanzapine 5mg. Please take both of these daily at bedtime. It is important to take them every day. Denisa will schedule a follow up in two weeks.    Please hold taking your hydroxyurea. He will need repeat labs next week before re-starting.

## 2024-02-15 NOTE — NURSING NOTE
"RN administering medication at IV pump and noted two vape pens on IV pump. This RN asked patient \"what are these?\" Pt states \"vape pens with nicotine.\"  This RN reminded patient he cannot have these pens in the hospital. MD notified. Pens taken and locked up for patient to receive upon discharge. Patient stated understanding.   "

## 2024-02-15 NOTE — CARE PLAN
he patient's goals for the shift include decreased chest and leg pain. Through 0800 2/15/24    The clinical goals for the shift include Patient will state pain relief of 7/10 by 0800 2/15/24    Over the shift, the patient did not make progress toward the following goals. Barriers to progression include continued pain and refusal to use topical voltaren and lidocaine patches to painful areas.. Patient remains afebrile vss overnight but pain is continuously rated 10/10 of bilateral legs and chest.   Dilaudid continues every 3 hours with tylenol every 6 hours and naproxen every 12hours.

## 2024-02-16 VITALS
HEART RATE: 64 BPM | TEMPERATURE: 98.2 F | HEIGHT: 70 IN | SYSTOLIC BLOOD PRESSURE: 93 MMHG | WEIGHT: 139.77 LBS | OXYGEN SATURATION: 98 % | BODY MASS INDEX: 20.01 KG/M2 | RESPIRATION RATE: 20 BRPM | DIASTOLIC BLOOD PRESSURE: 54 MMHG

## 2024-02-16 DIAGNOSIS — Z79.64 ENCOUNTER FOR MONITORING OF HYDROXYUREA THERAPY: ICD-10-CM

## 2024-02-16 DIAGNOSIS — D57.1 SICKLE CELL DISEASE WITHOUT CRISIS (MULTI): ICD-10-CM

## 2024-02-16 DIAGNOSIS — Z51.81 ENCOUNTER FOR MONITORING OF HYDROXYUREA THERAPY: ICD-10-CM

## 2024-02-16 LAB
ABO GROUP (TYPE) IN BLOOD: NORMAL
ANTIBODY SCREEN: NORMAL
BASOPHILS # BLD AUTO: 0.04 X10*3/UL (ref 0–0.1)
BASOPHILS NFR BLD AUTO: 0.5 %
EOSINOPHIL # BLD AUTO: 0.34 X10*3/UL (ref 0–0.7)
EOSINOPHIL NFR BLD AUTO: 4.7 %
ERYTHROCYTE [DISTWIDTH] IN BLOOD BY AUTOMATED COUNT: 14.6 % (ref 11.5–14.5)
HCT VFR BLD AUTO: 24.6 % (ref 41–52)
HGB BLD-MCNC: 9.4 G/DL (ref 13.5–17.5)
HGB RETIC QN: 27 PG (ref 28–38)
IMM GRANULOCYTES # BLD AUTO: 0.07 X10*3/UL (ref 0–0.7)
IMM GRANULOCYTES NFR BLD AUTO: 1 % (ref 0–0.9)
IMMATURE RETIC FRACTION: 27.8 %
LYMPHOCYTES # BLD AUTO: 3.16 X10*3/UL (ref 1.2–4.8)
LYMPHOCYTES NFR BLD AUTO: 43.3 %
MCH RBC QN AUTO: 32.4 PG (ref 26–34)
MCHC RBC AUTO-ENTMCNC: 38.2 G/DL (ref 32–36)
MCV RBC AUTO: 85 FL (ref 80–100)
MONOCYTES # BLD AUTO: 0.75 X10*3/UL (ref 0.1–1)
MONOCYTES NFR BLD AUTO: 10.3 %
NEUTROPHILS # BLD AUTO: 2.93 X10*3/UL (ref 1.2–7.7)
NEUTROPHILS NFR BLD AUTO: 40.2 %
NRBC BLD-RTO: 0.4 /100 WBCS (ref 0–0)
PLATELET # BLD AUTO: 346 X10*3/UL (ref 150–450)
RBC # BLD AUTO: 2.9 X10*6/UL (ref 4.5–5.9)
RETICS #: 0.06 X10*6/UL (ref 0.02–0.12)
RETICS/RBC NFR AUTO: 2.1 % (ref 0.5–2)
RH FACTOR (ANTIGEN D): NORMAL
WBC # BLD AUTO: 7.3 X10*3/UL (ref 4.4–11.3)

## 2024-02-16 PROCEDURE — 2500000001 HC RX 250 WO HCPCS SELF ADMINISTERED DRUGS (ALT 637 FOR MEDICARE OP)

## 2024-02-16 PROCEDURE — 85025 COMPLETE CBC W/AUTO DIFF WBC: CPT

## 2024-02-16 PROCEDURE — 99233 SBSQ HOSP IP/OBS HIGH 50: CPT

## 2024-02-16 PROCEDURE — 36415 COLL VENOUS BLD VENIPUNCTURE: CPT

## 2024-02-16 PROCEDURE — RXMED WILLOW AMBULATORY MEDICATION CHARGE

## 2024-02-16 PROCEDURE — 86901 BLOOD TYPING SEROLOGIC RH(D): CPT

## 2024-02-16 PROCEDURE — 99239 HOSP IP/OBS DSCHRG MGMT >30: CPT | Performed by: PEDIATRICS

## 2024-02-16 PROCEDURE — 85045 AUTOMATED RETICULOCYTE COUNT: CPT

## 2024-02-16 PROCEDURE — 2500000004 HC RX 250 GENERAL PHARMACY W/ HCPCS (ALT 636 FOR OP/ED)

## 2024-02-16 RX ORDER — OLANZAPINE 5 MG/1
5 TABLET ORAL NIGHTLY
Qty: 30 TABLET | Refills: 0 | Status: SHIPPED | OUTPATIENT
Start: 2024-02-16 | End: 2024-02-28 | Stop reason: DRUGHIGH

## 2024-02-16 RX ORDER — OXYCODONE HYDROCHLORIDE 5 MG/1
5 TABLET ORAL EVERY 4 HOURS
Status: DISCONTINUED | OUTPATIENT
Start: 2024-02-16 | End: 2024-02-16 | Stop reason: HOSPADM

## 2024-02-16 RX ORDER — OLANZAPINE 5 MG/1
5 TABLET, ORALLY DISINTEGRATING ORAL NIGHTLY
Qty: 30 TABLET | Refills: 0 | Status: SHIPPED | OUTPATIENT
Start: 2024-02-16 | End: 2024-02-16 | Stop reason: HOSPADM

## 2024-02-16 RX ADMIN — HYDROMORPHONE HYDROCHLORIDE 0.5 MG: 1 INJECTION, SOLUTION INTRAMUSCULAR; INTRAVENOUS; SUBCUTANEOUS at 06:39

## 2024-02-16 RX ADMIN — BISACODYL 5 MG: 5 TABLET, COATED ORAL at 08:55

## 2024-02-16 RX ADMIN — HYDROMORPHONE HYDROCHLORIDE 0.5 MG: 1 INJECTION, SOLUTION INTRAMUSCULAR; INTRAVENOUS; SUBCUTANEOUS at 00:24

## 2024-02-16 RX ADMIN — POLYETHYLENE GLYCOL 3350 17 G: 17 POWDER, FOR SOLUTION ORAL at 08:55

## 2024-02-16 RX ADMIN — ACETAMINOPHEN 650 MG: 325 TABLET ORAL at 06:39

## 2024-02-16 RX ADMIN — OXYCODONE HYDROCHLORIDE 5 MG: 5 TABLET ORAL at 09:34

## 2024-02-16 RX ADMIN — NAPROXEN 500 MG: 500 TABLET ORAL at 08:55

## 2024-02-16 RX ADMIN — SENNOSIDES 17.2 MG: 8.6 TABLET, FILM COATED ORAL at 08:55

## 2024-02-16 RX ADMIN — Medication 1000 UNITS: at 08:55

## 2024-02-16 RX ADMIN — HYDROMORPHONE HYDROCHLORIDE 0.5 MG: 1 INJECTION, SOLUTION INTRAMUSCULAR; INTRAVENOUS; SUBCUTANEOUS at 03:31

## 2024-02-16 RX ADMIN — OXYCODONE HYDROCHLORIDE 5 MG: 5 TABLET ORAL at 13:22

## 2024-02-16 RX ADMIN — ACETAMINOPHEN 650 MG: 325 TABLET ORAL at 00:24

## 2024-02-16 RX ADMIN — ACETAMINOPHEN 650 MG: 325 TABLET ORAL at 13:22

## 2024-02-16 ASSESSMENT — PAIN - FUNCTIONAL ASSESSMENT
PAIN_FUNCTIONAL_ASSESSMENT: 0-10

## 2024-02-16 ASSESSMENT — PAIN SCALES - GENERAL
PAINLEVEL_OUTOF10: 7
PAINLEVEL_OUTOF10: 0 - NO PAIN
PAINLEVEL_OUTOF10: 7
PAINLEVEL_OUTOF10: 7
PAINLEVEL_OUTOF10: 6
PAINLEVEL_OUTOF10: 7
PAINLEVEL_OUTOF10: 7

## 2024-02-16 NOTE — PROGRESS NOTES
"Josue Rangel is a 18 y.o. male on day 5 of admission presenting with Vasoocclusive sickle cell crisis (CMS/HCC).    Subjective   Yesterday afternoon the patient was switched back to dilaudid as the patient was once again in 10/10 pain. Overnight, there were no acute events. He reports his pain is resolved, but is still rating it a 5/10. He states the pain is located on his right side now, and is no longer in the extremities. The plan is to discharge Josue today, so we will switch him back to oral medications and will see how his pain responds. He is okay with transitioning dilaudid to oxycodone. Pt continues to deny any shortness of breath, cough, fever, or chills and states he is moving his bowels with no constipation or diarrhea. The patient stated he did not need a nicotine patches when asked.     Objective     Physical Exam  HENT:      Head: Normocephalic and atraumatic.      Right Ear: External ear normal.      Left Ear: External ear normal.      Nose: Nose normal.      Mouth/Throat:      Lips: Pink.      Mouth: Mucous membranes are moist.   Eyes:      General: Lids are normal.   Pulmonary:      Effort: Pulmonary effort is normal.      Breath sounds: Normal breath sounds.   Abdominal:      Tenderness: There is no abdominal tenderness.   Musculoskeletal:      Cervical back: Full passive range of motion without pain.   Skin:     General: Skin is warm and dry.   Neurological:      General: No focal deficit present.      Mental Status: He is alert and oriented to person, place, and time.   Psychiatric:         Attention and Perception: Attention and perception normal.         Mood and Affect: Mood normal.         Behavior: Behavior is cooperative.       Last Recorded Vitals  Blood pressure (!) 92/46, pulse 65, temperature 36.9 °C (98.4 °F), temperature source Oral, resp. rate 20, height 1.778 m (5' 10\"), weight 63.4 kg (139 lb 12.4 oz), SpO2 100 %.  Intake/Output last 3 Shifts:  I/O last 3 completed shifts:  In: " 2400 (37.2 mL/kg) [P.O.:2380; I.V.:20 (0.3 mL/kg)]  Out: 0 (0 mL/kg)   Dosing Weight: 64.6 kg     Relevant Results  Scheduled medications  acetaminophen, 650 mg, oral, q6h  bisacodyl, 5 mg, oral, Daily  cholecalciferol, 1,000 Units, oral, Daily  [Held by provider] hydroxyurea, 1,000 mg, oral, Daily  lamoTRIgine, 25 mg, oral, Nightly  lidocaine, 1 patch, transdermal, Daily  naproxen, 500 mg, oral, BID with meals  OLANZapine zydis, 5 mg, oral, Nightly  oxyCODONE, 5 mg, oral, q4h  polyethylene glycol, 17 g, oral, BID  sennosides, 17.2 mg, oral, BID      Continuous medications     PRN medications  PRN medications: diclofenac sodium     Results for orders placed or performed during the hospital encounter of 02/11/24 (from the past 24 hour(s))   Urinalysis with Reflex Microscopic   Result Value Ref Range    Color, Urine Light-Yellow Light-Yellow, Yellow, Dark-Yellow    Appearance, Urine Clear Clear    Specific Gravity, Urine 1.014 1.005 - 1.035    pH, Urine 6.0 5.0, 5.5, 6.0, 6.5, 7.0, 7.5, 8.0    Protein, Urine NEGATIVE NEGATIVE, 10 (TRACE), 20 (TRACE) mg/dL    Glucose, Urine Normal Normal mg/dL    Blood, Urine NEGATIVE NEGATIVE    Ketones, Urine NEGATIVE NEGATIVE mg/dL    Bilirubin, Urine NEGATIVE NEGATIVE    Urobilinogen, Urine Normal Normal mg/dL    Nitrite, Urine NEGATIVE NEGATIVE    Leukocyte Esterase, Urine NEGATIVE NEGATIVE   CBC and Auto Differential   Result Value Ref Range    WBC 7.3 4.4 - 11.3 x10*3/uL    nRBC 0.4 (H) 0.0 - 0.0 /100 WBCs    RBC 2.90 (L) 4.50 - 5.90 x10*6/uL    Hemoglobin 9.4 (L) 13.5 - 17.5 g/dL    Hematocrit 24.6 (L) 41.0 - 52.0 %    MCV 85 80 - 100 fL    MCH 32.4 26.0 - 34.0 pg    MCHC 38.2 (H) 32.0 - 36.0 g/dL    RDW 14.6 (H) 11.5 - 14.5 %    Platelets 346 150 - 450 x10*3/uL    Neutrophils % 40.2 40.0 - 80.0 %    Immature Granulocytes %, Automated 1.0 (H) 0.0 - 0.9 %    Lymphocytes % 43.3 13.0 - 44.0 %    Monocytes % 10.3 2.0 - 10.0 %    Eosinophils % 4.7 0.0 - 6.0 %    Basophils % 0.5 0.0  - 2.0 %    Neutrophils Absolute 2.93 1.20 - 7.70 x10*3/uL    Immature Granulocytes Absolute, Automated 0.07 0.00 - 0.70 x10*3/uL    Lymphocytes Absolute 3.16 1.20 - 4.80 x10*3/uL    Monocytes Absolute 0.75 0.10 - 1.00 x10*3/uL    Eosinophils Absolute 0.34 0.00 - 0.70 x10*3/uL    Basophils Absolute 0.04 0.00 - 0.10 x10*3/uL   Reticulocytes   Result Value Ref Range    Retic % 2.1 (H) 0.5 - 2.0 %    Retic Absolute 0.060 0.022 - 0.118 x10*6/uL    Reticulocyte Hemoglobin 27 (L) 28 - 38 pg    Immature Retic fraction 27.8 (H) <=16.0 %      ECG 12 lead    Result Date: 2/12/2024  Normal sinus rhythm with sinus arrhythmia [normal finding] Incomplete right bundle branch block [normal finding] Normal ECG When compared with ECG of 11-FEB-2024 09:46 No significant change was found Confirmed by Mendoza Chavez ([id number]) on 2/12/2024 1:46:29 PM    XR chest 2 views    Result Date: 2/12/2024  Interpreted By:  Elina Hensley, STUDY: XR CHEST 2 VIEWS;  2/12/2024 11:57 am   INDICATION: Signs/Symptoms:SOB r/o ACS.   COMPARISON: 02/11/2024   ACCESSION NUMBER(S): [id number]   ORDERING CLINICIAN: ERUM VILLANUEVA   FINDINGS:     CARDIOMEDIASTINAL SILHOUETTE: Cardiomediastinal silhouette is normal in size and configuration.   LUNGS: Heart is at the upper limits of normal in size. There is mild prominence of the central pulmonary vascularity and interstitial markings which can be seen with sickle cell disease. No focal parenchymal consolidation is seen. There is no pleural effusion or pneumothorax.   ABDOMEN: No remarkable upper abdominal findings.   BONES: Vertebral body endplate changes are identified which can be seen with sickle cell disease.       1. No focal parenchymal consolidation identified.   Signed by: Elina Hensley 2/12/2024 12:55 PM Dictation workstation:   [id number]    ECG 12 lead    Result Date: 2/12/2024  Normal sinus rhythm with sinus arrhythmia Normal ECG When compared with ECG of 10-FEB-2024 20:21, Previous ECG  has undetermined rhythm, needs review QRS duration has increased ST no longer depressed in Inferior leads ST elevation has replaced ST depression in Anterior leads ST less elevated in Lateral leads T wave inversion no longer evident in Inferior leads    XR chest 2 views    Result Date: 2/11/2024  Interpreted By:  Nina Tucker, STUDY: XR CHEST 2 VIEWS;  2/11/2024 12:28 pm   INDICATION: Signs/Symptoms:Concern for ACS, in and out please.   COMPARISON: 01/10/2024.   ACCESSION NUMBER(S): [id number]   ORDERING CLINICIAN: KESHIA CASTILLO   FINDINGS: Increased interstitial markings with peribronchial thickening.   No focal consolidation.   Lungs are well inflated.   No pleural effusion or pneumothorax.   Cardiac silhouette is normal in size. Pulmonary vessels are within normal limits.   Visualized upper abdomen is unremarkable.   Bony thorax is intact.       1.  Increased lung markings with peribronchial thickening, most consistent with viral type infection versus reactive airway disease. 2. No focal consolidation. Cardiac silhouette is normal.     Signed by: Nina Tucker 2/11/2024 2:15 PM Dictation workstation:   [id number]    ECG 12 lead    Result Date: 2/11/2024  Undetermined rhythm ST & T wave abnormality, consider inferior ischemia Abnormal ECG When compared with ECG of 03-JAN-2024 12:06, Current undetermined rhythm precludes rhythm comparison, needs review QRS duration has decreased ST now depressed in Inferior leads ST elevation now present in Lateral leads T wave inversion now evident in Inferior leads See ED provider note for full interpretation and clinical correlation Confirmed by Selena Granados ([id number]) on 2/11/2024 4:00:53 AM        Assessment/Plan   Active Problems:  There are no active Hospital Problems.    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 Patient is currently on IV dilaudid, tylenol, and naproxen. Today, dilaudid will be transitioned to oxycodone again  with a goal of pain control and discharge. He continues to have good oral intake, and is moving his bowels with no constipation or diarrhea. Psych has increased his dose of Olanzapine since yesterday. Psych will continue to follow the patient outpatient. He continues to saturate well on room air, and lungs sounds are clear. No concerns for acute chest at this time. His hydroxyurea is currently being held as the CBC resulted with a MCV of 80. This will be monitored by the sickle cell team with follow up labs.      Detailed plan as follows:      HEME  #HgB SC with VOE  [X] blood consent signed in EMR  - Acetaminophen 650 mg Q6   - Oxycodone 5mg Q4   - Naproxen 500 mg BID   - Lidocaine patch PRN   - Voltaren gel PRN   - baseline Hgb 11, baseline retic 3.7  - Hydroxyurea 1000 mg daily (M-F) (currently held due to MCV)   - Sickle cell team to follow up hydroxyurea with outpatient labs   #Opioid induced pruritus (resolved)  - Narcan ggt (completed)     CV  #Access: pIV  [X] repeat ECG: sinus rhythm      RESP  #Acute Chest Syndrome Prevention  [X] RT notified  - IS and EZPAP  [X] 2 view CXR 2/11: peribronchial thickening, No focal consolidation.   [X] 2 view repeat CXR 2/12: No focal parenchymal consolidation identified.        FEN/GI  #Nutrition  - regular diet  - Vit D 1000U daily  #Constipation prevention  - Miralax BID  - Dulcolax Daily  - Senna BID      ID  #Fever plan  - if febrile > or = 38.5C, blood culture and ceftriaxone  [X] UA: WNL      Psych  #Depression  - Zyprexa 5 mg   #Bipolar  - Lamotrigine 25 mg   - Denisa following     Labs:  - CBCd, retic qAM; T&S q72h (next due 2/15)      Discussed with Dr. Tomlin.       THAO RETANA PA-S     Senior Note: I was present for the history taking, exam, and decision making for this patient. I agree with the subjective, objective, and assessment portions of the above note. Edits were made as necessary.     The plan has been discussed on rounds with the team.  Melvi  MD [place]

## 2024-02-16 NOTE — TELEPHONE ENCOUNTER
Received call from grandmother for refill request prior to discharge home. Request forwarded to inpatient team for appropriate refills. Sickle cell team will manage the HU refills. At time we will not refill his HU as his labs reflect lower that tolerate ARC =60 will hold and restart at the direction of sickle cell provider and provide information to Josue prior to discharge of plan to recheck blood work and restart HU, if appropriate.

## 2024-02-16 NOTE — CARE PLAN
Pt vss and pain stable and ready for dc. PIV removed without complication, dc instructions reviewed with Josue and Josue verbalized understanding.

## 2024-02-19 ENCOUNTER — APPOINTMENT (OUTPATIENT)
Dept: PEDIATRIC HEMATOLOGY/ONCOLOGY | Facility: HOSPITAL | Age: 19
End: 2024-02-19
Payer: MEDICAID

## 2024-02-19 ENCOUNTER — PHARMACY VISIT (OUTPATIENT)
Dept: PHARMACY | Facility: CLINIC | Age: 19
End: 2024-02-19
Payer: MEDICAID

## 2024-02-19 NOTE — DISCHARGE SUMMARY
Discharge Diagnosis  Vasoocclusive sickle cell crisis (CMS/HCC)    Issues Requiring Follow-Up  HbSC Disease  Bipolar Disorder    Test Results Pending At Discharge  Pending Labs       No current pending labs.            Hospital Course  Pt is a 19yo M with HbSC disease, c/b sickle cell retinopathy, who was admitted on 02/11 due to VOE. Initially presented due to significant pain of his upper and lower extremities, as well as chest pain. No fever, cough, or increased oxygen requirements at the time, but given his chest pain, CXR obtained, which was negative for consolidation or opacity.     Otherwise, pt was started on morphine pain path, IV toradol, Tylenol, and 3/4 mIVF. Initially with minimal improvement, prompting switch to Dilaudid. Pain was slow to improve, but able to wean opioids gradually over the course his hospitalization. Transitioned to PO oxycodone and ibuprofen by 02/15. Pain remained well controlled on this regimen, and pt able to be discharged the next day. Instructed to continue home medications, and f/u with Sickle Cell Team.      Of note, pt evaluated by Psych NP due to concern for michael/psychosis. Diagnosed with Bipolar disorder and started on Olanzapine and Lamotrigine. However, did not require inpatient psych intervention at the time of discharge, so will continue to f/u on outpatient basis.      Pertinent Physical Exam At Time of Discharge  Physical Exam  Constitutional:       General: He is not in acute distress.  HENT:      Head: Normocephalic.      Mouth/Throat:      Mouth: Mucous membranes are moist.   Eyes:      Conjunctiva/sclera: Conjunctivae normal.      Pupils: Pupils are equal, round, and reactive to light.   Cardiovascular:      Rate and Rhythm: Normal rate and regular rhythm.      Pulses: Normal pulses.      Heart sounds: Normal heart sounds. No murmur heard.     No friction rub. No gallop.   Pulmonary:      Effort: Pulmonary effort is normal.      Breath sounds: Normal breath  sounds.   Abdominal:      General: Abdomen is flat.      Palpations: Abdomen is soft.   Musculoskeletal:      Cervical back: Normal range of motion and neck supple.   Skin:     General: Skin is warm.      Capillary Refill: Capillary refill takes less than 2 seconds.   Neurological:      General: No focal deficit present.      Mental Status: He is alert and oriented to person, place, and time.       Home Medications     Medication List      START taking these medications     lamoTRIgine 25 mg tablet; Commonly known as: LaMICtal; Take 1 tablet (25   mg) by mouth once daily for 13 days.   OLANZapine 5 mg tablet; Commonly known as: ZyPREXA; Take 1 tablet (5 mg)   by mouth once daily at bedtime.   oxyCODONE 5 mg immediate release tablet; Commonly known as: Roxicodone;   Take 1 tablet (5 mg) by mouth every 4 hours if needed for severe pain (7 -   10).     CHANGE how you take these medications     acetaminophen 325 mg tablet; Commonly known as: Tylenol; Take 2 tablets   (650 mg) by mouth every 6 hours if needed for mild pain (1 - 3).; What   changed: when to take this, reasons to take this   naproxen 375 mg tablet; Commonly known as: Naprosyn; Take 1 tablet (375   mg) by mouth every 12 hours if needed for mild pain (1 - 3). For the next   2-3 days, take every 12 hours, then only as needed for continued pain.;   What changed: when to take this, reasons to take this     CONTINUE taking these medications     Adderall XR 15 mg 24 hr capsule; Generic drug:   amphetamine-dextroamphetamine XR   albuterol 90 mcg/actuation inhaler   bisacodyl 5 mg EC tablet; Commonly known as: Dulcolax; Take 1 tablet (5   mg) by mouth once daily as needed for constipation. Do not crush, chew, or   split.   Chewable-Flory tablet,chewable   dimethicone-colloidal oatmeaL 1.2 % lotion; Commonly known as: Aveeno   docusate sodium 100 mg capsule; Commonly known as: Colace; Take 1   capsule (100 mg) by mouth 2 times a day.   Ensure Original 0.04-1.05  gram-kcal/mL liquid; Generic drug: food   supplemt, lactose-reduced   hydroxyurea 500 mg capsule; Commonly known as: Hydrea; Take 2 capsules   (1,000 mg total) by mouth once daily.  Take at the same time each day.   loratadine 10 mg tablet; Commonly known as: Claritin   polyethylene glycol 17 gram packet; Commonly known as: Glycolax,   Miralax; Take 17 g (mix 1 packet) and drink by mouth once daily.   senna 8.6 mg tablet; Generic drug: sennosides; Take 1 tablet (8.6 mg) by   mouth once daily.   Vitamin D3 400 unit capsule; Generic drug: cholecalciferol       Outpatient Follow-Up  Future Appointments   Date Time Provider Department Center   2/28/2024 10:00 AM DOTTY Wilson-CNP [id number] [facility]   3/29/2024 10:45 AM  RESP PFT TECH [id number] [facility]   3/29/2024 11:00 AM DOTTY Zazueta-CNP [id number] [facility]   4/5/2024  1:30 PM Avery Don MD [id number] [facility]   4/15/2024 12:30 PM Janeth Abdi [place] [id number] [facility]   4/25/2024  2:00 PM Janeth Abdi [place] [id number] [facility]       Pt seen and discussed with KULWINDER attending Dr. Sada Aguilera MD  Pediatric Hematology/Oncology Fellow PGY-4

## 2024-02-26 DIAGNOSIS — F32.A DEPRESSION, UNSPECIFIED DEPRESSION TYPE: Primary | ICD-10-CM

## 2024-02-26 DIAGNOSIS — D57.1 SICKLE CELL DISEASE WITHOUT CRISIS (MULTI): Primary | ICD-10-CM

## 2024-02-27 ENCOUNTER — APPOINTMENT (OUTPATIENT)
Dept: PEDIATRIC HEMATOLOGY/ONCOLOGY | Facility: HOSPITAL | Age: 19
End: 2024-02-27
Payer: MEDICAID

## 2024-02-28 ENCOUNTER — HOSPITAL ENCOUNTER (OUTPATIENT)
Dept: PEDIATRIC HEMATOLOGY/ONCOLOGY | Facility: HOSPITAL | Age: 19
Discharge: HOME | End: 2024-02-28
Payer: MEDICAID

## 2024-02-28 ENCOUNTER — DOCUMENTATION (OUTPATIENT)
Dept: PEDIATRIC HEMATOLOGY/ONCOLOGY | Facility: HOSPITAL | Age: 19
End: 2024-02-28

## 2024-02-28 DIAGNOSIS — F31.81 BIPOLAR 2 DISORDER (MULTI): ICD-10-CM

## 2024-02-28 DIAGNOSIS — F32.A DEPRESSION, UNSPECIFIED DEPRESSION TYPE: ICD-10-CM

## 2024-02-28 DIAGNOSIS — F31.81 BIPOLAR 2 DISORDER (MULTI): Primary | ICD-10-CM

## 2024-02-28 DIAGNOSIS — Z51.81 ENCOUNTER FOR MONITORING OF HYDROXYUREA THERAPY: ICD-10-CM

## 2024-02-28 DIAGNOSIS — D57.1 SICKLE CELL DISEASE WITHOUT CRISIS (MULTI): Primary | ICD-10-CM

## 2024-02-28 DIAGNOSIS — Z79.64 ENCOUNTER FOR MONITORING OF HYDROXYUREA THERAPY: ICD-10-CM

## 2024-02-28 DIAGNOSIS — D57.1 SICKLE CELL DISEASE WITHOUT CRISIS (MULTI): ICD-10-CM

## 2024-02-28 LAB
25(OH)D3 SERPL-MCNC: 43 NG/ML (ref 30–100)
BASOPHILS # BLD AUTO: 0.05 X10*3/UL (ref 0–0.1)
BASOPHILS NFR BLD AUTO: 0.7 %
CREAT UR-MCNC: 88.9 MG/DL (ref 20–370)
EOSINOPHIL # BLD AUTO: 0.5 X10*3/UL (ref 0–0.7)
EOSINOPHIL NFR BLD AUTO: 6.9 %
ERYTHROCYTE [DISTWIDTH] IN BLOOD BY AUTOMATED COUNT: 15.9 % (ref 11.5–14.5)
HCT VFR BLD AUTO: 27 % (ref 41–52)
HGB BLD-MCNC: 10.3 G/DL (ref 13.5–17.5)
HGB RETIC QN: 33 PG (ref 28–38)
IMM GRANULOCYTES # BLD AUTO: 0.02 X10*3/UL (ref 0–0.7)
IMM GRANULOCYTES NFR BLD AUTO: 0.3 % (ref 0–0.9)
IMMATURE RETIC FRACTION: 46.5 %
LYMPHOCYTES # BLD AUTO: 3.18 X10*3/UL (ref 1.2–4.8)
LYMPHOCYTES NFR BLD AUTO: 43.6 %
MCH RBC QN AUTO: 31.7 PG (ref 26–34)
MCHC RBC AUTO-ENTMCNC: 38.1 G/DL (ref 32–36)
MCV RBC AUTO: 83 FL (ref 80–100)
MICROALBUMIN UR-MCNC: <7 MG/L
MICROALBUMIN/CREAT UR: NORMAL MG/G{CREAT}
MONOCYTES # BLD AUTO: 0.65 X10*3/UL (ref 0.1–1)
MONOCYTES NFR BLD AUTO: 8.9 %
MUCOUS THREADS #/AREA URNS AUTO: NORMAL /LPF
NEUTROPHILS # BLD AUTO: 2.89 X10*3/UL (ref 1.2–7.7)
NEUTROPHILS NFR BLD AUTO: 39.6 %
NRBC BLD-RTO: 1.2 /100 WBCS (ref 0–0)
PLATELET # BLD AUTO: 591 X10*3/UL (ref 150–450)
RBC # BLD AUTO: 3.25 X10*6/UL (ref 4.5–5.9)
RBC #/AREA URNS AUTO: NORMAL /HPF
RETICS #: 0.2 X10*6/UL (ref 0.02–0.12)
RETICS/RBC NFR AUTO: 6.1 % (ref 0.5–2)
T4 FREE SERPL-MCNC: 0.97 NG/DL (ref 0.78–1.48)
TSH SERPL-ACNC: 1.94 MIU/L (ref 0.44–3.98)
VIT B12 SERPL-MCNC: 263 PG/ML (ref 211–911)
WBC # BLD AUTO: 7.3 X10*3/UL (ref 4.4–11.3)
WBC #/AREA URNS AUTO: NORMAL /HPF

## 2024-02-28 PROCEDURE — 85045 AUTOMATED RETICULOCYTE COUNT: CPT | Performed by: NURSE PRACTITIONER

## 2024-02-28 PROCEDURE — 84443 ASSAY THYROID STIM HORMONE: CPT | Performed by: NURSE PRACTITIONER

## 2024-02-28 PROCEDURE — 82306 VITAMIN D 25 HYDROXY: CPT | Performed by: NURSE PRACTITIONER

## 2024-02-28 PROCEDURE — 36415 COLL VENOUS BLD VENIPUNCTURE: CPT | Performed by: NURSE PRACTITIONER

## 2024-02-28 PROCEDURE — 81001 URINALYSIS AUTO W/SCOPE: CPT | Performed by: NURSE PRACTITIONER

## 2024-02-28 PROCEDURE — 84439 ASSAY OF FREE THYROXINE: CPT | Performed by: NURSE PRACTITIONER

## 2024-02-28 PROCEDURE — 82043 UR ALBUMIN QUANTITATIVE: CPT | Performed by: NURSE PRACTITIONER

## 2024-02-28 PROCEDURE — 85025 COMPLETE CBC W/AUTO DIFF WBC: CPT | Performed by: NURSE PRACTITIONER

## 2024-02-28 PROCEDURE — 99215 OFFICE O/P EST HI 40 MIN: CPT | Performed by: NURSE PRACTITIONER

## 2024-02-28 PROCEDURE — 82607 VITAMIN B-12: CPT | Performed by: NURSE PRACTITIONER

## 2024-02-28 RX ORDER — OLANZAPINE 10 MG/1
10 TABLET ORAL NIGHTLY
Qty: 30 TABLET | Refills: 0 | Status: SHIPPED | OUTPATIENT
Start: 2024-02-28 | End: 2024-03-26

## 2024-02-28 RX ORDER — HYDROXYZINE PAMOATE 25 MG/1
25 CAPSULE ORAL 2 TIMES DAILY
Qty: 30 CAPSULE | Refills: 0 | Status: SHIPPED | OUTPATIENT
Start: 2024-02-28 | End: 2024-04-20 | Stop reason: HOSPADM

## 2024-02-28 NOTE — PROGRESS NOTES
"Outpatient Psychiatry      Subjective   Josue Rangel, a 18 y.o. male, for initial evaluation visit.  Patient is referred by Janeth Santiago.        Assessment/Plan   Josue is a 17 y/o male. He presents with SSD, BI-Polar Disorder Type II combined type. Josue reports that he is taking his medication, denies SE or concerns. Continues to endorse rapid mood cycling on a daily basis. He c/o michael at night, difficulty sleeping during the night, high energy states at night, goal directed behavior, sleeping during the day, and feeling tired and depressed. He endorses feeling paranoid, that people are watching him and following him. Denies AVH, delusions. Reports that he is limited social contact because \"I never what I am going to say or what kind of mood I'll be in so its better I am not around people.\" He reports that he is eating once  a day, because he is not gaining weight. He would like to weigh more, between 150-180 he currently weights 120 pounds. We discussed the relationship between of eating more and weighing more. He agreed to discuss this with a RD, this provider will place a referral. He reports impaired function. He reports that he was \"let go\" from his job, \"I am in the hospital all the time so they had to let me know, I get it.\" He reports that he applied and has been accepted for disability. Denies SI-HI intent,plan, access to lethal weapons.  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)    Heart palpitations    Hypermetropia of both eyes    Hyperopia    Intermittent asthma without complication    Melanocytic nevi of other parts of face    Myopia of both eyes with regular astigmatism    " Sickle cell disease (CMS/HCC)    Snoring    Sickle cell retinopathy (CMS/HCC)    Sickle cell crisis (CMS/HCC)    Vaso-occlusive pain due to sickle cell disease (CMS/HCC)       Treatment Goals:  Specify outcomes written in observable, behavioral terms:   Anxiety: eliminating avoidance (specify to michael and high energy states), modifying schemata of threat/vulnerability/need for control (or other schemata -- specify decrease michael and paranoia), reducing negative automatic thoughts, reducing physical symptoms of anxiety, and reducing time spent worrying (<30 minutes/day)  Depression: increasing self-reward for positive behaviors (one/day), increasing self-reward for positive thoughts (one/day), increasing social contacts (three/week), reducing fatigue, and reducing negative automatic thoughts    Treatment Plan/Recommendations:   Increase Olanzapine to 10 mg I oral tablet once daily at bedtime, for michael, psychosis  NOTE: EKG done on 12.19.23 revealed NSR w/ QTC of 384 ms. Will continue to monitor Qtc.  Start Step I on Lamictal 25 mg I oral tablet once daily at bedtime x 14 days ( first dose 2.15.2024), than take ii oral tablets x 14 days ( start 2.29.2024).  Step II: (start 3.14.2024) Lamictal 100 mg I oral tablet once daily at bedtime. Indicated for mood stabilization and depression.  Status: No change in symptoms. Worsening of nighttime anxiety. Lack of sleep.  Psychoeducation: Discussed medication safety. How to take an antipsychotic and a mood stabilizer. Discussed basic pathology of bipolar disorder and the importance of adhering to treatment to achieve remission for this chronic illness.  Psychsupport: Continue  to discuss consideration of psychotherapy.   Safety: Upon discharge review safety play, if patient feels that he can not keep himself safe, he is to tell a trusted friend, call [phone], or go to the nearest ER.  Follow up: Denisa Amaro, in clinic on Friday, 3.1.2024 or sooner if indicated.       Follow-up  plan for depression and michael was discussed with patient.    Referral to:  N/A    Review with patient: Treatment plan reviewed with the patient.  Medication risks/benefit reviewed with the patient    Reason for Visit: Medication Follow Up Visit       Reason:  He has been experiencing Mood swings is due to untreated mood disorder.    HPI:   Location: Endorses rapid cycling moods, high anxiety states, depression (moderate) and insomnia.    Quality: Reports feeling paranoid, rapid mood swings, chaotic mind, restless.    Severity:Endorses moderate to severe anxiety and depression with a 24 hours period.   Timing/Duration: He has been experiencing these symptoms since childhood.    Context: Mood does not appear to be linked to stressors   Modifying factors: Denied modifying factors    Associated signs and symptoms: Describes impaired function. Endorses being able to participate in ADLs but describes inability to work, weight loss, issues with hyposomnia at night and hypersomnia during the day.     Current Medications:    Current Outpatient Medications:     acetaminophen (Tylenol) 325 mg tablet, Take 2 tablets (650 mg) by mouth every 6 hours if needed for mild pain (1 - 3)., Disp: 240 tablet, Rfl: 0    albuterol 90 mcg/actuation inhaler, Inhale 2-4 puffs every 4-6 hours as needed for shortness of breath or wheezing, Disp: , Rfl:     amphetamine-dextroamphetamine XR (Adderall XR) 15 mg 24 hr capsule, Adderall XR 15 MG Oral Capsule Extended Release 24 Hour  Quantity: 30  Refills: 0      Start : 2-Sep-2016  Active, Disp: , Rfl:     bisacodyl (Dulcolax) 5 mg EC tablet, Take 1 tablet (5 mg) by mouth once daily as needed for constipation. Do not crush, chew, or split., Disp: 30 tablet, Rfl: 0    dimethicone-colloidal oatmeaL (Aveeno) 1.2 % lotion, 1 Application every 12 hours., Disp: , Rfl:     docusate sodium (Colace) 100 mg capsule, Take 1 capsule (100 mg) by mouth 2 times a day., Disp: 60 capsule, Rfl: 0    food supplemt,  lactose-reduced (Ensure Original) 0.04-1.05 gram-kcal/mL liquid, Ensure Oral Liquid  Refills: 0      Start : 3-Aug-2017  Active, Disp: , Rfl:     hydroxyurea (Hydrea) 500 mg capsule, Take 2 capsules (1,000 mg total) by mouth once daily.  Take at the same time each day., Disp: 60 capsule, Rfl: 0    lamoTRIgine (LaMICtal) 25 mg tablet, Take 1 tablet (25 mg) by mouth once daily for 13 days., Disp: 13 tablet, Rfl: 0    loratadine (Claritin) 10 mg tablet, Take 1 tablet (10 mg) by mouth once daily at bedtime., Disp: , Rfl:     multivitamin (Chewable-Flory) tablet,chewable, Chew 1 tablet once daily., Disp: , Rfl:     naproxen (Naprosyn) 375 mg tablet, Take 1 tablet (375 mg) by mouth every 12 hours if needed for mild pain (1 - 3). For the next 2-3 days, take every 12 hours, then only as needed for continued pain., Disp: 60 tablet, Rfl: 0    OLANZapine (ZyPREXA) 5 mg tablet, Take 1 tablet (5 mg) by mouth once daily at bedtime., Disp: 30 tablet, Rfl: 0    oxyCODONE (Roxicodone) 5 mg immediate release tablet, Take 1 tablet (5 mg) by mouth every 4 hours if needed for severe pain (7 - 10)., Disp: 12 tablet, Rfl: 0    polyethylene glycol (Glycolax, Miralax) 17 gram packet, Take 17 g (mix 1 packet) and drink by mouth once daily., Disp: 30 packet, Rfl: 0    sennosides (Senokot) 8.6 mg tablet, Take 1 tablet (8.6 mg) by mouth once daily., Disp: 30 tablet, Rfl: 0    Vitamin D3 10 mcg (400 unit) capsule, Take 5 capsules (50 mcg) by mouth once daily., Disp: , Rfl:   Medical History: See medical chart  Past Medical History:   Diagnosis Date    Personal history of diseases of the blood and blood-forming organs and certain disorders involving the immune mechanism     History of sickle cell anemia     Surgical History:  Past Surgical History:   Procedure Laterality Date    CT GUIDED PERCUTANEOUS BIOPSY BONE DEEP  7/17/2023    CT GUIDED PERCUTANEOUS BIOPSY BONE DEEP 7/17/2023 [facility] CT    MR HEAD ANGIO WO IV CONTRAST  10/8/2014    MR HEAD ANGIO  WO IV CONTRAST 10/8/2014 [facility] CLINICAL LEGACY    MR HEAD ANGIO WO IV CONTRAST  7/10/2023    MR HEAD ANGIO WO IV CONTRAST 7/10/2023 [facility] MRI    MR NECK ANGIO WO IV CONTRAST  10/8/2014    MR NECK ANGIO WO IV CONTRAST 10/8/2014 [facility] CLINICAL LEGACY    MR NECK ANGIO WO IV CONTRAST  7/10/2023    MR NECK ANGIO WO IV CONTRAST 7/10/2023 [facility] MRI     Family History:  Family History   Problem Relation Name Age of Onset    Diabetes Paternal Grandmother      Diabetes Paternal Grandfather       Social History:  Social History     Socioeconomic History    Marital status: Single     Spouse name: Not on file    Number of children: Not on file    Years of education: Not on file    Highest education level: Not on file   Occupational History    Not on file   Tobacco Use    Smoking status: Never    Smokeless tobacco: Never   Substance and Sexual Activity    Alcohol use: Yes     Comment: per patient; socially    Drug use: Yes     Types: Marijuana    Sexual activity: Not on file   Other Topics Concern    Not on file   Social History Narrative    Not on file     Social Determinants of Health     Financial Resource Strain: Not on file   Food Insecurity: Not on file   Transportation Needs: Not on file   Physical Activity: Not on file   Stress: Not on file   Social Connections: Not on file   Intimate Partner Violence: Not on file   Housing Stability: Not on file       Additional historical information includes:   N/A    Record Review: brief     Medical Review Of Systems:  Pertinent items are noted in HPI.    Psychiatric Review Of Systems:  Depressive Symptoms:Depressed/Irritable mood, Diminished interest, Change in appetite, Insomnia or Hypersomnia, Fatigue, and N/A  Manic Symptoms:Elevated or irritable mood, Distractibility, Decreased need for sleep, More talkative than usual, Flight of ideas or racing thoughts, Grandiosity or increased self esteem, and Psychomotor agitation or increased goal-directed activity  Anxiety Symptoms:Excessive  "worry, Difficulty controlling worry, Obsessions (recurring thoughts, impulses, or images), Irritability due to worry, Sleep disturbances due to worry, and Restlessness/Feeling on edge due to worry  Disordered Eating Symptoms:Restricting of diet and/or excessive exercise  Inattentive Symptoms:Other: (comment) Easily distracted.    Hyperactive/Impulsive Symptoms:Is often \"on the go\" or \"driven by motor\"  Oppositional Defiant Symptoms:Other: (comment) N/A  Trauma Symptoms:Negative alterations in cognition or mood  Conduct Issues:Other: (comment) N/A  Psychotic Symptoms:Other: (comment) Denies today  Developmental Concerns:Other: (comment) N/A  Other Symptoms/Concerns: N /A  Delirium/Altered Mental Status Symptoms:Other: (comment) A & O x 4       Objective   Mental Status Exam:  General: Appropriately groomed and dressed.   Appearance: Appears stated age.   Attitude: Anxious, cooperative.   Behavior: Minimal eye contact.   Motor Activity: Reports feeling restless during manic episodes. Steady gait.   Speech: Rapid rate, rhythm, volume and tone, spontaneous, fluent.   Mood: Hypomania and depression.   Affect: Appropriate with full range.   Thought Process: Organized, linear, goal directed. Associations are lose. Tangential speech.   Thought Content: Does not endorse suicidal or homicidal ideation, no delusions elicited.   Thought Perception: Does not endorse auditory or visual hallucinations, does not appear to be responding to hallucinatory stimuli.   Cognition: Alert, oriented x3. No deficits noted. Adequate fund of knowledge. No deficit in recent and remote memory. Deny deficits in attention and concentration noted due to excessive sleepiness, no deficits in language noted.   Insight: Good, as patient recognizes symptoms of illness and need for recommended treatments.   Judgment: Can make reasonable decisions about ordinary activities of daily living and necessary medical care recommendations.          Other Objective " Information:  N/A    Vitals:  There were no vitals filed for this visit.        Time spent in therapy 30  Summary of Psychotherapy component: Motivational interviewing, positive reinforcement.   Total time spent 40    DOTTY Wilson-CNP

## 2024-02-29 ENCOUNTER — DOCUMENTATION (OUTPATIENT)
Dept: PEDIATRIC HEMATOLOGY/ONCOLOGY | Facility: HOSPITAL | Age: 19
End: 2024-02-29
Payer: MEDICAID

## 2024-02-29 NOTE — PROGRESS NOTES
Patient notified by this provider, via [employer] ,that his Vit B-12, Vit D, TSH, and Thyroxine levels all came back within normal limits.

## 2024-03-01 ENCOUNTER — HOSPITAL ENCOUNTER (OUTPATIENT)
Dept: PEDIATRIC HEMATOLOGY/ONCOLOGY | Facility: HOSPITAL | Age: 19
Discharge: HOME | End: 2024-03-01
Payer: MEDICAID

## 2024-03-01 ENCOUNTER — TELEPHONE (OUTPATIENT)
Dept: PEDIATRIC HEMATOLOGY/ONCOLOGY | Facility: HOSPITAL | Age: 19
End: 2024-03-01
Payer: MEDICAID

## 2024-03-01 ENCOUNTER — DOCUMENTATION (OUTPATIENT)
Dept: PEDIATRIC HEMATOLOGY/ONCOLOGY | Facility: HOSPITAL | Age: 19
End: 2024-03-01

## 2024-03-01 DIAGNOSIS — D57.20: ICD-10-CM

## 2024-03-01 DIAGNOSIS — F31.81 BIPOLAR 2 DISORDER (MULTI): Primary | ICD-10-CM

## 2024-03-01 PROCEDURE — 99214 OFFICE O/P EST MOD 30 MIN: CPT | Performed by: NURSE PRACTITIONER

## 2024-03-01 RX ORDER — HYDROXYUREA 500 MG/1
1000 CAPSULE ORAL DAILY
Qty: 60 CAPSULE | Refills: 0 | Status: SHIPPED | OUTPATIENT
Start: 2024-03-01 | End: 2024-05-03 | Stop reason: SDUPTHER

## 2024-03-01 NOTE — TELEPHONE ENCOUNTER
Reviewed labs from 2/28/24. Lab results within therapeutic parameters for hydroxyurea; therefore, patient and Great Grandmother updated that he should continue taking hydroxyurea 2 capsules (1000 mg) daily.  New prescription refill sent to [facility] on [address].       Latest Reference Range & Units 02/28/24 10:04   Retic Absolute 0.022 - 0.118 x10*6/uL 0.198 (H)   Retic % 0.5 - 2.0 % 6.1 (H)   Reticulocyte Hemoglobin 28 - 38 pg 33   (H): Data is abnormally high   Latest Reference Range & Units 02/28/24 10:04   WBC 4.4 - 11.3 x10*3/uL 7.3   nRBC 0.0 - 0.0 /100 WBCs 1.2 (H)   RBC 4.50 - 5.90 x10*6/uL 3.25 (L)   HEMOGLOBIN 13.5 - 17.5 g/dL 10.3 (L)   HEMATOCRIT 41.0 - 52.0 % 27.0 (L)   MCV 80 - 100 fL 83   MCH 26.0 - 34.0 pg 31.7   MCHC 32.0 - 36.0 g/dL 38.1 (H)   RED CELL DISTRIBUTION WIDTH 11.5 - 14.5 % 15.9 (H)   Platelets 150 - 450 x10*3/uL 591 (H)   Neutrophils % 40.0 - 80.0 % 39.6   Immature Granulocytes %, Automated 0.0 - 0.9 % 0.3   Lymphocytes % 13.0 - 44.0 % 43.6   Monocytes % 2.0 - 10.0 % 8.9   Eosinophils % 0.0 - 6.0 % 6.9   Basophils % 0.0 - 2.0 % 0.7   Neutrophils Absolute 1.20 - 7.70 x10*3/uL 2.89   Immature Granulocytes Absolute, Automated 0.00 - 0.70 x10*3/uL 0.02   Lymphocytes Absolute 1.20 - 4.80 x10*3/uL 3.18   Monocytes Absolute 0.10 - 1.00 x10*3/uL 0.65   Eosinophils Absolute 0.00 - 0.70 x10*3/uL 0.50   Basophils Absolute 0.00 - 0.10 x10*3/uL 0.05   (H): Data is abnormally high  (L): Data is abnormally low

## 2024-03-01 NOTE — PROGRESS NOTES
"Outpatient Child and Adolescent Psychiatry      Subjective   Josue Rangel, a 18 y.o. male, for initial evaluation visit.  Patient is referred by Dr. Janeth Deras.      Assessment/Plan   Josue is a 19 y/o male. He presents with SSD, BI-Polar Disorder Type II combined type. Josue reports that he is taking his medication. He reports that he did not increase the Olanzapine to 10 mg as advised \"I haven't had a chance to pick it up from the pharmacy.\" We discussed that he could take 2 of his 5 mg tablets at bedtime, if needed, until he can  his new prescription. He repeated these instructions back and agreed to do so if necessary. Denies barriers with picking up his medication.  We also reviewed how to take and when to use Atarax for high anxiety states. Continues to endorse rapid mood cycling on a daily basis. He c/o michael at night, difficulty sleeping during the night, high energy states at night, goal directed behavior, sleeping during the day, and feeling tired and depressed. He endorses feeling paranoid, and at times impulsive with spending money. Denies AVH, delusions. He reports isolating self, feeling that in social situations he is being judged. Denies SI-HI intent,plan, access to lethal weapons.  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)    Heart palpitations    Hypermetropia of both eyes    Hyperopia    Intermittent asthma without complication    Melanocytic nevi of other parts of face    Myopia of both eyes with regular astigmatism    Sickle cell disease (CMS/HCC)    Snoring    Sickle cell retinopathy (CMS/HCC)    Sickle cell crisis (CMS/HCC)    Vaso-occlusive pain due " to sickle cell disease (CMS/[facility])       Treatment Goals:  Specify outcomes written in observable, behavioral terms:   Anxiety: eliminating avoidance (specify specific to social situations, michael and high energy states), modifying schemata of threat/vulnerability/need for control (or other schemata -- specify paranoia and social anxiety), reducing negative automatic thoughts, and reducing physical symptoms of anxiety  Depression: increasing self-reward for positive behaviors (one/day), increasing self-reward for positive thoughts (one/day), increasing social contacts (three/week), reducing fatigue, and reducing negative automatic thoughts    Treatment Plan/Recommendations:   Increase Olanzapine to 10 mg I oral tablet once daily at bedtime, for michael, psychosis. * Patient has not made the dose adjustment yet. Discussed the need to increase dose this evening at bedtime so that we can re-assess response ASAP. Patient agreed to do so.  NOTE: EKG done on 12.19.23 revealed NSR w/ QTC of 384 ms. Will continue to monitor Qtc.  Start Step I on Lamictal 25 mg I oral tablet once daily at bedtime x 14 days ( first dose 2.15.2024)- step completed) than take ii oral tablets x 14 days ( start 2.29.2024).  Step II: (start 3.14.2024) Lamictal 100 mg I oral tablet once daily at bedtime. Indicated for mood stabilization and depression.  Status: No change in symptoms. Worsening of nighttime anxiety. Lack of sleep.  Psychoeducation: Discussed medication safety. How to take an antipsychotic and a mood stabilizer. Discussed basic pathology of bipolar disorder and the importance of adhering to treatment to achieve remission for this chronic illness.  Psychsupport: Continue  to discuss consideration of psychotherapy.   Safety: Upon discharge review safety play, if patient feels that he can not keep himself safe, he is to tell a trusted friend, call [phone], or go to the nearest ER.  Follow up: Denisa Amaro, in clinic on Friday, 3.1.2024 or sooner  if indicated.       Follow-up plan for depression and michael was discussed with patient.     Follow-up plan for depression was discussed with patient.    Reason for Visit: Medication Follow Up Visit       HPI:   Location: Endorses rapid cycling moods, high anxiety states, depression (moderate) and insomnia.    Quality: Reports feeling paranoid, rapid mood swings, chaotic mind, restless.    Severity:Endorses moderate to severe anxiety and depression with a 24 hours period.   Timing/Duration: He has been experiencing these symptoms since childhood.    Context: Mood does not appear to be linked to stressors   Modifying factors: Denied modifying factors    Associated signs and symptoms: Describes impaired function. Endorses being able to participate in ADLs but describes inability to work, weight loss, issues with hyposomnia at night and hypersomnia during the day.     Current Medications:    Current Outpatient Medications:     acetaminophen (Tylenol) 325 mg tablet, Take 2 tablets (650 mg) by mouth every 6 hours if needed for mild pain (1 - 3)., Disp: 240 tablet, Rfl: 0    albuterol 90 mcg/actuation inhaler, Inhale 2-4 puffs every 4-6 hours as needed for shortness of breath or wheezing, Disp: , Rfl:     amphetamine-dextroamphetamine XR (Adderall XR) 15 mg 24 hr capsule, Adderall XR 15 MG Oral Capsule Extended Release 24 Hour  Quantity: 30  Refills: 0      Start : 2-Sep-2016  Active, Disp: , Rfl:     bisacodyl (Dulcolax) 5 mg EC tablet, Take 1 tablet (5 mg) by mouth once daily as needed for constipation. Do not crush, chew, or split., Disp: 30 tablet, Rfl: 0    dimethicone-colloidal oatmeaL (Aveeno) 1.2 % lotion, 1 Application every 12 hours., Disp: , Rfl:     docusate sodium (Colace) 100 mg capsule, Take 1 capsule (100 mg) by mouth 2 times a day., Disp: 60 capsule, Rfl: 0    food supplemt, lactose-reduced (Ensure Original) 0.04-1.05 gram-kcal/mL liquid, Ensure Oral Liquid  Refills: 0      Start : 3-Aug-2017  Active,  Disp: , Rfl:     hydroxyurea (Hydrea) 500 mg capsule, Take 2 capsules (1,000 mg total) by mouth once daily.  Take at the same time each day., Disp: 60 capsule, Rfl: 0    hydrOXYzine pamoate (Vistaril) 25 mg capsule, Take 1 capsule (25 mg) by mouth 2 times a day for 30 doses., Disp: 30 capsule, Rfl: 0    lamoTRIgine (LaMICtal) 25 mg tablet, Take 1 tablet (25 mg) by mouth once daily for 13 days., Disp: 13 tablet, Rfl: 0    loratadine (Claritin) 10 mg tablet, Take 1 tablet (10 mg) by mouth once daily at bedtime., Disp: , Rfl:     multivitamin (Chewable-Flory) tablet,chewable, Chew 1 tablet once daily., Disp: , Rfl:     naproxen (Naprosyn) 375 mg tablet, Take 1 tablet (375 mg) by mouth every 12 hours if needed for mild pain (1 - 3). For the next 2-3 days, take every 12 hours, then only as needed for continued pain., Disp: 60 tablet, Rfl: 0    OLANZapine (ZyPREXA) 10 mg tablet, Take 1 tablet (10 mg) by mouth once daily at bedtime., Disp: 30 tablet, Rfl: 0    oxyCODONE (Roxicodone) 5 mg immediate release tablet, Take 1 tablet (5 mg) by mouth every 4 hours if needed for severe pain (7 - 10)., Disp: 12 tablet, Rfl: 0    polyethylene glycol (Glycolax, Miralax) 17 gram packet, Take 17 g (mix 1 packet) and drink by mouth once daily., Disp: 30 packet, Rfl: 0    sennosides (Senokot) 8.6 mg tablet, Take 1 tablet (8.6 mg) by mouth once daily., Disp: 30 tablet, Rfl: 0    Vitamin D3 10 mcg (400 unit) capsule, Take 5 capsules (50 mcg) by mouth once daily., Disp: , Rfl:     Record Review: brief     Medical Review Of Systems:  Pertinent items are noted in HPI.    Psychiatric Review Of Systems:  Depressive Symptoms:Depressed/Irritable mood, Insomnia or Hypersomnia, Fatigue, and N/A  Manic Symptoms:Elevated or irritable mood  Anxiety Symptoms:Easily fatigued due to worry, Irritability due to worry, Sleep disturbances due to worry, and Restlessness/Feeling on edge due to worry  Disordered Eating Symptoms:Poor body image  Inattentive  Symptoms:Other: (comment) Denies    Hyperactive/Impulsive Symptoms:Other: (comment) Restlessness at night  Oppositional Defiant Symptoms:Denies  Trauma Symptoms:Negative alterations in cognition or mood  Conduct Issues:Other: (comment) Denies  Psychotic Symptoms:Denies  Developmental Concerns:Other: (comment) Denies  Other Symptoms/Concerns:Sleep disorder symptoms (PEREZ, sleepwalking, NT, etc.)  Delirium/Altered Mental Status Symptoms:Other: (comment) Denies.      Objective   Mental Status Exam:      Mental Status Exam:  General: Appropriately groomed and dressed.   Appearance: Appears stated age.   Attitude: Anxious, cooperative.   Behavior: Minimal eye contact.   Motor Activity: Reports feeling restless during manic episodes. Steady gait.   Speech: Rapid rate, rhythm, volume and tone, spontaneous, fluent.   Mood: Hypomania and depression.   Affect: Appropriate with full range.   Thought Process: Organized, linear, goal directed. Associations are lose. Tangential speech.   Thought Content: Does not endorse suicidal or homicidal ideation, no delusions elicited.   Thought Perception: Does not endorse auditory or visual hallucinations, does not appear to be responding to hallucinatory stimuli.   Cognition: Alert, oriented x3. No deficits noted. Adequate fund of knowledge. No deficit in recent and remote memory. Deny deficits in attention and concentration noted due to excessive sleepiness, no deficits in language noted.   Insight: Good, as patient recognizes symptoms of illness and need for recommended treatments.   Judgment: Can make reasonable decisions about ordinary activities of daily living and necessary medical care recommendations.         Other Objective Information:    Other Objective Information: Patient was engaged and attentive during this session.    Referral to:  Attempt referral ti [facility] for therapy. Patient declined at this time.     Review with patient: Treatment plan reviewed with the  patient.  Medication risks/benefit reviewed with the patient    Time spent in therapy 20  Total time spent 30    Denisa Amaro, APRN-CNP

## 2024-03-05 ENCOUNTER — HOSPITAL ENCOUNTER (OUTPATIENT)
Dept: PEDIATRIC HEMATOLOGY/ONCOLOGY | Facility: HOSPITAL | Age: 19
Discharge: HOME | End: 2024-03-05
Payer: MEDICAID

## 2024-03-20 ENCOUNTER — DOCUMENTATION (OUTPATIENT)
Dept: PEDIATRIC HEMATOLOGY/ONCOLOGY | Facility: HOSPITAL | Age: 19
End: 2024-03-20
Payer: MEDICAID

## 2024-03-20 NOTE — PROGRESS NOTES
This provider sent the patient a message via [employer] to contact her for a medication follow up visit. This provider offered a next day appointment and requested the patient to confirm appointment.

## 2024-03-25 DIAGNOSIS — D57.1 SICKLE CELL DISEASE WITHOUT CRISIS (MULTI): ICD-10-CM

## 2024-03-25 DIAGNOSIS — F31.81 BIPOLAR 2 DISORDER (MULTI): ICD-10-CM

## 2024-03-26 RX ORDER — OLANZAPINE 10 MG/1
TABLET ORAL
Qty: 30 TABLET | Refills: 0 | Status: SHIPPED | OUTPATIENT
Start: 2024-03-26 | End: 2024-04-20 | Stop reason: HOSPADM

## 2024-03-27 ASSESSMENT — ENCOUNTER SYMPTOMS
ARTHRALGIAS: 1
NERVOUS/ANXIOUS: 1

## 2024-03-27 NOTE — ADDENDUM NOTE
Encounter addended by: DOTTY Gonzalez-ERNESTO on: 3/27/2024 12:38 PM   Actions taken: SmartForm saved, Clinical Note Signed, Level of Service modified

## 2024-03-29 ENCOUNTER — HOSPITAL ENCOUNTER (OUTPATIENT)
Dept: RESPIRATORY THERAPY | Facility: HOSPITAL | Age: 19
Discharge: HOME | End: 2024-03-29
Payer: MEDICAID

## 2024-03-29 ENCOUNTER — APPOINTMENT (OUTPATIENT)
Dept: RESPIRATORY THERAPY | Facility: HOSPITAL | Age: 19
End: 2024-03-29
Payer: MEDICAID

## 2024-03-29 ENCOUNTER — OFFICE VISIT (OUTPATIENT)
Dept: PEDIATRIC PULMONOLOGY | Facility: HOSPITAL | Age: 19
End: 2024-03-29
Payer: MEDICAID

## 2024-03-29 VITALS
BODY MASS INDEX: 21.62 KG/M2 | TEMPERATURE: 97.8 F | WEIGHT: 151.01 LBS | RESPIRATION RATE: 18 BRPM | DIASTOLIC BLOOD PRESSURE: 67 MMHG | SYSTOLIC BLOOD PRESSURE: 106 MMHG | HEIGHT: 70 IN | OXYGEN SATURATION: 97 % | HEART RATE: 79 BPM

## 2024-03-29 DIAGNOSIS — J45.20 MILD INTERMITTENT ASTHMA WITHOUT COMPLICATION (HHS-HCC): ICD-10-CM

## 2024-03-29 DIAGNOSIS — J45.909 ASTHMA, UNSPECIFIED ASTHMA SEVERITY, UNSPECIFIED WHETHER COMPLICATED, UNSPECIFIED WHETHER PERSISTENT (HHS-HCC): ICD-10-CM

## 2024-03-29 LAB
FEF 25-75: 3.62 L/S
FEV1/FVC: 79 %
FEV1: 3.99 LITERS
FVC: 5.07 LITERS
PEF: 7.7 L/S

## 2024-03-29 PROCEDURE — 99213 OFFICE O/P EST LOW 20 MIN: CPT | Performed by: NURSE PRACTITIONER

## 2024-03-29 PROCEDURE — 94664 DEMO&/EVAL PT USE INHALER: CPT | Performed by: NURSE PRACTITIONER

## 2024-03-29 PROCEDURE — 94060 EVALUATION OF WHEEZING: CPT | Performed by: NURSE PRACTITIONER

## 2024-03-29 PROCEDURE — 94060 EVALUATION OF WHEEZING: CPT

## 2024-03-29 RX ORDER — ALBUTEROL SULFATE 90 UG/1
AEROSOL, METERED RESPIRATORY (INHALATION)
Qty: 18 G | Refills: 3 | Status: SHIPPED | OUTPATIENT
Start: 2024-03-29

## 2024-03-29 RX ORDER — BUDESONIDE AND FORMOTEROL FUMARATE DIHYDRATE 80; 4.5 UG/1; UG/1
2 AEROSOL RESPIRATORY (INHALATION)
Qty: 10.2 G | Refills: 3 | Status: ON HOLD | OUTPATIENT
Start: 2024-03-29 | End: 2024-04-19

## 2024-03-29 NOTE — PROGRESS NOTES
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 Also with allergic rhinitis and atopic dermatitis.      Pulmonary function testing not performed today secondary to COVID restrictions      1. Asthma-LIKE symptoms from sickle cell anemia are common. Poorly controlled asthma may increase risk of sickle cell pain and chest attacks. Josue has previously had cough with weather change and occasionally colds that linger; however, he has done well over the last year Will try to continue albuterol as needed. If he continues to have more persistent symptoms could consider trial of ICS.      2. Recommend continuing Hydroxyurea     3. Had some increased snoring and daytime fatigue recently. Sleep study reassuring. Has sleep appointment scheduled for 6/30.      Follow up in one year        Interval history:  Here for a follow up on his asthma  Hasn't been seen since 2017  Does get shortness of breath  Does think he needs something everyday for his asthma  Hasn't been to the er for any breathing issues  Has run out of his albuterol and needs a new script     Risk assessment:  Hospitalizations: no   ED visits: no   Systemic corticosteroid courses: no     Impairment assessment:  - Symptoms in last 2-4 weeks:   - Nocturnal cough: no  - Daytime cough/wheeze: yes   - Albuterol frequency: ran out of it   - Exercise limitation: yes     Co-Morbid Conditions:  - Allergic rhinitis:no   - Food allergy:no   - Atopic dermatitis:no   - Snoring:no     Past Medical Hx: personally review and no changes unless noted in chart.  Family Hx: personally review and no changes unless noted in chart.  Social Hx: personally review and no changes unless noted in chart.      All other ROS (10 point review) was negative unless noted above.  I personally reviewed previous documentation, any new pertinent labs, and new pertinent  radiologic imaging.     Current Outpatient Medications   Medication Instructions    albuterol 90 mcg/actuation inhaler Inhale 2-4 puffs every 4-6 hours as needed for shortness of breath or wheezing    amphetamine-dextroamphetamine XR (Adderall XR) 15 mg 24 hr capsule Adderall XR 15 MG Oral Capsule Extended Release 24 Hour   Quantity: 30  Refills: 0        Start : 2-Sep-2016   Active    bisacodyl (DULCOLAX) 5 mg, oral, Daily PRN, Do not crush, chew, or split.    dimethicone-colloidal oatmeaL (Aveeno) 1.2 % lotion 1 Application, Every 12 hours    food supplemt, lactose-reduced (Ensure Original) 0.04-1.05 gram-kcal/mL liquid Ensure Oral Liquid   Refills: 0        Start : 3-Aug-2017   Active    hydroxyurea (HYDREA) 1,000 mg, oral, Daily, Take at the same time each day.    hydrOXYzine pamoate (VISTARIL) 25 mg, oral, 2 times daily    lamoTRIgine (LAMICTAL) 25 mg, oral, Daily    loratadine (Claritin) 10 mg tablet 1 tablet, oral, Nightly    multivitamin (Chewable-Flory) tablet,chewable 1 tablet, oral, Daily    OLANZapine (ZyPREXA) 10 mg tablet TAKE 1 TABLET(10 MG) BY MOUTH DAILY AT BEDTIME    oxyCODONE (ROXICODONE) 5 mg, oral, Every 4 hours PRN    Symbicort 80-4.5 mcg/actuation inhaler 2 puffs, inhalation, 2 times daily RT, And 2 puffs every 4 hours as needed.    Vitamin D3 2,000 Units, oral, Daily       Vitals:    03/29/24 1028   BP: 106/67   Pulse: 79   Resp: 18   Temp: 36.6 °C (97.8 °F)   SpO2: 97%        Physical Exam:   General: awake and alert no distress  Eyes: clear, no conjunctival injection or discharge  Ears: Left and Right TM clear with good light reflex and landmarks  Nose: no nasal congestion, turbinates non-erythematous and non-edematous in appearance  Mouth: MMM no lesions, posterior oropharynx without exudates, cobblestoning   Neck: no lymphadenopathy  Heart: RRR nml S1/S2, no m/r/g noted, cap refill <2 sec  Lungs: Normal respiratory rate, chest with normal A-P diameter, no chest wall deformities. Lungs are  CTA B/L. No wheezes, crackles, rhonchi. No cough observed on exam  Skin: warm and without rashes on exposed skin, full skin exam not completed  MSK: normal muscle bulk and tone  Ext: no cyanosis, no digital clubbing    Assessment:    Pulmonary Functions Testing Results:    FEV1   Date Value Ref Range Status   03/29/2024 3.99 liters      Comment:     86%     FVC   Date Value Ref Range Status   03/29/2024 5.07 liters      Comment:     91%     FEV1/FVC   Date Value Ref Range Status   03/29/2024 79 %      Post:   Fvc: 88  Fev: 89  Fev1/fvc: 100  Mmef 75/25: 90: 23 % increase   Since he hasn't been on any bronchodilator I did post him.      Assessment:   19 year old make with sickle cell S-C disease, mild intermittent asthma, allergic rhinitis and atopic dermatitis under fair control with reported day time cough and activity intolerance. He did have a 23% increase post bronchodilator so will start him on symbicort 80 2 puffs bid for a month and then he can use as needed. Will have him follow-up in 4-6 months     Plan:  Symbicort 80 2 puffs bid and as needed  Refilled albuterol for him to still have  F/up in 4-6 months.        - Use albuterol either by nebulizer or inhaler with spacer every 4 hours as needed for cough, wheeze, or difficulty breathing  - Personalized asthma action plan was provided and reviewed.  Please call pediatric triage line if in Yellow Zone for more than 24 hours or if in Red Zone.  - Inhaled medication delivery device techniques were reviewed at this visit.  - Patient engagement using teach back during review of devices or action plan was utilized  - Flu vaccine yearly in the fall   - Smoking cessation for all appropriate family members    DOTTY Perkins-CNP, pediatric pulmonary

## 2024-04-02 ENCOUNTER — APPOINTMENT (OUTPATIENT)
Dept: PEDIATRIC HEMATOLOGY/ONCOLOGY | Facility: HOSPITAL | Age: 19
End: 2024-04-02
Payer: MEDICAID

## 2024-04-02 ENCOUNTER — HOSPITAL ENCOUNTER (OUTPATIENT)
Dept: PEDIATRIC HEMATOLOGY/ONCOLOGY | Facility: HOSPITAL | Age: 19
End: 2024-04-02
Payer: MEDICAID

## 2024-04-14 ENCOUNTER — HOSPITAL ENCOUNTER (OUTPATIENT)
Facility: HOSPITAL | Age: 19
Setting detail: OBSERVATION
Discharge: OTHER INSTITUTION | End: 2024-04-14
Attending: EMERGENCY MEDICINE | Admitting: PEDIATRICS
Payer: MEDICAID

## 2024-04-14 ENCOUNTER — HOSPITAL ENCOUNTER (INPATIENT)
Facility: HOSPITAL | Age: 19
LOS: 6 days | Discharge: HOME | End: 2024-04-20
Attending: PEDIATRICS | Admitting: PEDIATRICS
Payer: MEDICAID

## 2024-04-14 ENCOUNTER — APPOINTMENT (OUTPATIENT)
Dept: RADIOLOGY | Facility: HOSPITAL | Age: 19
End: 2024-04-14
Payer: MEDICAID

## 2024-04-14 VITALS
OXYGEN SATURATION: 95 % | WEIGHT: 152 LBS | SYSTOLIC BLOOD PRESSURE: 128 MMHG | HEIGHT: 70 IN | RESPIRATION RATE: 18 BRPM | TEMPERATURE: 98.5 F | HEART RATE: 57 BPM | BODY MASS INDEX: 21.76 KG/M2 | DIASTOLIC BLOOD PRESSURE: 73 MMHG

## 2024-04-14 DIAGNOSIS — F31.81 BIPOLAR II DISORDER (MULTI): ICD-10-CM

## 2024-04-14 DIAGNOSIS — D57.01 ACUTE CHEST SYNDROME (MULTI): ICD-10-CM

## 2024-04-14 DIAGNOSIS — D57.00 SICKLE CELL DISEASE WITH CRISIS (MULTI): Primary | ICD-10-CM

## 2024-04-14 DIAGNOSIS — D57.00 VASO-OCCLUSIVE PAIN DUE TO SICKLE CELL DISEASE (MULTI): Primary | ICD-10-CM

## 2024-04-14 DIAGNOSIS — J45.20 MILD INTERMITTENT ASTHMA WITHOUT COMPLICATION (HHS-HCC): ICD-10-CM

## 2024-04-14 LAB
ABO GROUP (TYPE) IN BLOOD: NORMAL
ALBUMIN SERPL BCP-MCNC: 4.5 G/DL (ref 3.4–5)
ALP SERPL-CCNC: 67 U/L (ref 33–120)
ALT SERPL W P-5'-P-CCNC: 18 U/L (ref 10–52)
ANION GAP SERPL CALC-SCNC: 11 MMOL/L (ref 10–20)
ANTIBODY SCREEN: NORMAL
AST SERPL W P-5'-P-CCNC: 24 U/L (ref 9–39)
BASOPHILS # BLD AUTO: 0.04 X10*3/UL (ref 0–0.1)
BASOPHILS NFR BLD AUTO: 0.4 %
BILIRUB DIRECT SERPL-MCNC: 0.2 MG/DL (ref 0–0.3)
BILIRUB SERPL-MCNC: 1 MG/DL (ref 0–1.2)
BUN SERPL-MCNC: 15 MG/DL (ref 6–23)
CALCIUM SERPL-MCNC: 10 MG/DL (ref 8.6–10.6)
CHLORIDE SERPL-SCNC: 103 MMOL/L (ref 98–107)
CO2 SERPL-SCNC: 25 MMOL/L (ref 21–32)
CREAT SERPL-MCNC: 0.69 MG/DL (ref 0.5–1.3)
EGFRCR SERPLBLD CKD-EPI 2021: >90 ML/MIN/1.73M*2
EOSINOPHIL # BLD AUTO: 0.86 X10*3/UL (ref 0–0.7)
EOSINOPHIL NFR BLD AUTO: 9.6 %
ERYTHROCYTE [DISTWIDTH] IN BLOOD BY AUTOMATED COUNT: 15.8 % (ref 11.5–14.5)
GLUCOSE SERPL-MCNC: 75 MG/DL (ref 74–99)
HCT VFR BLD AUTO: 28.8 % (ref 41–52)
HGB BLD-MCNC: 11 G/DL (ref 13.5–17.5)
HGB RETIC QN: 33 PG (ref 28–38)
IMM GRANULOCYTES # BLD AUTO: 0.05 X10*3/UL (ref 0–0.7)
IMM GRANULOCYTES NFR BLD AUTO: 0.6 % (ref 0–0.9)
IMMATURE RETIC FRACTION: 38.1 %
LDH SERPL L TO P-CCNC: 249 U/L (ref 84–246)
LYMPHOCYTES # BLD AUTO: 2.48 X10*3/UL (ref 1.2–4.8)
LYMPHOCYTES NFR BLD AUTO: 27.7 %
MCH RBC QN AUTO: 31.6 PG (ref 26–34)
MCHC RBC AUTO-ENTMCNC: 38.2 G/DL (ref 32–36)
MCV RBC AUTO: 83 FL (ref 80–100)
MONOCYTES # BLD AUTO: 0.75 X10*3/UL (ref 0.1–1)
MONOCYTES NFR BLD AUTO: 8.4 %
NEUTROPHILS # BLD AUTO: 4.78 X10*3/UL (ref 1.2–7.7)
NEUTROPHILS NFR BLD AUTO: 53.3 %
NRBC BLD-RTO: 0.9 /100 WBCS (ref 0–0)
PLATELET # BLD AUTO: 290 X10*3/UL (ref 150–450)
POTASSIUM SERPL-SCNC: 4.3 MMOL/L (ref 3.5–5.3)
PROT SERPL-MCNC: 7.7 G/DL (ref 6.4–8.2)
RBC # BLD AUTO: 3.48 X10*6/UL (ref 4.5–5.9)
RETICS #: 0.12 X10*6/UL (ref 0.02–0.12)
RETICS/RBC NFR AUTO: 3.5 % (ref 0.5–2)
RH FACTOR (ANTIGEN D): NORMAL
SODIUM SERPL-SCNC: 135 MMOL/L (ref 136–145)
WBC # BLD AUTO: 9 X10*3/UL (ref 4.4–11.3)

## 2024-04-14 PROCEDURE — 80053 COMPREHEN METABOLIC PANEL: CPT

## 2024-04-14 PROCEDURE — 1210000001 HC SEMI-PRIVATE ROOM DAILY

## 2024-04-14 PROCEDURE — 71046 X-RAY EXAM CHEST 2 VIEWS: CPT | Performed by: STUDENT IN AN ORGANIZED HEALTH CARE EDUCATION/TRAINING PROGRAM

## 2024-04-14 PROCEDURE — 96375 TX/PRO/DX INJ NEW DRUG ADDON: CPT

## 2024-04-14 PROCEDURE — 2500000004 HC RX 250 GENERAL PHARMACY W/ HCPCS (ALT 636 FOR OP/ED): Mod: SE

## 2024-04-14 PROCEDURE — 85025 COMPLETE CBC W/AUTO DIFF WBC: CPT

## 2024-04-14 PROCEDURE — 85045 AUTOMATED RETICULOCYTE COUNT: CPT

## 2024-04-14 PROCEDURE — 99285 EMERGENCY DEPT VISIT HI MDM: CPT | Mod: 25

## 2024-04-14 PROCEDURE — G0378 HOSPITAL OBSERVATION PER HR: HCPCS

## 2024-04-14 PROCEDURE — 99223 1ST HOSP IP/OBS HIGH 75: CPT | Performed by: PEDIATRICS

## 2024-04-14 PROCEDURE — 99285 EMERGENCY DEPT VISIT HI MDM: CPT | Performed by: EMERGENCY MEDICINE

## 2024-04-14 PROCEDURE — 2500000004 HC RX 250 GENERAL PHARMACY W/ HCPCS (ALT 636 FOR OP/ED)

## 2024-04-14 PROCEDURE — 96372 THER/PROPH/DIAG INJ SC/IM: CPT

## 2024-04-14 PROCEDURE — 86901 BLOOD TYPING SEROLOGIC RH(D): CPT

## 2024-04-14 PROCEDURE — 36415 COLL VENOUS BLD VENIPUNCTURE: CPT

## 2024-04-14 PROCEDURE — 96376 TX/PRO/DX INJ SAME DRUG ADON: CPT

## 2024-04-14 PROCEDURE — 1130000001 HC PRIVATE PED ROOM DAILY

## 2024-04-14 PROCEDURE — 83615 LACTATE (LD) (LDH) ENZYME: CPT

## 2024-04-14 PROCEDURE — 71046 X-RAY EXAM CHEST 2 VIEWS: CPT

## 2024-04-14 PROCEDURE — 1100000001 HC PRIVATE ROOM DAILY

## 2024-04-14 PROCEDURE — 96374 THER/PROPH/DIAG INJ IV PUSH: CPT | Mod: 59

## 2024-04-14 PROCEDURE — 93010 ELECTROCARDIOGRAM REPORT: CPT | Performed by: EMERGENCY MEDICINE

## 2024-04-14 PROCEDURE — 82248 BILIRUBIN DIRECT: CPT

## 2024-04-14 RX ORDER — MORPHINE SULFATE 4 MG/ML
8 INJECTION INTRAVENOUS
Status: COMPLETED | OUTPATIENT
Start: 2024-04-14 | End: 2024-04-14

## 2024-04-14 RX ORDER — LIDOCAINE 560 MG/1
1 PATCH PERCUTANEOUS; TOPICAL; TRANSDERMAL EVERY 24 HOURS
Status: DISCONTINUED | OUTPATIENT
Start: 2024-04-14 | End: 2024-04-20 | Stop reason: HOSPADM

## 2024-04-14 RX ORDER — ACETAMINOPHEN 10 MG/ML
15 INJECTION, SOLUTION INTRAVENOUS EVERY 6 HOURS
Status: COMPLETED | OUTPATIENT
Start: 2024-04-15 | End: 2024-04-16

## 2024-04-14 RX ORDER — KETOROLAC TROMETHAMINE 15 MG/ML
15 INJECTION, SOLUTION INTRAMUSCULAR; INTRAVENOUS ONCE
Status: COMPLETED | OUTPATIENT
Start: 2024-04-14 | End: 2024-04-14

## 2024-04-14 RX ORDER — POLYETHYLENE GLYCOL 3350 17 G/17G
17 POWDER, FOR SOLUTION ORAL DAILY
Status: DISCONTINUED | OUTPATIENT
Start: 2024-04-15 | End: 2024-04-20 | Stop reason: HOSPADM

## 2024-04-14 RX ORDER — SENNOSIDES 8.6 MG/1
17.2 TABLET ORAL 2 TIMES DAILY
Status: DISCONTINUED | OUTPATIENT
Start: 2024-04-15 | End: 2024-04-20 | Stop reason: HOSPADM

## 2024-04-14 RX ORDER — DEXTROSE MONOHYDRATE AND SODIUM CHLORIDE 5; .9 G/100ML; G/100ML
75 INJECTION, SOLUTION INTRAVENOUS CONTINUOUS
Status: DISCONTINUED | OUTPATIENT
Start: 2024-04-14 | End: 2024-04-16

## 2024-04-14 RX ORDER — ACETAMINOPHEN 10 MG/ML
15 INJECTION, SOLUTION INTRAVENOUS EVERY 6 HOURS SCHEDULED
Status: DISCONTINUED | OUTPATIENT
Start: 2024-04-15 | End: 2024-04-14

## 2024-04-14 RX ORDER — ONDANSETRON HYDROCHLORIDE 2 MG/ML
4 INJECTION, SOLUTION INTRAVENOUS ONCE
Status: COMPLETED | OUTPATIENT
Start: 2024-04-14 | End: 2024-04-14

## 2024-04-14 RX ORDER — ONDANSETRON HYDROCHLORIDE 2 MG/ML
8 INJECTION, SOLUTION INTRAVENOUS EVERY 8 HOURS PRN
Status: DISCONTINUED | OUTPATIENT
Start: 2024-04-14 | End: 2024-04-15

## 2024-04-14 RX ORDER — MORPHINE SULFATE 4 MG/ML
8 INJECTION INTRAVENOUS
Status: DISCONTINUED | OUTPATIENT
Start: 2024-04-14 | End: 2024-04-14

## 2024-04-14 RX ORDER — KETOROLAC TROMETHAMINE 30 MG/ML
15 INJECTION, SOLUTION INTRAMUSCULAR; INTRAVENOUS EVERY 6 HOURS SCHEDULED
Status: COMPLETED | OUTPATIENT
Start: 2024-04-15 | End: 2024-04-17

## 2024-04-14 RX ORDER — MORPHINE SULFATE 4 MG/ML
8 INJECTION INTRAVENOUS
Status: DISCONTINUED | OUTPATIENT
Start: 2024-04-14 | End: 2024-04-16

## 2024-04-14 RX ORDER — ACETAMINOPHEN 325 MG/1
975 TABLET ORAL ONCE
Status: COMPLETED | OUTPATIENT
Start: 2024-04-14 | End: 2024-04-14

## 2024-04-14 RX ADMIN — MORPHINE SULFATE 8 MG: 4 INJECTION, SOLUTION INTRAMUSCULAR; INTRAVENOUS at 19:02

## 2024-04-14 RX ADMIN — ACETAMINOPHEN 975 MG: 325 TABLET ORAL at 15:12

## 2024-04-14 RX ADMIN — DEXTROSE AND SODIUM CHLORIDE 75 ML/HR: 5; 900 INJECTION, SOLUTION INTRAVENOUS at 23:22

## 2024-04-14 RX ADMIN — MORPHINE SULFATE 8 MG: 4 INJECTION INTRAVENOUS at 23:22

## 2024-04-14 RX ADMIN — MORPHINE SULFATE 8 MG: 4 INJECTION, SOLUTION INTRAMUSCULAR; INTRAVENOUS at 17:27

## 2024-04-14 RX ADMIN — ONDANSETRON 4 MG: 2 INJECTION INTRAMUSCULAR; INTRAVENOUS at 15:12

## 2024-04-14 RX ADMIN — KETOROLAC TROMETHAMINE 15 MG: 30 INJECTION, SOLUTION INTRAMUSCULAR; INTRAVENOUS at 23:22

## 2024-04-14 RX ADMIN — MORPHINE SULFATE 8 MG: 4 INJECTION, SOLUTION INTRAMUSCULAR; INTRAVENOUS at 16:22

## 2024-04-14 RX ADMIN — KETOROLAC TROMETHAMINE 15 MG: 15 INJECTION, SOLUTION INTRAMUSCULAR; INTRAVENOUS at 15:12

## 2024-04-14 SDOH — SOCIAL STABILITY: SOCIAL INSECURITY: WERE YOU ABLE TO COMPLETE ALL THE BEHAVIORAL HEALTH SCREENINGS?: YES

## 2024-04-14 SDOH — SOCIAL STABILITY: SOCIAL INSECURITY: HAS ANYONE EVER THREATENED TO HURT YOUR FAMILY OR YOUR PETS?: NO

## 2024-04-14 SDOH — SOCIAL STABILITY: SOCIAL INSECURITY: DO YOU FEEL ANYONE HAS EXPLOITED OR TAKEN ADVANTAGE OF YOU FINANCIALLY OR OF YOUR PERSONAL PROPERTY?: NO

## 2024-04-14 SDOH — SOCIAL STABILITY: SOCIAL INSECURITY: HAVE YOU HAD ANY THOUGHTS OF HARMING ANYONE ELSE?: NO

## 2024-04-14 SDOH — ECONOMIC STABILITY: HOUSING INSECURITY: DO YOU FEEL UNSAFE GOING BACK TO THE PLACE WHERE YOU LIVE?: NO

## 2024-04-14 SDOH — SOCIAL STABILITY: SOCIAL INSECURITY: ARE YOU OR HAVE YOU BEEN THREATENED OR ABUSED PHYSICALLY, EMOTIONALLY, OR SEXUALLY BY ANYONE?: NO

## 2024-04-14 SDOH — SOCIAL STABILITY: SOCIAL INSECURITY: DO YOU FEEL UNSAFE GOING BACK TO THE PLACE WHERE YOU ARE LIVING?: NO

## 2024-04-14 SDOH — SOCIAL STABILITY: SOCIAL INSECURITY: ARE THERE ANY APPARENT SIGNS OF INJURIES/BEHAVIORS THAT COULD BE RELATED TO ABUSE/NEGLECT?: NO

## 2024-04-14 SDOH — SOCIAL STABILITY: SOCIAL INSECURITY: ABUSE: PEDIATRIC

## 2024-04-14 SDOH — SOCIAL STABILITY: SOCIAL INSECURITY: HAVE YOU HAD THOUGHTS OF HARMING ANYONE ELSE?: NO

## 2024-04-14 SDOH — SOCIAL STABILITY: SOCIAL INSECURITY: DOES ANYONE TRY TO KEEP YOU FROM HAVING/CONTACTING OTHER FRIENDS OR DOING THINGS OUTSIDE YOUR HOME?: NO

## 2024-04-14 ASSESSMENT — ACTIVITIES OF DAILY LIVING (ADL)
WALKS IN HOME: INDEPENDENT
ADEQUATE_TO_COMPLETE_ADL: YES
HEARING - RIGHT EAR: FUNCTIONAL
FEEDING YOURSELF: INDEPENDENT
PATIENT'S MEMORY ADEQUATE TO SAFELY COMPLETE DAILY ACTIVITIES?: YES
DRESSING YOURSELF: INDEPENDENT
BATHING: INDEPENDENT
JUDGMENT_ADEQUATE_SAFELY_COMPLETE_DAILY_ACTIVITIES: YES
GROOMING: INDEPENDENT
HEARING - LEFT EAR: FUNCTIONAL
TOILETING: INDEPENDENT

## 2024-04-14 ASSESSMENT — PAIN DESCRIPTION - LOCATION
LOCATION: GENERALIZED
LOCATION: GENERALIZED

## 2024-04-14 ASSESSMENT — LIFESTYLE VARIABLES
HOW OFTEN DO YOU HAVE 6 OR MORE DRINKS ON ONE OCCASION: NEVER
SKIP TO QUESTIONS 9-10: 1
HOW OFTEN DO YOU HAVE A DRINK CONTAINING ALCOHOL: MONTHLY OR LESS
HOW MANY STANDARD DRINKS CONTAINING ALCOHOL DO YOU HAVE ON A TYPICAL DAY: 1 OR 2
AUDIT-C TOTAL SCORE: 1
AUDIT-C TOTAL SCORE: 1

## 2024-04-14 ASSESSMENT — PATIENT HEALTH QUESTIONNAIRE - PHQ9
SUM OF ALL RESPONSES TO PHQ9 QUESTIONS 1 & 2: 0
2. FEELING DOWN, DEPRESSED OR HOPELESS: NOT AT ALL
1. LITTLE INTEREST OR PLEASURE IN DOING THINGS: NOT AT ALL

## 2024-04-14 ASSESSMENT — COLUMBIA-SUICIDE SEVERITY RATING SCALE - C-SSRS
1. IN THE PAST MONTH, HAVE YOU WISHED YOU WERE DEAD OR WISHED YOU COULD GO TO SLEEP AND NOT WAKE UP?: NO
2. HAVE YOU ACTUALLY HAD ANY THOUGHTS OF KILLING YOURSELF?: NO
6. HAVE YOU EVER DONE ANYTHING, STARTED TO DO ANYTHING, OR PREPARED TO DO ANYTHING TO END YOUR LIFE?: NO

## 2024-04-14 ASSESSMENT — ENCOUNTER SYMPTOMS
CHILLS: 0
EYE PAIN: 0
COUGH: 0
EYE REDNESS: 0
VOMITING: 0
SHORTNESS OF BREATH: 1
DIZZINESS: 0
HEADACHES: 0
FATIGUE: 0
DYSURIA: 0
NAUSEA: 1
RHINORRHEA: 0

## 2024-04-14 ASSESSMENT — PAIN SCALES - GENERAL
PAINLEVEL_OUTOF10: 10 - WORST POSSIBLE PAIN
PAINLEVEL_OUTOF10: 8
PAINLEVEL_OUTOF10: 10 - WORST POSSIBLE PAIN
PAINLEVEL_OUTOF10: 10 - WORST POSSIBLE PAIN

## 2024-04-14 ASSESSMENT — PAIN - FUNCTIONAL ASSESSMENT
PAIN_FUNCTIONAL_ASSESSMENT: 0-10

## 2024-04-14 ASSESSMENT — PAIN DESCRIPTION - PAIN TYPE: TYPE: ACUTE PAIN

## 2024-04-14 ASSESSMENT — PAIN DESCRIPTION - PROGRESSION: CLINICAL_PROGRESSION: GRADUALLY IMPROVING

## 2024-04-14 NOTE — ED PROVIDER NOTES
HPI   Chief Complaint   Patient presents with    Sickle Cell Pain Crisis       HPI     Patient is a 19-year-old male with past medical history significant for sickle cell anemia and acute chest presenting to the emergency department for sickle cell type pain and shortness of breath. Patient states that since this morning, his sickle cell pain has been out of control and unrelenting even after taking home medications. He also states that he has a history of acute chest and with his shortness of breath at this time feels like this pain is similar to that. His typical sickle cell pain includes body wide pain especially in his back. He denies hematemesis. Denies changes in skin color. Denies yellowing of his eyes. Denies recent illness.               No data recorded                   Patient History   Past Medical History:   Diagnosis Date    Personal history of diseases of the blood and blood-forming organs and certain disorders involving the immune mechanism     History of sickle cell anemia     Past Surgical History:   Procedure Laterality Date    CT GUIDED PERCUTANEOUS BIOPSY BONE DEEP  7/17/2023    CT GUIDED PERCUTANEOUS BIOPSY BONE DEEP 7/17/2023 [facility] CT    MR HEAD ANGIO WO IV CONTRAST  10/8/2014    MR HEAD ANGIO WO IV CONTRAST 10/8/2014 [facility] CLINICAL LEGACY    MR HEAD ANGIO WO IV CONTRAST  7/10/2023    MR HEAD ANGIO WO IV CONTRAST 7/10/2023 [facility] MRI    MR NECK ANGIO WO IV CONTRAST  10/8/2014    MR NECK ANGIO WO IV CONTRAST 10/8/2014 [facility] CLINICAL LEGACY    MR NECK ANGIO WO IV CONTRAST  7/10/2023    MR NECK ANGIO WO IV CONTRAST 7/10/2023 [facility] MRI     Family History   Problem Relation Name Age of Onset    Diabetes Paternal Grandmother      Diabetes Paternal Grandfather       Social History     Tobacco Use    Smoking status: Never    Smokeless tobacco: Never   Substance Use Topics    Alcohol use: Yes     Comment: per patient; socially    Drug use: Yes     Types: Marijuana       Physical Exam   ED Triage Vitals  [04/14/24 1404]   Temperature Heart Rate Respirations BP   36.7 °C (98.1 °F) 60 20 117/63      Pulse Ox Temp src Heart Rate Source Patient Position   99 % -- -- --      BP Location FiO2 (%)     -- --       Physical Exam  Constitutional:       Appearance: Normal appearance.      Comments: In obvious pain/discomfort   HENT:      Head: Normocephalic and atraumatic.   Eyes:      Extraocular Movements: Extraocular movements intact.      Pupils: Pupils are equal, round, and reactive to light.   Cardiovascular:      Rate and Rhythm: Normal rate and regular rhythm.   Pulmonary:      Effort: Pulmonary effort is normal.      Breath sounds: Normal breath sounds.      Comments: However no wheezes rales or rhonchi on auscultation. Normal respiratory effort  Abdominal:      General: Abdomen is flat.      Palpations: Abdomen is soft.   Musculoskeletal:         General: No swelling or signs of injury. Normal range of motion.   Skin:     General: Skin is warm and dry.      Coloration: Skin is not jaundiced.   Neurological:      General: No focal deficit present.      Mental Status: He is alert and oriented to person, place, and time.       ED Course & MDM   ED Course as of 04/14/24 1816   Sun Apr 14, 2024   1715 ECG 12 lead  EKG Interpretation:   Rate: 68 BPM  Rhythm: Sinus rhythm  Axis: Regular  Intervals/waves: Regular KS interval with narrow QRS regular KS interval with narrow QRS  ST changes: None appreciated [HELENA]      ED Course User Index  [HELENA] Cisco Tariq MD         Diagnoses as of 04/14/24 1816   Vaso-occlusive pain due to sickle cell disease (Multi)       Medical Decision Making  Patient is a 19-year-old male with past medical history significant for sickle cell anemia and acute chest presenting to the emergency department with body wide pain and shortness of breath. On presentation, patient is hemodynamically stable saturating 99% on room air. On physical exam, there is no abnormal breath sounds and patient is not in  distress. Given history of acute chest and patient's description of symptoms, chest x-ray was obtained showing no evidence of acute cardiopulmonary processes that would suggest acute chest syndrome. Laboratory workup was initiated showing patient at baseline LDH with 3.5% reticulocyte count, bilirubin direct within normal limits and CBC with hemoglobin at baseline, no evidence of leukocytosis. CMP shows mild hyponatremia but otherwise no severe electrolyte derangements. Given imaging and laboratory findings, low concern that patient is experiencing acute chest or aplastic crisis at this time. For symptomatic control, patient given 975 mg of Tylenol, 15 mg of Toradol, and 4 mg of Zofran. Patient does not appear to have a care plan, but based off of previous providers plan for patient, patient administered 8 mg of morphine every hour for 3 doses. On signout to oncoming provider, plan is to reassess patient after final dose of morphine and determine disposition based off of patient's pain levels and if he thinks he will be able to support himself at home.    On reevaluation prior to signout, patient states that he is unable to manage his pain at home and will require admission to the hospital for further pain. Plan discussed with patient and oncoming provider who are agreeable.    Procedure  Procedures     Cisco Tariq MD  Resident  04/14/24 1737       Cisco Tariq MD  Resident  04/14/24 1737       Cisco Tariq MD  Resident  04/14/24 5023

## 2024-04-15 ENCOUNTER — APPOINTMENT (OUTPATIENT)
Dept: PEDIATRIC HEMATOLOGY/ONCOLOGY | Facility: HOSPITAL | Age: 19
End: 2024-04-15
Payer: MEDICAID

## 2024-04-15 LAB
BASOPHILS # BLD AUTO: 0.03 X10*3/UL (ref 0–0.1)
BASOPHILS NFR BLD AUTO: 0.3 %
EOSINOPHIL # BLD AUTO: 0.59 X10*3/UL (ref 0–0.7)
EOSINOPHIL NFR BLD AUTO: 6.7 %
ERYTHROCYTE [DISTWIDTH] IN BLOOD BY AUTOMATED COUNT: 15.5 % (ref 11.5–14.5)
HCT VFR BLD AUTO: 27.8 % (ref 41–52)
HGB BLD-MCNC: 10 G/DL (ref 13.5–17.5)
HGB RETIC QN: 33 PG (ref 28–38)
IMM GRANULOCYTES # BLD AUTO: 0.03 X10*3/UL (ref 0–0.7)
IMM GRANULOCYTES NFR BLD AUTO: 0.3 % (ref 0–0.9)
IMMATURE RETIC FRACTION: 39.3 %
LYMPHOCYTES # BLD AUTO: 2.11 X10*3/UL (ref 1.2–4.8)
LYMPHOCYTES NFR BLD AUTO: 23.8 %
MCH RBC QN AUTO: 30.9 PG (ref 26–34)
MCHC RBC AUTO-ENTMCNC: 36 G/DL (ref 32–36)
MCV RBC AUTO: 86 FL (ref 80–100)
MONOCYTES # BLD AUTO: 1.03 X10*3/UL (ref 0.1–1)
MONOCYTES NFR BLD AUTO: 11.6 %
NEUTROPHILS # BLD AUTO: 5.06 X10*3/UL (ref 1.2–7.7)
NEUTROPHILS NFR BLD AUTO: 57.3 %
NRBC BLD-RTO: 0.9 /100 WBCS (ref 0–0)
PLATELET # BLD AUTO: 256 X10*3/UL (ref 150–450)
RBC # BLD AUTO: 3.24 X10*6/UL (ref 4.5–5.9)
RETICS #: 0.12 X10*6/UL (ref 0.02–0.12)
RETICS/RBC NFR AUTO: 3.9 % (ref 0.5–2)
WBC # BLD AUTO: 8.9 X10*3/UL (ref 4.4–11.3)

## 2024-04-15 PROCEDURE — 1100000001 HC PRIVATE ROOM DAILY

## 2024-04-15 PROCEDURE — 2500000005 HC RX 250 GENERAL PHARMACY W/O HCPCS

## 2024-04-15 PROCEDURE — 2500000004 HC RX 250 GENERAL PHARMACY W/ HCPCS (ALT 636 FOR OP/ED)

## 2024-04-15 PROCEDURE — 2500000004 HC RX 250 GENERAL PHARMACY W/ HCPCS (ALT 636 FOR OP/ED): Performed by: STUDENT IN AN ORGANIZED HEALTH CARE EDUCATION/TRAINING PROGRAM

## 2024-04-15 PROCEDURE — 85025 COMPLETE CBC W/AUTO DIFF WBC: CPT

## 2024-04-15 PROCEDURE — 1130000001 HC PRIVATE PED ROOM DAILY

## 2024-04-15 PROCEDURE — 2500000001 HC RX 250 WO HCPCS SELF ADMINISTERED DRUGS (ALT 637 FOR MEDICARE OP)

## 2024-04-15 PROCEDURE — 85045 AUTOMATED RETICULOCYTE COUNT: CPT

## 2024-04-15 PROCEDURE — 2500000002 HC RX 250 W HCPCS SELF ADMINISTERED DRUGS (ALT 637 FOR MEDICARE OP, ALT 636 FOR OP/ED)

## 2024-04-15 PROCEDURE — 36415 COLL VENOUS BLD VENIPUNCTURE: CPT

## 2024-04-15 RX ORDER — OLANZAPINE 10 MG/1
10 TABLET ORAL NIGHTLY
Status: DISCONTINUED | OUTPATIENT
Start: 2024-04-15 | End: 2024-04-16

## 2024-04-15 RX ORDER — ONDANSETRON HYDROCHLORIDE 2 MG/ML
8 INJECTION, SOLUTION INTRAVENOUS EVERY 6 HOURS
Status: DISCONTINUED | OUTPATIENT
Start: 2024-04-15 | End: 2024-04-19

## 2024-04-15 RX ORDER — HYDROXYUREA 500 MG/1
1000 CAPSULE ORAL
Status: DISCONTINUED | OUTPATIENT
Start: 2024-04-16 | End: 2024-04-20 | Stop reason: HOSPADM

## 2024-04-15 RX ORDER — DIPHENHYDRAMINE HYDROCHLORIDE 50 MG/ML
10 INJECTION INTRAMUSCULAR; INTRAVENOUS EVERY 6 HOURS PRN
Status: DISCONTINUED | OUTPATIENT
Start: 2024-04-15 | End: 2024-04-20 | Stop reason: HOSPADM

## 2024-04-15 RX ORDER — BUDESONIDE AND FORMOTEROL FUMARATE DIHYDRATE 80; 4.5 UG/1; UG/1
2 AEROSOL RESPIRATORY (INHALATION) 2 TIMES DAILY
Status: DISCONTINUED | OUTPATIENT
Start: 2024-04-15 | End: 2024-04-20 | Stop reason: HOSPADM

## 2024-04-15 RX ADMIN — MORPHINE SULFATE 8 MG: 4 INJECTION INTRAVENOUS at 15:10

## 2024-04-15 RX ADMIN — MORPHINE SULFATE 8 MG: 4 INJECTION INTRAVENOUS at 11:18

## 2024-04-15 RX ADMIN — KETOROLAC TROMETHAMINE 15 MG: 30 INJECTION, SOLUTION INTRAMUSCULAR; INTRAVENOUS at 05:00

## 2024-04-15 RX ADMIN — ONDANSETRON 8 MG: 2 INJECTION INTRAMUSCULAR; INTRAVENOUS at 21:34

## 2024-04-15 RX ADMIN — MORPHINE SULFATE 8 MG: 4 INJECTION INTRAVENOUS at 09:01

## 2024-04-15 RX ADMIN — LIDOCAINE 1 PATCH: 4 PATCH TOPICAL at 23:45

## 2024-04-15 RX ADMIN — OLANZAPINE 10 MG: 10 TABLET, FILM COATED ORAL at 21:31

## 2024-04-15 RX ADMIN — MORPHINE SULFATE 8 MG: 4 INJECTION INTRAVENOUS at 01:17

## 2024-04-15 RX ADMIN — FAMOTIDINE 20 MG: 10 INJECTION INTRAVENOUS at 22:05

## 2024-04-15 RX ADMIN — DEXTROSE AND SODIUM CHLORIDE 75 ML/HR: 5; 900 INJECTION, SOLUTION INTRAVENOUS at 21:31

## 2024-04-15 RX ADMIN — MORPHINE SULFATE 8 MG: 4 INJECTION INTRAVENOUS at 13:18

## 2024-04-15 RX ADMIN — MORPHINE SULFATE 8 MG: 4 INJECTION INTRAVENOUS at 03:05

## 2024-04-15 RX ADMIN — MORPHINE SULFATE 8 MG: 4 INJECTION INTRAVENOUS at 23:11

## 2024-04-15 RX ADMIN — ONDANSETRON 8 MG: 2 INJECTION INTRAMUSCULAR; INTRAVENOUS at 03:42

## 2024-04-15 RX ADMIN — MORPHINE SULFATE 8 MG: 4 INJECTION INTRAVENOUS at 17:27

## 2024-04-15 RX ADMIN — FAMOTIDINE 20 MG: 10 INJECTION INTRAVENOUS at 09:01

## 2024-04-15 RX ADMIN — ONDANSETRON 8 MG: 2 INJECTION INTRAMUSCULAR; INTRAVENOUS at 13:15

## 2024-04-15 RX ADMIN — ACETAMINOPHEN 1000 MG: 10 INJECTION, SOLUTION INTRAVENOUS at 03:06

## 2024-04-15 RX ADMIN — BUDESONIDE AND FORMOTEROL FUMARATE DIHYDRATE 2 PUFF: 80; 4.5 AEROSOL RESPIRATORY (INHALATION) at 21:31

## 2024-04-15 RX ADMIN — POLYETHYLENE GLYCOL 3350 17 G: 17 POWDER, FOR SOLUTION ORAL at 09:01

## 2024-04-15 RX ADMIN — KETOROLAC TROMETHAMINE 15 MG: 30 INJECTION, SOLUTION INTRAMUSCULAR; INTRAVENOUS at 11:18

## 2024-04-15 RX ADMIN — MORPHINE SULFATE 8 MG: 4 INJECTION INTRAVENOUS at 04:59

## 2024-04-15 RX ADMIN — ACETAMINOPHEN 1000 MG: 10 INJECTION, SOLUTION INTRAVENOUS at 15:10

## 2024-04-15 RX ADMIN — ACETAMINOPHEN 1000 MG: 10 INJECTION, SOLUTION INTRAVENOUS at 21:31

## 2024-04-15 RX ADMIN — DIPHENHYDRAMINE HYDROCHLORIDE 10 MG: 50 INJECTION INTRAMUSCULAR; INTRAVENOUS at 17:27

## 2024-04-15 RX ADMIN — MORPHINE SULFATE 8 MG: 4 INJECTION INTRAVENOUS at 19:00

## 2024-04-15 RX ADMIN — NALOXONE HYDROCHLORIDE 1 MCG/KG/HR: 0.4 INJECTION, SOLUTION INTRAMUSCULAR; INTRAVENOUS; SUBCUTANEOUS at 04:59

## 2024-04-15 RX ADMIN — DEXTROSE AND SODIUM CHLORIDE 75 ML/HR: 5; 900 INJECTION, SOLUTION INTRAVENOUS at 19:11

## 2024-04-15 RX ADMIN — KETOROLAC TROMETHAMINE 15 MG: 30 INJECTION, SOLUTION INTRAMUSCULAR; INTRAVENOUS at 17:27

## 2024-04-15 RX ADMIN — KETOROLAC TROMETHAMINE 15 MG: 30 INJECTION, SOLUTION INTRAMUSCULAR; INTRAVENOUS at 23:11

## 2024-04-15 RX ADMIN — SENNOSIDES 17.2 MG: 8.6 TABLET, FILM COATED ORAL at 21:31

## 2024-04-15 RX ADMIN — MORPHINE SULFATE 8 MG: 4 INJECTION INTRAVENOUS at 21:31

## 2024-04-15 RX ADMIN — ACETAMINOPHEN 1000 MG: 10 INJECTION, SOLUTION INTRAVENOUS at 09:01

## 2024-04-15 RX ADMIN — MORPHINE SULFATE 8 MG: 4 INJECTION INTRAVENOUS at 07:02

## 2024-04-15 ASSESSMENT — PAIN - FUNCTIONAL ASSESSMENT
PAIN_FUNCTIONAL_ASSESSMENT: UNABLE TO SELF-REPORT
PAIN_FUNCTIONAL_ASSESSMENT: 0-10

## 2024-04-15 ASSESSMENT — PAIN SCALES - GENERAL
PAINLEVEL_OUTOF10: 10 - WORST POSSIBLE PAIN

## 2024-04-15 NOTE — HOSPITAL COURSE
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 Pain first started this morning and was localized to his back. It then spread up his spine and now involves his whole body. Pain was uncontrollable with home medications. He tried taking tylenol which did not help. Does not know timing of last dose. Pain currently 10 out of 10. Denies fevers, chills, rhinorrhea, congestion, cough. He endorses shortness of breath but states that it is secondary to pain. Pain today is similar to pain crises in the past. Last BM earlier today.  Admitted to [facility] because he is followed by the pediatric sickle cell team and has not transitioned yet.     [facility] Adult ED Course:  Vitals: T 36.7 HR 60 RR 30 /63 SpO2 99% RA  Labs:  - CBC: 9.0>11.0/28.8<290  - retic: 3.5%  -   - RFP: 135/4.3/103/25/15/0.69<75 Ca 10.0  - HFP: AST/ALT 24/18, AlkPhos 67, Tbili 1.0, dbili 0.2  - T&S: O+ Ab-  Imaging:  - 2v CXR: Clear lung fields without evidence of acute cardiopulmonary process.   Interventions:  - tylenol 975 mg x1  - toradol 15 mg x1  - morphine 8 mg x3  - zofran 4 mg x1    Floor Course (4/14 -   Started on narcan GGT due to pruritus increased to 1.5 mcg/kg/hr on 4/15 due to continued pruritus. DC PCA 4/17.     4/17 PM febrile to 39.4 with increased work of breathing, empiric ceftriaxone started after blood culture drawn and 2 view chest xray at that time showed left pleural effusion.     4/18 started azithromycin. Partial exchange transfusion completed due to high risk of mortality in adults with ACS.    4/19 Held hydroxyurea given decreasing absolute reticulocyte count.

## 2024-04-15 NOTE — H&P
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 Pain first started this morning and was localized to his back. It then spread up his spine and now involves his whole body. Pain was uncontrollable with home medications. He tried taking tylenol which did not help. Does not know timing of last dose. Pain currently 10 out of 10. Denies fevers, chills, rhinorrhea, congestion, cough. He endorses shortness of breath but states that it is secondary to pain. Pain today is similar to pain crises in the past. Last BM earlier today.    Admitted to Psychiatric because he is followed by the pediatric sickle cell team and has not transitioned yet.     Adult ED Course:  Vitals: T 36.7 HR 60 RR 30 /63 SpO2 99% RA  Labs:  - CBC: 9.0>11.0/28.8<290  - retic: 3.5%  -   - RFP: 135/4.3/103/25/15/0.69<75 Ca 10.0  - HFP: AST/ALT 24/18, AlkPhos 67, Tbili 1.0, dbili 0.2  - T&S: O+ Ab-  Imaging:  - 2v CXR: Clear lung fields without evidence of acute cardiopulmonary process.   Interventions:  - tylenol 975 mg x1  - toradol 15 mg x1  - morphine 8 mg x3  - zofran 4 mg x1    PMH: Hgb SC disease  PSH: bone bx  Meds: hydroxyurea, cannot remember others due to pain  Allergies: NKDA        Past Medical History:  Past Medical History:   Diagnosis Date    Personal history of diseases of the blood and blood-forming organs and certain disorders involving the immune mechanism     History of sickle cell anemia       Surgical History:  Past Surgical History:   Procedure Laterality Date    CT GUIDED PERCUTANEOUS BIOPSY BONE DEEP  7/17/2023    CT GUIDED PERCUTANEOUS BIOPSY BONE DEEP 7/17/2023 [facility] CT    MR HEAD ANGIO WO IV CONTRAST  10/8/2014    MR HEAD ANGIO WO IV CONTRAST 10/8/2014 [facility] CLINICAL LEGACY    MR HEAD ANGIO WO IV CONTRAST  7/10/2023     MR HEAD ANGIO WO IV CONTRAST 7/10/2023 [facility] MRI    MR NECK ANGIO WO IV CONTRAST  10/8/2014    MR NECK ANGIO WO IV CONTRAST 10/8/2014 [facility] CLINICAL LEGACY    MR NECK ANGIO WO IV CONTRAST  7/10/2023    MR NECK ANGIO WO IV CONTRAST 7/10/2023 [facility] MRI       Family History:  Family History   Problem Relation Name Age of Onset    Diabetes Paternal Grandmother      Diabetes Paternal Grandfather         Medications Prior to Admission:  Current Outpatient Medications   Medication Instructions    albuterol 90 mcg/actuation inhaler Inhale 2-4 puffs every 4-6 hours as needed for shortness of breath or wheezing    amphetamine-dextroamphetamine XR (Adderall XR) 15 mg 24 hr capsule Adderall XR 15 MG Oral Capsule Extended Release 24 Hour   Quantity: 30  Refills: 0        Start : 2-Sep-2016   Active    bisacodyl (DULCOLAX) 5 mg, oral, Daily PRN, Do not crush, chew, or split.    dimethicone-colloidal oatmeaL (Aveeno) 1.2 % lotion 1 Application, Every 12 hours    food supplemt, lactose-reduced (Ensure Original) 0.04-1.05 gram-kcal/mL liquid Ensure Oral Liquid   Refills: 0        Start : 3-Aug-2017   Active    hydroxyurea (HYDREA) 1,000 mg, oral, Daily, Take at the same time each day.    hydrOXYzine pamoate (VISTARIL) 25 mg, oral, 2 times daily    lamoTRIgine (LAMICTAL) 25 mg, oral, Daily    loratadine (Claritin) 10 mg tablet 1 tablet, oral, Nightly    multivitamin (Chewable-Flory) tablet,chewable 1 tablet, oral, Daily    OLANZapine (ZyPREXA) 10 mg tablet TAKE 1 TABLET(10 MG) BY MOUTH DAILY AT BEDTIME    oxyCODONE (ROXICODONE) 5 mg, oral, Every 4 hours PRN    Symbicort 80-4.5 mcg/actuation inhaler 2 puffs, inhalation, 2 times daily RT, And 2 puffs every 4 hours as needed.    Vitamin D3 2,000 Units, oral, Daily       Allergies:  No Known Allergies     Immunizations:  unknown    Social History:  Social History     Socioeconomic History    Marital status: Single     Spouse name: Not on file    Number of children: Not on file    Years of  education: Not on file    Highest education level: Not on file   Occupational History    Not on file   Tobacco Use    Smoking status: Never    Smokeless tobacco: Never   Substance and Sexual Activity    Alcohol use: Yes     Comment: per patient; socially    Drug use: Yes     Types: Marijuana    Sexual activity: Not on file   Other Topics Concern    Not on file   Social History Narrative    Not on file     Social Determinants of Health     Financial Resource Strain: Patient Unable To Answer (4/14/2024)    Overall Financial Resource Strain (CARDIA)     Difficulty of Paying Living Expenses: Patient unable to answer   Food Insecurity: Not on File (1/9/2023)    Received from [facility]     Food Insecurity     Food: 0   Transportation Needs: Not on File (1/9/2023)    Received from [facility]     Transportation Needs     Transportation: 0   Physical Activity: Not on File (1/9/2023)    Received from [facility]     Physical Activity     Physical Activity: 0   Stress: Not on File (1/9/2023)    Received from [facility]     Stress     Stress: 0   Social Connections: Not at Risk (1/16/2023)    Received from [facility]     Social Connections     Social Connections and Isolation: 1   Intimate Partner Violence: Not on file   Housing Stability: Unknown (4/14/2024)    Housing Stability Vital Sign     Unable to Pay for Housing in the Last Year: Patient unable to answer     Number of Places Lived in the Last Year: Not on file     Unstable Housing in the Last Year: Not on file       Review of Systems   Constitutional:  Negative for chills and fatigue.   HENT:  Negative for congestion and rhinorrhea.    Eyes:  Negative for pain and redness.   Respiratory:  Positive for shortness of breath. Negative for cough.    Gastrointestinal:  Positive for nausea. Negative for vomiting.   Genitourinary:  Negative for decreased urine volume and dysuria.   Musculoskeletal:         Diffuse pain over bones and muscles   Skin:  Negative for pallor.   Neurological:  Negative for  "dizziness and headaches.       Objective   Vitals:      2/16/2024     8:44 AM 2/16/2024     8:52 AM 2/16/2024    12:12 PM 3/29/2024    10:28 AM 4/14/2024     2:04 PM 4/14/2024     7:30 PM 4/14/2024     9:41 PM   Vitals   Systolic 84 92 93 106 117 128 100   Diastolic 47 46 54 67 63 73 59   Heart Rate 65  64 79 60 57 69   Temp 36.9 °C (98.4 °F)  36.8 °C (98.2 °F) 36.6 °C (97.8 °F) 36.7 °C (98.1 °F) 36.9 °C (98.5 °F) 36.4 °C (97.5 °F)   Resp 20  20 18 20 18 18   Height (in)    1.772 m (5' 9.76\") 1.778 m (5' 10\")  1.8 m (5' 10.87\")   Weight (lb)    151.02 152  154.32   BMI    21.82 kg/m2 21.81 kg/m2  21.61 kg/m2   BSA (m2)    1.84 m2 1.84 m2  1.87 m2   Visit Report    Report          Physical Exam  Vitals reviewed.   Constitutional:       General: He is awake. He is not in acute distress.     Comments: Jittery 2/2 pain, appears uncomfortable   HENT:      Head: Normocephalic and atraumatic.      Nose: Nose normal.      Mouth/Throat:      Mouth: Mucous membranes are moist.      Pharynx: Oropharynx is clear. No posterior oropharyngeal erythema.   Eyes:      General: Scleral icterus present.      Extraocular Movements: Extraocular movements intact.   Cardiovascular:      Rate and Rhythm: Normal rate and regular rhythm.      Pulses: Normal pulses.      Heart sounds: Normal heart sounds.   Pulmonary:      Effort: Pulmonary effort is normal. No respiratory distress.      Breath sounds: Normal breath sounds. No wheezing, rhonchi or rales.   Abdominal:      General: Abdomen is flat. Bowel sounds are normal. There is no distension.      Palpations: Abdomen is soft.      Tenderness: There is abdominal tenderness (mild).   Musculoskeletal:         General: Normal range of motion.      Cervical back: Normal range of motion and neck supple.   Lymphadenopathy:      Cervical: No cervical adenopathy.   Skin:     General: Skin is warm and dry.      Capillary Refill: Capillary refill takes less than 2 seconds.   Neurological:      " General: No focal deficit present.      Mental Status: He is alert and oriented to person, place, and time.      Cranial Nerves: No cranial nerve deficit.      Sensory: Sensation is intact.      Motor: Motor function is intact.      Gait: Gait is intact.   Psychiatric:         Behavior: Behavior normal. Behavior is cooperative.      Comments: Upset that his pain came on suddenly during a good day         Lab Results:  Results for orders placed or performed during the hospital encounter of 04/14/24 (from the past 24 hour(s))   Bilirubin, Direct   Result Value Ref Range    Bilirubin, Direct 0.2 0.0 - 0.3 mg/dL   Reticulocytes   Result Value Ref Range    Retic % 3.5 (H) 0.5 - 2.0 %    Retic Absolute 0.122 (H) 0.022 - 0.118 x10*6/uL    Reticulocyte Hemoglobin 33 28 - 38 pg    Immature Retic fraction 38.1 (H) <=16.0 %   Lactate Dehydrogenase   Result Value Ref Range     (H) 84 - 246 U/L   CBC and Auto Differential   Result Value Ref Range    WBC 9.0 4.4 - 11.3 x10*3/uL    nRBC 0.9 (H) 0.0 - 0.0 /100 WBCs    RBC 3.48 (L) 4.50 - 5.90 x10*6/uL    Hemoglobin 11.0 (L) 13.5 - 17.5 g/dL    Hematocrit 28.8 (L) 41.0 - 52.0 %    MCV 83 80 - 100 fL    MCH 31.6 26.0 - 34.0 pg    MCHC 38.2 (H) 32.0 - 36.0 g/dL    RDW 15.8 (H) 11.5 - 14.5 %    Platelets 290 150 - 450 x10*3/uL    Neutrophils % 53.3 40.0 - 80.0 %    Immature Granulocytes %, Automated 0.6 0.0 - 0.9 %    Lymphocytes % 27.7 13.0 - 44.0 %    Monocytes % 8.4 2.0 - 10.0 %    Eosinophils % 9.6 0.0 - 6.0 %    Basophils % 0.4 0.0 - 2.0 %    Neutrophils Absolute 4.78 1.20 - 7.70 x10*3/uL    Immature Granulocytes Absolute, Automated 0.05 0.00 - 0.70 x10*3/uL    Lymphocytes Absolute 2.48 1.20 - 4.80 x10*3/uL    Monocytes Absolute 0.75 0.10 - 1.00 x10*3/uL    Eosinophils Absolute 0.86 (H) 0.00 - 0.70 x10*3/uL    Basophils Absolute 0.04 0.00 - 0.10 x10*3/uL   Comprehensive Metabolic Panel   Result Value Ref Range    Glucose 75 74 - 99 mg/dL    Sodium 135 (L) 136 - 145 mmol/L     Potassium 4.3 3.5 - 5.3 mmol/L    Chloride 103 98 - 107 mmol/L    Bicarbonate 25 21 - 32 mmol/L    Anion Gap 11 10 - 20 mmol/L    Urea Nitrogen 15 6 - 23 mg/dL    Creatinine 0.69 0.50 - 1.30 mg/dL    eGFR >90 >60 mL/min/1.73m*2    Calcium 10.0 8.6 - 10.6 mg/dL    Albumin 4.5 3.4 - 5.0 g/dL    Alkaline Phosphatase 67 33 - 120 U/L    Total Protein 7.7 6.4 - 8.2 g/dL    AST 24 9 - 39 U/L    Bilirubin, Total 1.0 0.0 - 1.2 mg/dL    ALT 18 10 - 52 U/L   Type and Screen   Result Value Ref Range    ABO TYPE O     Rh TYPE POS     ANTIBODY SCREEN NEG        Imaging Results:  XR chest 2 views    Result Date: 4/14/2024  Interpreted By:  Toño Mahoney and Liller Gregory STUDY: XR CHEST 2 VIEWS;  4/14/2024 2:50 pm   INDICATION: Signs/Symptoms:Sickle cell pain crisis with chest pain and SOB.   COMPARISON: 02/12/2024   ACCESSION NUMBER(S): [id number]   ORDERING CLINICIAN: HARRY NIEVES   FINDINGS: PA and lateral radiographs of the chest were provided.   CARDIOMEDIASTINAL SILHOUETTE: Cardiomediastinal silhouette is normal in size and configuration.   LUNGS: Lungs are clear. There is no focal consolidation, pleural effusion, or pneumothorax.   ABDOMEN: No remarkable upper abdominal findings.   BONES: No osseous injury. Osseous manifestations of sickle cell disease again noted within the thoracic spine.       Clear lung fields without evidence of acute cardiopulmonary process.   I personally reviewed the images/study and I agree with the findings as stated above by resident physician, Dr. Jose Agudelo. The study was interpreted at [facility] in [facility].   MACRO: none.   Signed by: Toño Mahoney 4/14/2024 3:27 PM Dictation workstation:   [id number]      Assessment/Plan   Hospital Problems:  Principal Problem:    Sickle cell disease with crisis (Multi)      Josue is a 19 y.o. male with Hgb SC disease presenting with diffuse pain, consistent with VOE. He was originally treated in  the adult ED, however he was admitted to the pediatric hospital because he follows with the pediatric sickle cell team. He endorsed shortness of breath in the ED; CXR did not show any focal consolidations concerning for ACS. Lower concern for ACS due to lack of fevers or hypoxemia. Will treat pain with standard pain plan. He was unable to remember his home medications due to his pain, so will need confirm in the morning.     Plan:  HEME  [x] blood consent signed  #HgbSC  - baseline: Hgb 10-11, retic 3-4%  - admission: Hgb 11.0, retic 3.5%  [ ] confirm home hydroxyurea dosing with sickle cell team    CNS   #VOE  - IV morphine 8 mg q2h  - IV toradol 15 mg q6h  - IV tylenol 1g q6h  - lidocaine patch    RESP  #ACS ppx  [X] RT notified of patient  - ACS carepath    MIMI  #nutrition  - regular diet  #hydration  - D5 NS @ 3/4 mIVF  #GI ppx  - IV famotidine  20 mg BID  #bowel regimen  - Miralax 1 cap daily  - Senna BID  #nausea  - IV zofran q8h PRN    Labs: AM CBC, retic; T&S q72h    Patient discussed with fellow Dr. Francois.    Kat Neri MD  Pediatrics PGY-2   I saw and evaluated the patient. I personally obtained the key and critical portions of the history and physical exam or was physically present for key and critical portions performed by the resident/fellow. I reviewed the resident/fellow's documentation and discussed the patient with the resident/fellow. I agree with the resident/fellow's medical decision making as documented in the note.

## 2024-04-15 NOTE — PROGRESS NOTES
Massage Therapy / Acupuncture Note: Josue was sleeping when I visited today.  I will continue to check in.

## 2024-04-15 NOTE — PROGRESS NOTES
Josue Rangel is a 19 y.o. male on day 1 of admission presenting with Sickle cell disease with crisis (Multi).      Subjective   Overnight, Josue had pruritus so narcan GGT was started. This morning Josue rates his pain as a 7/10 and has continued stomach pain. He last had a bowel movement on 4/14 which he describes as hard and difficult.     Objective     Vitals  Temp:  [36.4 °C (97.5 °F)-36.9 °C (98.5 °F)] 36.7 °C (98.1 °F)  Heart Rate:  [56-81] 71  Resp:  [16-18] 18  BP: ()/(53-73) 95/53  PEWS Score: 0    Pain Score: 10 - Worst possible pain      Peripheral IV 04/14/24 20 G Left;Ventral Hand (Active)   Number of days: 1       Intake/Output Summary (Last 24 hours) at 4/15/2024 1532  Last data filed at 4/15/2024 1520  Gross per 24 hour   Intake 1273.69 ml   Output 0 ml   Net 1273.69 ml       Physical Exam  Constitutional:       General: He is not in acute distress.     Appearance: He is not ill-appearing.      Comments: Sleeping on couch in patient room    HENT:      Head: Normocephalic and atraumatic.      Nose: No congestion or rhinorrhea.      Mouth/Throat:      Mouth: Mucous membranes are moist.   Eyes:      Conjunctiva/sclera: Conjunctivae normal.      Pupils: Pupils are equal, round, and reactive to light.   Cardiovascular:      Rate and Rhythm: Normal rate and regular rhythm.      Pulses: Normal pulses.   Pulmonary:      Effort: Pulmonary effort is normal. No respiratory distress.   Abdominal:      General: There is no distension.      Palpations: Abdomen is soft.      Tenderness: There is no abdominal tenderness.   Musculoskeletal:         General: Normal range of motion.   Skin:     General: Skin is warm.      Capillary Refill: Capillary refill takes less than 2 seconds.      Findings: No bruising or rash.   Neurological:      General: No focal deficit present.   Psychiatric:         Mood and Affect: Mood normal.         Behavior: Behavior normal.         Relevant Results  Scheduled  medications  acetaminophen, 15 mg/kg, intravenous, q6h  famotidine, 20 mg, intravenous, q12h  [START ON 4/16/2024] hydroxyurea, 1,000 mg, oral, Once per day on Monday Tuesday Wednesday Thursday Friday  ketorolac, 15 mg, intravenous, q6h SERVANDO  lidocaine, 1 patch, transdermal, q24h  morphine, 8 mg, intravenous, q2h  OLANZapine, 10 mg, oral, Nightly  polyethylene glycol, 17 g, oral, Daily  sennosides, 17.2 mg, oral, BID      Continuous medications  D5 % and 0.9 % sodium chloride, 75 mL/hr, Last Rate: 75 mL/hr (04/14/24 1963)  naloxone, 1.5 mcg/kg/hr (Dosing Weight), Last Rate: 1 mcg/kg/hr (04/15/24 9410)      PRN medications  PRN medications: ondansetron    Results for orders placed or performed during the hospital encounter of 04/14/24 (from the past 24 hour(s))   CBC and Auto Differential   Result Value Ref Range    WBC 8.9 4.4 - 11.3 x10*3/uL    nRBC 0.9 (H) 0.0 - 0.0 /100 WBCs    RBC 3.24 (L) 4.50 - 5.90 x10*6/uL    Hemoglobin 10.0 (L) 13.5 - 17.5 g/dL    Hematocrit 27.8 (L) 41.0 - 52.0 %    MCV 86 80 - 100 fL    MCH 30.9 26.0 - 34.0 pg    MCHC 36.0 32.0 - 36.0 g/dL    RDW 15.5 (H) 11.5 - 14.5 %    Platelets 256 150 - 450 x10*3/uL    Neutrophils % 57.3 40.0 - 80.0 %    Immature Granulocytes %, Automated 0.3 0.0 - 0.9 %    Lymphocytes % 23.8 13.0 - 44.0 %    Monocytes % 11.6 2.0 - 10.0 %    Eosinophils % 6.7 0.0 - 6.0 %    Basophils % 0.3 0.0 - 2.0 %    Neutrophils Absolute 5.06 1.20 - 7.70 x10*3/uL    Immature Granulocytes Absolute, Automated 0.03 0.00 - 0.70 x10*3/uL    Lymphocytes Absolute 2.11 1.20 - 4.80 x10*3/uL    Monocytes Absolute 1.03 (H) 0.10 - 1.00 x10*3/uL    Eosinophils Absolute 0.59 0.00 - 0.70 x10*3/uL    Basophils Absolute 0.03 0.00 - 0.10 x10*3/uL   Reticulocytes   Result Value Ref Range    Retic % 3.9 (H) 0.5 - 2.0 %    Retic Absolute 0.125 (H) 0.022 - 0.118 x10*6/uL    Reticulocyte Hemoglobin 33 28 - 38 pg    Immature Retic fraction 39.3 (H) <=16.0 %     XR chest 2 views    Result Date:  4/14/2024  Interpreted By:  Toño Mahoney and Liller Gregory STUDY: XR CHEST 2 VIEWS;  4/14/2024 2:50 pm   INDICATION: Signs/Symptoms:Sickle cell pain crisis with chest pain and SOB.   COMPARISON: 02/12/2024   ACCESSION NUMBER(S): [id number]   ORDERING CLINICIAN: HARRY NIEVES   FINDINGS: PA and lateral radiographs of the chest were provided.   CARDIOMEDIASTINAL SILHOUETTE: Cardiomediastinal silhouette is normal in size and configuration.   LUNGS: Lungs are clear. There is no focal consolidation, pleural effusion, or pneumothorax.   ABDOMEN: No remarkable upper abdominal findings.   BONES: No osseous injury. Osseous manifestations of sickle cell disease again noted within the thoracic spine.       Clear lung fields without evidence of acute cardiopulmonary process.   I personally reviewed the images/study and I agree with the findings as stated above by resident physician, Dr. Jose Agudelo. The study was interpreted at [facility] in [facility].   MACRO: none.   Signed by: Toño Mahoney 4/14/2024 3:27 PM Dictation workstation:   [id number]       Assessment/Plan     Principal Problem:    Sickle cell disease with crisis (Multi)    Josue is a 20 yo male with HgB SC disease presenting with diffuse pain, consistent with VOE. Low concern for ACS due to no focal exam findings, as well as CXR obtained in the ED which did not show any focal consolidations. Today he is rating his pain as 7/10 which is improvement from the 10/10 pain he was in last night. He does endorse continued pruritus so narcn ggt will be increased to 1.5 mcg/kg/hr. Spoke with sickle cell team, as well as Josue and confirmed home hydroxyurea dosing of 1000 mg Monday through Friday. Josue denies any recent missed doses. In terms of abdominal pain this may be in the setting of constipation due to patient endorsing last BM was difficult. Will continue current bowel regimen of Miralax 1 cap daily along with  Senna BID.      HEME  [x] blood consent signed  #HgbSC  - baseline: Hgb 10-11, retic 3-4%  - admission: Hgb 11.0, retic 3.5%  - continue home hydroxyurea 1000 mg Monday through Friday     CNS   #VOE  - IV morphine 8 mg q2h  - IV toradol 15 mg q6h  - IV tylenol 1g q6h  - lidocaine patch  #pruritus   - narcan ggt 1.5 mcg/kg/hr     RESP  #ACS ppx  [X] RT notified of patient  - ACS carepath  #mild intermittent asthma   - continue home symbicort 2 puffs BID     FENGI  #nutrition  - regular diet  #hydration  - D5 NS @ 3/4 mIVF  #GI ppx  - IV famotidine  20 mg BID  #bowel regimen  - Miralax 1 cap daily  - Senna BID  #nausea  - IV zofran q8h PRN    PSYCH  #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 Kadi Ramirez, DO  Pediatrics, PGY-1   I saw and evaluated the patient. I personally obtained the key and critical portions of the history and physical exam or was physically present for key and critical portions performed by the resident/fellow. I reviewed the resident/fellow's documentation and discussed the patient with the resident/fellow. I agree with the resident/fellow's medical decision making as documented in the note.

## 2024-04-15 NOTE — HOSPITAL COURSE
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 Pain first started this morning and was uncontrollable with home medications. He tried taking ***, last doses at ***. He also is experiencing shortness of breath. He has had episodes of acute chest in the past and he states that the pain and shortness of breath feels similar to previous episodes.   Last BM ***.  Admitted to [facility] because he is followed by the pediatric sickle cell team and has not transitioned yet.     Adult ED Course:  Vitals: T 36.7 HR 60 RR 30 /63 SpO2 99% RA  Labs:  - CBC: 9.0>11.0/28.8<290  - retic: 3.5%  -   - RFP: 135/4.3/103/25/15/0.69<75 Ca 10.0  - HFP: AST/ALT 24/18, AlkPhos 67, Tbili 1.0, dbili 0.2  - T&S: O+ Ab-  Imaging:  - 2v CXR: Clear lung fields without evidence of acute cardiopulmonary process.   Interventions:  - tylenol 975 mg x1  - toradol 15 mg x1  - morphine 8 mg x3  - zofran 4 mg x1

## 2024-04-16 ENCOUNTER — APPOINTMENT (OUTPATIENT)
Dept: RADIOLOGY | Facility: HOSPITAL | Age: 19
End: 2024-04-16
Payer: MEDICAID

## 2024-04-16 LAB
BASOPHILS # BLD AUTO: 0.02 X10*3/UL (ref 0–0.1)
BASOPHILS NFR BLD AUTO: 0.2 %
EOSINOPHIL # BLD AUTO: 0.29 X10*3/UL (ref 0–0.7)
EOSINOPHIL NFR BLD AUTO: 3.5 %
ERYTHROCYTE [DISTWIDTH] IN BLOOD BY AUTOMATED COUNT: 15.8 % (ref 11.5–14.5)
HCT VFR BLD AUTO: 26.2 % (ref 41–52)
HGB BLD-MCNC: 9.7 G/DL (ref 13.5–17.5)
HGB RETIC QN: 33 PG (ref 28–38)
IMM GRANULOCYTES # BLD AUTO: 0.05 X10*3/UL (ref 0–0.7)
IMM GRANULOCYTES NFR BLD AUTO: 0.6 % (ref 0–0.9)
IMMATURE RETIC FRACTION: 42.5 %
LYMPHOCYTES # BLD AUTO: 1.91 X10*3/UL (ref 1.2–4.8)
LYMPHOCYTES NFR BLD AUTO: 22.9 %
MCH RBC QN AUTO: 31.5 PG (ref 26–34)
MCHC RBC AUTO-ENTMCNC: 37 G/DL (ref 32–36)
MCV RBC AUTO: 85 FL (ref 80–100)
MONOCYTES # BLD AUTO: 1.01 X10*3/UL (ref 0.1–1)
MONOCYTES NFR BLD AUTO: 12.1 %
NEUTROPHILS # BLD AUTO: 5.06 X10*3/UL (ref 1.2–7.7)
NEUTROPHILS NFR BLD AUTO: 60.7 %
NRBC BLD-RTO: 1.6 /100 WBCS (ref 0–0)
PLATELET # BLD AUTO: 231 X10*3/UL (ref 150–450)
RBC # BLD AUTO: 3.08 X10*6/UL (ref 4.5–5.9)
RETICS #: 0.12 X10*6/UL (ref 0.02–0.12)
RETICS/RBC NFR AUTO: 4.1 % (ref 0.5–2)
WBC # BLD AUTO: 8.3 X10*3/UL (ref 4.4–11.3)

## 2024-04-16 PROCEDURE — 2500000005 HC RX 250 GENERAL PHARMACY W/O HCPCS

## 2024-04-16 PROCEDURE — 2500000004 HC RX 250 GENERAL PHARMACY W/ HCPCS (ALT 636 FOR OP/ED)

## 2024-04-16 PROCEDURE — 2500000004 HC RX 250 GENERAL PHARMACY W/ HCPCS (ALT 636 FOR OP/ED): Performed by: STUDENT IN AN ORGANIZED HEALTH CARE EDUCATION/TRAINING PROGRAM

## 2024-04-16 PROCEDURE — 1130000001 HC PRIVATE PED ROOM DAILY

## 2024-04-16 PROCEDURE — 71046 X-RAY EXAM CHEST 2 VIEWS: CPT

## 2024-04-16 PROCEDURE — 36415 COLL VENOUS BLD VENIPUNCTURE: CPT

## 2024-04-16 PROCEDURE — 85045 AUTOMATED RETICULOCYTE COUNT: CPT

## 2024-04-16 PROCEDURE — 85025 COMPLETE CBC W/AUTO DIFF WBC: CPT

## 2024-04-16 PROCEDURE — 99233 SBSQ HOSP IP/OBS HIGH 50: CPT | Performed by: PEDIATRICS

## 2024-04-16 PROCEDURE — 1100000001 HC PRIVATE ROOM DAILY

## 2024-04-16 PROCEDURE — 71046 X-RAY EXAM CHEST 2 VIEWS: CPT | Performed by: RADIOLOGY

## 2024-04-16 PROCEDURE — 2500000001 HC RX 250 WO HCPCS SELF ADMINISTERED DRUGS (ALT 637 FOR MEDICARE OP)

## 2024-04-16 PROCEDURE — 2500000006 HC RX 250 W HCPCS SELF ADMINISTERED DRUGS (ALT 637 FOR ALL PAYERS)

## 2024-04-16 RX ORDER — ACETAMINOPHEN 10 MG/ML
1000 INJECTION, SOLUTION INTRAVENOUS EVERY 6 HOURS
Status: DISCONTINUED | OUTPATIENT
Start: 2024-04-16 | End: 2024-04-17

## 2024-04-16 RX ORDER — HYDROMORPHONE HYDROCHLORIDE 1 MG/ML
1 INJECTION, SOLUTION INTRAMUSCULAR; INTRAVENOUS; SUBCUTANEOUS
Status: DISCONTINUED | OUTPATIENT
Start: 2024-04-16 | End: 2024-04-17

## 2024-04-16 RX ORDER — DEXTROSE MONOHYDRATE AND SODIUM CHLORIDE 5; .9 G/100ML; G/100ML
5 INJECTION, SOLUTION INTRAVENOUS CONTINUOUS
Status: DISCONTINUED | OUTPATIENT
Start: 2024-04-16 | End: 2024-04-20 | Stop reason: HOSPADM

## 2024-04-16 RX ORDER — QUETIAPINE FUMARATE 50 MG/1
50 TABLET, FILM COATED ORAL NIGHTLY
Status: DISCONTINUED | OUTPATIENT
Start: 2024-04-16 | End: 2024-04-17

## 2024-04-16 RX ADMIN — KETOROLAC TROMETHAMINE 15 MG: 30 INJECTION, SOLUTION INTRAMUSCULAR; INTRAVENOUS at 16:35

## 2024-04-16 RX ADMIN — ACETAMINOPHEN 1000 MG: 10 INJECTION, SOLUTION INTRAVENOUS at 09:00

## 2024-04-16 RX ADMIN — HYALURONIDASE 150 UNITS: 150 INJECTION SUBCUTANEOUS at 21:37

## 2024-04-16 RX ADMIN — NALOXONE HYDROCHLORIDE 2 MCG/KG/HR: 0.4 INJECTION, SOLUTION INTRAMUSCULAR; INTRAVENOUS; SUBCUTANEOUS at 21:58

## 2024-04-16 RX ADMIN — SENNOSIDES 17.2 MG: 8.6 TABLET, FILM COATED ORAL at 08:59

## 2024-04-16 RX ADMIN — MORPHINE SULFATE 8 MG: 4 INJECTION INTRAVENOUS at 13:12

## 2024-04-16 RX ADMIN — ONDANSETRON 8 MG: 2 INJECTION INTRAMUSCULAR; INTRAVENOUS at 14:44

## 2024-04-16 RX ADMIN — KETOROLAC TROMETHAMINE 15 MG: 30 INJECTION, SOLUTION INTRAMUSCULAR; INTRAVENOUS at 23:13

## 2024-04-16 RX ADMIN — ACETAMINOPHEN 1000 MG: 10 INJECTION, SOLUTION INTRAVENOUS at 22:27

## 2024-04-16 RX ADMIN — POLYETHYLENE GLYCOL 3350 17 G: 17 POWDER, FOR SOLUTION ORAL at 08:59

## 2024-04-16 RX ADMIN — DIPHENHYDRAMINE HYDROCHLORIDE 10 MG: 50 INJECTION INTRAMUSCULAR; INTRAVENOUS at 16:00

## 2024-04-16 RX ADMIN — FAMOTIDINE 20 MG: 10 INJECTION INTRAVENOUS at 22:03

## 2024-04-16 RX ADMIN — LIDOCAINE 1 PATCH: 4 PATCH TOPICAL at 23:13

## 2024-04-16 RX ADMIN — KETOROLAC TROMETHAMINE 15 MG: 30 INJECTION, SOLUTION INTRAMUSCULAR; INTRAVENOUS at 05:02

## 2024-04-16 RX ADMIN — BUDESONIDE AND FORMOTEROL FUMARATE DIHYDRATE 2 PUFF: 80; 4.5 AEROSOL RESPIRATORY (INHALATION) at 22:03

## 2024-04-16 RX ADMIN — MORPHINE SULFATE 8 MG: 4 INJECTION INTRAVENOUS at 01:09

## 2024-04-16 RX ADMIN — ACETAMINOPHEN 1000 MG: 10 INJECTION, SOLUTION INTRAVENOUS at 16:31

## 2024-04-16 RX ADMIN — HYDROMORPHONE HYDROCHLORIDE 1 MG: 1 INJECTION, SOLUTION INTRAMUSCULAR; INTRAVENOUS; SUBCUTANEOUS at 18:04

## 2024-04-16 RX ADMIN — HYDROMORPHONE HYDROCHLORIDE 1 MG: 1 INJECTION, SOLUTION INTRAMUSCULAR; INTRAVENOUS; SUBCUTANEOUS at 14:44

## 2024-04-16 RX ADMIN — SENNOSIDES 17.2 MG: 8.6 TABLET, FILM COATED ORAL at 22:11

## 2024-04-16 RX ADMIN — QUETIAPINE FUMARATE 50 MG: 50 TABLET ORAL at 22:03

## 2024-04-16 RX ADMIN — MORPHINE SULFATE 8 MG: 4 INJECTION INTRAVENOUS at 05:02

## 2024-04-16 RX ADMIN — ONDANSETRON 8 MG: 2 INJECTION INTRAMUSCULAR; INTRAVENOUS at 09:00

## 2024-04-16 RX ADMIN — MORPHINE SULFATE 8 MG: 4 INJECTION INTRAVENOUS at 09:00

## 2024-04-16 RX ADMIN — DEXTROSE AND SODIUM CHLORIDE 75 ML/HR: 5; 900 INJECTION, SOLUTION INTRAVENOUS at 21:59

## 2024-04-16 RX ADMIN — MORPHINE SULFATE 8 MG: 4 INJECTION INTRAVENOUS at 03:21

## 2024-04-16 RX ADMIN — FAMOTIDINE 20 MG: 10 INJECTION INTRAVENOUS at 09:00

## 2024-04-16 RX ADMIN — ACETAMINOPHEN 1000 MG: 10 INJECTION, SOLUTION INTRAVENOUS at 03:22

## 2024-04-16 RX ADMIN — HYDROXYUREA 1000 MG: 500 CAPSULE ORAL at 09:00

## 2024-04-16 RX ADMIN — BUDESONIDE AND FORMOTEROL FUMARATE DIHYDRATE 2 PUFF: 80; 4.5 AEROSOL RESPIRATORY (INHALATION) at 09:03

## 2024-04-16 RX ADMIN — DEXTROSE AND SODIUM CHLORIDE 75 ML/HR: 5; 900 INJECTION, SOLUTION INTRAVENOUS at 09:56

## 2024-04-16 RX ADMIN — ONDANSETRON 8 MG: 2 INJECTION INTRAMUSCULAR; INTRAVENOUS at 03:21

## 2024-04-16 RX ADMIN — ONDANSETRON 8 MG: 2 INJECTION INTRAMUSCULAR; INTRAVENOUS at 22:03

## 2024-04-16 RX ADMIN — MORPHINE SULFATE 8 MG: 4 INJECTION INTRAVENOUS at 10:53

## 2024-04-16 RX ADMIN — HYDROMORPHONE HYDROCHLORIDE 1 MG: 1 INJECTION, SOLUTION INTRAMUSCULAR; INTRAVENOUS; SUBCUTANEOUS at 21:53

## 2024-04-16 RX ADMIN — KETOROLAC TROMETHAMINE 15 MG: 30 INJECTION, SOLUTION INTRAMUSCULAR; INTRAVENOUS at 10:53

## 2024-04-16 RX ADMIN — MORPHINE SULFATE 8 MG: 4 INJECTION INTRAVENOUS at 07:10

## 2024-04-16 ASSESSMENT — PAIN SCALES - GENERAL
PAINLEVEL_OUTOF10: 10 - WORST POSSIBLE PAIN

## 2024-04-16 ASSESSMENT — PAIN - FUNCTIONAL ASSESSMENT
PAIN_FUNCTIONAL_ASSESSMENT: 0-10
PAIN_FUNCTIONAL_ASSESSMENT: UNABLE TO SELF-REPORT
PAIN_FUNCTIONAL_ASSESSMENT: 0-10

## 2024-04-16 NOTE — PROGRESS NOTES
" PSYCHIATRY CONSULT NOTE     Josue Rangel is a 19 y.o. male admitted 4/14/2024 to [facility] for Sickle cell disease with crisis.      Subjective:  Josue is a 20 y/o male. 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 Obtained informed consent from patient. Disposition patient room. Student, Lesli Manley accompanied provider on this visit with patients permission. Today Josue reports that he is experiencing a high energy state. He endorses pressured speech, flight of ideas, grandiosity, periods of intense energy and little need for sleep. He reports that his \"sleep medicine\" referring to Zyprexa does not really help him with sleep or mood. He describes daily fluctuations in mood ranging from high anxiety to depression. He reports auditory psychosis during high anxiety states, he endorses hearing non-distinct voices he \"can't make out what they are saying\" he denies receiving commands. He endorses feeling paranoid, and  thinks that people are \"always watching me.\" Endorses feeling paranoid, denies delusions. Denies SI-HI intent,plan, access to lethal weapons.  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 Jermaine Wren     Attending Physician at the Time of Consult: Jermaine Wallis MD     Reason for Consultation:  Psychosis     History of Present Illness:     Location: Endorses rapid cycling moods, high anxiety states, depression (moderate) and insomnia.    Quality: Reports feeling paranoid, rapid mood swings, chaotic mind, restless.    Severity:Endorses moderate to severe anxiety and depression with a 24 hours period.   Timing/Duration: He has been experiencing these symptoms since childhood.    Context: Mood does not appear to be linked to stressors   Modifying factors: Denied modifying " "factors    Associated signs and symptoms: Describes impaired function. Endorses being able to participate in ADLs but describes inability to work, weight loss, issues with hyposomnia at night and hypersomnia during the day.     Past Psychiatric History: (Dxs, Hx of IP, Hx of Suicide Attempts)  Inpatient Hx: None  Outpatient Hx:  Went to [facility] for psychotherapy . Has tried various psychotropic medication in the past ( patient is unclear as to what he was taking). He did a brief trial of Lamictal and stopped taking for no specific reason. Most recently was started on Olanzapine, he refers to it as his \"nighttime\" medication. He feels it works but not well enough to control michael and sleep.  Hx of Suicidal Ideation: Denies current ideation. Endorses SI prior to 2022.   Hx of Suicide Attempts: None known attempts.  Hx of Violence/Aggression Towards others (including threats): Denies  Access to Fire Arms and/or Weapons: Denies  Current Treatment:   Not currently taking:  Lamictal 50 mg I oral tablet once daily at bedtime for mood stabilization ( not currently taking medication.  Olanzapine 10 mg I oral tablet once daily at night for michael and insomnia.  Initial Trial:  Seroquel 50 mg I oral tablet once daily at bedtime for michael and insomnia.     Substance Use History:    Denied by patient      Reviewed Documentation:  The following portions of the chart were reviewed this encounter and updated as appropriate:          Review Of Medical Systems:  positive for anxiety, depression stable, hallucinations, and sleep disturbance and negative for anxiety, depression stable, hallucinations, and sleep disturbance    Objective: Upon approach patient is lying on his left side. Patient readily engaged in conversation with this provider and student.    Exam:  Vital Signs: /52 (BP Location: Left leg, Patient Position: Lying)   Pulse 71   Temp 36.6 °C (97.9 °F) (Axillary)   Resp 18   Ht 1.8 m (5' 10.87\")   Wt 69 " kg (152 lb 1.9 oz)   SpO2 96%   BMI 21.30 kg/m²   Musculoskeletal: No dyskinesia noted,   and Gait wnl     Reviewed Physical Exam by Dr. Wallis on 4.16.2024       Mental Status Exam:  General Appearance: eye contact good, grooming well kept, hyperactive, and well developed, well nourished  Gait/Station: normal  Speech: pressured  Mood: Anxious. Manic.  Affect: appropriate and anxious and manic.  Thought Process: Tangential, Flight of ideas  Thought Associations: Moderate loosening of associations  Thought Content: paranoid ideation  Perception: Auditory and visual hallucinations noted. Endorses hearing voices and conversations that are unclear. Denies recieveing commands. Most recent episode a few days ago. Denies being disturbed by these perceptual disturbances. Reports experiencing them since early childhood.  Level of Consciousness: Alert  Orientation: Alert and oriented to person, place, time and situation  Attention and Concentration: Mild impairment in attention  Recent Memory: Intact as evidenced by ability to recall details from the past 24 hours   Remote Memory: Intact as evidenced by ability to recall previous medical issues   Insight: Fair, as evidenced by able to discuss treatment with frequent re-direction  Judgment: Fair, as evidenced by able to discuss  and describe what he is thinking and feeling. Able to consider alternatives for care, is open to discussion.     Data:  [unfilled]     Pertinent Studies: N/A     Risk Assessment:   Static Risk Factors: history of mood, psychotic, or substance use disorder    Modifiable Risk Factors:  current mood, psychotic, or substance use disorder , mood swings , and ongoing medical illness   Protective Factors:  positive therapeutic relationship , social support, high family cohesion , hopeful, future oriented , and convincing commitment to safety    Imminent Risk Factors: none   Overall Risk:  Low     Impression:   Bi-Polar Disorder Type II w/ psychotic  features and rapid cycling.     DIAGNOSES:     Axis I:     X    Axis II:   N/A    Axis III:  N/A    Axis IV:  N/A    Axis V:   N/A    Recommendations:    1. Safety:  Low Risk. Denies SI-HI intent, plan, intent. Denies access to lethal means.   PF: family, friends, goal driven, future and goal oriented, hopeful   Plan: If a patient feels that he can not keep himself safe, he is to tell a trusted friend, call or text [phone], or go to the nearest ER.      2. Medication Recommendations:   Recommend: Seroquel (initial dose) 50 mg I oral tablet once daily at bedtime. Indicated for michael and insomnia.     3. Disposition:   [id number]    4. Other: Discussed the goal of re-engaging with psychiatric care at [facility] ( received care there previously) or another [facility] were he can receive comprehensive care and wrap around services, including case management.  Note: Received permission from patient to contact his grandmother re: providers concerns related to medication adherence and level of care.    5. Plan: Patient will follow up with Denisa during his upcoming clinic visit, or sooner if indicated. Patient also gave consent for this provider to speak with his grandmother regarding his mental health.      [email or web address]    Thank you for allowing us to participate in the care of this patient. We will continue to follow-up with you. . Please contact Denisa Amaro for questions. After hours or on weekends/Holidays, please page the Psych resident on-call at [phone].     Assessment:    Denisa Amaro, APRN-CNP  4/16/2024  12:35 PM  Josue is a 20 y/o male. 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 Obtained informed consent from patient. Disposition patient room. Student, Lesli Manley accompanied provider on this visit with patients permission. Today Josue reports that he is experiencing a high energy  "state. He endorses pressured speech, flight of ideas, grandiosity, periods of intense energy and little need for sleep. He reports that his \"sleep medicine\" referring to Zyprexa does not really help him with sleep or mood. He describes daily fluctuations in mood ranging from high anxiety to depression. He reports auditory psychosis during high anxiety states, he endorses hearing non-distinct voices he \"can't make out what they are saying\" he denies receiving commands. He endorses feeling paranoid, and  thinks that people are \"always watching me.\" Endorses feeling paranoid, denies delusions. Denies SI-HI intent,plan, access to lethal weapons.  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      Denisa Amaro, APRN-CNP      "

## 2024-04-16 NOTE — PROGRESS NOTES
Massage Therapy / Acupuncture Note:  I visited with Josue today.  When I entered he asked to have his back massaged because he was having a lot of pain.  During the massage Josue fell asleep.  I will continue to follow.

## 2024-04-16 NOTE — PROGRESS NOTES
Progress Note  Service: Pediatric Hematology / Oncology    Josue is a 19 y.o. male on day 2 of admission with Sickle cell disease with crisis (Multi).    Subjective  Interval Update:  Pain scores 10 out of 10 or patient sleeping. Reports significant rib pain that makes it difficult to take a deep breath    Objective   Vitals:      4/15/2024     4:15 AM 4/15/2024     9:19 AM 4/15/2024     1:23 PM 4/15/2024     4:53 PM 4/15/2024     9:30 PM 4/16/2024     1:07 AM 4/16/2024     4:57 AM   Vitals   Systolic 94 103 95 98 117 102 128   Diastolic 55 59 53 44 59 50 82   Heart Rate 56 67 71 74 75 72 76   Temp 36.4 °C (97.5 °F) 36.5 °C (97.7 °F) 36.7 °C (98.1 °F) 36.9 °C (98.5 °F) 36.5 °C (97.7 °F) 36.6 °C (97.9 °F) 36.7 °C (98.1 °F)   Resp 16 16 18 18 18 20 18       24 Hour I&O Total:    Intake/Output Summary (Last 24 hours) at 4/16/2024 0701  Last data filed at 4/16/2024 0636  Gross per 24 hour   Intake 3408.91 ml   Output 0 ml   Net 3408.91 ml       Physical Exam  Vitals reviewed.   Constitutional:       General: He is awake.      Appearance: He is not ill-appearing or toxic-appearing.   HENT:      Nose: Nose normal.      Mouth/Throat:      Mouth: Mucous membranes are moist.      Pharynx: Oropharynx is clear.   Eyes:      General: Scleral icterus (mild) present.      Extraocular Movements: Extraocular movements intact.      Conjunctiva/sclera:      Right eye: Hemorrhage present.      Left eye: Hemorrhage present.   Cardiovascular:      Rate and Rhythm: Normal rate and regular rhythm.      Pulses: Normal pulses.      Heart sounds: Normal heart sounds.   Pulmonary:      Effort: No respiratory distress.      Breath sounds: Normal breath sounds. No wheezing, rhonchi or rales.      Comments: Shallow breaths  Chest:      Chest wall: Tenderness present.   Abdominal:      General: Abdomen is flat. Bowel sounds are normal. There is no distension.      Palpations: Abdomen is soft.      Tenderness: There is no abdominal tenderness.    Skin:     General: Skin is warm and dry.      Capillary Refill: Capillary refill takes less than 2 seconds.   Neurological:      General: No focal deficit present.      Mental Status: He is alert and oriented to person, place, and time.      Sensory: Sensation is intact.      Motor: Motor function is intact.   Psychiatric:         Mood and Affect: Mood is anxious.         Speech: Speech is tangential.         Behavior: Behavior normal. Behavior is cooperative.         Lab Results:  Results for orders placed or performed during the hospital encounter of 04/14/24 (from the past 24 hour(s))   CBC and Auto Differential   Result Value Ref Range    WBC 8.9 4.4 - 11.3 x10*3/uL    nRBC 0.9 (H) 0.0 - 0.0 /100 WBCs    RBC 3.24 (L) 4.50 - 5.90 x10*6/uL    Hemoglobin 10.0 (L) 13.5 - 17.5 g/dL    Hematocrit 27.8 (L) 41.0 - 52.0 %    MCV 86 80 - 100 fL    MCH 30.9 26.0 - 34.0 pg    MCHC 36.0 32.0 - 36.0 g/dL    RDW 15.5 (H) 11.5 - 14.5 %    Platelets 256 150 - 450 x10*3/uL    Neutrophils % 57.3 40.0 - 80.0 %    Immature Granulocytes %, Automated 0.3 0.0 - 0.9 %    Lymphocytes % 23.8 13.0 - 44.0 %    Monocytes % 11.6 2.0 - 10.0 %    Eosinophils % 6.7 0.0 - 6.0 %    Basophils % 0.3 0.0 - 2.0 %    Neutrophils Absolute 5.06 1.20 - 7.70 x10*3/uL    Immature Granulocytes Absolute, Automated 0.03 0.00 - 0.70 x10*3/uL    Lymphocytes Absolute 2.11 1.20 - 4.80 x10*3/uL    Monocytes Absolute 1.03 (H) 0.10 - 1.00 x10*3/uL    Eosinophils Absolute 0.59 0.00 - 0.70 x10*3/uL    Basophils Absolute 0.03 0.00 - 0.10 x10*3/uL   Reticulocytes   Result Value Ref Range    Retic % 3.9 (H) 0.5 - 2.0 %    Retic Absolute 0.125 (H) 0.022 - 0.118 x10*6/uL    Reticulocyte Hemoglobin 33 28 - 38 pg    Immature Retic fraction 39.3 (H) <=16.0 %       Imaging Results:  XR chest 2 views    Result Date: 4/14/2024  Interpreted By:  Toño Mahoney and Liller Gregory STUDY: XR CHEST 2 VIEWS;  4/14/2024 2:50 pm   INDICATION: Signs/Symptoms:Sickle cell pain crisis  with chest pain and SOB.   COMPARISON: 02/12/2024   ACCESSION NUMBER(S): [id number]   ORDERING CLINICIAN: HARRY NIEVES   FINDINGS: PA and lateral radiographs of the chest were provided.   CARDIOMEDIASTINAL SILHOUETTE: Cardiomediastinal silhouette is normal in size and configuration.   LUNGS: Lungs are clear. There is no focal consolidation, pleural effusion, or pneumothorax.   ABDOMEN: No remarkable upper abdominal findings.   BONES: No osseous injury. Osseous manifestations of sickle cell disease again noted within the thoracic spine.       Clear lung fields without evidence of acute cardiopulmonary process.   I personally reviewed the images/study and I agree with the findings as stated above by resident physician, Dr. Jose Agudelo. The study was interpreted at [facility] in [facility].   MACRO: none.   Signed by: Toño Mahoney 4/14/2024 3:27 PM Dictation workstation:   [id number]      Assessment/Plan   Hospital Problems:  Principal Problem:    Sickle cell disease with crisis (Multi)      Josue is a 19 y.o. male with HgB SC disease presenting with diffuse pain, consistent with VOE. Low concern for ACS due to no focal exam findings, as well as CXR obtained in the ED which did not show any focal consolidations. He continues to have significant pain that is not improving with the morphine. The pain is also affecting his ability to take deep breaths, which increases his risk for ACS. Will switch him to dilaudid to see if he has improved pain control. With his reported worsening chest pain and inability to take deep breaths, will get a repeat 2v CXR.     Denisa met with Josue today and recommended switching from zyprexa to seroquel for his michael and insomnia. Will start this evening.     Plan:  HEME  [x] blood consent signed  #HgbSC  - baseline: Hgb 10-11, retic 3-4%  - admission: Hgb 11.0, retic 3.5%  - hydroxyurea 1000 mg M-F      CNS   #VOE  - IV dilaudid 1 mg q2h  -  IV toradol 15 mg q6h  - IV tylenol 1g q6h  - lidocaine patch  #pruritus   - narcan gtt 2 mcg/kg/hr      RESP  #ACS ppx  [X] RT notified of patient  - ACS carepath  [ ] 2v CXR  #mild intermittent asthma   - symbicort 2 puffs BID      FENGI  #nutrition  - regular diet  #hydration  - D5 NS @ 3/4 mIVF  #GI ppx  - IV famotidine  20 mg BID  #bowel regimen  - Miralax 1 cap daily  - Senna BID  #nausea  - IV zofran q6h  - IV benadryl q6h PRN     PSYCH  #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 Wallis and sickle cell team.    Kat Neri MD  Pediatrics PGY-2   I saw and evaluated the patient. I personally obtained the key and critical portions of the history and physical exam or was physically present for key and critical portions performed by the resident/fellow. I reviewed the resident/fellow's documentation and discussed the patient with the resident/fellow. I agree with the resident/fellow's medical decision making as documented in the note.

## 2024-04-17 ENCOUNTER — APPOINTMENT (OUTPATIENT)
Dept: RADIOLOGY | Facility: HOSPITAL | Age: 19
End: 2024-04-17
Payer: MEDICAID

## 2024-04-17 PROBLEM — R07.89 MUSCULOSKELETAL CHEST PAIN: Status: RESOLVED | Noted: 2022-10-20 | Resolved: 2024-04-17

## 2024-04-17 PROBLEM — R05.9 COUGH: Status: RESOLVED | Noted: 2024-04-17 | Resolved: 2024-04-17

## 2024-04-17 PROBLEM — M21.40 ACQUIRED PES PLANUS: Status: ACTIVE | Noted: 2023-10-27

## 2024-04-17 PROBLEM — J30.9 ALLERGIC RHINITIS: Status: ACTIVE | Noted: 2024-04-17

## 2024-04-17 PROBLEM — R42 DIZZINESS: Status: RESOLVED | Noted: 2022-10-20 | Resolved: 2024-04-17

## 2024-04-17 PROBLEM — Z79.64: Status: ACTIVE | Noted: 2023-10-30

## 2024-04-17 PROBLEM — J02.9 ACUTE PHARYNGITIS: Status: RESOLVED | Noted: 2022-07-20 | Resolved: 2024-04-17

## 2024-04-17 PROBLEM — F12.90 MARIJUANA USE: Status: ACTIVE | Noted: 2023-01-16

## 2024-04-17 PROBLEM — Z86.59 HISTORY OF ADHD: Status: ACTIVE | Noted: 2023-01-16

## 2024-04-17 PROBLEM — J45.20 INTERMITTENT ASTHMA (HHS-HCC): Status: ACTIVE | Noted: 2023-07-20

## 2024-04-17 PROBLEM — F39: Chronic | Status: ACTIVE | Noted: 2023-01-16

## 2024-04-17 PROBLEM — M46.1 SACROILIITIS (CMS-HCC): Status: ACTIVE | Noted: 2023-07-20

## 2024-04-17 PROBLEM — D22.30 MELANOCYTIC NEVUS OF FACE: Status: ACTIVE | Noted: 2022-09-12

## 2024-04-17 PROBLEM — F31.81 BIPOLAR II DISORDER (MULTI): Status: ACTIVE | Noted: 2024-02-11

## 2024-04-17 LAB
ABO GROUP (TYPE) IN BLOOD: NORMAL
ANTIBODY SCREEN: NORMAL
BASOPHILS # BLD AUTO: 0.02 X10*3/UL (ref 0–0.1)
BASOPHILS NFR BLD AUTO: 0.2 %
EOSINOPHIL # BLD AUTO: 0.33 X10*3/UL (ref 0–0.7)
EOSINOPHIL NFR BLD AUTO: 3.1 %
ERYTHROCYTE [DISTWIDTH] IN BLOOD BY AUTOMATED COUNT: 15.7 % (ref 11.5–14.5)
HCT VFR BLD AUTO: 25.4 % (ref 41–52)
HGB BLD-MCNC: 9.5 G/DL (ref 13.5–17.5)
HGB RETIC QN: 32 PG (ref 28–38)
IMM GRANULOCYTES # BLD AUTO: 0.05 X10*3/UL (ref 0–0.7)
IMM GRANULOCYTES NFR BLD AUTO: 0.5 % (ref 0–0.9)
IMMATURE RETIC FRACTION: 27.8 %
LYMPHOCYTES # BLD AUTO: 1.54 X10*3/UL (ref 1.2–4.8)
LYMPHOCYTES NFR BLD AUTO: 14.6 %
MCH RBC QN AUTO: 30.6 PG (ref 26–34)
MCHC RBC AUTO-ENTMCNC: 37.4 G/DL (ref 32–36)
MCV RBC AUTO: 82 FL (ref 80–100)
MONOCYTES # BLD AUTO: 1.12 X10*3/UL (ref 0.1–1)
MONOCYTES NFR BLD AUTO: 10.6 %
NEUTROPHILS # BLD AUTO: 7.48 X10*3/UL (ref 1.2–7.7)
NEUTROPHILS NFR BLD AUTO: 71 %
NRBC BLD-RTO: 0.6 /100 WBCS (ref 0–0)
PLATELET # BLD AUTO: 240 X10*3/UL (ref 150–450)
RBC # BLD AUTO: 3.1 X10*6/UL (ref 4.5–5.9)
RETICS #: 0.14 X10*6/UL (ref 0.02–0.12)
RETICS/RBC NFR AUTO: 4.4 % (ref 0.5–2)
RH FACTOR (ANTIGEN D): NORMAL
WBC # BLD AUTO: 10.5 X10*3/UL (ref 4.4–11.3)

## 2024-04-17 PROCEDURE — 85045 AUTOMATED RETICULOCYTE COUNT: CPT

## 2024-04-17 PROCEDURE — 83020 HEMOGLOBIN ELECTROPHORESIS: CPT | Performed by: PATHOLOGY

## 2024-04-17 PROCEDURE — 2500000001 HC RX 250 WO HCPCS SELF ADMINISTERED DRUGS (ALT 637 FOR MEDICARE OP)

## 2024-04-17 PROCEDURE — 2500000004 HC RX 250 GENERAL PHARMACY W/ HCPCS (ALT 636 FOR OP/ED)

## 2024-04-17 PROCEDURE — 86901 BLOOD TYPING SEROLOGIC RH(D): CPT

## 2024-04-17 PROCEDURE — 36415 COLL VENOUS BLD VENIPUNCTURE: CPT

## 2024-04-17 PROCEDURE — 71046 X-RAY EXAM CHEST 2 VIEWS: CPT

## 2024-04-17 PROCEDURE — 2500000005 HC RX 250 GENERAL PHARMACY W/O HCPCS

## 2024-04-17 PROCEDURE — 83021 HEMOGLOBIN CHROMOTOGRAPHY: CPT

## 2024-04-17 PROCEDURE — 86920 COMPATIBILITY TEST SPIN: CPT

## 2024-04-17 PROCEDURE — 2500000006 HC RX 250 W HCPCS SELF ADMINISTERED DRUGS (ALT 637 FOR ALL PAYERS)

## 2024-04-17 PROCEDURE — 87075 CULTR BACTERIA EXCEPT BLOOD: CPT

## 2024-04-17 PROCEDURE — 1100000001 HC PRIVATE ROOM DAILY

## 2024-04-17 PROCEDURE — 85025 COMPLETE CBC W/AUTO DIFF WBC: CPT

## 2024-04-17 PROCEDURE — 87040 BLOOD CULTURE FOR BACTERIA: CPT

## 2024-04-17 PROCEDURE — 1130000001 HC PRIVATE PED ROOM DAILY

## 2024-04-17 PROCEDURE — 71046 X-RAY EXAM CHEST 2 VIEWS: CPT | Performed by: RADIOLOGY

## 2024-04-17 PROCEDURE — 99233 SBSQ HOSP IP/OBS HIGH 50: CPT | Performed by: PEDIATRICS

## 2024-04-17 PROCEDURE — 86850 RBC ANTIBODY SCREEN: CPT

## 2024-04-17 PROCEDURE — 2500000004 HC RX 250 GENERAL PHARMACY W/ HCPCS (ALT 636 FOR OP/ED): Performed by: STUDENT IN AN ORGANIZED HEALTH CARE EDUCATION/TRAINING PROGRAM

## 2024-04-17 RX ORDER — NAPROXEN 500 MG/1
500 TABLET ORAL EVERY 12 HOURS
Status: DISCONTINUED | OUTPATIENT
Start: 2024-04-17 | End: 2024-04-20 | Stop reason: HOSPADM

## 2024-04-17 RX ORDER — QUETIAPINE FUMARATE 100 MG/1
100 TABLET, FILM COATED ORAL NIGHTLY
Status: DISCONTINUED | OUTPATIENT
Start: 2024-04-17 | End: 2024-04-20 | Stop reason: HOSPADM

## 2024-04-17 RX ORDER — FAMOTIDINE 20 MG/1
20 TABLET, FILM COATED ORAL 2 TIMES DAILY
Status: DISCONTINUED | OUTPATIENT
Start: 2024-04-17 | End: 2024-04-20 | Stop reason: HOSPADM

## 2024-04-17 RX ORDER — CEFTRIAXONE 2 G/50ML
2 INJECTION, SOLUTION INTRAVENOUS EVERY 24 HOURS
Status: DISCONTINUED | OUTPATIENT
Start: 2024-04-17 | End: 2024-04-19

## 2024-04-17 RX ORDER — ACETAMINOPHEN 325 MG/1
650 TABLET ORAL EVERY 6 HOURS
Status: DISCONTINUED | OUTPATIENT
Start: 2024-04-17 | End: 2024-04-18

## 2024-04-17 RX ORDER — HYDROMORPHONE HYDROCHLORIDE 1 MG/ML
0.87 INJECTION, SOLUTION INTRAMUSCULAR; INTRAVENOUS; SUBCUTANEOUS
Status: DISCONTINUED | OUTPATIENT
Start: 2024-04-17 | End: 2024-04-19

## 2024-04-17 RX ADMIN — HYDROMORPHONE HYDROCHLORIDE 1 MG: 1 INJECTION, SOLUTION INTRAMUSCULAR; INTRAVENOUS; SUBCUTANEOUS at 08:53

## 2024-04-17 RX ADMIN — ACETAMINOPHEN 650 MG: 325 TABLET ORAL at 11:33

## 2024-04-17 RX ADMIN — HYDROMORPHONE HYDROCHLORIDE 0.87 MG: 1 INJECTION, SOLUTION INTRAMUSCULAR; INTRAVENOUS; SUBCUTANEOUS at 15:22

## 2024-04-17 RX ADMIN — SENNOSIDES 17.2 MG: 8.6 TABLET, FILM COATED ORAL at 08:53

## 2024-04-17 RX ADMIN — BUDESONIDE AND FORMOTEROL FUMARATE DIHYDRATE 2 PUFF: 80; 4.5 AEROSOL RESPIRATORY (INHALATION) at 08:53

## 2024-04-17 RX ADMIN — HYDROMORPHONE HYDROCHLORIDE 1 MG: 1 INJECTION, SOLUTION INTRAMUSCULAR; INTRAVENOUS; SUBCUTANEOUS at 03:21

## 2024-04-17 RX ADMIN — KETOROLAC TROMETHAMINE 15 MG: 30 INJECTION, SOLUTION INTRAMUSCULAR; INTRAVENOUS at 04:32

## 2024-04-17 RX ADMIN — HYDROMORPHONE HYDROCHLORIDE 0.87 MG: 1 INJECTION, SOLUTION INTRAMUSCULAR; INTRAVENOUS; SUBCUTANEOUS at 23:56

## 2024-04-17 RX ADMIN — FAMOTIDINE 20 MG: 10 INJECTION INTRAVENOUS at 08:53

## 2024-04-17 RX ADMIN — HYDROMORPHONE HYDROCHLORIDE 1 MG: 1 INJECTION, SOLUTION INTRAMUSCULAR; INTRAVENOUS; SUBCUTANEOUS at 06:00

## 2024-04-17 RX ADMIN — NAPROXEN 500 MG: 500 TABLET ORAL at 21:04

## 2024-04-17 RX ADMIN — CEFTRIAXONE 2 G: 2 INJECTION, SOLUTION INTRAVENOUS at 23:56

## 2024-04-17 RX ADMIN — LIDOCAINE 1 PATCH: 4 PATCH TOPICAL at 23:45

## 2024-04-17 RX ADMIN — HYDROMORPHONE HYDROCHLORIDE 1 MG: 1 INJECTION, SOLUTION INTRAMUSCULAR; INTRAVENOUS; SUBCUTANEOUS at 00:32

## 2024-04-17 RX ADMIN — QUETIAPINE FUMARATE 100 MG: 100 TABLET ORAL at 21:04

## 2024-04-17 RX ADMIN — HYDROMORPHONE HYDROCHLORIDE 0.87 MG: 1 INJECTION, SOLUTION INTRAMUSCULAR; INTRAVENOUS; SUBCUTANEOUS at 21:04

## 2024-04-17 RX ADMIN — ACETAMINOPHEN 650 MG: 325 TABLET ORAL at 17:13

## 2024-04-17 RX ADMIN — BUDESONIDE AND FORMOTEROL FUMARATE DIHYDRATE 2 PUFF: 80; 4.5 AEROSOL RESPIRATORY (INHALATION) at 23:56

## 2024-04-17 RX ADMIN — POLYETHYLENE GLYCOL 3350 17 G: 17 POWDER, FOR SOLUTION ORAL at 08:53

## 2024-04-17 RX ADMIN — SENNOSIDES 17.2 MG: 8.6 TABLET, FILM COATED ORAL at 21:04

## 2024-04-17 RX ADMIN — ONDANSETRON 8 MG: 2 INJECTION INTRAMUSCULAR; INTRAVENOUS at 08:53

## 2024-04-17 RX ADMIN — FAMOTIDINE 20 MG: 20 TABLET, FILM COATED ORAL at 21:04

## 2024-04-17 RX ADMIN — ONDANSETRON 8 MG: 2 INJECTION INTRAMUSCULAR; INTRAVENOUS at 03:21

## 2024-04-17 RX ADMIN — DEXTROSE AND SODIUM CHLORIDE 5 ML/HR: 5; 900 INJECTION, SOLUTION INTRAVENOUS at 17:15

## 2024-04-17 RX ADMIN — ACETAMINOPHEN 1000 MG: 10 INJECTION, SOLUTION INTRAVENOUS at 04:32

## 2024-04-17 RX ADMIN — ONDANSETRON 8 MG: 2 INJECTION INTRAMUSCULAR; INTRAVENOUS at 15:22

## 2024-04-17 RX ADMIN — ACETAMINOPHEN 650 MG: 325 TABLET ORAL at 22:09

## 2024-04-17 RX ADMIN — NALOXONE HYDROCHLORIDE 2 MCG/KG/HR: 0.4 INJECTION, SOLUTION INTRAMUSCULAR; INTRAVENOUS; SUBCUTANEOUS at 11:38

## 2024-04-17 RX ADMIN — ONDANSETRON 8 MG: 2 INJECTION INTRAMUSCULAR; INTRAVENOUS at 21:04

## 2024-04-17 RX ADMIN — KETOROLAC TROMETHAMINE 15 MG: 30 INJECTION, SOLUTION INTRAMUSCULAR; INTRAVENOUS at 17:13

## 2024-04-17 RX ADMIN — KETOROLAC TROMETHAMINE 15 MG: 30 INJECTION, SOLUTION INTRAMUSCULAR; INTRAVENOUS at 11:33

## 2024-04-17 RX ADMIN — HYDROMORPHONE HYDROCHLORIDE 1 MG: 1 INJECTION, SOLUTION INTRAMUSCULAR; INTRAVENOUS; SUBCUTANEOUS at 11:50

## 2024-04-17 RX ADMIN — HYDROMORPHONE HYDROCHLORIDE 0.87 MG: 1 INJECTION, SOLUTION INTRAMUSCULAR; INTRAVENOUS; SUBCUTANEOUS at 18:34

## 2024-04-17 RX ADMIN — HYDROXYUREA 1000 MG: 500 CAPSULE ORAL at 08:53

## 2024-04-17 ASSESSMENT — PAIN - FUNCTIONAL ASSESSMENT
PAIN_FUNCTIONAL_ASSESSMENT: 0-10

## 2024-04-17 ASSESSMENT — PAIN SCALES - GENERAL
PAINLEVEL_OUTOF10: 10 - WORST POSSIBLE PAIN
PAINLEVEL_OUTOF10: 8
PAINLEVEL_OUTOF10: 10 - WORST POSSIBLE PAIN
PAINLEVEL_OUTOF10: 9
PAINLEVEL_OUTOF10: 9
PAINLEVEL_OUTOF10: 10 - WORST POSSIBLE PAIN

## 2024-04-17 NOTE — PROGRESS NOTES
Massage Therapy / Acupuncture Note: I visited with Josue in the hallway today.  He was in great spirits, singing, dancing and interacting.  I will continue to follow.

## 2024-04-17 NOTE — PROGRESS NOTES
Progress Note  Service: Pediatric Hematology / Oncology    Josue is a 19 y.o. male on day 3 of admission with Sickle cell disease with crisis (Multi).    Subjective  Interval Update:  Pain scores 10 out of 10 or patient sleeping overnight.    Objective   Vitals:      4/16/2024     8:05 AM 4/16/2024     9:18 AM 4/16/2024     1:34 PM 4/16/2024     4:03 PM 4/16/2024     8:56 PM 4/17/2024     1:00 AM 4/17/2024     4:30 AM   Vitals   Systolic 107  126 109 130 114 121   Diastolic 52  62 53 80 60 74   Heart Rate 71  99 83 83 102 108   Temp 36.6 °C (97.9 °F)  36.5 °C (97.7 °F) 36.4 °C (97.5 °F) 36.8 °C (98.3 °F) 36.8 °C (98.2 °F) 36.9 °C (98.4 °F)   Resp 18  20 20 22 16 16   Weight (lb)  152.12        BMI  21.3 kg/m2        BSA (m2)  1.86 m2            24 Hour I&O Total:    Intake/Output Summary (Last 24 hours) at 4/17/2024 0650  Last data filed at 4/17/2024 0600  Gross per 24 hour   Intake 4710.91 ml   Output 0 ml   Net 4710.91 ml       Physical Exam  Vitals reviewed.   Constitutional:       General: He is awake.      Appearance: He is not ill-appearing or toxic-appearing.      Comments: Shooting hoops in the playroom   HENT:      Nose: Nose normal.      Mouth/Throat:      Mouth: Mucous membranes are moist.      Pharynx: Oropharynx is clear.   Eyes:      General: Scleral icterus (mild) present.      Extraocular Movements: Extraocular movements intact.      Conjunctiva/sclera:      Right eye: Hemorrhage (improving) present.      Left eye: Hemorrhage present.   Cardiovascular:      Rate and Rhythm: Normal rate and regular rhythm.      Pulses: Normal pulses.      Heart sounds: Normal heart sounds.   Pulmonary:      Effort: Pulmonary effort is normal. No respiratory distress.      Breath sounds: Normal breath sounds. No wheezing, rhonchi or rales.   Chest:      Chest wall: Tenderness present.   Abdominal:      General: Abdomen is flat. Bowel sounds are normal. There is no distension.      Palpations: Abdomen is soft.       Tenderness: There is no abdominal tenderness.   Skin:     General: Skin is warm and dry.      Capillary Refill: Capillary refill takes less than 2 seconds.   Neurological:      General: No focal deficit present.      Mental Status: He is alert and oriented to person, place, and time.      Sensory: Sensation is intact.      Motor: Motor function is intact.   Psychiatric:         Mood and Affect: Mood is anxious.         Speech: Speech is tangential.         Behavior: Behavior normal. Behavior is cooperative.      Comments: Pressured speech         Lab Results:  Results for orders placed or performed during the hospital encounter of 04/14/24 (from the past 24 hour(s))   CBC and Auto Differential   Result Value Ref Range    WBC 8.3 4.4 - 11.3 x10*3/uL    nRBC 1.6 (H) 0.0 - 0.0 /100 WBCs    RBC 3.08 (L) 4.50 - 5.90 x10*6/uL    Hemoglobin 9.7 (L) 13.5 - 17.5 g/dL    Hematocrit 26.2 (L) 41.0 - 52.0 %    MCV 85 80 - 100 fL    MCH 31.5 26.0 - 34.0 pg    MCHC 37.0 (H) 32.0 - 36.0 g/dL    RDW 15.8 (H) 11.5 - 14.5 %    Platelets 231 150 - 450 x10*3/uL    Neutrophils % 60.7 40.0 - 80.0 %    Immature Granulocytes %, Automated 0.6 0.0 - 0.9 %    Lymphocytes % 22.9 13.0 - 44.0 %    Monocytes % 12.1 2.0 - 10.0 %    Eosinophils % 3.5 0.0 - 6.0 %    Basophils % 0.2 0.0 - 2.0 %    Neutrophils Absolute 5.06 1.20 - 7.70 x10*3/uL    Immature Granulocytes Absolute, Automated 0.05 0.00 - 0.70 x10*3/uL    Lymphocytes Absolute 1.91 1.20 - 4.80 x10*3/uL    Monocytes Absolute 1.01 (H) 0.10 - 1.00 x10*3/uL    Eosinophils Absolute 0.29 0.00 - 0.70 x10*3/uL    Basophils Absolute 0.02 0.00 - 0.10 x10*3/uL   Reticulocytes   Result Value Ref Range    Retic % 4.1 (H) 0.5 - 2.0 %    Retic Absolute 0.125 (H) 0.022 - 0.118 x10*6/uL    Reticulocyte Hemoglobin 33 28 - 38 pg    Immature Retic fraction 42.5 (H) <=16.0 %       Imaging Results:  XR chest 2 views    Result Date: 4/16/2024  Interpreted By:  Lorie Masters, STUDY: XR CHEST 2 VIEWS;  4/16/2024  3:49 pm   INDICATION: Signs/Symptoms:sickle cell patient, eval for ACS.   COMPARISON: 04/14/2024   ACCESSION NUMBER(S): [id number]   ORDERING CLINICIAN: ELIZA VELASQUEZ   FINDINGS: PA and lateral views of the chest were obtained.   CARDIOMEDIASTINAL SILHOUETTE: Cardiomediastinal silhouette is stable in size and configuration. Pulmonary vascularity is unchanged. There is no radiographic evidence for pulmonary venous congestion.   LUNGS: The lungs are clear and well expanded. There is no focal parenchymal consolidation, pleural effusion, or pneumothorax.   ABDOMEN: No remarkable upper abdominal findings.   BONES: No acute osseous changes. Central endplate depression within the thoracic spine is again noted consistent with the known sickle cell disease.       1. No change in the appearance of the chest and no evidence for focal pulmonary disease.     Signed by: Lorie Masters 4/16/2024 4:00 PM Dictation workstation:   [id number]    XR chest 2 views    Result Date: 4/14/2024  Interpreted By:  Toño Mahoney and Liller Gregory STUDY: XR CHEST 2 VIEWS;  4/14/2024 2:50 pm   INDICATION: Signs/Symptoms:Sickle cell pain crisis with chest pain and SOB.   COMPARISON: 02/12/2024   ACCESSION NUMBER(S): [id number]   ORDERING CLINICIAN: HARRY NIEVES   FINDINGS: PA and lateral radiographs of the chest were provided.   CARDIOMEDIASTINAL SILHOUETTE: Cardiomediastinal silhouette is normal in size and configuration.   LUNGS: Lungs are clear. There is no focal consolidation, pleural effusion, or pneumothorax.   ABDOMEN: No remarkable upper abdominal findings.   BONES: No osseous injury. Osseous manifestations of sickle cell disease again noted within the thoracic spine.       Clear lung fields without evidence of acute cardiopulmonary process.   I personally reviewed the images/study and I agree with the findings as stated above by resident physician, Dr. Jose Agudelo. The study was interpreted at [facility] in [facility].   MACRO: none.   Signed by: Toño Mahoney 4/14/2024 3:27 PM Dictation workstation:   [id number]       Assessment/Plan   Hospital Problems:  Principal Problem:    Sickle cell disease with crisis (Multi)      Josue is a 19 y.o. 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 Yesterday a repeat CXR was obtained due to complaints of chest tightness and inability to take a deep breath secondary to pain. CXR was stable from admission with no focal consolidations concerning for developing ACS. Despite switching from morphine to dilaudid, his pain continues to be 10 out of 10 localized to his chest. However, clinically he seems to be improving. In agreement with Josue, will trial weaning dilaudid. Will transition his tylenol and famotidine to PO and discontinue IVF as he has been tolerating PO well. Will plan to transition toradol to naproxen later this evening.     Will follow up with Denisa regarding patient's seroquel titration plan.     Plan:  HEME  [x] blood consent signed  #HgbSC  - baseline: Hgb 10-11, retic 3-4%  - admission: Hgb 11.0, retic 3.5%  - hydroxyurea 1000 mg M-F      CNS   #VOE  - IV dilaudid 0.0125 mg/kg q2h  - IV toradol 15 mg q6h  - PO tylenol q6h  - lidocaine patch  #pruritus   - narcan gtt 2 mcg/kg/hr      RESP  #ACS ppx  [X] RT notified of patient  - ACS carepath  #mild intermittent asthma   - symbicort 2 puffs BID      FENGI  #nutrition  - regular diet  #hydration  - discontinue IVF  #GI ppx  - PO famotidine 20 mg BID  #bowel regimen  - Miralax 1 cap daily  - Senna BID  #nausea  - IV zofran q6h  - IV benadryl q6h PRN     PSYCH  #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 Wallis and sickle cell team.    Kat Neri MD  Pediatrics PGY-2   I saw and evaluated the patient. I personally  obtained the key and critical portions of the history and physical exam or was physically present for key and critical portions performed by the resident/fellow. I reviewed the resident/fellow's documentation and discussed the patient with the resident/fellow. I agree with the resident/fellow's medical decision making as documented in the note.

## 2024-04-17 NOTE — PROGRESS NOTES
Music Therapy Note        Therapy Session  Referral Type: Referral from previous admission  Visit Type: Follow-up visit  Session Start Time: 1435  Session End Time: 1600  Intervention Delivery: In-person  Conflict of Service: None               Treatment/Interventions  Areas of Focus: Normalization, Locus of control, Anxiety reduction, Self-expression  Music Therapy Interventions: Active music engagement, Iso-principle, Empathic listening/validating emotions, Improvisation, Songwriting/composition  Interruption: No  Patient Fell Asleep at End of Session: No    Post-assessment  Total Session Time (min): 85 minutes       Music Therapy Intern (MTI) greeted patient and asked if now would be a good time to make music. Patient was in a bright mood and was excited to make music. Patient explored the happy drum, tambourine, and keyboard. Patient created an array of songs throughout the session with MTI. After patient and MTI finished their final song, MTI thanked pt and concluded the session. Music Therapy will continue to provide services to patient.      Brad Mckinnon    Music Therapy Intern

## 2024-04-18 LAB
BASOPHILS # BLD AUTO: 0.01 X10*3/UL (ref 0–0.1)
BASOPHILS NFR BLD AUTO: 0.1 %
EOSINOPHIL # BLD AUTO: 0.09 X10*3/UL (ref 0–0.7)
EOSINOPHIL NFR BLD AUTO: 0.9 %
ERYTHROCYTE [DISTWIDTH] IN BLOOD BY AUTOMATED COUNT: 15.5 % (ref 11.5–14.5)
FLUAV RNA RESP QL NAA+PROBE: NOT DETECTED
FLUBV RNA RESP QL NAA+PROBE: NOT DETECTED
HADV DNA SPEC QL NAA+PROBE: NOT DETECTED
HCT VFR BLD AUTO: 24.9 % (ref 41–52)
HEMOGLOBIN A2: 3.8 % (ref 2–3.5)
HEMOGLOBIN C: 47.1 %
HEMOGLOBIN F: 0.1 % (ref 0–2)
HEMOGLOBIN IDENTIFICATION INTERPRETATION: ABNORMAL
HEMOGLOBIN S: 49 %
HGB BLD-MCNC: 9.4 G/DL (ref 13.5–17.5)
HGB RETIC QN: 29 PG (ref 28–38)
HMPV RNA SPEC QL NAA+PROBE: NOT DETECTED
HPIV1 RNA SPEC QL NAA+PROBE: NOT DETECTED
HPIV2 RNA SPEC QL NAA+PROBE: NOT DETECTED
HPIV3 RNA SPEC QL NAA+PROBE: NOT DETECTED
HPIV4 RNA SPEC QL NAA+PROBE: NOT DETECTED
IMM GRANULOCYTES # BLD AUTO: 0.04 X10*3/UL (ref 0–0.7)
IMM GRANULOCYTES NFR BLD AUTO: 0.4 % (ref 0–0.9)
IMMATURE RETIC FRACTION: 20.2 %
LYMPHOCYTES # BLD AUTO: 1.03 X10*3/UL (ref 1.2–4.8)
LYMPHOCYTES NFR BLD AUTO: 9.8 %
MCH RBC QN AUTO: 31 PG (ref 26–34)
MCHC RBC AUTO-ENTMCNC: 37.8 G/DL (ref 32–36)
MCV RBC AUTO: 82 FL (ref 80–100)
MONOCYTES # BLD AUTO: 1.33 X10*3/UL (ref 0.1–1)
MONOCYTES NFR BLD AUTO: 12.7 %
NEUTROPHILS # BLD AUTO: 7.96 X10*3/UL (ref 1.2–7.7)
NEUTROPHILS NFR BLD AUTO: 76.1 %
NRBC BLD-RTO: 0.4 /100 WBCS (ref 0–0)
PATH REVIEW-HGB IDENTIFICATION: ABNORMAL
PLATELET # BLD AUTO: 212 X10*3/UL (ref 150–450)
RBC # BLD AUTO: 3.03 X10*6/UL (ref 4.5–5.9)
RETICS #: 0.1 X10*6/UL (ref 0.02–0.12)
RETICS/RBC NFR AUTO: 3.3 % (ref 0.5–2)
RHINOVIRUS RNA UPPER RESP QL NAA+PROBE: NOT DETECTED
SARS-COV-2 RNA RESP QL NAA+PROBE: NOT DETECTED
WBC # BLD AUTO: 10.5 X10*3/UL (ref 4.4–11.3)

## 2024-04-18 PROCEDURE — 1130000001 HC PRIVATE PED ROOM DAILY

## 2024-04-18 PROCEDURE — 99233 SBSQ HOSP IP/OBS HIGH 50: CPT | Performed by: PEDIATRICS

## 2024-04-18 PROCEDURE — 2500000004 HC RX 250 GENERAL PHARMACY W/ HCPCS (ALT 636 FOR OP/ED)

## 2024-04-18 PROCEDURE — 2500000004 HC RX 250 GENERAL PHARMACY W/ HCPCS (ALT 636 FOR OP/ED): Performed by: STUDENT IN AN ORGANIZED HEALTH CARE EDUCATION/TRAINING PROGRAM

## 2024-04-18 PROCEDURE — 87631 RESP VIRUS 3-5 TARGETS: CPT

## 2024-04-18 PROCEDURE — 2500000006 HC RX 250 W HCPCS SELF ADMINISTERED DRUGS (ALT 637 FOR ALL PAYERS)

## 2024-04-18 PROCEDURE — 1100000001 HC PRIVATE ROOM DAILY

## 2024-04-18 PROCEDURE — P9016 RBC LEUKOCYTES REDUCED: HCPCS

## 2024-04-18 PROCEDURE — 36430 TRANSFUSION BLD/BLD COMPNT: CPT

## 2024-04-18 PROCEDURE — 87636 SARSCOV2 & INF A&B AMP PRB: CPT

## 2024-04-18 PROCEDURE — 2500000001 HC RX 250 WO HCPCS SELF ADMINISTERED DRUGS (ALT 637 FOR MEDICARE OP)

## 2024-04-18 PROCEDURE — 2500000005 HC RX 250 GENERAL PHARMACY W/O HCPCS

## 2024-04-18 RX ORDER — ACETAMINOPHEN 10 MG/ML
600 INJECTION, SOLUTION INTRAVENOUS EVERY 6 HOURS SCHEDULED
Status: CANCELLED | OUTPATIENT
Start: 2024-04-18 | End: 2024-04-19

## 2024-04-18 RX ORDER — ACETAMINOPHEN 10 MG/ML
1000 INJECTION, SOLUTION INTRAVENOUS EVERY 6 HOURS SCHEDULED
Status: DISCONTINUED | OUTPATIENT
Start: 2024-04-18 | End: 2024-04-18

## 2024-04-18 RX ORDER — ACETAMINOPHEN 325 MG/1
650 TABLET ORAL EVERY 6 HOURS
Status: DISCONTINUED | OUTPATIENT
Start: 2024-04-18 | End: 2024-04-20 | Stop reason: HOSPADM

## 2024-04-18 RX ORDER — ACETAMINOPHEN 10 MG/ML
650 INJECTION, SOLUTION INTRAVENOUS EVERY 6 HOURS SCHEDULED
Status: DISCONTINUED | OUTPATIENT
Start: 2024-04-18 | End: 2024-04-18

## 2024-04-18 RX ORDER — AZITHROMYCIN 500 MG/1
500 TABLET, FILM COATED ORAL ONCE
Status: COMPLETED | OUTPATIENT
Start: 2024-04-18 | End: 2024-04-18

## 2024-04-18 RX ORDER — AZITHROMYCIN 250 MG/1
250 TABLET, FILM COATED ORAL EVERY 24 HOURS
Status: DISCONTINUED | OUTPATIENT
Start: 2024-04-19 | End: 2024-04-20 | Stop reason: HOSPADM

## 2024-04-18 RX ADMIN — NAPROXEN 500 MG: 500 TABLET ORAL at 21:00

## 2024-04-18 RX ADMIN — ACETAMINOPHEN 650 MG: 10 INJECTION, SOLUTION INTRAVENOUS at 04:13

## 2024-04-18 RX ADMIN — BUDESONIDE AND FORMOTEROL FUMARATE DIHYDRATE 2 PUFF: 80; 4.5 AEROSOL RESPIRATORY (INHALATION) at 08:22

## 2024-04-18 RX ADMIN — HYDROMORPHONE HYDROCHLORIDE 0.87 MG: 1 INJECTION, SOLUTION INTRAMUSCULAR; INTRAVENOUS; SUBCUTANEOUS at 15:57

## 2024-04-18 RX ADMIN — HYDROMORPHONE HYDROCHLORIDE 0.87 MG: 1 INJECTION, SOLUTION INTRAMUSCULAR; INTRAVENOUS; SUBCUTANEOUS at 08:21

## 2024-04-18 RX ADMIN — ONDANSETRON 8 MG: 2 INJECTION INTRAMUSCULAR; INTRAVENOUS at 08:21

## 2024-04-18 RX ADMIN — AZITHROMYCIN DIHYDRATE 500 MG: 500 TABLET ORAL at 11:42

## 2024-04-18 RX ADMIN — SENNOSIDES 17.2 MG: 8.6 TABLET, FILM COATED ORAL at 08:21

## 2024-04-18 RX ADMIN — QUETIAPINE FUMARATE 100 MG: 100 TABLET ORAL at 21:00

## 2024-04-18 RX ADMIN — CEFTRIAXONE 2 G: 2 INJECTION, SOLUTION INTRAVENOUS at 22:00

## 2024-04-18 RX ADMIN — HYDROXYUREA 1000 MG: 500 CAPSULE ORAL at 09:22

## 2024-04-18 RX ADMIN — HYDROMORPHONE HYDROCHLORIDE 0.87 MG: 1 INJECTION, SOLUTION INTRAMUSCULAR; INTRAVENOUS; SUBCUTANEOUS at 20:59

## 2024-04-18 RX ADMIN — SENNOSIDES 17.2 MG: 8.6 TABLET, FILM COATED ORAL at 21:00

## 2024-04-18 RX ADMIN — ONDANSETRON 8 MG: 2 INJECTION INTRAMUSCULAR; INTRAVENOUS at 15:58

## 2024-04-18 RX ADMIN — FAMOTIDINE 20 MG: 20 TABLET, FILM COATED ORAL at 08:21

## 2024-04-18 RX ADMIN — HYDROMORPHONE HYDROCHLORIDE 0.87 MG: 1 INJECTION, SOLUTION INTRAMUSCULAR; INTRAVENOUS; SUBCUTANEOUS at 12:53

## 2024-04-18 RX ADMIN — LIDOCAINE 1 PATCH: 4 PATCH TOPICAL at 23:56

## 2024-04-18 RX ADMIN — FAMOTIDINE 20 MG: 20 TABLET, FILM COATED ORAL at 21:00

## 2024-04-18 RX ADMIN — BUDESONIDE AND FORMOTEROL FUMARATE DIHYDRATE 2 PUFF: 80; 4.5 AEROSOL RESPIRATORY (INHALATION) at 21:00

## 2024-04-18 RX ADMIN — HYDROMORPHONE HYDROCHLORIDE 0.87 MG: 1 INJECTION, SOLUTION INTRAMUSCULAR; INTRAVENOUS; SUBCUTANEOUS at 23:28

## 2024-04-18 RX ADMIN — SODIUM CHLORIDE 240 ML: 9 INJECTION, SOLUTION INTRAVENOUS at 16:20

## 2024-04-18 RX ADMIN — HYDROMORPHONE HYDROCHLORIDE 0.87 MG: 1 INJECTION, SOLUTION INTRAMUSCULAR; INTRAVENOUS; SUBCUTANEOUS at 03:10

## 2024-04-18 RX ADMIN — POLYETHYLENE GLYCOL 3350 17 G: 17 POWDER, FOR SOLUTION ORAL at 08:21

## 2024-04-18 RX ADMIN — ONDANSETRON 8 MG: 2 INJECTION INTRAMUSCULAR; INTRAVENOUS at 03:09

## 2024-04-18 RX ADMIN — HYDROMORPHONE HYDROCHLORIDE 0.87 MG: 1 INJECTION, SOLUTION INTRAMUSCULAR; INTRAVENOUS; SUBCUTANEOUS at 06:06

## 2024-04-18 RX ADMIN — HYDROMORPHONE HYDROCHLORIDE 0.87 MG: 1 INJECTION, SOLUTION INTRAMUSCULAR; INTRAVENOUS; SUBCUTANEOUS at 18:44

## 2024-04-18 RX ADMIN — ONDANSETRON 8 MG: 2 INJECTION INTRAMUSCULAR; INTRAVENOUS at 20:59

## 2024-04-18 RX ADMIN — NAPROXEN 500 MG: 500 TABLET ORAL at 08:21

## 2024-04-18 RX ADMIN — ACETAMINOPHEN 1000 MG: 10 INJECTION, SOLUTION INTRAVENOUS at 11:42

## 2024-04-18 RX ADMIN — ACETAMINOPHEN 650 MG: 325 TABLET ORAL at 19:08

## 2024-04-18 ASSESSMENT — PAIN SCALES - GENERAL
PAINLEVEL_OUTOF10: 8
PAINLEVEL_OUTOF10: 9
PAINLEVEL_OUTOF10: 8
PAINLEVEL_OUTOF10: 9

## 2024-04-18 ASSESSMENT — PAIN - FUNCTIONAL ASSESSMENT
PAIN_FUNCTIONAL_ASSESSMENT: 0-10
PAIN_FUNCTIONAL_ASSESSMENT: UNABLE TO SELF-REPORT
PAIN_FUNCTIONAL_ASSESSMENT: 0-10
PAIN_FUNCTIONAL_ASSESSMENT: UNABLE TO SELF-REPORT
PAIN_FUNCTIONAL_ASSESSMENT: 0-10
PAIN_FUNCTIONAL_ASSESSMENT: UNABLE TO SELF-REPORT

## 2024-04-18 ASSESSMENT — PAIN DESCRIPTION - DESCRIPTORS: DESCRIPTORS: SORE

## 2024-04-18 ASSESSMENT — PAIN DESCRIPTION - LOCATION: LOCATION: CHEST

## 2024-04-18 ASSESSMENT — PAIN INTENSITY VAS
VAS_PAIN_GENERAL: 8
VAS_PAIN_GENERAL: 9

## 2024-04-18 NOTE — PROGRESS NOTES
Progress Note  Service: Pediatric Hematology / Oncology    Josue is a 19 y.o. male on day 4 of admission with Sickle cell disease with crisis (Multi).    Subjective  Interval Update:  Pain scores 8-9 out of 10. Overnight spiked a fever to 39.4C. Bcx and 2v CXR obtained. CXR c/f developing ACS and L pleural effusion. Started CTX; azithromycin started in the morning.     Objective   Vitals:      4/17/2024    12:42 PM 4/17/2024     4:50 PM 4/17/2024     9:57 PM 4/17/2024    10:05 PM 4/18/2024    12:00 AM 4/18/2024     1:15 AM 4/18/2024     4:15 AM   Vitals   Systolic 124 128 129  104  102   Diastolic 59 78 58  50  52   Heart Rate 97 112 109  99  101   Temp 37.9 °C (100.2 °F) 37.5 °C (99.5 °F) 38.6 °C (101.5 °F) 39.4 °C (102.9 °F) 38.7 °C (101.7 °F) 37.6 °C (99.7 °F) 36.8 °C (98.2 °F)   Resp 18 20 20  24  16       24 Hour I&O Total:    Intake/Output Summary (Last 24 hours) at 4/18/2024 0723  Last data filed at 4/18/2024 0705  Gross per 24 hour   Intake 2093.27 ml   Output 0 ml   Net 2093.27 ml       Physical Exam  Vitals reviewed.   Constitutional:       General: He is awake.      Appearance: He is not ill-appearing or toxic-appearing.   HENT:      Nose: Nose normal.      Mouth/Throat:      Mouth: Mucous membranes are moist.      Pharynx: Oropharynx is clear.   Eyes:      General: Scleral icterus (mild) present.      Extraocular Movements: Extraocular movements intact.      Conjunctiva/sclera:      Right eye: Hemorrhage (improving) present.      Left eye: Hemorrhage present.   Cardiovascular:      Rate and Rhythm: Normal rate and regular rhythm.      Pulses: Normal pulses.      Heart sounds: Normal heart sounds.   Pulmonary:      Effort: Pulmonary effort is normal. No respiratory distress.      Breath sounds: Normal breath sounds. No wheezing, rhonchi or rales.   Chest:      Chest wall: Tenderness present.   Abdominal:      General: Abdomen is flat. Bowel sounds are normal. There is no distension.      Palpations:  Abdomen is soft.      Tenderness: There is no abdominal tenderness.   Skin:     General: Skin is warm and dry.      Capillary Refill: Capillary refill takes less than 2 seconds.   Neurological:      General: No focal deficit present.      Mental Status: He is alert and oriented to person, place, and time.      Sensory: Sensation is intact.      Motor: Motor function is intact.   Psychiatric:         Mood and Affect: Mood is anxious.         Speech: Speech is tangential.         Behavior: Behavior normal. Behavior is cooperative.      Comments: Pressured speech         Lab Results:  Results for orders placed or performed during the hospital encounter of 04/14/24 (from the past 24 hour(s))   CBC and Auto Differential   Result Value Ref Range    WBC 10.5 4.4 - 11.3 x10*3/uL    nRBC 0.4 (H) 0.0 - 0.0 /100 WBCs    RBC 3.03 (L) 4.50 - 5.90 x10*6/uL    Hemoglobin 9.4 (L) 13.5 - 17.5 g/dL    Hematocrit 24.9 (L) 41.0 - 52.0 %    MCV 82 80 - 100 fL    MCH 31.0 26.0 - 34.0 pg    MCHC 37.8 (H) 32.0 - 36.0 g/dL    RDW 15.5 (H) 11.5 - 14.5 %    Platelets 212 150 - 450 x10*3/uL    Neutrophils % 76.1 40.0 - 80.0 %    Immature Granulocytes %, Automated 0.4 0.0 - 0.9 %    Lymphocytes % 9.8 13.0 - 44.0 %    Monocytes % 12.7 2.0 - 10.0 %    Eosinophils % 0.9 0.0 - 6.0 %    Basophils % 0.1 0.0 - 2.0 %    Neutrophils Absolute 7.96 (H) 1.20 - 7.70 x10*3/uL    Immature Granulocytes Absolute, Automated 0.04 0.00 - 0.70 x10*3/uL    Lymphocytes Absolute 1.03 (L) 1.20 - 4.80 x10*3/uL    Monocytes Absolute 1.33 (H) 0.10 - 1.00 x10*3/uL    Eosinophils Absolute 0.09 0.00 - 0.70 x10*3/uL    Basophils Absolute 0.01 0.00 - 0.10 x10*3/uL   Reticulocytes   Result Value Ref Range    Retic % 3.3 (H) 0.5 - 2.0 %    Retic Absolute 0.100 0.022 - 0.118 x10*6/uL    Reticulocyte Hemoglobin 29 28 - 38 pg    Immature Retic fraction 20.2 (H) <=16.0 %   Blood Culture    Specimen: Peripheral Venipuncture; Blood culture   Result Value Ref Range    Blood Culture  Loaded on Instrument - Culture in progress        Imaging Results:  XR chest 2 views    Result Date: 4/18/2024  Interpreted By:  Nina Tucker  and Elle Engel STUDY: XR CHEST 2 VIEWS;  4/17/2024 10:57 pm   INDICATION: Signs/Symptoms:acute chest syndrome.   COMPARISON: Radiograph 04/16/2024   ACCESSION NUMBER(S): [id number]   ORDERING CLINICIAN: MAGDALENE SCHWARZ   FINDINGS: PA and lateral radiographs of the chest were provided.   CARDIOMEDIASTINAL SILHOUETTE: Cardiomediastinal silhouette is normal in size and configuration.   LUNGS: Interval development small left pleural effusion. Mild left basilar atelectasis. No consolidation or pneumothorax.   ABDOMEN: No remarkable upper abdominal findings.   BONES: No acute osseous changes.       1.  Interval development of small left pleural effusion with mild left basilar atelectasis.   I personally reviewed the images/study and I agree with the findings as stated by Toro Almeida MD. This study was interpreted at [place], [place].   MACRO: None.   Signed by: Nina Tucker 4/18/2024 7:49 AM Dictation workstation:   [id number]      Assessment/Plan   Hospital Problems:  Principal Problem:    Sickle cell disease with crisis (Multi)      Josue is a 19 y.o. 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 Overnight he spiked a fever to 39.4. Bcx and CXR were obtained. CXR concerning for developing ACS, with along with his fever increases the likelihood of ACS. Overnight he was given one dose CTX. Started on azithromycin this morning for ACS treatment and RT notified. Will also give a partial manual exchange transfusion to reduce risk of rapid progression of ACS. Due to persistent appearance of PHPBT on CXR, will send viral swabs.     Plan:  HEME  [x] blood consent signed  #HgbSC  - baseline: Hgb 10-11, retic 3-4%  - admission: Hgb 11.0, retic 3.5%  - hydroxyurea 1000 mg  M-F      CNS   #VOE  - IV dilaudid 0.0125 mg/kg q2h  - IV toradol 15 mg q6h  - IV tylenol q6h  - lidocaine patch  #pruritus   - narcan gtt 2 mcg/kg/hr      RESP  #ACS  [X] RT notified of patient  - partial manual exchange transfusion  - CTX (4/17-*)  - azithromycin (4/18-*)  [ ] respiratory viral panel  #mild intermittent asthma   - symbicort 2 puffs BID      FENGI  #nutrition  - regular diet  #GI ppx  - PO famotidine 20 mg BID  #bowel regimen  - Miralax 1 cap daily  - Senna BID  #nausea  - IV zofran q6h  - IV benadryl q6h PRN     PSYCH  #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 Wallis and sickle cell team.    Kat Neri MD  Pediatrics PGY-2   I saw and evaluated the patient. I personally obtained the key and critical portions of the history and physical exam or was physically present for key and critical portions performed by the resident/fellow. I reviewed the resident/fellow's documentation and discussed the patient with the resident/fellow. I agree with the resident/fellow's medical decision making as documented in the note.

## 2024-04-18 NOTE — PROGRESS NOTES
Expressive Therapies Note    Art Therapy Note      Therapy Session  Referral Type: New referral this admission  Visit Type: New visit  Session Start Time: 1520  Session End Time: 1525  Intervention Delivery: In-person  Conflict of Service: Off unit      Treatment/Interventions      Post-assessment  Continue Visiting: Yes  Total Session Time (min): 5 minutes      Art therapy intern visited pt room and pt was not in room. Nurse told [facility] that pt was currently out of room getting an IV. ATI left note for pt stating they would check back in. Art therapy team will continue to follow to provide opportunities for choice and control, expressive outlet, coping skills, communication, normalization of environment, rapport building, family support, and support during the medical experience.       Jason Patel  Art Therapy Intern  Epic Secure Chat

## 2024-04-18 NOTE — SIGNIFICANT EVENT
"Josue Rangel is a 18yo M with Hgb S C disease admitted for VOE now found to have new fever. This resident to bedside, fellow Ronel Francois and stat ceftriaxone and CXR ordered in 2200 hour for new fever. Upon my exam, Josue was laying awake and alert, conversing about which arms had blood draws this admission, talking full sentences but having abdominal accessory muscle breathing and endorsing shortness of breath. Attempted blood culture draw with aid of bedside RN; unsuccessful. Radiology transport arrived; plan to re-attempt blood draw upon patient return from radiology, which was successful. Ddx acute chest syndrome vs sepsis.    On physical exam @ ~ 2300, lungs clear bilaterally though air movement limited by poor breath volumes 2/2 pain. No abdominal accessory muscle use, intercostal retractions, nor tripoding. Nasal flaring noted intermittently.     On review of CXR possible new RLL infiltrate as compared to 4/16 film.     Visit Vitals  /58 (BP Location: Right arm, Patient Position: Lying)   Pulse 109   Temp 39.4 °C (102.9 °F) (Oral)   Resp 20   Ht 1.8 m (5' 10.87\")   Wt 69.6 kg (153 lb 7 oz)   SpO2 97%   BMI 21.48 kg/m²   Smoking Status Never   BSA 1.87 m²        Nona Coats MD  Pediatrics PGY 3   "

## 2024-04-19 ENCOUNTER — DOCUMENTATION (OUTPATIENT)
Dept: PEDIATRIC HEMATOLOGY/ONCOLOGY | Facility: HOSPITAL | Age: 19
End: 2024-04-19
Payer: MEDICAID

## 2024-04-19 ENCOUNTER — PHARMACY VISIT (OUTPATIENT)
Dept: PHARMACY | Facility: CLINIC | Age: 19
End: 2024-04-19
Payer: MEDICAID

## 2024-04-19 LAB
BASOPHILS # BLD AUTO: 0.02 X10*3/UL (ref 0–0.1)
BASOPHILS NFR BLD AUTO: 0.2 %
BLOOD EXPIRATION DATE: NORMAL
BLOOD EXPIRATION DATE: NORMAL
DISPENSE STATUS: NORMAL
DISPENSE STATUS: NORMAL
EOSINOPHIL # BLD AUTO: 0.77 X10*3/UL (ref 0–0.7)
EOSINOPHIL NFR BLD AUTO: 7.8 %
ERYTHROCYTE [DISTWIDTH] IN BLOOD BY AUTOMATED COUNT: 15.3 % (ref 11.5–14.5)
HCT VFR BLD AUTO: 30.2 % (ref 41–52)
HGB BLD-MCNC: 11.1 G/DL (ref 13.5–17.5)
HGB RETIC QN: 25 PG (ref 28–38)
IMM GRANULOCYTES # BLD AUTO: 0.02 X10*3/UL (ref 0–0.7)
IMM GRANULOCYTES NFR BLD AUTO: 0.2 % (ref 0–0.9)
IMMATURE RETIC FRACTION: 37.2 %
LYMPHOCYTES # BLD AUTO: 2.21 X10*3/UL (ref 1.2–4.8)
LYMPHOCYTES NFR BLD AUTO: 22.5 %
MCH RBC QN AUTO: 30.8 PG (ref 26–34)
MCHC RBC AUTO-ENTMCNC: 36.8 G/DL (ref 32–36)
MCV RBC AUTO: 84 FL (ref 80–100)
MONOCYTES # BLD AUTO: 1.03 X10*3/UL (ref 0.1–1)
MONOCYTES NFR BLD AUTO: 10.5 %
NEUTROPHILS # BLD AUTO: 5.79 X10*3/UL (ref 1.2–7.7)
NEUTROPHILS NFR BLD AUTO: 58.8 %
NRBC BLD-RTO: 0.3 /100 WBCS (ref 0–0)
PLATELET # BLD AUTO: 231 X10*3/UL (ref 150–450)
PRODUCT BLOOD TYPE: 9500
PRODUCT BLOOD TYPE: 9500
PRODUCT CODE: NORMAL
PRODUCT CODE: NORMAL
RBC # BLD AUTO: 3.6 X10*6/UL (ref 4.5–5.9)
RETICS #: 0.05 X10*6/UL (ref 0.02–0.12)
RETICS/RBC NFR AUTO: 1.4 % (ref 0.5–2)
UNIT ABO: NORMAL
UNIT ABO: NORMAL
UNIT NUMBER: NORMAL
UNIT NUMBER: NORMAL
UNIT RH: NORMAL
UNIT RH: NORMAL
UNIT VOLUME: 277
UNIT VOLUME: 350
WBC # BLD AUTO: 9.8 X10*3/UL (ref 4.4–11.3)
XM INTEP: NORMAL
XM INTEP: NORMAL

## 2024-04-19 PROCEDURE — 99233 SBSQ HOSP IP/OBS HIGH 50: CPT | Performed by: PEDIATRICS

## 2024-04-19 PROCEDURE — RXMED WILLOW AMBULATORY MEDICATION CHARGE

## 2024-04-19 PROCEDURE — 2500000004 HC RX 250 GENERAL PHARMACY W/ HCPCS (ALT 636 FOR OP/ED)

## 2024-04-19 PROCEDURE — 36415 COLL VENOUS BLD VENIPUNCTURE: CPT

## 2024-04-19 PROCEDURE — 2500000004 HC RX 250 GENERAL PHARMACY W/ HCPCS (ALT 636 FOR OP/ED): Performed by: STUDENT IN AN ORGANIZED HEALTH CARE EDUCATION/TRAINING PROGRAM

## 2024-04-19 PROCEDURE — 85025 COMPLETE CBC W/AUTO DIFF WBC: CPT

## 2024-04-19 PROCEDURE — 2500000006 HC RX 250 W HCPCS SELF ADMINISTERED DRUGS (ALT 637 FOR ALL PAYERS)

## 2024-04-19 PROCEDURE — 2500000001 HC RX 250 WO HCPCS SELF ADMINISTERED DRUGS (ALT 637 FOR MEDICARE OP)

## 2024-04-19 PROCEDURE — 85045 AUTOMATED RETICULOCYTE COUNT: CPT

## 2024-04-19 PROCEDURE — 1100000001 HC PRIVATE ROOM DAILY

## 2024-04-19 PROCEDURE — 2500000005 HC RX 250 GENERAL PHARMACY W/O HCPCS

## 2024-04-19 PROCEDURE — 1130000001 HC PRIVATE PED ROOM DAILY

## 2024-04-19 RX ORDER — OXYCODONE HYDROCHLORIDE 5 MG/1
5 TABLET ORAL
Status: DISCONTINUED | OUTPATIENT
Start: 2024-04-19 | End: 2024-04-20

## 2024-04-19 RX ORDER — FAMOTIDINE 20 MG/1
20 TABLET, FILM COATED ORAL 2 TIMES DAILY
Qty: 30 TABLET | Refills: 0 | Status: SHIPPED | OUTPATIENT
Start: 2024-04-19 | End: 2024-05-30 | Stop reason: ALTCHOICE

## 2024-04-19 RX ORDER — BUDESONIDE AND FORMOTEROL FUMARATE DIHYDRATE 80; 4.5 UG/1; UG/1
2 AEROSOL RESPIRATORY (INHALATION)
Qty: 10.2 G | Refills: 1 | Status: SHIPPED | OUTPATIENT
Start: 2024-04-19

## 2024-04-19 RX ORDER — CEFTRIAXONE 2 G/50ML
2 INJECTION, SOLUTION INTRAVENOUS EVERY 24 HOURS
Status: DISCONTINUED | OUTPATIENT
Start: 2024-04-19 | End: 2024-04-20 | Stop reason: HOSPADM

## 2024-04-19 RX ORDER — AMOXICILLIN AND CLAVULANATE POTASSIUM 562.5; 437.5; 62.5 MG/1; MG/1; MG/1
1000 TABLET, FILM COATED, EXTENDED RELEASE ORAL 2 TIMES DAILY
Qty: 12 TABLET | Refills: 0 | Status: SHIPPED | OUTPATIENT
Start: 2024-04-19 | End: 2024-04-25

## 2024-04-19 RX ORDER — ONDANSETRON 8 MG/1
8 TABLET, ORALLY DISINTEGRATING ORAL EVERY 6 HOURS PRN
Status: DISCONTINUED | OUTPATIENT
Start: 2024-04-19 | End: 2024-04-20 | Stop reason: HOSPADM

## 2024-04-19 RX ORDER — NAPROXEN 500 MG/1
500 TABLET ORAL EVERY 12 HOURS
Qty: 60 TABLET | Refills: 0 | Status: SHIPPED | OUTPATIENT
Start: 2024-04-19

## 2024-04-19 RX ORDER — HYDROMORPHONE HYDROCHLORIDE 1 MG/ML
0.52 INJECTION, SOLUTION INTRAMUSCULAR; INTRAVENOUS; SUBCUTANEOUS
Status: DISCONTINUED | OUTPATIENT
Start: 2024-04-19 | End: 2024-04-19

## 2024-04-19 RX ORDER — ACETAMINOPHEN 325 MG/1
650 TABLET ORAL EVERY 6 HOURS PRN
Qty: 60 TABLET | Refills: 0 | Status: SHIPPED | OUTPATIENT
Start: 2024-04-19 | End: 2024-05-02 | Stop reason: SDUPTHER

## 2024-04-19 RX ORDER — AZITHROMYCIN 250 MG/1
250 TABLET, FILM COATED ORAL EVERY 24 HOURS
Qty: 2 TABLET | Refills: 0 | Status: SHIPPED | OUTPATIENT
Start: 2024-04-20 | End: 2024-04-22

## 2024-04-19 RX ORDER — POLYETHYLENE GLYCOL 3350 17 G/17G
17 POWDER, FOR SOLUTION ORAL DAILY PRN
Qty: 30 EACH | Refills: 0 | Status: SHIPPED | OUTPATIENT
Start: 2024-04-19

## 2024-04-19 RX ORDER — QUETIAPINE FUMARATE 100 MG/1
100 TABLET, FILM COATED ORAL NIGHTLY
Qty: 30 TABLET | Refills: 0 | Status: SHIPPED | OUTPATIENT
Start: 2024-04-19 | End: 2024-05-30 | Stop reason: SDUPTHER

## 2024-04-19 RX ORDER — OXYCODONE HYDROCHLORIDE 5 MG/1
5 TABLET ORAL EVERY 6 HOURS
Qty: 10 TABLET | Refills: 0 | Status: SHIPPED | OUTPATIENT
Start: 2024-04-19

## 2024-04-19 RX ORDER — SENNOSIDES 8.6 MG/1
2 TABLET ORAL 2 TIMES DAILY PRN
Qty: 60 TABLET | Refills: 0 | Status: SHIPPED | OUTPATIENT
Start: 2024-04-19

## 2024-04-19 RX ADMIN — SENNOSIDES 17.2 MG: 8.6 TABLET, FILM COATED ORAL at 20:40

## 2024-04-19 RX ADMIN — HYDROMORPHONE HYDROCHLORIDE 0.52 MG: 1 INJECTION, SOLUTION INTRAMUSCULAR; INTRAVENOUS; SUBCUTANEOUS at 12:11

## 2024-04-19 RX ADMIN — NAPROXEN 500 MG: 500 TABLET ORAL at 20:40

## 2024-04-19 RX ADMIN — BUDESONIDE AND FORMOTEROL FUMARATE DIHYDRATE 2 PUFF: 80; 4.5 AEROSOL RESPIRATORY (INHALATION) at 20:45

## 2024-04-19 RX ADMIN — QUETIAPINE FUMARATE 100 MG: 100 TABLET ORAL at 20:41

## 2024-04-19 RX ADMIN — HYDROMORPHONE HYDROCHLORIDE 0.87 MG: 1 INJECTION, SOLUTION INTRAMUSCULAR; INTRAVENOUS; SUBCUTANEOUS at 04:05

## 2024-04-19 RX ADMIN — HYDROMORPHONE HYDROCHLORIDE 0.52 MG: 1 INJECTION, SOLUTION INTRAMUSCULAR; INTRAVENOUS; SUBCUTANEOUS at 15:26

## 2024-04-19 RX ADMIN — ACETAMINOPHEN 650 MG: 325 TABLET ORAL at 12:52

## 2024-04-19 RX ADMIN — CEFTRIAXONE 2 G: 2 INJECTION, SOLUTION INTRAVENOUS at 12:13

## 2024-04-19 RX ADMIN — FAMOTIDINE 20 MG: 20 TABLET, FILM COATED ORAL at 09:14

## 2024-04-19 RX ADMIN — POLYETHYLENE GLYCOL 3350 17 G: 17 POWDER, FOR SOLUTION ORAL at 09:14

## 2024-04-19 RX ADMIN — ACETAMINOPHEN 650 MG: 325 TABLET ORAL at 19:16

## 2024-04-19 RX ADMIN — ACETAMINOPHEN 650 MG: 325 TABLET ORAL at 01:24

## 2024-04-19 RX ADMIN — ONDANSETRON 8 MG: 2 INJECTION INTRAMUSCULAR; INTRAVENOUS at 09:14

## 2024-04-19 RX ADMIN — HYDROMORPHONE HYDROCHLORIDE 0.87 MG: 1 INJECTION, SOLUTION INTRAMUSCULAR; INTRAVENOUS; SUBCUTANEOUS at 06:39

## 2024-04-19 RX ADMIN — NAPROXEN 500 MG: 500 TABLET ORAL at 09:14

## 2024-04-19 RX ADMIN — ONDANSETRON 8 MG: 2 INJECTION INTRAMUSCULAR; INTRAVENOUS at 04:05

## 2024-04-19 RX ADMIN — OXYCODONE HYDROCHLORIDE 5 MG: 5 TABLET ORAL at 20:41

## 2024-04-19 RX ADMIN — AZITHROMYCIN DIHYDRATE 250 MG: 250 TABLET ORAL at 06:39

## 2024-04-19 RX ADMIN — SENNOSIDES 17.2 MG: 8.6 TABLET, FILM COATED ORAL at 09:14

## 2024-04-19 RX ADMIN — HYDROMORPHONE HYDROCHLORIDE 0.52 MG: 1 INJECTION, SOLUTION INTRAMUSCULAR; INTRAVENOUS; SUBCUTANEOUS at 18:22

## 2024-04-19 RX ADMIN — HYDROMORPHONE HYDROCHLORIDE 0.87 MG: 1 INJECTION, SOLUTION INTRAMUSCULAR; INTRAVENOUS; SUBCUTANEOUS at 09:14

## 2024-04-19 RX ADMIN — ACETAMINOPHEN 650 MG: 325 TABLET ORAL at 06:39

## 2024-04-19 RX ADMIN — BUDESONIDE AND FORMOTEROL FUMARATE DIHYDRATE 2 PUFF: 80; 4.5 AEROSOL RESPIRATORY (INHALATION) at 09:15

## 2024-04-19 RX ADMIN — DEXTROSE AND SODIUM CHLORIDE 5 ML/HR: 5; 900 INJECTION, SOLUTION INTRAVENOUS at 12:01

## 2024-04-19 RX ADMIN — LIDOCAINE 1 PATCH: 4 PATCH TOPICAL at 22:47

## 2024-04-19 RX ADMIN — NALOXONE HYDROCHLORIDE 2 MCG/KG/HR: 0.4 INJECTION, SOLUTION INTRAMUSCULAR; INTRAVENOUS; SUBCUTANEOUS at 12:11

## 2024-04-19 RX ADMIN — FAMOTIDINE 20 MG: 20 TABLET, FILM COATED ORAL at 20:40

## 2024-04-19 ASSESSMENT — PAIN - FUNCTIONAL ASSESSMENT
PAIN_FUNCTIONAL_ASSESSMENT: 0-10
PAIN_FUNCTIONAL_ASSESSMENT: UNABLE TO SELF-REPORT
PAIN_FUNCTIONAL_ASSESSMENT: 0-10
PAIN_FUNCTIONAL_ASSESSMENT: 0-10

## 2024-04-19 ASSESSMENT — PAIN DESCRIPTION - LOCATION
LOCATION: BACK
LOCATION: BACK

## 2024-04-19 ASSESSMENT — PAIN SCALES - GENERAL
PAINLEVEL_OUTOF10: 7
PAINLEVEL_OUTOF10: 8

## 2024-04-19 ASSESSMENT — PAIN DESCRIPTION - ORIENTATION
ORIENTATION: LEFT
ORIENTATION: LEFT

## 2024-04-19 NOTE — DISCHARGE INSTRUCTIONS
It was a pleasure taking care of you! You were admitted to [facility] for a sickle cell pain crisis and acute chest syndrome.     For your acute chest, you will need to finish a course of Augmentin and Azithromycin. It is important that you take the medicine as prescribed.    You do not need to take your hydroxyurea until you follow up with the sickle cell team.     To manage your pain, take these medications:  - Take oxycodone every 6 hours for the next 2 days. Then you can take it as needed for breakthrough pain.  - Take naproxen every 12 hours for the next 2 days. Then take as needed for pain.  - Take tylenol every 6 hours as needed for pain.     Continue taking the Seroquel every night to help with your mood and sleep.   
Mother/Father

## 2024-04-19 NOTE — PROGRESS NOTES
Massage Therapy / Acupuncture Note:  I visited with Josue today and was met with a huge smile.  He was in good spirits and stated he didn't need anything today.

## 2024-04-19 NOTE — PROGRESS NOTES
"SW met with pt at bedside.  He was sitting up and easily engaged in this conversation.    SW discussed options to have mental health care at a mental health center where there are additional services available to him.  Pt stated that he has no desire to get care at a mental health center.  He easily agreed to continue seeing Denisa Amaro for his \"sleeping medicine\" which is how he references his medicine for his mental health.    SW also had pt sign a DEVORAH from his grandmother, Addis Bo so information can be shared. This will be scanned into epic.    P: Continue to assist with coordination of care  BASHIR Reyes      "

## 2024-04-19 NOTE — PROGRESS NOTES
Patient currently admitted on R7 for VOE and acute chest syndrome.  Received a partial manual exchange transfusion on 4/18/24 evening shift.  ARC has been down trending for the last 3 days.  Prior to the partial manual exchange, his ARC was 0.1 and post transfusion it was .049.  After discussing with Dr. Smith, we will hold his hydroxyurea for now given the ARC is less than .08    Hydroxyurea Monitoring Parameters/Goals:  ANC: >1.5 x 10^3 cells/microL (if <2 yrs old then >1 x10^3 cells/microL)  Platelets: >80 x 10^3 cells/microL  Absolute retic count: >0.08 x 10^6 cells/microL       Latest Reference Range & Units 04/17/24 23:33 04/19/24 05:42   Retic Absolute 0.022 - 0.118 x10*6/uL 0.100 0.049   Retic % 0.5 - 2.0 % 3.3 (H) 1.4   Reticulocyte Hemoglobin 28 - 38 pg 29 25 (L)   (H): Data is abnormally high  (L): Data is abnormally low      Latest Reference Range & Units 04/17/24 23:33 04/19/24 05:42   LEUKOCYTES (10*3/UL) IN BLOOD BY AUTOMATED COUNT, [place] 4.4 - 11.3 x10*3/uL 10.5 9.8   nRBC 0.0 - 0.0 /100 WBCs 0.4 (H) 0.3 (H)   ERYTHROCYTES (10*6/UL) IN BLOOD BY AUTOMATED COUNT, [place] 4.50 - 5.90 x10*6/uL 3.03 (L) 3.60 (L)   HEMOGLOBIN 13.5 - 17.5 g/dL 9.4 (L) 11.1 (L)   HEMATOCRIT 41.0 - 52.0 % 24.9 (L) 30.2 (L)   MCV 80 - 100 fL 82 84   MCH 26.0 - 34.0 pg 31.0 30.8   MCHC 32.0 - 36.0 g/dL 37.8 (H) 36.8 (H)   RED CELL DISTRIBUTION WIDTH 11.5 - 14.5 % 15.5 (H) 15.3 (H)   PLATELETS (10*3/UL) IN BLOOD AUTOMATED COUNT, [place] 150 - 450 x10*3/uL 212 231   NEUTROPHILS/100 LEUKOCYTES IN BLOOD BY AUTOMATED COUNT, [place] 40.0 - 80.0 % 76.1 58.8   Immature Granulocytes %, Automated 0.0 - 0.9 % 0.4 0.2   Lymphocytes % 13.0 - 44.0 % 9.8 22.5   Monocytes % 2.0 - 10.0 % 12.7 10.5   Eosinophils % 0.0 - 6.0 % 0.9 7.8   Basophils % 0.0 - 2.0 % 0.1 0.2   NEUTROPHILS (10*3/UL) IN BLOOD BY AUTOMATED COUNT, [place] 1.20 - 7.70 x10*3/uL 7.96 (H) 5.79   Immature Granulocytes Absolute, Automated 0.00 - 0.70 x10*3/uL 0.04 0.02    Lymphocytes Absolute 1.20 - 4.80 x10*3/uL 1.03 (L) 2.21   Monocytes Absolute 0.10 - 1.00 x10*3/uL 1.33 (H) 1.03 (H)   Eosinophils Absolute 0.00 - 0.70 x10*3/uL 0.09 0.77 (H)   Basophils Absolute 0.00 - 0.10 x10*3/uL 0.01 0.02   (H): Data is abnormally high  (L): Data is abnormally low

## 2024-04-19 NOTE — PROGRESS NOTES
Progress Note  Service: Pediatric Hematology / Oncology    Josue is a 19 y.o. male on day 5 of admission with Sickle cell disease with crisis (Multi).    Subjective  Interval Update:  Pain scores 8-9 out of 10. In a good mood    Objective   Vitals:      4/18/2024     8:45 PM 4/18/2024    11:30 PM 4/18/2024    11:38 PM 4/18/2024    11:53 PM 4/19/2024    12:36 AM 4/19/2024     2:54 AM 4/19/2024     6:30 AM   Vitals   Systolic 128 123 123 107 112 108 104   Diastolic 71 60 60 53 59 57 55   Heart Rate 90 108 108 111 93 82 91   Temp 36.8 °C (98.2 °F) 36.9 °C (98.4 °F) 36.9 °C (98.4 °F) 36.7 °C (98.1 °F) 36.7 °C (98.1 °F) 36.7 °C (98 °F) 36.7 °C (98.1 °F)   Resp 22 24 24 22 20 20 16       24 Hour I&O Total:    Intake/Output Summary (Last 24 hours) at 4/19/2024 0713  Last data filed at 4/19/2024 0643  Gross per 24 hour   Intake 1288.2 ml   Output 0 ml   Net 1288.2 ml       Physical Exam  Vitals reviewed.   Constitutional:       General: He is awake.      Appearance: He is not ill-appearing or toxic-appearing.   HENT:      Nose: Nose normal.      Mouth/Throat:      Mouth: Mucous membranes are moist.      Pharynx: Oropharynx is clear.   Eyes:      General: Scleral icterus (mild) present.      Extraocular Movements: Extraocular movements intact.      Conjunctiva/sclera:      Right eye: Hemorrhage (improving) present.      Left eye: Hemorrhage present.   Cardiovascular:      Rate and Rhythm: Normal rate and regular rhythm.      Pulses: Normal pulses.      Heart sounds: Normal heart sounds.   Pulmonary:      Effort: Pulmonary effort is normal. No respiratory distress.      Breath sounds: Normal breath sounds. No wheezing, rhonchi or rales.   Abdominal:      General: Abdomen is flat. Bowel sounds are normal. There is no distension.      Palpations: Abdomen is soft.      Tenderness: There is no abdominal tenderness.   Skin:     General: Skin is warm and dry.      Capillary Refill: Capillary refill takes less than 2 seconds.    Neurological:      General: No focal deficit present.      Mental Status: He is alert and oriented to person, place, and time.      Sensory: Sensation is intact.      Motor: Motor function is intact.   Psychiatric:         Mood and Affect: Mood is anxious.         Speech: Speech is tangential.         Behavior: Behavior normal. Behavior is cooperative.      Comments: Pressured speech         Lab Results:  Results for orders placed or performed during the hospital encounter of 04/14/24 (from the past 24 hour(s))   Prepare RBC: 2 Units, Hgb S Negative, Leukocytes Reduced (CMV reduced risk)   Result Value Ref Range    PRODUCT CODE H0464P78     Unit Number [id number]     Unit ABO O     Unit RH NEG     XM INTEP COMP     Dispense Status TR     Blood Expiration Date May 14, 2024 23:59 EDT     PRODUCT BLOOD TYPE 9500     UNIT VOLUME 350     PRODUCT CODE F6272D95     Unit Number [id number]     Unit ABO O     Unit RH NEG     XM INTEP COMP     Dispense Status TR     Blood Expiration Date May 14, 2024 23:59 EDT     PRODUCT BLOOD TYPE 9500     UNIT VOLUME 277    Rhinovirus PCR, Respiratory Specimens   Result Value Ref Range    Rhinovirus PCR, Respiratory Spec Not Detected Not Detected   Sars-CoV-2 and Influenza A/B PCR   Result Value Ref Range    Flu A Result Not Detected Not Detected    Flu B Result Not Detected Not Detected    Coronavirus 2019, PCR Not Detected Not Detected   Adenovirus PCR Qual For Respiratory Samples   Result Value Ref Range    Adenovirus PCR, Qual Not Detected Not detected   Metapneumovirus PCR   Result Value Ref Range    Metapneumovirus (Human), PCR Not Detected Not detected   Parainfluenza PCR   Result Value Ref Range    Parainfluenza 1, PCR Not Detected Not Detected, Invalid    Parainfluenza 2, PCR Not Detected Not Detected, Invalid    Parainfluenza 3, PCR Not Detected Not Detected, Invalid    Parainfluenza 4, PCR Not Detected Not Detected, Invalid       Imaging Results:  XR chest 2  views    Result Date: 4/18/2024  Interpreted By:  Nina Tucker  and Elle Engel STUDY: XR CHEST 2 VIEWS;  4/17/2024 10:57 pm   INDICATION: Signs/Symptoms:acute chest syndrome.   COMPARISON: Radiograph 04/16/2024   ACCESSION NUMBER(S): [id number]   ORDERING CLINICIAN: MAGDALENE SCHWARZ   FINDINGS: PA and lateral radiographs of the chest were provided.   CARDIOMEDIASTINAL SILHOUETTE: Cardiomediastinal silhouette is normal in size and configuration.   LUNGS: Interval development small left pleural effusion. Mild left basilar atelectasis. No consolidation or pneumothorax.   ABDOMEN: No remarkable upper abdominal findings.   BONES: No acute osseous changes.       1.  Interval development of small left pleural effusion with mild left basilar atelectasis.   I personally reviewed the images/study and I agree with the findings as stated by Toro Almeida MD. This study was interpreted at [place], [place].   MACRO: None.   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 His pain is improved and he has been up and moving consistently throughout the day. Will wean his dilaudid and transition tylenol to PO. With his decreasing absolute retic count, will hold his hydroxyurea per sickle cell team. Will send meds to beds for possible discharge over the weekend.     Plan:  HEME  [x] blood consent signed  #HgbSC  - baseline: Hgb 10-11, retic 3-4%  - admission: Hgb 11.0, retic 3.5%  - HOLD hydroxyurea     CNS   #VOE  - IV dilaudid 0.0075 mg/kg q2h  - PO naproxen q12h  - PO tylenol q6h  - lidocaine patch  #pruritus   - narcan gtt 2 mcg/kg/hr      RESP  #ACS  [X] RT notified of patient  - s/p partial manual  exchange transfusion  - CTX (4/17-*)  - azithromycin (4/18-*)  #mild intermittent asthma   - symbicort 2 puffs BID      EVAI  #nutrition  - regular diet  #GI ppx  - PO famotidine 20 mg BID  #bowel regimen  - Miralax 1 cap daily  - Senna BID  #nausea  - IV zofran q6h  - IV benadryl q6h PRN     PSYCH  #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 Wallis and sickle cell team.    Kat Neri MD  Pediatrics PGY-2   I saw and evaluated the patient. I personally obtained the key and critical portions of the history and physical exam or was physically present for key and critical portions performed by the resident/fellow. I reviewed the resident/fellow's documentation and discussed the patient with the resident/fellow. I agree with the resident/fellow's medical decision making as documented in the note.

## 2024-04-20 VITALS
RESPIRATION RATE: 18 BRPM | OXYGEN SATURATION: 95 % | BODY MASS INDEX: 21.48 KG/M2 | HEIGHT: 71 IN | HEART RATE: 78 BPM | DIASTOLIC BLOOD PRESSURE: 83 MMHG | TEMPERATURE: 98.2 F | SYSTOLIC BLOOD PRESSURE: 121 MMHG | WEIGHT: 153.44 LBS

## 2024-04-20 LAB
ABO GROUP (TYPE) IN BLOOD: NORMAL
ANTIBODY SCREEN: NORMAL
BASOPHILS # BLD AUTO: 0.03 X10*3/UL (ref 0–0.1)
BASOPHILS NFR BLD AUTO: 0.3 %
EOSINOPHIL # BLD AUTO: 0.88 X10*3/UL (ref 0–0.7)
EOSINOPHIL NFR BLD AUTO: 9.9 %
ERYTHROCYTE [DISTWIDTH] IN BLOOD BY AUTOMATED COUNT: 15.7 % (ref 11.5–14.5)
HCT VFR BLD AUTO: 27.7 % (ref 41–52)
HGB BLD-MCNC: 10.5 G/DL (ref 13.5–17.5)
HGB RETIC QN: 24 PG (ref 28–38)
IMM GRANULOCYTES # BLD AUTO: 0.03 X10*3/UL (ref 0–0.7)
IMM GRANULOCYTES NFR BLD AUTO: 0.3 % (ref 0–0.9)
IMMATURE RETIC FRACTION: 33.6 %
LYMPHOCYTES # BLD AUTO: 2.79 X10*3/UL (ref 1.2–4.8)
LYMPHOCYTES NFR BLD AUTO: 31.3 %
MCH RBC QN AUTO: 30.8 PG (ref 26–34)
MCHC RBC AUTO-ENTMCNC: 37.9 G/DL (ref 32–36)
MCV RBC AUTO: 81 FL (ref 80–100)
MONOCYTES # BLD AUTO: 1.08 X10*3/UL (ref 0.1–1)
MONOCYTES NFR BLD AUTO: 12.1 %
NEUTROPHILS # BLD AUTO: 4.11 X10*3/UL (ref 1.2–7.7)
NEUTROPHILS NFR BLD AUTO: 46.1 %
NRBC BLD-RTO: 0.3 /100 WBCS (ref 0–0)
PLATELET # BLD AUTO: 279 X10*3/UL (ref 150–450)
RBC # BLD AUTO: 3.41 X10*6/UL (ref 4.5–5.9)
RETICS #: 0.04 X10*6/UL (ref 0.02–0.12)
RETICS/RBC NFR AUTO: 1.2 % (ref 0.5–2)
RH FACTOR (ANTIGEN D): NORMAL
WBC # BLD AUTO: 8.9 X10*3/UL (ref 4.4–11.3)

## 2024-04-20 PROCEDURE — 2500000001 HC RX 250 WO HCPCS SELF ADMINISTERED DRUGS (ALT 637 FOR MEDICARE OP)

## 2024-04-20 PROCEDURE — 85045 AUTOMATED RETICULOCYTE COUNT: CPT

## 2024-04-20 PROCEDURE — 99239 HOSP IP/OBS DSCHRG MGMT >30: CPT | Performed by: PEDIATRICS

## 2024-04-20 PROCEDURE — 86901 BLOOD TYPING SEROLOGIC RH(D): CPT

## 2024-04-20 PROCEDURE — 36415 COLL VENOUS BLD VENIPUNCTURE: CPT

## 2024-04-20 PROCEDURE — 2500000006 HC RX 250 W HCPCS SELF ADMINISTERED DRUGS (ALT 637 FOR ALL PAYERS)

## 2024-04-20 PROCEDURE — 2500000004 HC RX 250 GENERAL PHARMACY W/ HCPCS (ALT 636 FOR OP/ED)

## 2024-04-20 PROCEDURE — 85025 COMPLETE CBC W/AUTO DIFF WBC: CPT

## 2024-04-20 RX ORDER — OXYCODONE HYDROCHLORIDE 5 MG/1
5 TABLET ORAL EVERY 6 HOURS
Status: DISCONTINUED | OUTPATIENT
Start: 2024-04-20 | End: 2024-04-20 | Stop reason: HOSPADM

## 2024-04-20 RX ADMIN — ACETAMINOPHEN 650 MG: 325 TABLET ORAL at 06:20

## 2024-04-20 RX ADMIN — POLYETHYLENE GLYCOL 3350 17 G: 17 POWDER, FOR SOLUTION ORAL at 10:08

## 2024-04-20 RX ADMIN — OXYCODONE HYDROCHLORIDE 5 MG: 5 TABLET ORAL at 01:26

## 2024-04-20 RX ADMIN — OXYCODONE HYDROCHLORIDE 5 MG: 5 TABLET ORAL at 06:20

## 2024-04-20 RX ADMIN — SENNOSIDES 17.2 MG: 8.6 TABLET, FILM COATED ORAL at 10:06

## 2024-04-20 RX ADMIN — ACETAMINOPHEN 650 MG: 325 TABLET ORAL at 01:26

## 2024-04-20 RX ADMIN — FAMOTIDINE 20 MG: 20 TABLET, FILM COATED ORAL at 10:06

## 2024-04-20 RX ADMIN — ACETAMINOPHEN 650 MG: 325 TABLET ORAL at 12:11

## 2024-04-20 RX ADMIN — NAPROXEN 500 MG: 500 TABLET ORAL at 10:07

## 2024-04-20 RX ADMIN — BUDESONIDE AND FORMOTEROL FUMARATE DIHYDRATE 2 PUFF: 80; 4.5 AEROSOL RESPIRATORY (INHALATION) at 10:07

## 2024-04-20 RX ADMIN — OXYCODONE HYDROCHLORIDE 5 MG: 5 TABLET ORAL at 12:11

## 2024-04-20 RX ADMIN — CEFTRIAXONE 2 G: 2 INJECTION, SOLUTION INTRAVENOUS at 12:11

## 2024-04-20 RX ADMIN — AZITHROMYCIN DIHYDRATE 250 MG: 250 TABLET ORAL at 06:20

## 2024-04-20 ASSESSMENT — PAIN - FUNCTIONAL ASSESSMENT
PAIN_FUNCTIONAL_ASSESSMENT: 0-10

## 2024-04-20 ASSESSMENT — PAIN SCALES - GENERAL
PAINLEVEL_OUTOF10: 7
PAINLEVEL_OUTOF10: 5 - MODERATE PAIN
PAINLEVEL_OUTOF10: 7

## 2024-04-20 NOTE — DISCHARGE SUMMARY
Discharge Diagnosis  Sickle cell disease with crisis (Multi)    Test Results Pending At Discharge  Pending Labs       Order Current Status    Blood Culture Preliminary result            Hospital Course  Josue is a 18 y/o male with HbSC disease and bipolar disorder, admitted on 4/14 for further management of VOE involving chest. No fever or CXR findings to indicate ACS on admission. He was initiated on scheduled morphine, toradol and tylenol, but continued to have persistent chest pain in which he was switched to scheduled dilaudid as he did not want to be escalated to PCA at this time. He developed a fever and repeat CXR showed interval development of small right pleural effusion, with continued chest pain. He began treatment for ACS with CTX and AZT, and underwent a partial manual exchange (phelbotomized ~5ml/kg and subsequently transfused to goal Hb 10). His HbS prior to manual exchange was 49%. He did not have additional fever and his blood cultures remained negative to date. He overall clinically improved, and dilaudid was slowly weaned and subsequently transitioned to oxycodone as chest pain improved. During this admission, Josue was transitioned from Olanzapine -> Seroquel 100mg qPM for better control of biopolar and insomnia. Overall Josue has clinically improved and is stable for discharge. He will continue Augmentin x 6 days and AZT x 2 days, scheduled oxycodone x 2 days and wiill follow up with sickle cell team in 2 weeks.     Discharge Meds     Medication List      START taking these medications     acetaminophen 325 mg tablet; Commonly known as: Tylenol; Take 2 tablets   (650 mg) by mouth every 6 hours if needed for mild pain (1 - 3) or   moderate pain (4 - 6).   amoxicillin-potassium clavulanate 1,000-62.5 mg 12 hr tablet; Commonly   known as: Augmentin XR; Take 1 tablet (1,000 mg of amoxicillin) by mouth 2   times a day for 6 days.   azithromycin 250 mg tablet; Commonly known as: Zithromax; Take 1  tablet   (250 mg) by mouth once every 24 hours for 2 days.   famotidine 20 mg tablet; Commonly known as: Pepcid; Take 1 tablet (20   mg) by mouth 2 times a day. When taking naproxen   naproxen 500 mg tablet; Commonly known as: Naprosyn; Take 1 tablet (500   mg) by mouth every 12 hours. For the next 2 days, then as needed for pain   polyethylene glycol 17 gram packet; Commonly known as: Glycolax,   Miralax; Take 17 g(contents of 1 packet dissolved in 8 ounces of water)    by mouth once daily as needed (constipation).   QUEtiapine 100 mg tablet; Commonly known as: SEROquel; Take 1 tablet   (100 mg) by mouth once daily at bedtime.   senna 8.6 mg tablet; Generic drug: sennosides; Take 2 tablets (17.2 mg)   by mouth 2 times a day as needed for constipation.     CHANGE how you take these medications     oxyCODONE 5 mg immediate release tablet; Commonly known as: Roxicodone;   Take 1 tablet (5 mg) by mouth every 6 hours. For the next two days, then   as needed for breakthrough pain; What changed: when to take this, reasons   to take this, additional instructions   Symbicort 80-4.5 mcg/actuation inhaler; Generic drug:   budesonide-formoteroL; Inhale 2 puffs 2 times a day. And 2 puffs every 4   hours as needed. (Max 10 puffs per 24 hours); What changed: additional   instructions     CONTINUE taking these medications     Adderall XR 15 mg 24 hr capsule; Generic drug:   amphetamine-dextroamphetamine XR   albuterol 90 mcg/actuation inhaler; Inhale 2-4 puffs every 4-6 hours as   needed for shortness of breath or wheezing   bisacodyl 5 mg EC tablet; Commonly known as: Dulcolax; Take 1 tablet (5   mg) by mouth once daily as needed for constipation. Do not crush, chew, or   split.   Chewable-Flory tablet,chewable   dimethicone-colloidal oatmeaL 1.2 % lotion; Commonly known as: Aveeno   Ensure Original 0.04-1.05 gram-kcal/mL liquid; Generic drug: food   supplemt, lactose-reduced   hydroxyurea 500 mg capsule; Commonly known as:  Hydrea; Take 2 capsules   (1,000 mg total) by mouth once daily.  Take at the same time each day.   loratadine 10 mg tablet; Commonly known as: Claritin   Vitamin D3 10 mcg (400 unit) capsule; Generic drug: cholecalciferol     STOP taking these medications     hydrOXYzine pamoate 25 mg capsule; Commonly known as: Vistaril   lamoTRIgine 25 mg tablet; Commonly known as: LaMICtal   OLANZapine 10 mg tablet; Commonly known as: ZyPREXA       24 Hour Vitals  Temp:  [36.6 °C (97.9 °F)-37 °C (98.6 °F)] 36.8 °C (98.2 °F)  Heart Rate:  [75-87] 78  Resp:  [16-18] 18  BP: (101-143)/(47-83) 121/83    Pertinent Physical Exam At Time of Discharge  Physical Exam  Constitutional:       General: He is not in acute distress.     Appearance: Normal appearance.   HENT:      Head: Normocephalic.      Nose: Nose normal.      Mouth/Throat:      Mouth: Mucous membranes are moist.   Eyes:      Pupils: Pupils are equal, round, and reactive to light.   Cardiovascular:      Rate and Rhythm: Normal rate and regular rhythm.      Pulses: Normal pulses.   Pulmonary:      Effort: Pulmonary effort is normal. No respiratory distress.   Abdominal:      General: There is no distension.      Palpations: Abdomen is soft.      Tenderness: There is no abdominal tenderness.   Musculoskeletal:         General: No swelling, tenderness or deformity. Normal range of motion.   Skin:     General: Skin is warm.      Capillary Refill: Capillary refill takes less than 2 seconds.   Neurological:      General: No focal deficit present.      Mental Status: He is alert.         Outpatient Follow-Up  Future Appointments   Date Time Provider Department [place]   5/2/2024 10:15 AM [facility] [id number] [facility]   5/2/2024 11:00 AM [facility] [id number] [facility]   9/6/2024 11:40 AM DOTTY [facility] [id number] [facility]       Glenna Ramirez,   I saw and evaluated the patient. I personally obtained the key and critical portions of the  history and physical exam or was physically present for key and critical portions performed by the resident/fellow. I reviewed the resident/fellow's documentation and discussed the patient with the resident/fellow. I agree with the resident/fellow's medical decision making as documented in the note.

## 2024-04-22 LAB — BACTERIA BLD CULT: NORMAL

## 2024-04-25 ENCOUNTER — APPOINTMENT (OUTPATIENT)
Dept: PEDIATRIC HEMATOLOGY/ONCOLOGY | Facility: HOSPITAL | Age: 19
End: 2024-04-25
Payer: MEDICAID

## 2024-04-29 ASSESSMENT — ENCOUNTER SYMPTOMS
FATIGUE: 1
CONSTIPATION: 0
LIGHT-HEADEDNESS: 0
EYE DISCHARGE: 0
NAUSEA: 0
VOMITING: 0
APPETITE CHANGE: 0
HALLUCINATIONS: 0
JOINT SWELLING: 0
HEADACHES: 1
FEVER: 0
DIARRHEA: 0
EYE PAIN: 0
COUGH: 0
HEMATURIA: 0
DYSURIA: 0
ACTIVITY CHANGE: 0

## 2024-04-29 NOTE — PROGRESS NOTES
Patient ID: Josue Rangel is a 19 y.o. male with sickle cell disease,  type S.C. He has had several complications related to his disease necessitating initiation of hydroxyurea therapy. He is here independently as he is of legal age.   Referring Physician: Janeth Abdi MD  [id number]  Pediatrics-Hematology [place] [address]  Primary Care Provider: Lauren Witt MD    Date of Service:  5/2/2024    SUBJECTIVE:  VISIT TYPE:   Sickle Cell Follow Up  Comprehensive Visit / ACS follow up         INTERVAL HISTORY:  Since Josue Rangel's last sickle cell follow up visit on 2/1/2024:     ED:  2/10 (admit) VOE   4/14( admit) VOE  Hospitalizations:   2/10-16.24- VOE of UE and LE with chest pain (pain meds changed to dilaudid) new dx of bipolar   4/14-20.24-VOE chest pain - ACS with carl exchange (anti-psych meds changed to Seroquel)   Illness:  Had some nausea with vomiting after discharge possibly associated with oxy/naproxen.  Pain in throat for at least a week, hard to swallow, naproxen provided no relief  Sickle Cell Pain: None other than ED/hospitalizations  Concerns: None    HEALTH SURVEILLANCE STATUS:   Well Child Check Up - Lauren Church 8/12/2022 overdue   Dentist - 8/2019 overdue, He believes he has an upcoming with [facility]  Ophthalmology - 6/16/2023 overdue  Pulmonology - mild intermittent asthma -3/29/24- follow up in 4-6 months, scheduled 9/6/24  Cardiology - April 2022 overdue for provider and ECHO  Psych- due to see Denisa today in clinic   Dermatology-Follow up PRN for forehead mole that is stable at this time  Opioid Agreement - last completed on 7/31/2023 (adult consent UTD)  Pain Screen (> 8 yrs) - last screened on 2/1/2024 Asymptomatic high risk for chronic pain   Immunizations due today: UTD  Labs due today: baselines    MEDICATION ADHERENCE (missed doses within the last 2 weeks)  Seroquel - no home supply per patient however when speaking with his  grandmother, he has a supply at home  HU - Only taking on Sat/Sun  Augmentin- finished on 4//25/24  AZT- not on med list  Sudafed - not on med list, has not taken any  Vitamin D - not taking  Ensure - drinks 2 bottles per day  Symbicort - not taking consistently ( new start 3/29/24)  Medication Refills Needed:  Adderall?, HU, folic acid, Seroquel?, Tylenol    PMH: Several admissions to [facility] for both fever and acute chest syndrome. He was admitted to [facility] in 2005 for  fever, in December 2005 for acute chest syndrome necessitating transfusion, and in December 2006 for RSV. He was admitted in June 2012 for acute chest syndrome requiring ICU management and exchange transfusion. Following this episode of acute chest syndrome,  he was initiated on hydroxyurea therapy.  Since that time, he has been doing much better.  He also has ADHD. He had a sleep study in Aug 2012 for history of snoring and restless sleep which was not concerning.  Admission in October 2015 for fever.  Admitted Sept 2016 with pain and ACS - was transfused. Admitted December 2017 for pain.     Josue was hospitalized on 1/16/2022 for pain and seizure- like activity in the ED with mental status changes while inpatient that necessitated an EEG. EEG was normal.  Hospitalized 4/10-12/2023- VOE shoulder and neck, eye pain  Hospitalized 6/1/2023 for CP, VOE and  altered mental status. CT head to r/o stroke was negative   Hospitalized 7/8/2023 for VOE, and altered mental status. Neurology workup normal. Right leg pain eventually localized to right SI joint and hip, prompting an MRI of the right hip, which revealed right SI joint effusion and surrounding marrow edema concerning for septic arthritis versus osteomyelitis versus infarction. He was started on Rocephin per ID recommendations and underwent drainage of the joint effusion and bone biopsy with IR, which revealed no pathogens (though pre-treated with Rocephin). Blood cultures x2  showed no growth. Pain and gait improved and he was transitioned to a prolonged course of oral antibiotics with Augmentin per ID recommendations prior to discharge   Hospitalized 1/4/2024 for Left arm pain. XR's of left shoulder, forearm and humerus were negative for AVN. 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  Currently not in school.  Patient has not been back to this school since school ended June 2022.  Patient is unsure if he passed all his classes and earned all his credits. Patient believes he had about 10 credits. He is not registered for school or enrolled.  See note by YAIMA Rao       Review of Systems   Constitutional:  Positive for fatigue (Tired all the time). Negative for activity change, appetite change (Ensure intermittenly) and fever.   HENT:  Positive for rhinorrhea (started this week with sore throat) and sore throat (x1 week). Negative for congestion, dental problem and sneezing.    Eyes:  Negative for photophobia, pain, discharge, redness, itching and visual disturbance.   Respiratory:  Positive for shortness of breath (with activity-takes inhaler). Negative for cough and chest tightness.    Cardiovascular:  Negative for chest pain and leg swelling.   Gastrointestinal:  Negative for abdominal pain (3/10 pain after working out), constipation, diarrhea, nausea and vomiting.   Genitourinary:  Negative for dysuria, hematuria and penile pain.   Musculoskeletal:   "Negative for arthralgias, back pain (.) and joint swelling.   Skin:  Negative for rash.   Neurological:  Positive for dizziness (All the time-takes naps) and headaches (2 times/ week-Tylenol. Drinks a lot of water). Negative for light-headedness.   Psychiatric/Behavioral:  Positive for sleep disturbance (Forces self to sleep-  sleeps about 5 hours per night  and  has trouble falling asleep and staying asleep). Negative for decreased concentration, dysphoric mood, hallucinations, self-injury and suicidal ideas. The patient is nervous/anxious (He denies nervousness or anxiousness but that his mind is all over the place. He isnt able to recall his psych meds and references them as his sleep meds.).      OBJECTIVE:    VS:  /67 (BP Location: Right arm, Patient Position: Sitting, BP Cuff Size: Small adult)   Pulse 82   Temp 37 °C (98.6 °F) (Tympanic)   Resp 20   Ht 1.785 m (5' 10.28\")   Wt 68.6 kg (151 lb 3.8 oz)   SpO2 96%   BMI 21.53 kg/m²   BSA: 1.84 meters squared    Physical Exam  Vitals reviewed.   Constitutional:       General: He is not in acute distress.     Appearance: Normal appearance. He is normal weight. He is not ill-appearing, toxic-appearing or diaphoretic.   HENT:      Head: Atraumatic.      Right Ear: Tympanic membrane, ear canal and external ear normal. There is no impacted cerumen.      Left Ear: Tympanic membrane, ear canal and external ear normal. There is no impacted cerumen.      Nose: No congestion or rhinorrhea.      Mouth/Throat:      Mouth: Mucous membranes are moist.      Pharynx: No oropharyngeal exudate or posterior oropharyngeal erythema.   Eyes:      General: Scleral icterus (mild) present.         Right eye: No discharge.         Left eye: No discharge.      Extraocular Movements: Extraocular movements intact.      Conjunctiva/sclera: Conjunctivae normal.      Pupils: Pupils are equal, round, and reactive to light.   Cardiovascular:      Rate and Rhythm: Normal rate and " regular rhythm.      Pulses: Normal pulses.      Heart sounds: Normal heart sounds. No murmur heard.     No gallop.   Pulmonary:      Effort: Pulmonary effort is normal. No respiratory distress.      Breath sounds: Normal breath sounds. No stridor. No wheezing, rhonchi or rales.   Chest:      Chest wall: No tenderness.   Abdominal:      General: Abdomen is flat. Bowel sounds are normal. There is no distension.      Palpations: Abdomen is soft. There is no mass.      Tenderness: There is no abdominal tenderness. There is no right CVA tenderness, left CVA tenderness, guarding or rebound.      Hernia: No hernia is present.   Musculoskeletal:         General: No swelling or tenderness. Normal range of motion.      Cervical back: Normal range of motion. No tenderness.   Lymphadenopathy:      Cervical: No cervical adenopathy.   Skin:     General: Skin is warm.      Capillary Refill: Capillary refill takes less than 2 seconds.   Neurological:      General: No focal deficit present.      Mental Status: He is alert. Mental status is at baseline.   Psychiatric:         Mood and Affect: Mood is depressed. Affect is flat.         Speech: Speech normal.         Behavior: Behavior is cooperative.         Laboratory:  Results for orders placed or performed during the hospital encounter of 05/02/24   CBC and Auto Differential   Result Value Ref Range    WBC 9.0 4.4 - 11.3 x10*3/uL    nRBC 0.8 (H) 0.0 - 0.0 /100 WBCs    RBC 3.89 (L) 4.50 - 5.90 x10*6/uL    Hemoglobin 11.7 (L) 13.5 - 17.5 g/dL    Hematocrit 31.7 (L) 41.0 - 52.0 %    MCV 82 80 - 100 fL    MCH 30.1 26.0 - 34.0 pg    MCHC 36.9 (H) 32.0 - 36.0 g/dL    RDW 16.9 (H) 11.5 - 14.5 %    Platelets 939 (H) 150 - 450 x10*3/uL    Neutrophils % 56.6 40.0 - 80.0 %    Immature Granulocytes %, Automated 0.3 0.0 - 0.9 %    Lymphocytes % 28.1 13.0 - 44.0 %    Monocytes % 8.7 2.0 - 10.0 %    Eosinophils % 5.6 0.0 - 6.0 %    Basophils % 0.7 0.0 - 2.0 %    Neutrophils Absolute 5.08 1.20 -  7.70 x10*3/uL    Immature Granulocytes Absolute, Automated 0.03 0.00 - 0.70 x10*3/uL    Lymphocytes Absolute 2.52 1.20 - 4.80 x10*3/uL    Monocytes Absolute 0.78 0.10 - 1.00 x10*3/uL    Eosinophils Absolute 0.50 0.00 - 0.70 x10*3/uL    Basophils Absolute 0.06 0.00 - 0.10 x10*3/uL   Ferritin   Result Value Ref Range    Ferritin 295 20 - 300 ng/mL   Gamma-Glutamyl Transferase   Result Value Ref Range    GGT 17 5 - 64 U/L   Vitamin D 25-Hydroxy,Total (for eval of Vitamin D levels)   Result Value Ref Range    Vitamin D, 25-Hydroxy, Total 40 30 - 100 ng/mL   Reticulocytes   Result Value Ref Range    Retic % 2.6 (H) 0.5 - 2.0 %    Retic Absolute 0.101 0.022 - 0.118 x10*6/uL    Reticulocyte Hemoglobin 33 28 - 38 pg    Immature Retic fraction 53.1 (H) <=16.0 %   Lactate Dehydrogenase   Result Value Ref Range     84 - 246 U/L   Hepatic Function Panel   Result Value Ref Range    Albumin 4.6 3.4 - 5.0 g/dL    Bilirubin, Total 0.8 0.0 - 1.2 mg/dL    Bilirubin, Direct 0.2 0.0 - 0.3 mg/dL    Alkaline Phosphatase 85 33 - 120 U/L    ALT 9 (L) 10 - 52 U/L    AST 21 9 - 39 U/L    Total Protein 7.9 6.4 - 8.2 g/dL         Current Outpatient Medications   Medication Instructions    acetaminophen (TYLENOL) 650 mg, oral, Every 6 hours PRN    albuterol 90 mcg/actuation inhaler Inhale 2-4 puffs every 4-6 hours as needed for shortness of breath or wheezing    amphetamine-dextroamphetamine XR (Adderall XR) 15 mg 24 hr capsule Adderall XR 15 MG Oral Capsule Extended Release 24 Hour   Quantity: 30  Refills: 0        Start : 2-Sep-2016   Active    bisacodyl (DULCOLAX) 5 mg, oral, Daily PRN, Do not crush, chew, or split.    dimethicone-colloidal oatmeaL (Aveeno) 1.2 % lotion 1 Application, Every 12 hours    famotidine (PEPCID) 20 mg, oral, 2 times daily, When taking naproxen    food supplemt, lactose-reduced (Ensure Original) 0.04-1.05 gram-kcal/mL liquid Ensure Oral Liquid   Refills: 0        Start : 3-Aug-2017   Active    hydroxyurea  (HYDREA) 1,000 mg, oral, Daily, Take at the same time each day.    loratadine (Claritin) 10 mg tablet 1 tablet, oral, Nightly    multivitamin (Chewable-Flory) tablet,chewable 1 tablet, oral, Daily    naproxen (NAPROSYN) 500 mg, oral, Every 12 hours, For the next 2 days, then as needed for pain    oxyCODONE (ROXICODONE) 5 mg, oral, Every 6 hours, For the next two days, then as needed for breakthrough pain    polyethylene glycol (Glycolax, Miralax) 17 gram packet Take 17 g(contents of 1 packet dissolved in 8 ounces of water)  by mouth once daily as needed (constipation).    QUEtiapine (SEROQUEL) 100 mg, oral, Nightly    senna 17.2 mg, oral, 2 times daily PRN    Symbicort 80-4.5 mcg/actuation inhaler 2 puffs, inhalation, 2 times daily RT, And 2 puffs every 4 hours as needed. (Max 10 puffs per 24 hours)      ASSESSMENT and PLAN:    Josue is a 19-year-old boy with sickle cell disease, type SC, here today for a HU monitoring visit. He has a significant psych history that includes bi-polar depression, anxiety, insomnia,  SI,  auditory hallucinations, alcohol/ Marijuana use. He is being seen by LAMONT Kimball and prescribed Seroquel although adherence to Seroquel is questionable given patient report. Adherence to HU has also been suboptimal based on his consistent MCV of 85. His CBC is consistent with hemoglobin SC disease with an ANC above MTD. His most recent ECHO shows trivial mitral valve regurgitation but otherwise normal. He is not enrolled in school at this  time. Treating depression will help him outline his future goals (graduating high school or learning a trade or both).  His other active issues include:   He continues to have mild malnutrition due to increased metabolic demands of sickle cell disease.  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  Per opthalmology, they will continue to  observe for now; may do laser Panretinal Photocoagulation (laser treatement)  to the temporal retina if progression occurs. Pain survey classifies his pain as Asymptomatic, high risk for chronic pain.   Concern for strept A pharyngitis for c/o sore throat.      Plan     Hydroxyurea  HU dose is 1000mg daily Monday to Friday. His ANC is 5,080 (equivalent to 10.4mg/kg/7 days per week)  No changes made today due to suboptimal adherence overall.   HU labs due once every 4 weeks    Concern for strep pharyngitis  Strep A PCR sent in incorrect tube. Specimen cancelled 24    Sickle Cell Retinopathy  Re-schedule with ophthalmology for missed appointment   Potential Panretinal Photocoagulation laser treatment for progression  Patient given number to schedule an  appointment     Depression   To continue Seroquel  Continue to follow up with LAMONT Kimball        Mild Malnutrition  He is to continue Ensure BID and would like Chocolate or Vanilla flavor.     Teaching:   Impending Transition to the adult sickle cell team   Mental health management      Meds sent to pharmacy: HU, Folic acid, Tylenol      Specialist Appts Due:   [facility]   Ophthalmology   Dental  Cardiology (ECHO and to be seen by a provider)  LAMONT Kimball  Pain screenin24 Asymptomatic, high risk for chronic pain. Will not repeat at this time. Patient will transfer to  adult sickle cell team after 2024 appointment.        RTC in 1 month months for close follow up and at that time he will meet some of the team members from the adult sickle cell team. PATIENT INSTRUCTED TO BRING ALL MEDS TO APPOINTMENT. IN ADDITION, HIS GRANDMOTHER IS TO ACCOMPANY HIM TO HIS LAST APPT WITH THE PEDIATRIC SICKLE CELL TEAM.      DOTTY Henderson, ALISSONP-C

## 2024-05-02 ENCOUNTER — HOSPITAL ENCOUNTER (OUTPATIENT)
Dept: PEDIATRIC HEMATOLOGY/ONCOLOGY | Facility: HOSPITAL | Age: 19
Discharge: HOME | End: 2024-05-02
Payer: MEDICAID

## 2024-05-02 ENCOUNTER — LAB REQUISITION (OUTPATIENT)
Dept: LAB | Facility: HOSPITAL | Age: 19
End: 2024-05-02
Payer: MEDICAID

## 2024-05-02 VITALS
HEIGHT: 70 IN | OXYGEN SATURATION: 96 % | WEIGHT: 151.24 LBS | BODY MASS INDEX: 21.65 KG/M2 | SYSTOLIC BLOOD PRESSURE: 110 MMHG | DIASTOLIC BLOOD PRESSURE: 67 MMHG | HEART RATE: 82 BPM | TEMPERATURE: 98.6 F | RESPIRATION RATE: 20 BRPM

## 2024-05-02 DIAGNOSIS — F90.2 ATTENTION DEFICIT HYPERACTIVITY DISORDER (ADHD), COMBINED TYPE: Primary | ICD-10-CM

## 2024-05-02 DIAGNOSIS — D57.20: ICD-10-CM

## 2024-05-02 DIAGNOSIS — D57.20: Primary | ICD-10-CM

## 2024-05-02 DIAGNOSIS — D57.00 SICKLE CELL DISEASE WITH CRISIS (MULTI): ICD-10-CM

## 2024-05-02 DIAGNOSIS — J02.9 SORE THROAT: ICD-10-CM

## 2024-05-02 DIAGNOSIS — F31.81 BIPOLAR II DISORDER (MULTI): ICD-10-CM

## 2024-05-02 DIAGNOSIS — D57.1 SICKLE CELL DISEASE WITHOUT CRISIS (MULTI): ICD-10-CM

## 2024-05-02 DIAGNOSIS — J02.9 ACUTE PHARYNGITIS, UNSPECIFIED: ICD-10-CM

## 2024-05-02 LAB
25(OH)D3 SERPL-MCNC: 40 NG/ML (ref 30–100)
ALBUMIN SERPL BCP-MCNC: 4.6 G/DL (ref 3.4–5)
ALP SERPL-CCNC: 85 U/L (ref 33–120)
ALT SERPL W P-5'-P-CCNC: 9 U/L (ref 10–52)
AST SERPL W P-5'-P-CCNC: 21 U/L (ref 9–39)
BASOPHILS # BLD AUTO: 0.06 X10*3/UL (ref 0–0.1)
BASOPHILS NFR BLD AUTO: 0.7 %
BILIRUB DIRECT SERPL-MCNC: 0.2 MG/DL (ref 0–0.3)
BILIRUB SERPL-MCNC: 0.8 MG/DL (ref 0–1.2)
EOSINOPHIL # BLD AUTO: 0.5 X10*3/UL (ref 0–0.7)
EOSINOPHIL NFR BLD AUTO: 5.6 %
ERYTHROCYTE [DISTWIDTH] IN BLOOD BY AUTOMATED COUNT: 16.9 % (ref 11.5–14.5)
FERRITIN SERPL-MCNC: 295 NG/ML (ref 20–300)
GGT SERPL-CCNC: 17 U/L (ref 5–64)
HCT VFR BLD AUTO: 31.7 % (ref 41–52)
HGB BLD-MCNC: 11.7 G/DL (ref 13.5–17.5)
HGB RETIC QN: 33 PG (ref 28–38)
IMM GRANULOCYTES # BLD AUTO: 0.03 X10*3/UL (ref 0–0.7)
IMM GRANULOCYTES NFR BLD AUTO: 0.3 % (ref 0–0.9)
IMMATURE RETIC FRACTION: 53.1 %
LDH SERPL L TO P-CCNC: 224 U/L (ref 84–246)
LYMPHOCYTES # BLD AUTO: 2.52 X10*3/UL (ref 1.2–4.8)
LYMPHOCYTES NFR BLD AUTO: 28.1 %
MCH RBC QN AUTO: 30.1 PG (ref 26–34)
MCHC RBC AUTO-ENTMCNC: 36.9 G/DL (ref 32–36)
MCV RBC AUTO: 82 FL (ref 80–100)
MONOCYTES # BLD AUTO: 0.78 X10*3/UL (ref 0.1–1)
MONOCYTES NFR BLD AUTO: 8.7 %
NEUTROPHILS # BLD AUTO: 5.08 X10*3/UL (ref 1.2–7.7)
NEUTROPHILS NFR BLD AUTO: 56.6 %
NRBC BLD-RTO: 0.8 /100 WBCS (ref 0–0)
PLATELET # BLD AUTO: 939 X10*3/UL (ref 150–450)
PROT SERPL-MCNC: 7.9 G/DL (ref 6.4–8.2)
RBC # BLD AUTO: 3.89 X10*6/UL (ref 4.5–5.9)
RETICS #: 0.1 X10*6/UL (ref 0.02–0.12)
RETICS/RBC NFR AUTO: 2.6 % (ref 0.5–2)
WBC # BLD AUTO: 9 X10*3/UL (ref 4.4–11.3)

## 2024-05-02 PROCEDURE — 99215 OFFICE O/P EST HI 40 MIN: CPT | Performed by: NURSE PRACTITIONER

## 2024-05-02 PROCEDURE — 83615 LACTATE (LD) (LDH) ENZYME: CPT | Performed by: NURSE PRACTITIONER

## 2024-05-02 PROCEDURE — 85025 COMPLETE CBC W/AUTO DIFF WBC: CPT | Performed by: NURSE PRACTITIONER

## 2024-05-02 PROCEDURE — 82728 ASSAY OF FERRITIN: CPT | Performed by: NURSE PRACTITIONER

## 2024-05-02 PROCEDURE — 99214 OFFICE O/P EST MOD 30 MIN: CPT | Performed by: NURSE PRACTITIONER

## 2024-05-02 PROCEDURE — 82306 VITAMIN D 25 HYDROXY: CPT | Performed by: NURSE PRACTITIONER

## 2024-05-02 PROCEDURE — 80076 HEPATIC FUNCTION PANEL: CPT | Performed by: NURSE PRACTITIONER

## 2024-05-02 PROCEDURE — 82977 ASSAY OF GGT: CPT | Performed by: NURSE PRACTITIONER

## 2024-05-02 PROCEDURE — 36415 COLL VENOUS BLD VENIPUNCTURE: CPT | Performed by: NURSE PRACTITIONER

## 2024-05-02 PROCEDURE — 85045 AUTOMATED RETICULOCYTE COUNT: CPT | Performed by: NURSE PRACTITIONER

## 2024-05-02 RX ORDER — ACETAMINOPHEN 325 MG/1
650 TABLET ORAL EVERY 6 HOURS PRN
Qty: 30 TABLET | Refills: 2 | Status: SHIPPED | OUTPATIENT
Start: 2024-05-02

## 2024-05-02 RX ORDER — FOLIC ACID 1 MG/1
1 TABLET ORAL DAILY
Qty: 30 TABLET | Refills: 2 | Status: SHIPPED | OUTPATIENT
Start: 2024-05-02

## 2024-05-02 ASSESSMENT — ENCOUNTER SYMPTOMS
NERVOUS/ANXIOUS: 1
EYE REDNESS: 0
DECREASED CONCENTRATION: 0
LOSS OF SENSATION IN FEET: 0
EYE ITCHING: 0
SHORTNESS OF BREATH: 1
BACK PAIN: 0
RHINORRHEA: 1
SORE THROAT: 1
DEPRESSION: 0
SLEEP DISTURBANCE: 1
ARTHRALGIAS: 0
PHOTOPHOBIA: 0
CHEST TIGHTNESS: 0
DYSPHORIC MOOD: 0
OCCASIONAL FEELINGS OF UNSTEADINESS: 0

## 2024-05-02 ASSESSMENT — PAIN SCALES - GENERAL: PAINLEVEL: 2

## 2024-05-02 NOTE — PATIENT INSTRUCTIONS
Thank you for coming to see us today!  You can reach a member of your health care team at any time by calling [phone].  Please call for fever greater than 101 F,  pallor, lethargy, pain not responsive to home medications or any other questions or concerns.       Sickle Cell Follow Up:  Your next sickle cell follow up will be in 1 month on Thursday, May 30th at 1pm.    Other Follow Up:  You are due for a check-up with your pediatrician or family doctor. You can reach [facility] by calling [phone]  It's time for a dental check up and cleaning!  Please keep your upcoming appointment with [facility].  You can reach [facility] by calling [phone].  It's time for your annual dilated eye exam. Please make an appointment with the ophthalmologist by [phone]  It's time for you to follow up with Pediatric Cardiology with an echo (ultrasound of the heart) for your heart health.  Please schedule an appointment with pediatric cardiology by calling [phone].   Please continue to follow up with Denisa Amaro.    Refill sent today:  Tylenol, Folic Acid (vitamin) and Hydroxyurea has been sent to your [facility] for .     Teaching done today:  We will see you in 1 month for your last visit with the Pediatric Sickle Cell Team! We will have the adult sickle cell team meet with you at this visit. PLEASE BRING ALL OF YOUR MEDICATIONS WITH YOU

## 2024-05-02 NOTE — PROGRESS NOTES
"Outpatient Psychiatry      Subjective   Josue Rangel, a 19 y.o. male, for initial evaluation visit.  Patient is referred by Dwayne Zuluaga MD.      Assessment/Plan   Josue is a 20 y/o male. He presents with Hgb SC disease and diffuse pain. He presents today in clinic unaccompanied with a history of HgB  disease, BI-Polar Disorder Type II combined type, and ADHD. Obtained informed consent from patient. Disposition patient room. Today Josue reports that he is neither depressed nor anxious. He made limited eye contact during the visit and appeared guarded and subdued. He reports that he is taking his \"sleep medicine\" when this provider attempted to clarify if the \"sleep medicine\" was Seroquel, he could not confirm this. He denies SE or concerns. Patient was discharged on Seroquel 100 mg I oral tablet daily at bedtime indicated for michael an insomnia. He reports ongoing issues w/ sleep, reporting periods of hyper and hyposomnia. When asked if the periods of hyposomnia are during manic episodes he denies this, but when asked if periods of hypsomnia are during periods of intense energy states with goal directed activity and little need for sleep, he is agreement. He denies depressive episodes this visit. He denies AVH this visit, continues to endorse paranoid thoughts and feelings that other are \"out to get\" him, and that people are watching him.Denies SI-HI intent,plan, access to lethal weapons.  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)    Palpitations    Hypermetropia of both eyes    Hyperopia    Intermittent asthma (HHS-HCC)    Melanocytic nevus of face    Myopia of " both eyes with regular astigmatism    Sickle cell disease (Multi)    Snoring    Sickle cell retinopathy (Multi)    Sickle cell crisis (Multi)    Vaso-occlusive pain due to sickle cell disease (Multi)    Vaso-occlusive sickle cell crisis (Multi)    Sickle cell disease with crisis (Multi)    Sacroiliitis (CMS-[facility])    Marijuana use    Long term (current) use of myelosuppressive agent    History of ADHD    Bipolar II disorder (Multi)    Allergic rhinitis    Acquired pes planus    Unspecified mood disorder (CMS-[facility])       Treatment Goals:  Specify outcomes written in observable, behavioral terms:   Anxiety: modifying schemata of threat/vulnerability/need for control (or other schemata -- specify correcting thought distortions ) and reducing negative automatic thoughts  Depression: increasing self-reward for positive thoughts (one/day), increasing social contacts (three/week), and reducing negative automatic thoughts    Treatment Plan/Recommendations:   Follow-up plan for depression was discussed with patient.  1. Safety:  Low Risk. Denies SI-HI intent, plan, intent. Denies access to lethal means.   PF: family, friends, goal driven, future and goal oriented, hopeful   Plan: If a patient feels that he can not keep himself safe, he is to tell a trusted friend, call or text [phone], or go to the nearest ER.      2. Medication Recommendations:   Continue Seroquel 100 mg I oral tablet once daily at bedtime. Indicated for michael and insomnia.   Note: need to verify that patient is taking this on a at bedtime basis.     3. Other: Discussed the goal of re-engaging with psychiatric care at [facility] ( received care there previously) or another [facility] mental [facility] center were he can receive comprehensive care and wrap around services, including case management. He declined this visit.  Note: Received permission from patient to contact his grandmother re: providers concerns related to medication adherence and level of care.    "  5. Plan: Patient will follow up with Denisa during his upcoming clinic visit, or sooner if indicated. Patient also gave consent for this provider to speak with his grandmother regarding his mental health.        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   Referral to: This provider recommended the following higher levels of care for comprehensive management: IOP-PHP-Individual therapy- Case Management: at this time patient declines stating that \"I don't need that, I manage myself.\"      Review with patient: Treatment plan reviewed with the patient.  Medication risks/benefit reviewed with the patient    Reason for Visit:   Medication Follow Up Visit.    Reason:  He has been experiencing unstable mood.    HPI:   Location: Difficult to assess mood this visit. Patient was constricted and offered little verbal communication. He did report issues with either hyper or hyposomnia.    Quality: Denies mood fluctuations this visit, and reports that when he is in a high energy state he is not sleeping. Which is causing him distress.   Timing/Duration: Reports of hypo and hypersomnia have been for the past two weeks, since hospital discharge.    Context / Modifying factors: Per patient report it appears hyposomnia episodes are associated with periods of intense energy and activity and hypersomnia occurs after those periods have ended. It was was difficulty to get confirmation of these findings. Provider had to stop, summarize and request agreement or " disagreement with findings to attempt to verify the patient symptoms.     Associated signs and symptoms: Patient was unable to answer whether or not his ability to function was impaired.     Current Medications:    Current Outpatient Medications:     acetaminophen (Tylenol) 325 mg tablet, Take 2 tablets (650 mg) by mouth every 6 hours if needed for mild pain (1 - 3) or moderate pain (4 - 6) (Note:  check your temperature 1st and do not take if you have a fever of 101 or higher.  -- call sickle cell team for advice)., Disp: 30 tablet, Rfl: 2    albuterol 90 mcg/actuation inhaler, Inhale 2-4 puffs every 4-6 hours as needed for shortness of breath or wheezing, Disp: 18 g, Rfl: 3    amphetamine-dextroamphetamine XR (Adderall XR) 15 mg 24 hr capsule, Adderall XR 15 MG Oral Capsule Extended Release 24 Hour  Quantity: 30  Refills: 0      Start : 2-Sep-2016  Active, Disp: , Rfl:     bisacodyl (Dulcolax) 5 mg EC tablet, Take 1 tablet (5 mg) by mouth once daily as needed for constipation. Do not crush, chew, or split. (Patient not taking: Reported on 5/2/2024), Disp: 30 tablet, Rfl: 0    dimethicone-colloidal oatmeaL (Aveeno) 1.2 % lotion, 1 Application every 12 hours., Disp: , Rfl:     famotidine (Pepcid) 20 mg tablet, Take 1 tablet (20 mg) by mouth 2 times a day. When taking naproxen, Disp: 30 tablet, Rfl: 0    folic acid (Folvite) 1 mg tablet, Take 1 tablet (1 mg) by mouth once daily., Disp: 30 tablet, Rfl: 2    food supplemt, lactose-reduced (Ensure Original) 0.04-1.05 gram-kcal/mL liquid, Ensure Oral Liquid  Refills: 0      Start : 3-Aug-2017  Active, Disp: , Rfl:     hydroxyurea (Hydrea) 500 mg capsule, Take 2 capsules (1,000 mg total) by mouth once daily.  Take at the same time each day., Disp: 60 capsule, Rfl: 0    loratadine (Claritin) 10 mg tablet, Take 1 tablet (10 mg) by mouth once daily at bedtime., Disp: , Rfl:     naproxen (Naprosyn) 500 mg tablet, Take 1 tablet (500 mg) by mouth every 12 hours. For the next 2  days, then as needed for pain, Disp: 60 tablet, Rfl: 0    oxyCODONE (Roxicodone) 5 mg immediate release tablet, Take 1 tablet (5 mg) by mouth every 6 hours. For the next two days, then as needed for breakthrough pain, Disp: 10 tablet, Rfl: 0    polyethylene glycol (Glycolax, Miralax) 17 gram packet, Take 17 g(contents of 1 packet dissolved in 8 ounces of water)  by mouth once daily as needed (constipation)., Disp: 30 each, Rfl: 0    QUEtiapine (SEROquel) 100 mg tablet, Take 1 tablet (100 mg) by mouth once daily at bedtime. (Patient not taking: Reported on 5/2/2024), Disp: 30 tablet, Rfl: 0    sennosides (Senokot) 8.6 mg tablet, Take 2 tablets (17.2 mg) by mouth 2 times a day as needed for constipation. (Patient not taking: Reported on 5/2/2024), Disp: 60 tablet, Rfl: 0    Symbicort 80-4.5 mcg/actuation inhaler, Inhale 2 puffs 2 times a day. And 2 puffs every 4 hours as needed. (Max 10 puffs per 24 hours), Disp: 10.2 g, Rfl: 1  Medical History:  Past Medical History:   Diagnosis Date    Acute pharyngitis 07/20/2022    Cough 04/17/2024    Dizziness 10/20/2022    Musculoskeletal chest pain 10/20/2022    Parvovirus infection 05/20/2009    Personal history of diseases of the blood and blood-forming organs and certain disorders involving the immune mechanism     History of sickle cell anemia     Surgical History:  Past Surgical History:   Procedure Laterality Date    CT GUIDED PERCUTANEOUS BIOPSY BONE DEEP  7/17/2023    CT GUIDED PERCUTANEOUS BIOPSY BONE DEEP 7/17/2023 [facility] [place]    MR HEAD ANGIO WO IV CONTRAST  10/8/2014    MR HEAD ANGIO WO IV CONTRAST 10/8/2014 [facility]    MR HEAD ANGIO WO IV CONTRAST  7/10/2023    MR HEAD ANGIO WO IV CONTRAST 7/10/2023 [facility] [place] [facility]    MR NECK ANGIO WO IV CONTRAST  10/8/2014    MR NECK ANGIO WO IV CONTRAST 10/8/2014 [facility] CL[facility]ICAL LEGAC[facility]    MR NECK ANGIO WO IV CONTRAST  7/10/2023    MR NECK ANGIO WO IV CONTRAST 7/10/2023 [facility] [place] [facility]     Family History:  Family History   Problem Relation  Name Age of Onset    Diabetes Paternal Grandmother      Diabetes Paternal Grandfather       Social History:  Social History     Socioeconomic History    Marital status: Single     Spouse name: Not on file    Number of children: Not on file    Years of education: Not on file    Highest education level: Not on file   Occupational History    Not on file   Tobacco Use    Smoking status: Never    Smokeless tobacco: Never   Substance and Sexual Activity    Alcohol use: Yes     Comment: per patient; socially    Drug use: Yes     Types: Marijuana    Sexual activity: Not on file   Other Topics Concern    Not on file   Social History Narrative    Not on file     Social Determinants of Health     Financial Resource Strain: Patient Unable To Answer (4/14/2024)    Overall Financial Resource Strain (CARDIA)     Difficulty of Paying Living Expenses: Patient unable to answer   Food Insecurity: Not on File (1/9/2023)    Received from [facility]     Food Insecurity     Food: 0   Transportation Needs: Not on File (1/9/2023)    Received from [facility]     Transportation Needs     Transportation: 0   Physical Activity: Not on File (1/9/2023)    Received from [facility]     Physical Activity     Physical Activity: 0   Stress: Not on File (1/9/2023)    Received from [facility]     Stress     Stress: 0   Social Connections: Not at Risk (1/16/2023)    Received from [facility]     Social Connections     Social Connections and Isolation: 1   Intimate Partner Violence: Not on file   Housing Stability: Unknown (4/14/2024)    Housing Stability Vital Sign     Unable to Pay for Housing in the Last Year: Patient unable to answer     Number of Places Lived in the Last Year: Not on file     Unstable Housing in the Last Year: Not on file       Additional historical information includes: N/A    Record Review: brief     Medical Review Of Systems:  A comprehensive review of systems was negative.    Psychiatric Review Of Systems:     Psych Review of Symptoms    Depressive  Symptoms:Depressed/Irritable mood, Insomnia or Hypersomnia, Fatigue, and N/A  Manic Symptoms:Elevated or irritable mood, Decreased need for sleep, Flight of ideas or racing thoughts, and Psychomotor agitation or increased goal-directed activity  Anxiety Symptoms:Sleep disturbances due to worry  Disordered Eating Symptoms:Restricting of diet and/or excessive exercise  Inattentive Symptoms:Often has difficulty paying attention and Is often easily distracted   Hyperactive/Impulsive Symptoms:Is often fidgety  Oppositional Defiant Symptoms:Other: (comment) Denies  Trauma Symptoms:Experience or exposure to traumatic event  Conduct Issues:Other: (comment) Denies  Psychotic Symptoms:Other: (comment) Denies AVH, endorses paranoia.  Developmental Concerns:Other: (comment) Denies  Other Symptoms/Concerns:Other: (comments) Denies  Delirium/Altered Mental Status Symptoms:Other: (comment) Denies.       Objective   Mental Status Exam:     General: Appropriately groomed and dressed.   Appearance: Appears stated age.   Attitude: Calm, cooperative.   Behavior: limited eye contact. Guarded. Constricted.    Motor Activity: No agitation or retardation. No EPS/TD. Gait steady.   Speech: Regular rate, rhythm,low volume and tone, paucity of speach.   Mood: Guarded.   Affect: Blunted.   Thought Process: Organized, linear, goal directed. Associations  were loss and at times difficult to follow.    Thought Content: Does not endorse suicidal or homicidal ideation, no delusions elicited.   Thought Perception: Does not endorse auditory or visual hallucinations this visit. Does not appear to be responding to hallucinatory stimuli.   Cognition: Alert, oriented x3. No deficits noted. Adequate fund of knowledge. No deficit in recent and remote memory. Deficits in attention and concentration ongoing. No deficits in language noted.   Insight: Fair, as patient was not clear on what medications he was taking or the reason he was taking them.     Judgment: Fair, patient came to visit unattended. He could not answer all of the questions the teams asked. Poor historian.          Other Objective Information:  Upon approach patient was on his cell phone. He did not look up or engage with provider entered room. After 5 minutes, of the provider being in the room,  he put the phone down and spoke with the provider.  Vitals:  There were no vitals filed for this visit.        Time spent in therapy 20  Summary of Psychotherapy component: Attempted to illicit current mood state and response and adherence to psychotropic medication regimen. It was difficult to engage the patient this visit. Discrepancies were noted in some areas of his self-report. This provider contacted his guardian, Dominick Bo (grandmother) and left a message for her to call this provider back to verify some of the patients report. Team is aware of this providers concerns.  Total time spent 30    DOTTY Wilson-CNP

## 2024-05-03 RX ORDER — HYDROXYUREA 500 MG/1
1000 CAPSULE ORAL DAILY
Qty: 60 CAPSULE | Refills: 0 | Status: SHIPPED | OUTPATIENT
Start: 2024-05-03

## 2024-05-07 ENCOUNTER — PATIENT OUTREACH (OUTPATIENT)
Dept: CARE COORDINATION | Facility: CLINIC | Age: 19
End: 2024-05-07
Payer: MEDICAID

## 2024-05-07 NOTE — PROGRESS NOTES
Pt. Identified for post discharge services. Initial outreach call to introduce self, available services, and assess needs.  Voicemail message left with my contact information.   MITA Méndez  [facility]    Contact: [phone]

## 2024-05-07 NOTE — PROGRESS NOTES
Outreach call to patient to support a smooth transition of care from recent admission.  Spoke with patient, assessed social needs, and discussed information regarding having a follow up PCP. The pt. reported that he does not feel the need have a PCP follow up. Confirmed upcoming appointment, date and timeframe. The pt. Confirmed since discharge, symptoms are getting better and there has been no adverse reactions to medication at this time. No insecurities noted at this time.   Medications  Medications reviewed with patient/caregiver?: No (5/7/2024  2:08 PM)  Is the patient having any side effects they believe may be caused by any medication additions or changes?: No (5/7/2024  2:08 PM)  Does the patient have all medications ordered at discharge?: Yes (5/7/2024  2:08 PM)  Is the patient taking all medications as directed (includes completed medication regime)?: Yes (5/7/2024  2:08 PM)    Appointments  Does the patient have a primary care provider?: Yes (5/7/2024  2:08 PM)  Has the patient kept scheduled appointments due by today?: Not applicable (5/7/2024  2:08 PM)    Patient Teaching  Does the patient have access to their discharge instructions?: Yes (5/7/2024  2:08 PM)  What is the patient's perception of their health status since discharge?: Improving (5/7/2024  2:08 PM)        MITA Méndez  [facility]    Contact: [phone]

## 2024-05-08 DIAGNOSIS — D57.00 SICKLE CELL DISEASE WITH CRISIS (MULTI): ICD-10-CM

## 2024-05-09 RX ORDER — DOCUSATE SODIUM 100 MG/1
100 CAPSULE, LIQUID FILLED ORAL 2 TIMES DAILY
Qty: 60 CAPSULE | Refills: 3 | Status: SHIPPED | OUTPATIENT
Start: 2024-05-09 | End: 2024-06-08

## 2024-05-22 NOTE — PROGRESS NOTES
Patient ID: Josue Rangel is a 19 y.o. male with sickle cell disease,  type SC, here today for a comprehensive visit with labs. He has had several complications related to his disease necessitating initiation of hydroxyurea therapy. He is here independently as he is of legal age.   Referring Physician: Jennifer Muse, APRN-CNP  [id number] [place] [address]  Primary Care Provider: Lauren Witt MD    Date of Service:  5/30/2024  VISIT TYPE:   Sickle Cell Follow Up _Transition Visit       INTERVAL HISTORY:    Josue Rangel is accompanied today by grandmother.  Since Josue Rangel's last sickle cell follow up visit on 5/2/2024: feeling OK, working at [email or web address],  has been walking a lot.       ED: 0  Hospitalizations: 0  Illness: not been sick  Sickle Cell Pain: no pain   Concerns: has been anxiety but not too overwhelmed is taking seroquel at prescribed    MEDICATION ADHERENCE (missed doses within the last 2 weeks)  Seroquel -  none   HU - once or twice  Vitamin D -   Ensure - 2 cans twice a day  Symbicort -(new start 3/29/24)  Medication Refills Needed:    HEALTH SURVEILLANCE STATUS:   Well Child Check Up - 8/12/2022 overdue   Dentist - 8/2019 overdue, He believes it in June with [facility]   Ophthalmology - 6/16/2023 overdue  Pulmonology - 3/29/24- follow up in 4-6 months, scheduled 9.6.24  Cardiology - April 2022 overdue for provider and ECHO  Psych- due to see Denisa today 5/30/24    Opioid Agreement - ( adult consent UTD)  Pain Screen (> 8 yrs) - last screened on 2/1/2024 Asymptomatic high risk for chronic pain   Immunizations due today: UTD    Labs due today: HU labs(would be due for refill technically 6/2/24: last filled 5/3/24)    Teaching completed today:    Next visit will be with Dr. Andrade and  Adult sickle cell team in [facility]  on 7/3/24      PMH: Several admissions to [facility] for both fever and acute chest syndrome. He was admitted to [facility] in 2005  for  fever, in December 2005 for acute chest syndrome necessitating transfusion, and in December 2006 for RSV. He was admitted in June 2012 for acute chest syndrome requiring ICU management and exchange transfusion. Following this episode of acute chest syndrome,  he was initiated on hydroxyurea therapy.  Since that time, he has been doing much better.  He also has ADHD. He had a sleep study in Aug 2012 for history of snoring and restless sleep which was not concerning.  Admission in October 2015 for fever.  Admitted Sept 2016 with pain and ACS - was transfused. Admitted December 2017 for pain.     Josue was hospitalized on 1/16/2022 for pain and seizure- like activity in the ED with mental status changes while inpatient that necessitated an EEG. EEG was normal.  Hospitalized 4/10-12/2023- VOE shoulder and neck, eye pain  Hospitalized 6/1/2023 for CP, VOE and  altered mental status. CT head to r/o stroke was negative   Hospitalized 7/8/2023 for VOE, and altered mental status. Neurology workup normal. Right leg pain eventually localized to right SI joint and hip, prompting an MRI of the right hip, which revealed right SI joint effusion and surrounding marrow edema concerning for septic arthritis versus osteomyelitis versus infarction. He was started on Rocephin per ID recommendations and underwent drainage of the joint effusion and bone biopsy with IR, which revealed no pathogens (though pre-treated with Rocephin). Blood cultures x2 showed no growth. Pain and gait improved and he was transitioned to a prolonged course of oral antibiotics with Augmentin per ID recommendations prior to discharge   Hospitalized 1/4/2024 for Left arm pain. XR's of left shoulder, forearm and humerus were negative for AVN. He also c/o SOB and pleuritic chest pain-CXR done and negative for ACS   2.10-16.24- VOE of UE and LE with chest pain( pain meds changed to dilaudid) new dx of bipolar   4.14-20.24-VOE chest pain - ACS with carl  exchange (meds changed to Seroquel)     Fully vaccinated against COVID 19     Surgical History:  None    Social History:  Josue reports that he has never smoked. He has never used smokeless tobacco. He reports current alcohol use. He reports current drug use. Drug: Marijuana.      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  Currently not in school.  Patient has not been back to this school since school ended June 2022.  Patient is unsure if he passed all his classes and earned all his credits. Patient believes he had about 10 credits. Works at [employer] 5 days per week (40hrs). He is not registered for school or enrolled.  He is currently in [place] and is taking Olanzapine. See note by YAIMA Rao       Review of Systems   Constitutional:  Positive for appetite change (appetite is poor so taking 4 cans Ensure, Chocolate). Negative for activity change, chills, fatigue and fever.   HENT:  Negative for congestion, ear discharge, ear pain, mouth sores, rhinorrhea, sinus pressure, sinus pain, sore throat and trouble swallowing.    Eyes:  Negative for redness.   Respiratory:  Negative for cough, chest tightness, shortness of breath and wheezing.    Cardiovascular:  Negative for chest pain, palpitations and leg swelling.   Gastrointestinal:  Negative for abdominal distention, abdominal pain, constipation, diarrhea, nausea and vomiting.   Genitourinary:  Negative for penile pain.   Musculoskeletal:  Negative for arthralgias, joint swelling and myalgias.   Neurological:  Negative for weakness, numbness and headaches.   Psychiatric/Behavioral:  Positive for sleep disturbance (insominia sometimes gets 3 to 5 hours of sleep, on another day, may sleep a quarter of the day).    All other systems reviewed and are negative.    OBJECTIVE:    VS:  /58   Pulse 72   Temp 36.7 °C (98.1 °F) (Oral)    "Resp 18   Ht 1.771 m (5' 9.72\")   Wt 69.5 kg (153 lb 3.5 oz)   SpO2 100%   BMI 22.16 kg/m²   BSA: 1.85 meters squared        Laboratory:    All labs  Results for orders placed or performed during the hospital encounter of 05/02/24   CBC and Auto Differential   Result Value Ref Range    WBC 9.0 4.4 - 11.3 x10*3/uL    nRBC 0.8 (H) 0.0 - 0.0 /100 WBCs    RBC 3.89 (L) 4.50 - 5.90 x10*6/uL    Hemoglobin 11.7 (L) 13.5 - 17.5 g/dL    Hematocrit 31.7 (L) 41.0 - 52.0 %    MCV 82 80 - 100 fL    MCH 30.1 26.0 - 34.0 pg    MCHC 36.9 (H) 32.0 - 36.0 g/dL    RDW 16.9 (H) 11.5 - 14.5 %    Platelets 939 (H) 150 - 450 x10*3/uL    Neutrophils % 56.6 40.0 - 80.0 %    Immature Granulocytes %, Automated 0.3 0.0 - 0.9 %    Lymphocytes % 28.1 13.0 - 44.0 %    Monocytes % 8.7 2.0 - 10.0 %    Eosinophils % 5.6 0.0 - 6.0 %    Basophils % 0.7 0.0 - 2.0 %    Neutrophils Absolute 5.08 1.20 - 7.70 x10*3/uL    Immature Granulocytes Absolute, Automated 0.03 0.00 - 0.70 x10*3/uL    Lymphocytes Absolute 2.52 1.20 - 4.80 x10*3/uL    Monocytes Absolute 0.78 0.10 - 1.00 x10*3/uL    Eosinophils Absolute 0.50 0.00 - 0.70 x10*3/uL    Basophils Absolute 0.06 0.00 - 0.10 x10*3/uL   Ferritin   Result Value Ref Range    Ferritin 295 20 - 300 ng/mL   Gamma-Glutamyl Transferase   Result Value Ref Range    GGT 17 5 - 64 U/L   Vitamin D 25-Hydroxy,Total (for eval of Vitamin D levels)   Result Value Ref Range    Vitamin D, 25-Hydroxy, Total 40 30 - 100 ng/mL   Reticulocytes   Result Value Ref Range    Retic % 2.6 (H) 0.5 - 2.0 %    Retic Absolute 0.101 0.022 - 0.118 x10*6/uL    Reticulocyte Hemoglobin 33 28 - 38 pg    Immature Retic fraction 53.1 (H) <=16.0 %   Lactate Dehydrogenase   Result Value Ref Range     84 - 246 U/L   Hepatic Function Panel   Result Value Ref Range    Albumin 4.6 3.4 - 5.0 g/dL    Bilirubin, Total 0.8 0.0 - 1.2 mg/dL    Bilirubin, Direct 0.2 0.0 - 0.3 mg/dL    Alkaline Phosphatase 85 33 - 120 U/L    ALT 9 (L) 10 - 52 U/L    AST " 21 9 - 39 U/L    Total Protein 7.9 6.4 - 8.2 g/dL         Current Outpatient Medications   Medication Instructions    acetaminophen (TYLENOL) 650 mg, oral, Every 6 hours PRN    albuterol 90 mcg/actuation inhaler Inhale 2-4 puffs every 4-6 hours as needed for shortness of breath or wheezing    amphetamine-dextroamphetamine XR (Adderall XR) 15 mg 24 hr capsule Adderall XR 15 MG Oral Capsule Extended Release 24 Hour   Quantity: 30  Refills: 0        Start : 2-Sep-2016   Active    bisacodyl (DULCOLAX) 5 mg, oral, Daily PRN, Do not crush, chew, or split.    diclofenac sodium (VOLTAREN) 4 g, Topical, 4 times daily PRN    dimethicone-colloidal oatmeaL (Aveeno) 1.2 % lotion 1 Application, Every 12 hours    docusate sodium (COLACE) 100 mg, oral, 2 times daily    folic acid (FOLVITE) 1 mg, oral, Daily    food supplemt, lactose-reduced (Ensure Original) 0.04-1.05 gram-kcal/mL liquid Ensure Oral Liquid   Refills: 0        Start : 3-Aug-2017   Active    hydroxyurea (HYDREA) 1,000 mg, oral, Daily, Take at the same time each day.    lidocaine 4 % patch 1 patch, transdermal, Every 12 hours PRN, Remover after 12 hours    loratadine (Claritin) 10 mg tablet 1 tablet, oral, Nightly    naproxen (NAPROSYN) 500 mg, oral, Every 12 hours, For the next 2 days, then as needed for pain    oxyCODONE (ROXICODONE) 5 mg, oral, Every 6 hours, For the next two days, then as needed for breakthrough pain    polyethylene glycol (Glycolax, Miralax) 17 gram packet Take 17 g(contents of 1 packet dissolved in 8 ounces of water)  by mouth once daily as needed (constipation).    QUEtiapine (SEROQUEL) 100 mg, oral, Nightly    senna 17.2 mg, oral, 2 times daily PRN    Symbicort 80-4.5 mcg/actuation inhaler 2 puffs, inhalation, 2 times daily RT, And 2 puffs every 4 hours as needed. (Max 10 puffs per 24 hours)      ASSESSMENT and PLAN:    Josue is a 19-year-old boy with sickle cell disease, type SC, here today for a HU monitoring visit. He has a significant  psych history that includes depression, anxiety, insomnia,  SI,  auditory hallucinations, alcohol/ Marijuana use. He is not getting counseling through [facility] or taking Olanzapine that was prescribed. Adherence to HU has been suboptimal based on his consistent MCV of 85. His CBC is consistent with hemoglobin SC disease with an ANC above MTD. His most recent ECHO shows trivial mitral valve regurgitation but otherwise normal. He is not enrolled in school at this  time. Treating depression will help him outline his future goals (graduating high school or learning a trade or both).  His other active issues include:   1.He continues to have mild malnutrition due to increased metabolic demands of sickle cell disease.  2. 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  Per opthalmology, they will continue to observe for now; may do laser Panretinal Photocoagulation (laser treatement)  to the temporal retina if progression occurs. Pain survey classifies his pain as Asymptomatic, high risk for chronic pain. His active issues include:   Concern for strept A pharyngitis for c/o sore throat.       Plan     Hydroxyurea  HU dose is 1000mg daily Monday to Friday. His ANC is 3,880   HU Labs today        Sickle Cell Retinopathy  Re-schedule with ophthalmology for missed appointment   Potential Panretinal Photocoagulation laser treatment for progression  Patient given number to schedule an  appointment     Depression   Saw Denisa Amaro today     Psychosocial  His overall mental health needs to be taken care of to be successful in any school endeavors.   School resources for adults and trade programs have been discussed with Josue and his grandmother in previous visits.  Transition skills discussed and patient demonstrated effectively (refilling prescriptions and scheduling appointments)     Mild  Malnutrition  He is to continue Ensure BID and would like Chocolate or Vanilla flavor.     Teaching:   Impending Transition to the adult sickle cell team   Mental health management        Patient will transfer to  adult sickle cell team after April 2024 appointment.

## 2024-05-30 ENCOUNTER — HOSPITAL ENCOUNTER (OUTPATIENT)
Dept: PEDIATRIC HEMATOLOGY/ONCOLOGY | Facility: HOSPITAL | Age: 19
Discharge: HOME | End: 2024-05-30
Payer: MEDICAID

## 2024-05-30 ENCOUNTER — SOCIAL WORK (OUTPATIENT)
Dept: PEDIATRIC HEMATOLOGY/ONCOLOGY | Facility: HOSPITAL | Age: 19
End: 2024-05-30

## 2024-05-30 VITALS
BODY MASS INDEX: 21.94 KG/M2 | OXYGEN SATURATION: 100 % | TEMPERATURE: 98.1 F | RESPIRATION RATE: 18 BRPM | DIASTOLIC BLOOD PRESSURE: 58 MMHG | SYSTOLIC BLOOD PRESSURE: 104 MMHG | WEIGHT: 153.22 LBS | HEART RATE: 72 BPM | HEIGHT: 70 IN

## 2024-05-30 DIAGNOSIS — F90.2 ATTENTION DEFICIT HYPERACTIVITY DISORDER (ADHD), COMBINED TYPE: ICD-10-CM

## 2024-05-30 DIAGNOSIS — F31.81 BIPOLAR II DISORDER (MULTI): ICD-10-CM

## 2024-05-30 DIAGNOSIS — D57.20: ICD-10-CM

## 2024-05-30 DIAGNOSIS — D57.00 SICKLE CELL DISEASE WITH CRISIS (MULTI): Primary | ICD-10-CM

## 2024-05-30 DIAGNOSIS — F90.2 ATTENTION DEFICIT HYPERACTIVITY DISORDER (ADHD), COMBINED TYPE: Primary | ICD-10-CM

## 2024-05-30 LAB
ANION GAP SERPL CALC-SCNC: 13 MMOL/L (ref 10–20)
BASOPHILS # BLD AUTO: 0.07 X10*3/UL (ref 0–0.1)
BASOPHILS NFR BLD AUTO: 1.1 %
BUN SERPL-MCNC: 16 MG/DL (ref 6–23)
CALCIUM SERPL-MCNC: 9.8 MG/DL (ref 8.6–10.6)
CHLORIDE SERPL-SCNC: 105 MMOL/L (ref 98–107)
CO2 SERPL-SCNC: 25 MMOL/L (ref 21–32)
CREAT SERPL-MCNC: 0.63 MG/DL (ref 0.5–1.3)
CREAT UR-MCNC: 45.2 MG/DL (ref 20–370)
EGFRCR SERPLBLD CKD-EPI 2021: >90 ML/MIN/1.73M*2
EOSINOPHIL # BLD AUTO: 0.43 X10*3/UL (ref 0–0.7)
EOSINOPHIL NFR BLD AUTO: 7 %
ERYTHROCYTE [DISTWIDTH] IN BLOOD BY AUTOMATED COUNT: 16.6 % (ref 11.5–14.5)
GLUCOSE SERPL-MCNC: 81 MG/DL (ref 74–99)
HCT VFR BLD AUTO: 30.4 % (ref 41–52)
HGB BLD-MCNC: 11.6 G/DL (ref 13.5–17.5)
HGB RETIC QN: 34 PG (ref 28–38)
IMM GRANULOCYTES # BLD AUTO: 0.02 X10*3/UL (ref 0–0.7)
IMM GRANULOCYTES NFR BLD AUTO: 0.3 % (ref 0–0.9)
IMMATURE RETIC FRACTION: 51.4 %
LYMPHOCYTES # BLD AUTO: 2.85 X10*3/UL (ref 1.2–4.8)
LYMPHOCYTES NFR BLD AUTO: 46.3 %
MCH RBC QN AUTO: 31.4 PG (ref 26–34)
MCHC RBC AUTO-ENTMCNC: 38.2 G/DL (ref 32–36)
MCV RBC AUTO: 82 FL (ref 80–100)
MICROALBUMIN UR-MCNC: 8.4 MG/L
MICROALBUMIN/CREAT UR: 18.6 UG/MG CREAT
MONOCYTES # BLD AUTO: 0.63 X10*3/UL (ref 0.1–1)
MONOCYTES NFR BLD AUTO: 10.2 %
MUCOUS THREADS #/AREA URNS AUTO: NORMAL /LPF
NEUTROPHILS # BLD AUTO: 2.15 X10*3/UL (ref 1.2–7.7)
NEUTROPHILS NFR BLD AUTO: 35.1 %
NRBC BLD-RTO: 1.1 /100 WBCS (ref 0–0)
PLATELET # BLD AUTO: 322 X10*3/UL (ref 150–450)
POTASSIUM SERPL-SCNC: 4.5 MMOL/L (ref 3.5–5.3)
RBC # BLD AUTO: 3.69 X10*6/UL (ref 4.5–5.9)
RBC #/AREA URNS AUTO: NORMAL /HPF
RETICS #: 0.09 X10*6/UL (ref 0.02–0.12)
RETICS/RBC NFR AUTO: 2.5 % (ref 0.5–2)
SODIUM SERPL-SCNC: 138 MMOL/L (ref 136–145)
WBC # BLD AUTO: 6.2 X10*3/UL (ref 4.4–11.3)
WBC #/AREA URNS AUTO: NORMAL /HPF

## 2024-05-30 PROCEDURE — 80048 BASIC METABOLIC PNL TOTAL CA: CPT | Performed by: NURSE PRACTITIONER

## 2024-05-30 PROCEDURE — 36415 COLL VENOUS BLD VENIPUNCTURE: CPT | Performed by: NURSE PRACTITIONER

## 2024-05-30 PROCEDURE — 85025 COMPLETE CBC W/AUTO DIFF WBC: CPT | Performed by: NURSE PRACTITIONER

## 2024-05-30 PROCEDURE — 99214 OFFICE O/P EST MOD 30 MIN: CPT | Performed by: NURSE PRACTITIONER

## 2024-05-30 PROCEDURE — 82043 UR ALBUMIN QUANTITATIVE: CPT | Performed by: NURSE PRACTITIONER

## 2024-05-30 PROCEDURE — 99214 OFFICE O/P EST MOD 30 MIN: CPT | Performed by: PEDIATRICS

## 2024-05-30 PROCEDURE — 81001 URINALYSIS AUTO W/SCOPE: CPT | Performed by: NURSE PRACTITIONER

## 2024-05-30 PROCEDURE — 85045 AUTOMATED RETICULOCYTE COUNT: CPT | Performed by: NURSE PRACTITIONER

## 2024-05-30 RX ORDER — QUETIAPINE FUMARATE 100 MG/1
200 TABLET, FILM COATED ORAL NIGHTLY
Qty: 30 TABLET | Refills: 1 | Status: SHIPPED | OUTPATIENT
Start: 2024-05-30

## 2024-05-30 RX ORDER — DICLOFENAC SODIUM 10 MG/G
4 GEL TOPICAL 4 TIMES DAILY PRN
COMMUNITY
Start: 2024-02-14

## 2024-05-30 RX ORDER — LIDOCAINE 560 MG/1
1 PATCH PERCUTANEOUS; TOPICAL; TRANSDERMAL EVERY 12 HOURS PRN
COMMUNITY
Start: 2024-02-13

## 2024-05-30 ASSESSMENT — ENCOUNTER SYMPTOMS
PALPITATIONS: 0
NERVOUS/ANXIOUS: 1
ABDOMINAL PAIN: 0
ACTIVITY CHANGE: 0
DEPRESSION: 0
APPETITE CHANGE: 1
FEVER: 0
WEAKNESS: 0
CHEST TIGHTNESS: 0
HEADACHES: 0
SORE THROAT: 0
COUGH: 0
EYE PAIN: 0
EYE DISCHARGE: 0
LOSS OF SENSATION IN FEET: 0
DIARRHEA: 0
ARTHRALGIAS: 0
SINUS PAIN: 0
FATIGUE: 0
NUMBNESS: 0
PHOTOPHOBIA: 0
NAUSEA: 0
SINUS PRESSURE: 0
RHINORRHEA: 0
TROUBLE SWALLOWING: 0
WHEEZING: 0
MYALGIAS: 0
SHORTNESS OF BREATH: 0
SLEEP DISTURBANCE: 1
JOINT SWELLING: 0
VOMITING: 0
CHILLS: 0
OCCASIONAL FEELINGS OF UNSTEADINESS: 0
ABDOMINAL DISTENTION: 0
EYE REDNESS: 0
CONSTIPATION: 0

## 2024-05-30 ASSESSMENT — PAIN SCALES - GENERAL: PAINLEVEL: 0-NO PAIN

## 2024-05-30 ASSESSMENT — COLUMBIA-SUICIDE SEVERITY RATING SCALE - C-SSRS
2. HAVE YOU ACTUALLY HAD ANY THOUGHTS OF KILLING YOURSELF?: NO
1. IN THE PAST MONTH, HAVE YOU WISHED YOU WERE DEAD OR WISHED YOU COULD GO TO SLEEP AND NOT WAKE UP?: NO
6. HAVE YOU EVER DONE ANYTHING, STARTED TO DO ANYTHING, OR PREPARED TO DO ANYTHING TO END YOUR LIFE?: NO

## 2024-05-30 NOTE — PROGRESS NOTES
"Outpatient Psychiatry      Subjective   Josue Rangel, a 19 y.o. male, for initial evaluation visit.  Patient is referred by Lauren Witt MD .        Assessment/Plan   Josue is a 20 y/o male. He presents with Hgb SC disease and diffuse pain. He presents today in clinic unaccompanied with a history of HgB  disease, BI-Polar Disorder Type II combined type, and ADHD. Obtained informed consent from patient. Disposition patient room. Today Josue reports that he is neither depressed nor anxious. He reports that it has been difficult for him to sleep at night due to having increased energy and racing thoughts. He reports that he is taking his \"sleep medicine\" referring to taking his Seroquel. He denies SE or concerns. Patient reports that his insomnia occurs during high states of energy, with racing mind. He reports that his mood cycles several times a week. He reports mild-moderate depressive symptoms when not in a high energy states. Today he denies AVH , or paranoid thoughts. This is an improvement from last visit, in which he reported that it was difficulty for him to be out in public and that he felt people were \" out to get him.\" He reports vaping marijuana 3-4 times a week to help with sleep, anxiety, and appetite. Denies additional substance use. Denies SI-HI intent,plan, access to lethal weapons.  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  Provisional Diagnosis: Schizoaffective Disorder     Diagnosis:   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)    Palpitations    Hypermetropia of both eyes    Hyperopia    Intermittent asthma (HHS-HCC)    Melanocytic nevus of face    Myopia of both eyes with regular " astigmatism    Sickle cell disease (Multi)    Snoring    Sickle cell retinopathy (Multi)    Sickle cell crisis (Multi)    Vaso-occlusive pain due to sickle cell disease (Multi)    Vaso-occlusive sickle cell crisis (Multi)    Sickle cell disease with crisis (Multi)    Sacroiliitis (CMS-[facility])    Marijuana use    Long term (current) use of myelosuppressive agent    History of ADHD    Bipolar II disorder (Multi)    Allergic rhinitis    Acquired pes planus    Unspecified mood disorder (CMS-[facility])       Treatment Goals:  Specify outcomes written in observable, behavioral terms:   Anxiety: modifying schemata of threat/vulnerability/need for control (or other schemata -- specify managing racing thoughts and high energy states), reducing negative automatic thoughts, reducing physical symptoms of anxiety, and reducing time spent worrying (<30 minutes/day)  Depression: increasing self-reward for positive behaviors (one/day), increasing self-reward for positive thoughts (one/day), and reducing negative automatic thoughts    Treatment Plan/Recommendations:   Follow-up plan for depression was discussed with patient.    1. Safety:  Low Risk. Denies SI-HI intent, plan, intent. Denies access to lethal means.   PF: family, friends, goal driven, future and goal oriented, hopeful   Plan: If a patient feels that he can not keep himself safe, he is to tell a trusted friend, call or text [phone], or go to the nearest ER.      2. Medication Recommendations:   Continue Seroquel - Dose increase to 200 mg I oral tablet once daily at bedtime. Indicated for michael and insomnia.   Note: Discussed the pros and cons of Long Acting Injectables ( Suleiman). Patient is willing to explore monthly IM injections of antipsychotic v. Daily oral ingestion. Will follow.   3. Other: Discussed the goal of re-engaging with psychiatric care at [facility] ( received care there previously) or another [facility] mental health center were he can receive comprehensive care  and wrap around services, including case management. He declined this visit.  Note: Grandmother was contacted re: this providers concerns related to medication adherence and level of care.     5. Plan: Patient will follow up with this provider in 2 weeks, or sooner if indicated.   Patient also gave consent for this provider to speak with his grandmother regarding his mental health.        Follow-up plan for depression was discussed with patient.    Referral to:  N/A    Review with patient: Treatment plan reviewed with the patient.  Medication risks/benefit reviewed with the patient    Reason for Visit:  Medication Follow Up Visit       Reason:  He has been experiencing racing mind and depressive symptoms.    HPI:  Location: Endorsed high energy state this visit. Reported difficulty sleeping due to racing mind and intense energy   Quality: Endorses michael at least 4-5 nights a week. This high energy state does not allow him to sleep at night. This is causing him distress.   Timing/Duration: Reports ongoing issues with insomnia.    Context / Modifying factors: Per patient report it appears hyposomnia episodes are associated with periods of intense energy and activity and hypersomnia occurs after those periods have ended. It was was difficulty to get confirmation of these findings. Provider had to stop, summarize and request agreement or disagreement with findings to attempt to verify the patient symptoms.     Associated signs and symptoms: Patient endorses ability to function and care for self.        Current Medications:    Current Outpatient Medications:     acetaminophen (Tylenol) 325 mg tablet, Take 2 tablets (650 mg) by mouth every 6 hours if needed for mild pain (1 - 3) or moderate pain (4 - 6) (Note:  check your temperature 1st and do not take if you have a fever of 101 or higher.  -- call sickle cell team for advice)., Disp: 30 tablet, Rfl: 2    amphetamine-dextroamphetamine XR (Adderall XR) 15 mg 24 hr  capsule, Adderall XR 15 MG Oral Capsule Extended Release 24 Hour  Quantity: 30  Refills: 0      Start : 2-Sep-2016  Active, Disp: , Rfl:     diclofenac sodium (Voltaren) 1 % gel, Apply 4.5 inches (4 g) topically 4 times a day as needed., Disp: , Rfl:     folic acid (Folvite) 1 mg tablet, Take 1 tablet (1 mg) by mouth once daily., Disp: 30 tablet, Rfl: 2    food supplemt, lactose-reduced (Ensure Original) 0.04-1.05 gram-kcal/mL liquid, Ensure Oral Liquid  Refills: 0      Start : 3-Aug-2017  Active, Disp: , Rfl:     hydroxyurea (Hydrea) 500 mg capsule, Take 2 capsules (1,000 mg total) by mouth once daily.  Take at the same time each day., Disp: 60 capsule, Rfl: 0    naproxen (Naprosyn) 500 mg tablet, Take 1 tablet (500 mg) by mouth every 12 hours. For the next 2 days, then as needed for pain, Disp: 60 tablet, Rfl: 0    polyethylene glycol (Glycolax, Miralax) 17 gram packet, Take 17 g(contents of 1 packet dissolved in 8 ounces of water)  by mouth once daily as needed (constipation)., Disp: 30 each, Rfl: 0    QUEtiapine (SEROquel) 100 mg tablet, Take 1 tablet (100 mg) by mouth once daily at bedtime., Disp: 30 tablet, Rfl: 0    sennosides (Senokot) 8.6 mg tablet, Take 2 tablets (17.2 mg) by mouth 2 times a day as needed for constipation., Disp: 60 tablet, Rfl: 0    albuterol 90 mcg/actuation inhaler, Inhale 2-4 puffs every 4-6 hours as needed for shortness of breath or wheezing (Patient not taking: Reported on 5/30/2024), Disp: 18 g, Rfl: 3    bisacodyl (Dulcolax) 5 mg EC tablet, Take 1 tablet (5 mg) by mouth once daily as needed for constipation. Do not crush, chew, or split. (Patient not taking: Reported on 5/2/2024), Disp: 30 tablet, Rfl: 0    dimethicone-colloidal oatmeaL (Aveeno) 1.2 % lotion, 1 Application every 12 hours., Disp: , Rfl:     docusate sodium (Colace) 100 mg capsule, Take 1 capsule (100 mg) by mouth 2 times a day. (Patient not taking: Reported on 5/30/2024), Disp: 60 capsule, Rfl: 3    lidocaine 4 %  patch, Place 1 patch on the skin every 12 hours if needed for mild pain (1 - 3). Remover after 12 hours, Disp: , Rfl:     loratadine (Claritin) 10 mg tablet, Take 1 tablet (10 mg) by mouth once daily at bedtime., Disp: , Rfl:     oxyCODONE (Roxicodone) 5 mg immediate release tablet, Take 1 tablet (5 mg) by mouth every 6 hours. For the next two days, then as needed for breakthrough pain (Patient not taking: Reported on 5/30/2024), Disp: 10 tablet, Rfl: 0    Symbicort 80-4.5 mcg/actuation inhaler, Inhale 2 puffs 2 times a day. And 2 puffs every 4 hours as needed. (Max 10 puffs per 24 hours) (Patient not taking: Reported on 5/30/2024), Disp: 10.2 g, Rfl: 1  Medical History:  Past Medical History:   Diagnosis Date    Acute pharyngitis 07/20/2022    Cough 04/17/2024    Dizziness 10/20/2022    Musculoskeletal chest pain 10/20/2022    Parvovirus infection 05/20/2009    Personal history of diseases of the blood and blood-forming organs and certain disorders involving the immune mechanism     History of sickle cell anemia     Surgical History:  Past Surgical History:   Procedure Laterality Date    CT GUIDED PERCUTANEOUS BIOPSY BONE DEEP  7/17/2023    CT GUIDED PERCUTANEOUS BIOPSY BONE DEEP 7/17/2023 [facility] CT    MR HEAD ANGIO WO IV CONTRAST  10/8/2014    MR HEAD ANGIO WO IV CONTRAST 10/8/2014 [facility]    MR HEAD ANGIO WO IV CONTRAST  7/10/2023    MR HEAD ANGIO WO IV CONTRAST 7/10/2023 [facility] MRI    MR NECK ANGIO WO IV CONTRAST  10/8/2014    MR NECK ANGIO WO IV CONTRAST 10/8/2014 [facility] CLINICAL LEGAC[facility]    MR NECK ANGIO WO IV CONTRAST  7/10/2023    MR NECK ANGIO WO IV CONTRAST 7/10/2023 [facility] MRI     Family History:  Family History   Problem Relation Name Age of Onset    Diabetes Paternal Grandmother      Diabetes Paternal Grandfather       Social History: He lives with his grandmother, great grandmother and at times his maternal mother. He is not currently employed. He endorses being in a relationship, he referred to the  individual as his partner.     Social History     Socioeconomic History    Marital status: Single     Spouse name: Not on file    Number of children: Not on file    Years of education: Not on file    Highest education level: Not on file   Occupational History    Not on file   Tobacco Use    Smoking status: Never    Smokeless tobacco: Never   Substance and Sexual Activity    Alcohol use: Yes     Comment: per patient; socially    Drug use: Yes     Types: Marijuana    Sexual activity: Not on file   Other Topics Concern    Not on file   Social History Narrative    Not on file     Social Determinants of Health     Financial Resource Strain: Patient Unable To Answer (4/14/2024)    Overall Financial Resource Strain (CARDIA)     Difficulty of Paying Living Expenses: Patient unable to answer   Food Insecurity: Not on File (1/9/2023)    Received from [facility]     Food Insecurity     Food: 0   Transportation Needs: Not on File (1/9/2023)    Received from [facility]     Transportation Needs     Transportation: 0   Physical Activity: Not on File (1/9/2023)    Received from [facility]     Physical Activity     Physical Activity: 0   Stress: Not on File (1/9/2023)    Received from [facility]     Stress     Stress: 0   Social Connections: Not at Risk (1/16/2023)    Received from [facility]     Social Connections     Social Connections and Isolation: 1   Intimate Partner Violence: Not on file   Housing Stability: Unknown (4/14/2024)    Housing Stability Vital Sign     Unable to Pay for Housing in the Last Year: Patient unable to answer     Number of Places Lived in the Last Year: Not on file     Unstable Housing in the Last Year: Not on file       Additional historical information includes: N/A    Record Review: moderate     Medical Review Of Systems:  Pertinent items are noted in HPI.    Psychiatric Review Of Systems:     Psych Review of Symptoms    Depressive Symptoms:Depressed/Irritable mood, Change in appetite, Insomnia or Hypersomnia, and  N/A  Manic Symptoms:Elevated or irritable mood, Distractibility, Decreased need for sleep, More talkative than usual, Flight of ideas or racing thoughts, Grandiosity or increased self esteem, Psychomotor agitation or increased goal-directed activity, and Excessive involvement in pleasurable activities  Anxiety Symptoms:Excessive worry, Difficulty controlling worry, and Difficulty concentration due to worry  Disordered Eating Symptoms: Limits intake to one meal a day.  Inattentive Symptoms:Is often disorganized and Is often easily distracted   Hyperactive/Impulsive Symptoms:Other: (comment) Denies  Oppositional Defiant Symptoms:Other: (comment) Denies  Trauma Symptoms:Experience or exposure to traumatic event and Avoidance of traumatic stimuli  Conduct Issues:Other: (comment) Denies  Psychotic Symptoms:Disorganized speech  Developmental Concerns:Other: (comment) Denies  Other Symptoms/Concerns:Sleep disorder symptoms (PEREZ, sleepwalking, NT, etc.)  Delirium/Altered Mental Status Symptoms:Other: (comment) Denies was A & O x 4 today       Objective   Mental Status Exam:     General: Appropriately groomed and dressed.   Appearance: Appears stated age.   Attitude: Calm, cooperative.   Behavior: limited eye contact.    Motor Activity: No agitation or retardation. No EPS/TD. Gait steady.   Speech: Regular rate, rhythm,low volume and tone, paucity of speach.   Mood: Manic.   Affect: Congruent w/ mood.   Thought Process: Organized, linear, goal directed. Associations  were loss and at times difficult to follow.    Thought Content: Does not endorse suicidal or homicidal ideation, no delusions elicited.   Thought Perception: Does not endorse auditory or visual hallucinations this visit. Does not appear to be responding to hallucinatory stimuli.   Cognition: Alert, oriented x3. No deficits noted. Adequate fund of knowledge. No deficit in recent and remote memory. Denies deficits in attention and concentration. No deficits in  language noted.   Insight: Fair, he was able to verbalize what medication he was taking and what it was for.    Judgment: Fair, patient came to visit unattended. He could not answer all of the questions the teams asked. Poor historian.        Other Objective Information:  Upon approach patient was sitting up in bed playing  a game on his phone.     Vitals:  Vitals:    05/30/24 1307   BP: 104/58   Pulse: 72   Resp: 18   Temp: 36.7 °C (98.1 °F)   SpO2: 100%           Time spent in therapy 30  Summary of Psychotherapy component: Medication follow up. Utilized MI to discuss cutting back on use of marijuana. Discussed decreasing the frequency weekly.  Total time spent 30    Denisa Amaro, APRN-CNP

## 2024-05-30 NOTE — PATIENT INSTRUCTIONS
You can reach a member of your health care team at any time by calling [phone].  Please call for fever greater than 101 F,  pallor, lethargy, pain not responsive to home medications or any other questions or concerns.       Follow up:  Your next sickle cell follow up will be 7/3/24 with the Adult Sickle Cell in the [facility] 1st floor .  Has an appointment for [facility] , please try to keep this appointment.    Please make an appointment with the ophthalmologist by [phone]     Teaching done today:  Transition to adult model of care

## 2024-05-30 NOTE — PROGRESS NOTES
Patient ID: Josue Rangel is a 19 y.o. male.  Referring Physician: Jennifer Muse, APRN-CNP  [id number] [place] [address]  Primary Care Provider: Lauren Witt MD    Date of Service:  5/30/2024    SUBJECTIVE:    Patient ID: Josue Rangel is a 19 y.o. 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 He has had several complications related to his disease necessitating initiation of hydroxyurea therapy. He is here independently as he is of legal age.       Date of Service:  5/30/2024  VISIT TYPE:   Sickle Cell Follow Up _Transition Visit                                        INTERVAL HISTORY:    Josue Rangel is accompanied today by his grandmother.  Since Josue Rangel's last sickle cell follow up visit on 5/2/2024,  he has been feeling ok and has had:   ED visits: 0  Hospitalizations: 0  Illness: not been sick  Sickle Cell Pain: no pain   Concerns: has been having anxiety but not too overwhelmed is taking seroquel at prescribed doses     MEDICATION ADHERENCE (missed doses within the last 2 weeks)  Seroquel -  none   HU - once or twice  Ensure - 2 cans twice a day  Symbicort -(new start 3/29/24)  Medication Refills Needed:     HEALTH SURVEILLANCE STATUS:   Well Child Check Up - 8/12/2022 overdue   Dentist - 8/2019 overdue, He believes  he has an appt in June with [facility]   Ophthalmology - 6/16/2023 overdue  Pulmonology -  last seen on 3/29/24- follow up in 4-6 months, scheduled on 9.6.24  Cardiology - April 2022 overdue for provider visit and ECHO  Psych- due to see Denisa today 5/30/24     Opioid Agreement - ( adult consent UTD)  Pain Screen (> 8 yrs) - last screened on 2/1/2024 Asymptomatic high risk for chronic pain   Immunizations due today: UTD     Labs due today: HU labs( would be due for refill technically 6/2/24: last filled 5/3/24) - labs today and we will fill HU when he needs it and  calls for it.       PMH: Several admissions to [facility] for both fever and acute chest syndrome. He was admitted to [facility] in 2005 for  fever, in December 2005 for acute chest syndrome necessitating transfusion, and in December 2006 for RSV. He was admitted in June 2012 for acute chest syndrome requiring ICU management and exchange transfusion. Following this episode of acute chest syndrome,  he was initiated on hydroxyurea therapy.  Since that time, he has been doing much better.  He also has ADHD. He had a sleep study in Aug 2012 for history of snoring and restless sleep which was not concerning.  Admission in October 2015 for fever.  Admitted Sept 2016 with pain and ACS - was transfused. Admitted December 2017 for pain.     Josue was hospitalized on 1/16/2022 for pain and seizure- like activity in the ED with mental status changes while inpatient that necessitated an EEG. EEG was normal.  Hospitalized 4/10-12/2023- VOE shoulder and neck, eye pain  Hospitalized 6/1/2023 for CP, VOE and  altered mental status. CT head to r/o stroke was negative   Hospitalized 7/8/2023 for VOE, and altered mental status. Neurology workup normal. Right leg pain eventually localized to right SI joint and hip, prompting an MRI of the right hip, which revealed right SI joint effusion and surrounding marrow edema concerning for septic arthritis versus osteomyelitis versus infarction. He was started on Rocephin per ID recommendations and underwent drainage of the joint effusion and bone biopsy with IR, which revealed no pathogens (though pre-treated with Rocephin). Blood cultures x2 showed no growth. Pain and gait improved and he was transitioned to a prolonged course of oral antibiotics with Augmentin per ID recommendations prior to discharge   Hospitalized 1/4/2024 for Left arm pain. XR's of left shoulder, forearm and humerus were negative for AVN. He also c/o SOB and pleuritic chest pain-CXR done and negative for ACS    2.10-16.24- VOE of UE and LE with chest pain( pain meds changed to dilaudid) new dx of bipolar   4.14-20.24-VOE chest pain - ACS with carl exchange (meds changed to Seroquel)      Fully vaccinated against COVID 19      Surgical History:  None    Current Outpatient Medications   Medication Instructions    acetaminophen (TYLENOL) 650 mg, oral, Every 6 hours PRN    albuterol 90 mcg/actuation inhaler Inhale 2-4 puffs every 4-6 hours as needed for shortness of breath or wheezing    amphetamine-dextroamphetamine XR (Adderall XR) 15 mg 24 hr capsule Adderall XR 15 MG Oral Capsule Extended Release 24 Hour   Quantity: 30  Refills: 0        Start : 2-Sep-2016   Active    bisacodyl (DULCOLAX) 5 mg, oral, Daily PRN, Do not crush, chew, or split.    diclofenac sodium (VOLTAREN) 4 g, Topical, 4 times daily PRN    dimethicone-colloidal oatmeaL (Aveeno) 1.2 % lotion 1 Application, Every 12 hours    docusate sodium (COLACE) 100 mg, oral, 2 times daily    folic acid (FOLVITE) 1 mg, oral, Daily    food supplemt, lactose-reduced (Ensure Original) 0.04-1.05 gram-kcal/mL liquid Ensure Oral Liquid   Refills: 0        Start : 3-Aug-2017   Active    hydroxyurea (HYDREA) 1,000 mg, oral, Daily, Take at the same time each day.    lidocaine 4 % patch 1 patch, transdermal, Every 12 hours PRN, Remover after 12 hours    loratadine (Claritin) 10 mg tablet 1 tablet, oral, Nightly    naproxen (NAPROSYN) 500 mg, oral, Every 12 hours, For the next 2 days, then as needed for pain    oxyCODONE (ROXICODONE) 5 mg, oral, Every 6 hours, For the next two days, then as needed for breakthrough pain    polyethylene glycol (Glycolax, Miralax) 17 gram packet Take 17 g(contents of 1 packet dissolved in 8 ounces of water)  by mouth once daily as needed (constipation).    QUEtiapine (SEROQUEL) 200 mg, oral, Nightly    senna 17.2 mg, oral, 2 times daily PRN    Symbicort 80-4.5 mcg/actuation inhaler 2 puffs, inhalation, 2 times daily RT, And 2 puffs every 4 hours  as needed. (Max 10 puffs per 24 hours)         Social History:   He has previously reported alcohol use and drug use. Drug: Marijuana.  Patient was previously enrolled as a 10th grade student at [employer] for [employer] in [place]  and was completing a credit recovery program.  Currently not in school.  Patient has not been back to this school since school ended June 2022.  Is working at [employer].       Review of Systems   Constitutional:  Positive for appetite change (appetite is poor so taking 4 cans Ensure, Chocolate). Negative for activity change, chills, fatigue and fever.   HENT:  Negative for congestion, ear discharge, ear pain, mouth sores, rhinorrhea, sinus pressure, sinus pain, sore throat and trouble swallowing.    Eyes:  Negative for redness.   Respiratory:  Negative for cough, chest tightness, shortness of breath and wheezing.    Cardiovascular:  Negative for chest pain, palpitations and leg swelling.   Gastrointestinal:  Negative for abdominal distention, abdominal pain, constipation, diarrhea, nausea and vomiting.   Genitourinary:  Negative for penile pain.   Musculoskeletal:  Negative for arthralgias, joint swelling and myalgias.   Neurological:  Negative for weakness, numbness and headaches.   Psychiatric/Behavioral:  Positive for sleep disturbance (insominia sometimes gets 3 to 5 hours of sleep, on another day, may sleep a quarter of the day).    All other systems reviewed and are negative.                                 Family History   Problem Relation Name Age of Onset    Diabetes Paternal Grandmother      Diabetes Paternal Grandfather         Review of Systems   Constitutional:  Positive for appetite change (Appetite has been poor for a long time, takes 4 cans chocolate Ensure daily, eats salads, wants to try vegan food). Negative for activity change and fever.   HENT:  Negative for congestion, dental problem, mouth sores, rhinorrhea, sinus pressure, sore throat and trouble  "swallowing.    Eyes:  Negative for photophobia, pain and discharge.   Respiratory:  Negative for cough, chest tightness, shortness of breath and wheezing.    Cardiovascular:  Negative for leg swelling.   Gastrointestinal:  Negative for abdominal distention, abdominal pain, constipation, diarrhea and nausea.   Genitourinary:  Negative for penile pain.   Neurological:  Negative for headaches.   Psychiatric/Behavioral:  Positive for sleep disturbance (erratic, sometimes gets only 3 to 5 hours sleep per night and othertimes sleeps quarter of the day). The patient is nervous/anxious.    All other systems reviewed and are negative.      OBJECTIVE:    VS:  /58   Pulse 72   Temp 36.7 °C (98.1 °F) (Oral)   Resp 18   Ht 1.771 m (5' 9.72\")   Wt 69.5 kg (153 lb 3.5 oz)   SpO2 100%   BMI 22.16 kg/m²   BSA: 1.85 meters squared    Physical Exam  Exam conducted with a chaperone present.   Constitutional:       General: He is not in acute distress.     Appearance: Normal appearance. He is normal weight. He is not ill-appearing, toxic-appearing or diaphoretic.   HENT:      Head: Atraumatic.      Right Ear: Tympanic membrane, ear canal and external ear normal.      Left Ear: Tympanic membrane, ear canal and external ear normal.      Nose: No congestion or rhinorrhea.      Mouth/Throat:      Mouth: Mucous membranes are moist.      Pharynx: Oropharynx is clear. No oropharyngeal exudate or posterior oropharyngeal erythema.   Eyes:      General: No scleral icterus.     Extraocular Movements: Extraocular movements intact.      Conjunctiva/sclera: Conjunctivae normal.      Pupils: Pupils are equal, round, and reactive to light.   Cardiovascular:      Rate and Rhythm: Normal rate and regular rhythm.      Pulses: Normal pulses.      Heart sounds: Normal heart sounds. No murmur heard.  Pulmonary:      Effort: Pulmonary effort is normal. No respiratory distress.      Breath sounds: Normal breath sounds. No stridor. No wheezing, " rhonchi or rales.   Abdominal:      General: Abdomen is flat. Bowel sounds are normal. There is no distension.      Palpations: Abdomen is soft. There is no mass.      Tenderness: There is no abdominal tenderness. There is no guarding or rebound.   Musculoskeletal:         General: No swelling, tenderness, deformity or signs of injury. Normal range of motion.      Cervical back: Normal range of motion. No rigidity.      Right lower leg: No edema.      Left lower leg: No edema.   Skin:     General: Skin is warm and dry.      Capillary Refill: Capillary refill takes less than 2 seconds.      Findings: Lesion (Mole on forehead) present. No rash.   Neurological:      General: No focal deficit present.      Mental Status: He is alert. Mental status is at baseline.      Gait: Gait normal.   Psychiatric:      Comments: Mood is elevated         Laboratory:  All labs  Results for orders placed or performed during the hospital encounter of 05/30/24   Albumin , Urine Random   Result Value Ref Range    Albumin, Urine Random 8.4 Not established mg/L    Creatinine, Urine Random 45.2 20.0 - 370.0 mg/dL    Albumin/Creatine Ratio 18.6 <30.0 ug/mg Creat   Basic Metabolic Panel   Result Value Ref Range    Glucose 81 74 - 99 mg/dL    Sodium 138 136 - 145 mmol/L    Potassium 4.5 3.5 - 5.3 mmol/L    Chloride 105 98 - 107 mmol/L    Bicarbonate 25 21 - 32 mmol/L    Anion Gap 13 10 - 20 mmol/L    Urea Nitrogen 16 6 - 23 mg/dL    Creatinine 0.63 0.50 - 1.30 mg/dL    eGFR >90 >60 mL/min/1.73m*2    Calcium 9.8 8.6 - 10.6 mg/dL   Microscopic Only, Urine   Result Value Ref Range    WBC, Urine NONE 1-5, NONE /HPF    RBC, Urine 1-2 NONE, 1-2, 3-5 /HPF    Mucus, Urine FEW Reference range not established. /LPF   Reticulocytes   Result Value Ref Range    Retic % 2.5 (H) 0.5 - 2.0 %    Retic Absolute 0.094 0.022 - 0.118 x10*6/uL    Reticulocyte Hemoglobin 34 28 - 38 pg    Immature Retic fraction 51.4 (H) <=16.0 %   CBC and Auto Differential   Result  Value Ref Range    WBC 6.2 4.4 - 11.3 x10*3/uL    nRBC 1.1 (H) 0.0 - 0.0 /100 WBCs    RBC 3.69 (L) 4.50 - 5.90 x10*6/uL    Hemoglobin 11.6 (L) 13.5 - 17.5 g/dL    Hematocrit 30.4 (L) 41.0 - 52.0 %    MCV 82 80 - 100 fL    MCH 31.4 26.0 - 34.0 pg    MCHC 38.2 (H) 32.0 - 36.0 g/dL    RDW 16.6 (H) 11.5 - 14.5 %    Platelets 322 150 - 450 x10*3/uL    Neutrophils % 35.1 40.0 - 80.0 %    Immature Granulocytes %, Automated 0.3 0.0 - 0.9 %    Lymphocytes % 46.3 13.0 - 44.0 %    Monocytes % 10.2 2.0 - 10.0 %    Eosinophils % 7.0 0.0 - 6.0 %    Basophils % 1.1 0.0 - 2.0 %    Neutrophils Absolute 2.15 1.20 - 7.70 x10*3/uL    Immature Granulocytes Absolute, Automated 0.02 0.00 - 0.70 x10*3/uL    Lymphocytes Absolute 2.85 1.20 - 4.80 x10*3/uL    Monocytes Absolute 0.63 0.10 - 1.00 x10*3/uL    Eosinophils Absolute 0.43 0.00 - 0.70 x10*3/uL    Basophils Absolute 0.07 0.00 - 0.10 x10*3/uL           ASSESSMENT and PLAN:    Josue Rangel is a 19 year old male with 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 He has comorbid ADHD and schizoaffective disorder. He is on Seroquel and was seen by Denisa Amaro, Psych NP. Has agreed to once a month injectable anti-psychotic agent.  Arrangements being made by MEGHAN Amaro Psych NP to wean him off current med, start new med orally and then transition to injectable form of same med.  On review of labs, ANC is in MTD range at 2150, with no signs of toxicity; ARC > 80k, (55100) and platelets > 80K (actual value 322K).  No evidence of albuminuria. Serum creatinine WNL.    Plan  Disease-modifying therapy  ANC is at maximum tolerated dose per our protocol (0414-0004). Continue hydroxyurea at the same dose. Josue or his grandmother should call when he is ready for the next refill which will be done since he has done labs. He is due on 06/03/24.  Continue Hydroxyurea 1000 mg po daily    Health Maintenance  Patient  advised to keep appointment for [facility] scheduled on June 22.  Patient advised to keep appointment with the ophthalmologist    Psych  Saw Denisa Amaro, Psych provider today with  plans as above  Patient has had previous conversations with Psych provider and SW  discussing accessing psych community service     Transition planning  Josue will transfer his care to [facility] with his first visit with Dr Andrade on 07.03./24. He was presented with a certificate of completion. Patient hand off to Adult sickle cell pain completed.      Teaching completed today: We reviewed Josue's growth charts and final stages of puberty. We discussed transition and integration into Adult-focused practice.  Next visit will be with Dr. Andrade and  Adult sickle cell team in [facility]  on 7/3/24    Janeth Abdi MD.  Pediatric Hematology Oncology Attending Physician

## 2024-06-05 ASSESSMENT — ENCOUNTER SYMPTOMS
DIZZINESS: 1
ABDOMINAL PAIN: 0

## 2024-06-28 ENCOUNTER — HOSPITAL ENCOUNTER (OUTPATIENT)
Facility: CLINIC | Age: 19
Setting detail: OUTPATIENT SURGERY
End: 2024-06-28
Attending: DENTIST | Admitting: DENTIST
Payer: MEDICAID

## 2024-06-28 NOTE — PROGRESS NOTES
"SW met with Grandma- Addis Bo while met with Provider in [place] [facility].     Pt typically comes to appt by himself as he is now over 17, but Dylan came today as this was his last appt with the SCD team in pediatrics.      Pt lives with Dylan, her , gr Aunt and Dylan's son, 18 year old-Demetrio. Grandparents are , but Dylan is remarried.  Pat Dylan- Addis Bo, had custody of pt until age of 17.  No guardian now. FISH has spoken to Dylan about guardianship for pt. in the past AND again today due to emerging mental health.  He does not have any diagnosed cognitive delays despite struggles in school.      Custody: As a minor- Grandmother, Grandfather     Mom was incarcerated. Dylan stated that bio mom tried to get custody back of all five of her children in past years. However, Dylan didn't feel it was a good plan.  Her legal issues were significant. Mom had visits 1-2 times per month on weekends.  Pt rotated between relatives for visits (Hyacinth, X- Gpa, Bio mom and gma). Bio mom is  now.  Mom had approached court to get custody back of pt in 2019.  Carlos Enriquea felt it was financial in nature. Mom did not show up in court.      Dad incarcerated.  He is in [facility]. Mom has currently legal issues pending.      Ins: [facility], [facility]-needs new application since over 18     Income: SSI, job, FS     Pt has been employed in the past but is not working now due to issues with mental health.     Add:[id number] [facility] [address]     Phone: [phone]-H, [phone]-Gma cell  -aunt Karolyn- [phone]  -pt-[phone]     In 2/24, pt had poor eye contact, was on his phone playing a game, but engaged in conversation.  He explained that his \"mindset wanders and it keeps me from doing things.\" Per pt, he \"wouldn't mind changing that problem\". Pt acknowledges that he still has both auditory and visual hallucinations at times.  He was not that talkative about these symptoms. He is interested in " meds to help quiet his thoughts, but often doesn't take as prescribed.  Pt sees with Denisa Amaro NP in clinic. He would rather meet with Denisa than go to a mental health agency at this time because he feels comfortable coming to SCD clinic, per pt. NABIL did discuss other services at a mental health center that pt would benefit from with Gma and with pt. Dylan described a safety issue in the kitchen when pt was cooking in the middle  of the night and walked away into the backyard with the stove on and doors open. Gma realizing his need for supervision.       In 7/23, pt was not taking any meds for his mental health.  He no longer felt he was depressed.  He admitted to having about two panic attacks weekly and would wake up with anxiety sometimes.  He also admitted to having visual and auditory hallucinations.  At times, he would ask others if they see and hear what he does. He did not feel they impact his life so he was not concerned about it.  He talked about using several calming and meditation apps to relax. When he has anxiety, his body goes numb so he drinks water. Nabil spoke with Dylan and she is trying very hard to guide him in adult life and tried to support him, especially around his medical health.     In 4/23-Pt was tired but talkative.  He stated that he is taking his psych. meds which gives him more energy. Pt isn't sure where he is going for his mental health care at this point ([facility]?).  He feels like he has a few pills left.   Nabil talked with pt about how important it was to stay on his medicine, to not run out of his medicine. At that time, he was still working at [employer] 24 hours/week, 5 days a week. He was not attending school and does not want to be in any school or GED program. He will let us know when he was ready for a program. [facility] has provided info on several programs without follow through. Nabil talked with Dylan around this issue and his desire to live independently, despite his cognitive  "and mental health issues.     At 10/22 clinic appt pt was withdrawn, \"tired\" and made very poor to no eye contact.  He sat up when SW requested him to, but after a few minutes layed back down. Pt stated that he has increased insomnia, causing his tiredness, has decreased energy, and has felt down for about two weeks.  He states that he is no longer taking his medication as he \"didn't feel his meds. were working anymore\". He also stated that he didn't want to take meds anymore as he gets tired of taking his medicine. He stated \"I want to be normal and feel like a human again\". He denies SI today but stated his last thought was a month ago, without plan, but unsure if he would follow through. His goal is \"making everyday worth it.\"  Pt clearly was showing signs of depression, similar to how he was prior to treatment with Zoloft.      In 10/22, NABIL discussed signs and symptoms of depression with pt.  Pt acknowledged changes in himself.  He had not talked with his therapist in a few weeks.  He was seeing Jennifer through [facility] at the time.  NABIL called Jennifer during the meeting with pt that day.  She scheduled an in person appt with pt in two days.  NABIL requested that a psychiatry appt also be coordinated, hopefully on Monday as well.  Jennifer will contact his psych NP at [facility] to arrange.  Jennifer shared with Gma and NABIL confirmed that Gma will take pt to that appt.  Nabil also put the appt in pt's phone calendar.  Pt did not seem to need an ED evaluation as he denies SI today.  MD concurred.      NABIL gave him the handout for [employer], a program where he can get his diploma/ged and get vocational training. They also help educate around life skills for adulthood. NABIL faxed referral and provided pt contact info. NABIL also gave Gma the [employer] contact info as they could help with vocational and supported housing as well.      In 5/21, pt was talkative, smiling, casey a beautiful picture with a thoughtful saying on the dry erase board in " "the clinic room.  This is much more like his baseline behavior. FISH encouraged pt to continue taking his meds as it appears as it is helping his depression.  FISH told pt that it was nice to see the positive changes in him. Pt and Aunt state that he is lifting weights at home and takes walks down the street as well as being a bit more social with family.  Aunt did mention when meeting with her alone about Gma's frustration level.  FISH gave Aunt  EAP program to offer Gma since she works at .   Pt does remember his last visit when he sat in the corner of the room, with his hoodie up and made no eye contact and didn't speak to staff.      FISH spoke with Yasir Richardson in 4/21 via phone as she was frustrated with pt's defiance and lethargy. FISH encouraged Dylan to remind herself that his behavior changes are likely caused by his depression.  He has started on Certraline and continued his Adderol.  FISH suggested that Dylan speak with his counselor and psych NP regarding potential med. increase as Gma starting to see minor positive changes since on the medicine.     FISH spoke with Yasir Richardson at length prior to the appt. on 2/22/21.  She has significant concerns regarding his mental health.  She explained that it started when he connected to the son of his bio dad's ex girlfrend.  He is a bad influence, per Gma.  He opened pt up to social media, websites involving sexual contacts virtually with females.  Per gma, pt became disrespectful, on computer and phone throughout the night, and therefore, tired for school and not completing work. He isolates himself in his dark bedroom and won't open up or talk about what is bothering him. Dylan believes that he is cutting as she has seen thin lines on his hands.  FISH explained reasons for cutting for Dylan as she was unaware. At the appt, pt admitted to being \"sad\" with RN and upon questioning about SI, pt stated \"you don't want to know the answer to that\". Per MD, pt does not have an active suicide " plan. Pt's affect was very flat, he kept his hoodie on and did not make eye contact.  He sat in the corner of the room and did not converse with staff as he had done in the past.  This was a remarkable difference for him, even compared to the subtle changes at his appt in our clinic three months ago.  FISH spoke with pt's therapist from [facility] via phone during the appt- Tiny [phone].  She had an appt at 6PM that day with pt. and would continue to assess the situation. FISH gained Tiny's insight regarding the need to send pt to the ED from clinic.  Tiny stated it sounded like pt's current baseline at this time.  She would continue to assess at their appt at 6PM and send him to the ED if SI were expressed.  Per Tiny, he has been slow to open up about his emotions and to put forth the effort into working on his issues.  He will not do a video appt with Tiny, only via phone.  He continues to be resistant in working on symptoms of depression.  She has been tracking self harm thoughts. FISH also spoke with Gma at the appt so everyone was on the same page.  Both Gma and Aunt were told how to make house safe.  There are no guns in the house, she will remove the knives from kitchen and meds are always locked up.  Family to monitor pt frequently and encourage less time in his room alone.  FISH provided Suicide Hotline 24/7 number to pt directly and to Gma.       Pt was also seeing a psychiatric NP, Temitope at [facility]. Pt started on Cirtraline on 2/21.  Tiny is contacting his psychiatrist at [facility] regarding whether this decline is due to the new med. Pt is also completing cognitive testing with Christina to put full picture in place.     11/2020- Had incident a few weeks ago when he was texting with a boy who was sharing bad advice.  Aunt felt the boy is a terrible influence. Pt made some negative and concerning comments and then tried to cut on his arms, leaving very superficial lines. Fish discussed feelings with pt but he  didn't really want to talk, which is very unusual for him.  Gma set up an urgent counseling session which helped.  SW provided the Suicide Prevention Hotline to pt, explaining there is someone 24/7 he could talk with. He and Aunt put number right in their phones.     Education: Pt is no longer in 11th grade at [employer]. In the past, he was excited to be at his last HS as he loves to draw and loves video games. Received  in past. His grades went from A,Bs to D F's.  He does not want to be in school at this time. SIS and Sw have shared various programs with him to complete diploma/GED and for vocational programming.     Pt had repeated 3rd grade twice.  The first time it was due to overall academics.  The second time it was because he failed the [employer] reading test by 4 pts.  Dylan states that he struggles on tests.  However, he still has issues with attention, speaking out of turn and getting out of seat. Teachers in past years did not feel he needed an IEP. He does have a 504 plan.  It is more an issue of attention and focus.      Dylan attended all the school mtgs. Pt was back on ADHD meds in the past mainly due to hyperactivity but his behavior had been better so he was taken off those meds.  Gma did get him back on meds for ADHD, as his behaviors got in the way of his learning.  Dylan  described that he was very talkative in class, didn't finish reading directions on class work, and didn't always listen in class. Last year, teacher sent daily notes home for all students so Dylan can see on a daily basis how he is doing behaviorally. He typically is moving around during the clinic visit but very polite.  However, he tried his best to not interrupt the conversation with his questions. Pt had MFE completed through [employer] in 2012 but no IEP was needed. He was retested by Dr. Johnson.  Per Gma, Dr Johnson feels he has slight dyslexia and ADHD.  a has given school the report but an IEP has not been written.   He  has a 504. [place] [facility] met with Gma and will assist in getting an IEP. Pt took Adderol 15 mg. SW discussed with Gma about getting another opinion about his ADHD meds from psychiatry as he continues to have attention and focusing problems in school. The dose of his Adderol hadn't been changed in several years.      Close family member went through a traumatic event of a kidnapping.      Pt attended summer school. Kids began making fun of pt as he is two years older then most. Sw to made counseling referral to [facility] as pt had a lot of things to handle with family situation.     Pt had started attending groups through [employer].  They went on field trips, volunteer in the community and have lectures about positive issues. He liked it.     Transp: Bus     P: Transfer to [facility], adult SCD team    Collaborated with [place] medical team about needs of patient and family.  -psychiatry info provided. Dr Joelle Meza again  referral to [facility]  Denisa Amaro  [facility] 360 program  [employer] [employer]

## 2024-07-01 ENCOUNTER — ANESTHESIA EVENT (OUTPATIENT)
Dept: OPERATING ROOM | Facility: CLINIC | Age: 19
End: 2024-07-01
Payer: MEDICAID

## 2024-07-01 DIAGNOSIS — K01.1 IMPACTED TEETH: Primary | ICD-10-CM

## 2024-07-01 NOTE — PROGRESS NOTES
Plan  - Ext of 2 teeth (#17, 32) in OR due to sickle cell disease    Andrzje Riggins DDS  OMFS PGY-3

## 2024-07-03 ENCOUNTER — APPOINTMENT (OUTPATIENT)
Dept: HEMATOLOGY/ONCOLOGY | Facility: HOSPITAL | Age: 19
End: 2024-07-03
Payer: MEDICAID

## 2024-07-08 ENCOUNTER — CLINICAL SUPPORT (OUTPATIENT)
Dept: EMERGENCY MEDICINE | Facility: HOSPITAL | Age: 19
End: 2024-07-08
Payer: MEDICAID

## 2024-07-08 ENCOUNTER — APPOINTMENT (OUTPATIENT)
Dept: RADIOLOGY | Facility: HOSPITAL | Age: 19
End: 2024-07-08
Payer: MEDICAID

## 2024-07-08 ENCOUNTER — HOSPITAL ENCOUNTER (OUTPATIENT)
Facility: HOSPITAL | Age: 19
Setting detail: OBSERVATION
Discharge: PSYCHIATRIC OR SUBSTANCE USE FACILITY | End: 2024-07-11
Attending: EMERGENCY MEDICINE | Admitting: HOSPITALIST
Payer: MEDICAID

## 2024-07-08 ENCOUNTER — ANESTHESIA (OUTPATIENT)
Dept: OPERATING ROOM | Facility: CLINIC | Age: 19
End: 2024-07-08
Payer: MEDICAID

## 2024-07-08 DIAGNOSIS — D57.00 SICKLE CELL DISEASE WITH CRISIS (MULTI): ICD-10-CM

## 2024-07-08 DIAGNOSIS — T14.91XA SUICIDE ATTEMPT (MULTI): Primary | ICD-10-CM

## 2024-07-08 DIAGNOSIS — D57.00 SICKLE CELL ANEMIA WITH CRISIS (MULTI): ICD-10-CM

## 2024-07-08 DIAGNOSIS — D57.00 VASO-OCCLUSIVE SICKLE CELL CRISIS (MULTI): ICD-10-CM

## 2024-07-08 DIAGNOSIS — D57.00 SICKLE CELL CRISIS (MULTI): ICD-10-CM

## 2024-07-08 DIAGNOSIS — F31.81 BIPOLAR II DISORDER (MULTI): ICD-10-CM

## 2024-07-08 LAB
ALBUMIN SERPL BCP-MCNC: 4.6 G/DL (ref 3.4–5)
ALP SERPL-CCNC: 60 U/L (ref 33–120)
ALT SERPL W P-5'-P-CCNC: 12 U/L (ref 10–52)
ANION GAP BLDV CALCULATED.4IONS-SCNC: 13 MMOL/L (ref 10–25)
ANION GAP SERPL CALC-SCNC: 14 MMOL/L (ref 10–20)
APAP SERPL-MCNC: <10 UG/ML
APTT PPP: 24 SECONDS (ref 27–38)
AST SERPL W P-5'-P-CCNC: 30 U/L (ref 9–39)
ATRIAL RATE: 63 BPM
BASE EXCESS BLDV CALC-SCNC: -1.1 MMOL/L (ref -2–3)
BASOPHILS # BLD AUTO: 0.04 X10*3/UL (ref 0–0.1)
BASOPHILS NFR BLD AUTO: 0.5 %
BILIRUB DIRECT SERPL-MCNC: 0.3 MG/DL (ref 0–0.3)
BILIRUB SERPL-MCNC: 1.5 MG/DL (ref 0–1.2)
BNP SERPL-MCNC: 17 PG/ML (ref 0–99)
BODY TEMPERATURE: 37 DEGREES CELSIUS
BUN SERPL-MCNC: 11 MG/DL (ref 6–23)
CA-I BLDV-SCNC: 1.23 MMOL/L (ref 1.1–1.33)
CALCIUM SERPL-MCNC: 9.7 MG/DL (ref 8.6–10.6)
CARDIAC TROPONIN I PNL SERPL HS: 3 NG/L (ref 0–53)
CHLORIDE BLDV-SCNC: 107 MMOL/L (ref 98–107)
CHLORIDE SERPL-SCNC: 108 MMOL/L (ref 98–107)
CO2 SERPL-SCNC: 22 MMOL/L (ref 21–32)
CREAT SERPL-MCNC: 0.57 MG/DL (ref 0.5–1.3)
EGFRCR SERPLBLD CKD-EPI 2021: >90 ML/MIN/1.73M*2
EOSINOPHIL # BLD AUTO: 0.45 X10*3/UL (ref 0–0.7)
EOSINOPHIL NFR BLD AUTO: 5.2 %
ERYTHROCYTE [DISTWIDTH] IN BLOOD BY AUTOMATED COUNT: 15.3 % (ref 11.5–14.5)
ETHANOL SERPL-MCNC: <10 MG/DL
GLUCOSE BLDV-MCNC: 88 MG/DL (ref 74–99)
GLUCOSE SERPL-MCNC: 86 MG/DL (ref 74–99)
HCO3 BLDV-SCNC: 21.8 MMOL/L (ref 22–26)
HCT VFR BLD AUTO: 29.8 % (ref 41–52)
HCT VFR BLD EST: 35 % (ref 41–52)
HGB BLD-MCNC: 11.2 G/DL (ref 13.5–17.5)
HGB BLDV-MCNC: 11.8 G/DL (ref 13.5–17.5)
HGB RETIC QN: 33 PG (ref 28–38)
IMM GRANULOCYTES # BLD AUTO: 0.06 X10*3/UL (ref 0–0.7)
IMM GRANULOCYTES NFR BLD AUTO: 0.7 % (ref 0–0.9)
IMMATURE RETIC FRACTION: 47.3 %
INHALED O2 CONCENTRATION: 21 %
INR PPP: 1.2 (ref 0.9–1.1)
LACTATE BLDV-SCNC: 1.1 MMOL/L (ref 0.4–2)
LDH SERPL L TO P-CCNC: 259 U/L (ref 84–246)
LYMPHOCYTES # BLD AUTO: 2.33 X10*3/UL (ref 1.2–4.8)
LYMPHOCYTES NFR BLD AUTO: 27.2 %
MCH RBC QN AUTO: 30.8 PG (ref 26–34)
MCHC RBC AUTO-ENTMCNC: 37.6 G/DL (ref 32–36)
MCV RBC AUTO: 82 FL (ref 80–100)
MONOCYTES # BLD AUTO: 0.87 X10*3/UL (ref 0.1–1)
MONOCYTES NFR BLD AUTO: 10.1 %
NEUTROPHILS # BLD AUTO: 4.83 X10*3/UL (ref 1.2–7.7)
NEUTROPHILS NFR BLD AUTO: 56.3 %
NRBC BLD-RTO: 0.7 /100 WBCS (ref 0–0)
OXYHGB MFR BLDV: 88.4 % (ref 45–75)
P AXIS: 22 DEGREES
P OFFSET: 190 MS
P ONSET: 155 MS
PCO2 BLDV: 30 MM HG (ref 41–51)
PH BLDV: 7.47 PH (ref 7.33–7.43)
PLATELET # BLD AUTO: 350 X10*3/UL (ref 150–450)
PO2 BLDV: 64 MM HG (ref 35–45)
POTASSIUM BLDV-SCNC: 3.6 MMOL/L (ref 3.5–5.3)
POTASSIUM SERPL-SCNC: 3.5 MMOL/L (ref 3.5–5.3)
PR INTERVAL: 128 MS
PROT SERPL-MCNC: 7.7 G/DL (ref 6.4–8.2)
PROTHROMBIN TIME: 13.1 SECONDS (ref 9.8–12.8)
Q ONSET: 219 MS
QRS COUNT: 11 BEATS
QRS DURATION: 106 MS
QT INTERVAL: 420 MS
QTC CALCULATION(BAZETT): 429 MS
QTC FREDERICIA: 427 MS
R AXIS: 69 DEGREES
RBC # BLD AUTO: 3.64 X10*6/UL (ref 4.5–5.9)
RETICS #: 0.16 X10*6/UL (ref 0.02–0.12)
RETICS/RBC NFR AUTO: 4.3 % (ref 0.5–2)
SALICYLATES SERPL-MCNC: <3 MG/DL
SAO2 % BLDV: 91 % (ref 45–75)
SODIUM BLDV-SCNC: 138 MMOL/L (ref 136–145)
SODIUM SERPL-SCNC: 140 MMOL/L (ref 136–145)
T AXIS: 62 DEGREES
T OFFSET: 429 MS
TSH SERPL-ACNC: 0.61 MIU/L (ref 0.44–3.98)
VENTRICULAR RATE: 63 BPM
WBC # BLD AUTO: 8.6 X10*3/UL (ref 4.4–11.3)

## 2024-07-08 PROCEDURE — 84443 ASSAY THYROID STIM HORMONE: CPT | Performed by: PHYSICIAN ASSISTANT

## 2024-07-08 PROCEDURE — 36415 COLL VENOUS BLD VENIPUNCTURE: CPT | Performed by: PHYSICIAN ASSISTANT

## 2024-07-08 PROCEDURE — 96374 THER/PROPH/DIAG INJ IV PUSH: CPT | Performed by: HOSPITALIST

## 2024-07-08 PROCEDURE — 83615 LACTATE (LD) (LDH) ENZYME: CPT

## 2024-07-08 PROCEDURE — 2500000004 HC RX 250 GENERAL PHARMACY W/ HCPCS (ALT 636 FOR OP/ED): Mod: SE

## 2024-07-08 PROCEDURE — G0378 HOSPITAL OBSERVATION PER HR: HCPCS

## 2024-07-08 PROCEDURE — 2500000004 HC RX 250 GENERAL PHARMACY W/ HCPCS (ALT 636 FOR OP/ED): Mod: SE | Performed by: PHYSICIAN ASSISTANT

## 2024-07-08 PROCEDURE — 96375 TX/PRO/DX INJ NEW DRUG ADDON: CPT | Performed by: HOSPITALIST

## 2024-07-08 PROCEDURE — 83021 HEMOGLOBIN CHROMOTOGRAPHY: CPT

## 2024-07-08 PROCEDURE — 93010 ELECTROCARDIOGRAM REPORT: CPT | Performed by: EMERGENCY MEDICINE

## 2024-07-08 PROCEDURE — 99223 1ST HOSP IP/OBS HIGH 75: CPT

## 2024-07-08 PROCEDURE — 82248 BILIRUBIN DIRECT: CPT

## 2024-07-08 PROCEDURE — 71045 X-RAY EXAM CHEST 1 VIEW: CPT | Performed by: RADIOLOGY

## 2024-07-08 PROCEDURE — 84484 ASSAY OF TROPONIN QUANT: CPT | Performed by: PHYSICIAN ASSISTANT

## 2024-07-08 PROCEDURE — 71045 X-RAY EXAM CHEST 1 VIEW: CPT

## 2024-07-08 PROCEDURE — 93005 ELECTROCARDIOGRAM TRACING: CPT

## 2024-07-08 PROCEDURE — 99291 CRITICAL CARE FIRST HOUR: CPT | Mod: 25 | Performed by: EMERGENCY MEDICINE

## 2024-07-08 PROCEDURE — 99222 1ST HOSP IP/OBS MODERATE 55: CPT | Performed by: EMERGENCY MEDICINE

## 2024-07-08 PROCEDURE — 99291 CRITICAL CARE FIRST HOUR: CPT | Performed by: EMERGENCY MEDICINE

## 2024-07-08 PROCEDURE — 80143 DRUG ASSAY ACETAMINOPHEN: CPT | Performed by: PHYSICIAN ASSISTANT

## 2024-07-08 PROCEDURE — 85610 PROTHROMBIN TIME: CPT | Performed by: PHYSICIAN ASSISTANT

## 2024-07-08 PROCEDURE — 84132 ASSAY OF SERUM POTASSIUM: CPT | Performed by: PHYSICIAN ASSISTANT

## 2024-07-08 PROCEDURE — 84132 ASSAY OF SERUM POTASSIUM: CPT | Mod: 59 | Performed by: PHYSICIAN ASSISTANT

## 2024-07-08 PROCEDURE — 85045 AUTOMATED RETICULOCYTE COUNT: CPT | Performed by: PHYSICIAN ASSISTANT

## 2024-07-08 PROCEDURE — 83880 ASSAY OF NATRIURETIC PEPTIDE: CPT | Performed by: PHYSICIAN ASSISTANT

## 2024-07-08 PROCEDURE — 85025 COMPLETE CBC W/AUTO DIFF WBC: CPT | Performed by: PHYSICIAN ASSISTANT

## 2024-07-08 RX ORDER — ONDANSETRON HYDROCHLORIDE 2 MG/ML
4 INJECTION, SOLUTION INTRAVENOUS EVERY 8 HOURS PRN
Status: DISCONTINUED | OUTPATIENT
Start: 2024-07-08 | End: 2024-07-11 | Stop reason: HOSPADM

## 2024-07-08 RX ORDER — FAMOTIDINE 10 MG/ML
40 INJECTION INTRAVENOUS ONCE
Status: COMPLETED | OUTPATIENT
Start: 2024-07-08 | End: 2024-07-08

## 2024-07-08 RX ORDER — HYDROMORPHONE HYDROCHLORIDE 1 MG/ML
0.6 INJECTION, SOLUTION INTRAMUSCULAR; INTRAVENOUS; SUBCUTANEOUS EVERY 2 HOUR PRN
Status: DISCONTINUED | OUTPATIENT
Start: 2024-07-08 | End: 2024-07-08

## 2024-07-08 RX ORDER — ONDANSETRON 4 MG/1
4 TABLET, FILM COATED ORAL EVERY 8 HOURS PRN
Status: DISCONTINUED | OUTPATIENT
Start: 2024-07-08 | End: 2024-07-11 | Stop reason: HOSPADM

## 2024-07-08 RX ORDER — HYDROMORPHONE HYDROCHLORIDE 1 MG/ML
1 INJECTION, SOLUTION INTRAMUSCULAR; INTRAVENOUS; SUBCUTANEOUS
Status: DISPENSED | OUTPATIENT
Start: 2024-07-08 | End: 2024-07-08

## 2024-07-08 RX ORDER — OLANZAPINE 10 MG/2ML
10 INJECTION, POWDER, FOR SOLUTION INTRAMUSCULAR ONCE AS NEEDED
Status: DISCONTINUED | OUTPATIENT
Start: 2024-07-08 | End: 2024-07-08

## 2024-07-08 RX ORDER — POLYETHYLENE GLYCOL 3350 17 G/17G
17 POWDER, FOR SOLUTION ORAL 2 TIMES DAILY PRN
Status: DISCONTINUED | OUTPATIENT
Start: 2024-07-08 | End: 2024-07-11 | Stop reason: HOSPADM

## 2024-07-08 RX ORDER — PANTOPRAZOLE SODIUM 40 MG/10ML
40 INJECTION, POWDER, LYOPHILIZED, FOR SOLUTION INTRAVENOUS
Status: DISCONTINUED | OUTPATIENT
Start: 2024-07-09 | End: 2024-07-09

## 2024-07-08 RX ORDER — DOCUSATE SODIUM 100 MG/1
100 CAPSULE, LIQUID FILLED ORAL 2 TIMES DAILY PRN
Status: DISCONTINUED | OUTPATIENT
Start: 2024-07-08 | End: 2024-07-09

## 2024-07-08 RX ORDER — PANTOPRAZOLE SODIUM 40 MG/1
40 TABLET, DELAYED RELEASE ORAL
Status: DISCONTINUED | OUTPATIENT
Start: 2024-07-09 | End: 2024-07-11 | Stop reason: HOSPADM

## 2024-07-08 RX ORDER — DEXTROSE MONOHYDRATE AND SODIUM CHLORIDE 5; .45 G/100ML; G/100ML
100 INJECTION, SOLUTION INTRAVENOUS CONTINUOUS
Status: ACTIVE | OUTPATIENT
Start: 2024-07-08 | End: 2024-07-09

## 2024-07-08 RX ORDER — HYDROMORPHONE HYDROCHLORIDE 1 MG/ML
0.8 INJECTION, SOLUTION INTRAMUSCULAR; INTRAVENOUS; SUBCUTANEOUS EVERY 2 HOUR PRN
Status: DISCONTINUED | OUTPATIENT
Start: 2024-07-08 | End: 2024-07-09

## 2024-07-08 RX ORDER — KETOROLAC TROMETHAMINE 15 MG/ML
15 INJECTION, SOLUTION INTRAMUSCULAR; INTRAVENOUS ONCE
Status: COMPLETED | OUTPATIENT
Start: 2024-07-08 | End: 2024-07-08

## 2024-07-08 RX ORDER — LIDOCAINE 560 MG/1
1 PATCH PERCUTANEOUS; TOPICAL; TRANSDERMAL DAILY
Status: DISCONTINUED | OUTPATIENT
Start: 2024-07-08 | End: 2024-07-11 | Stop reason: HOSPADM

## 2024-07-08 RX ORDER — QUETIAPINE FUMARATE 50 MG/1
100 TABLET, FILM COATED ORAL NIGHTLY
Status: DISCONTINUED | OUTPATIENT
Start: 2024-07-09 | End: 2024-07-10

## 2024-07-08 RX ORDER — ENOXAPARIN SODIUM 100 MG/ML
40 INJECTION SUBCUTANEOUS DAILY
Status: DISCONTINUED | OUTPATIENT
Start: 2024-07-09 | End: 2024-07-11 | Stop reason: HOSPADM

## 2024-07-08 RX ORDER — SENNOSIDES 8.6 MG/1
1 TABLET ORAL 2 TIMES DAILY
Status: DISCONTINUED | OUTPATIENT
Start: 2024-07-08 | End: 2024-07-11 | Stop reason: HOSPADM

## 2024-07-08 RX ORDER — KETOROLAC TROMETHAMINE 30 MG/ML
30 INJECTION, SOLUTION INTRAMUSCULAR; INTRAVENOUS EVERY 6 HOURS SCHEDULED
Status: DISCONTINUED | OUTPATIENT
Start: 2024-07-08 | End: 2024-07-10

## 2024-07-08 RX ORDER — ONDANSETRON HYDROCHLORIDE 2 MG/ML
4 INJECTION, SOLUTION INTRAVENOUS ONCE
Status: COMPLETED | OUTPATIENT
Start: 2024-07-08 | End: 2024-07-08

## 2024-07-08 RX ORDER — OLANZAPINE 2.5 MG/1
2.5 TABLET ORAL EVERY 8 HOURS PRN
Status: DISCONTINUED | OUTPATIENT
Start: 2024-07-08 | End: 2024-07-11 | Stop reason: HOSPADM

## 2024-07-08 RX ORDER — OLANZAPINE 10 MG/2ML
5 INJECTION, POWDER, FOR SOLUTION INTRAMUSCULAR ONCE AS NEEDED
Status: DISCONTINUED | OUTPATIENT
Start: 2024-07-08 | End: 2024-07-08

## 2024-07-08 RX ORDER — QUETIAPINE FUMARATE 100 MG/1
200 TABLET, FILM COATED ORAL NIGHTLY
Status: DISCONTINUED | OUTPATIENT
Start: 2024-07-08 | End: 2024-07-08

## 2024-07-08 RX ORDER — FOLIC ACID 1 MG/1
1 TABLET ORAL DAILY
Status: DISCONTINUED | OUTPATIENT
Start: 2024-07-08 | End: 2024-07-11 | Stop reason: HOSPADM

## 2024-07-08 RX ORDER — HYDROXYUREA 500 MG/1
1000 CAPSULE ORAL DAILY
Status: DISCONTINUED | OUTPATIENT
Start: 2024-07-08 | End: 2024-07-11 | Stop reason: HOSPADM

## 2024-07-08 SDOH — SOCIAL STABILITY: SOCIAL INSECURITY: HAVE YOU HAD ANY THOUGHTS OF HARMING ANYONE ELSE?: NO

## 2024-07-08 SDOH — SOCIAL STABILITY: SOCIAL INSECURITY: ABUSE: ADULT

## 2024-07-08 SDOH — SOCIAL STABILITY: SOCIAL INSECURITY: ARE YOU OR HAVE YOU BEEN THREATENED OR ABUSED PHYSICALLY, EMOTIONALLY, OR SEXUALLY BY ANYONE?: YES

## 2024-07-08 SDOH — SOCIAL STABILITY: SOCIAL INSECURITY: WERE YOU ABLE TO COMPLETE ALL THE BEHAVIORAL HEALTH SCREENINGS?: YES

## 2024-07-08 SDOH — SOCIAL STABILITY: SOCIAL INSECURITY: DO YOU FEEL ANYONE HAS EXPLOITED OR TAKEN ADVANTAGE OF YOU FINANCIALLY OR OF YOUR PERSONAL PROPERTY?: YES

## 2024-07-08 SDOH — SOCIAL STABILITY: SOCIAL INSECURITY: DOES ANYONE TRY TO KEEP YOU FROM HAVING/CONTACTING OTHER FRIENDS OR DOING THINGS OUTSIDE YOUR HOME?: NO

## 2024-07-08 SDOH — SOCIAL STABILITY: SOCIAL INSECURITY: POSSIBLE ABUSE REPORTED TO:: SOCIAL SERVICES

## 2024-07-08 SDOH — SOCIAL STABILITY: SOCIAL INSECURITY: HAVE YOU HAD THOUGHTS OF HARMING ANYONE ELSE?: NO

## 2024-07-08 SDOH — SOCIAL STABILITY: SOCIAL INSECURITY: HAS ANYONE EVER THREATENED TO HURT YOUR FAMILY OR YOUR PETS?: YES

## 2024-07-08 SDOH — SOCIAL STABILITY: SOCIAL INSECURITY: DO YOU FEEL UNSAFE GOING BACK TO THE PLACE WHERE YOU ARE LIVING?: YES

## 2024-07-08 SDOH — SOCIAL STABILITY: SOCIAL INSECURITY: ARE THERE ANY APPARENT SIGNS OF INJURIES/BEHAVIORS THAT COULD BE RELATED TO ABUSE/NEGLECT?: NO

## 2024-07-08 ASSESSMENT — ACTIVITIES OF DAILY LIVING (ADL)
ADEQUATE_TO_COMPLETE_ADL: YES
BATHING: INDEPENDENT
HEARING - RIGHT EAR: FUNCTIONAL
HEARING - LEFT EAR: FUNCTIONAL
PATIENT'S MEMORY ADEQUATE TO SAFELY COMPLETE DAILY ACTIVITIES?: YES
FEEDING YOURSELF: INDEPENDENT
JUDGMENT_ADEQUATE_SAFELY_COMPLETE_DAILY_ACTIVITIES: YES
GROOMING: INDEPENDENT
TOILETING: INDEPENDENT
WALKS IN HOME: INDEPENDENT
DRESSING YOURSELF: INDEPENDENT

## 2024-07-08 ASSESSMENT — PAIN - FUNCTIONAL ASSESSMENT: PAIN_FUNCTIONAL_ASSESSMENT: 0-10

## 2024-07-08 ASSESSMENT — PAIN DESCRIPTION - DESCRIPTORS: DESCRIPTORS: ACHING

## 2024-07-08 ASSESSMENT — PAIN SCALES - GENERAL
PAINLEVEL_OUTOF10: 8
PAINLEVEL_OUTOF10: 8
PAINLEVEL_OUTOF10: 0 - NO PAIN

## 2024-07-08 ASSESSMENT — COLUMBIA-SUICIDE SEVERITY RATING SCALE - C-SSRS
5. HAVE YOU STARTED TO WORK OUT OR WORKED OUT THE DETAILS OF HOW TO KILL YOURSELF? DO YOU INTEND TO CARRY OUT THIS PLAN?: YES
4. HAVE YOU HAD THESE THOUGHTS AND HAD SOME INTENTION OF ACTING ON THEM?: YES
6. HAVE YOU EVER DONE ANYTHING, STARTED TO DO ANYTHING, OR PREPARED TO DO ANYTHING TO END YOUR LIFE?: SI
2. HAVE YOU ACTUALLY HAD ANY THOUGHTS OF KILLING YOURSELF?: YES
1. IN THE PAST MONTH, HAVE YOU WISHED YOU WERE DEAD OR WISHED YOU COULD GO TO SLEEP AND NOT WAKE UP?: YES
6. HAVE YOU EVER DONE ANYTHING, STARTED TO DO ANYTHING, OR PREPARED TO DO ANYTHING TO END YOUR LIFE?: YES
6. HAVE YOU EVER DONE ANYTHING, STARTED TO DO ANYTHING, OR PREPARED TO DO ANYTHING TO END YOUR LIFE?: YES

## 2024-07-08 ASSESSMENT — COGNITIVE AND FUNCTIONAL STATUS - GENERAL
PATIENT BASELINE BEDBOUND: NO
MOBILITY SCORE: 24
DAILY ACTIVITIY SCORE: 24

## 2024-07-08 ASSESSMENT — LIFESTYLE VARIABLES
AUDIT-C TOTAL SCORE: 1
AUDIT-C TOTAL SCORE: 1
PRESCIPTION_ABUSE_PAST_12_MONTHS: NO
SKIP TO QUESTIONS 9-10: 1
HAVE PEOPLE ANNOYED YOU BY CRITICIZING YOUR DRINKING: NO
HOW OFTEN DO YOU HAVE 6 OR MORE DRINKS ON ONE OCCASION: NEVER
HOW MANY STANDARD DRINKS CONTAINING ALCOHOL DO YOU HAVE ON A TYPICAL DAY: PATIENT DOES NOT DRINK
HOW OFTEN DO YOU HAVE A DRINK CONTAINING ALCOHOL: MONTHLY OR LESS
EVER HAD A DRINK FIRST THING IN THE MORNING TO STEADY YOUR NERVES TO GET RID OF A HANGOVER: NO
SUBSTANCE_ABUSE_PAST_12_MONTHS: YES
TOTAL SCORE: 0
EVER FELT BAD OR GUILTY ABOUT YOUR DRINKING: NO
HAVE YOU EVER FELT YOU SHOULD CUT DOWN ON YOUR DRINKING: NO

## 2024-07-08 ASSESSMENT — PATIENT HEALTH QUESTIONNAIRE - PHQ9
1. LITTLE INTEREST OR PLEASURE IN DOING THINGS: NOT AT ALL
2. FEELING DOWN, DEPRESSED OR HOPELESS: NOT AT ALL
SUM OF ALL RESPONSES TO PHQ9 QUESTIONS 1 & 2: 0

## 2024-07-08 NOTE — ED PROCEDURE NOTE
Procedure  Critical Care    Performed by: Rj Stearns DO  Authorized by: Rj Stearns DO    Critical care provider statement:     Critical care time (minutes):  35    Critical care time was exclusive of:  Teaching time and separately billable procedures and treating other patients    Critical care was necessary to treat or prevent imminent or life-threatening deterioration of the following conditions: Acute overdose in the setting of suicidal ideation.    Critical care was time spent personally by me on the following activities:  Blood draw for specimens, development of treatment plan with patient or surrogate, discussions with consultants, examination of patient, evaluation of patient's response to treatment, ordering and performing treatments and interventions, ordering and review of laboratory studies, ordering and review of radiographic studies, pulse oximetry, re-evaluation of patient's condition and review of old charts               Rj Stearns DO  07/08/24 1528

## 2024-07-08 NOTE — ED TRIAGE NOTES
Patient presents to the Emergency department with a chief complaint of sickle cell pain crisis, suicide attempt, intentional overdose. Patient's grandma, who is his legal gaurdian, states she came home and saw that the patient was not acting right. Patient grandma was able to find out that patient ended up taking 5x 500mg acetaminophen pills as well as 2x 10mg Oxycodone pills. Patient states this was in an attempt to end his life. Patient has a history of schizoaffective, but is medication compliant. Grandma states patient is experiencing auditory hallucinations. Patient endorses sickle cell pain to his chest and abdomen, which is his usual sickle cell pain crisis. Patient is generally uncooperative but not aggressive. Belongings sent him with grandma (Shirt which he tore, pants, shorts, shoes, vapes)

## 2024-07-08 NOTE — CONSULTS
Reason For Consult  overdose    History Of Present Illness  Josue Rangel is a 19 y.o. male presenting with with a chief complaint of sickle cell pain crisis.  Patient states that he overdosed in a suicide attempt at 8 AM today.  Medical toxicology was consulted for further recommendations.    Patient states that he overdosed taking acetaminophen and oxycodone at approximately 8 AM this morning.  He states that this was in an attempt at self-harm.  He has had nausea some dizziness and epigastric discomfort since.  Denies any episodes of vomiting.  Denies any chest discomfort or difficulty breathing.  Denies any changes in vision or hearing.  Denies any other coingestions..     Past Medical History  He has a past medical history of Acute pharyngitis (07/20/2022), Cough (04/17/2024), Dizziness (10/20/2022), Musculoskeletal chest pain (10/20/2022), Parvovirus infection (05/20/2009), and Personal history of diseases of the blood and blood-forming organs and certain disorders involving the immune mechanism.    Surgical History  He has a past surgical history that includes MR angio head wo IV contrast (10/8/2014); MR angio neck wo IV contrast (10/8/2014); MR angio head wo IV contrast (7/10/2023); MR angio neck wo IV contrast (7/10/2023); and CT guided percutaneous biopsy bone deep (7/17/2023).     Social History  He reports current alcohol use. He reports current drug use. Drug: Marijuana. No history on file for tobacco use.    Family History  Family History   Problem Relation Name Age of Onset    Diabetes Paternal Grandmother      Diabetes Paternal Grandfather          Allergies  Patient has no known allergies.    Review of Systems  Per hpi     Physical Exam  General: awake, alert and oriented, NAD  HEENT: EOMI, PERRL, MMM, trachea midline  CV: RRR, no MRG  Lungs: CTAB, no increased wob  Abdomen: Soft, NT, ND, no guarding, rebound or rigidity  Ext: no deformities, no peripheral edema  Neuro: awake, alert and oriented;  no focal motor or sensory deficit; CN II-XII grossly intact; moving extremities equally  Psych: anxious     Last Recorded Vitals  Blood pressure (!) 130/96, pulse 97, temperature 37.1 °C (98.8 °F), resp. rate 18, height 1.829 m (6'), weight 68 kg (150 lb), SpO2 96%.    Relevant Results  Results for orders placed or performed during the hospital encounter of 07/08/24 (from the past 24 hour(s))   CBC and Auto Differential   Result Value Ref Range    WBC 8.6 4.4 - 11.3 x10*3/uL    nRBC 0.7 (H) 0.0 - 0.0 /100 WBCs    RBC 3.64 (L) 4.50 - 5.90 x10*6/uL    Hemoglobin 11.2 (L) 13.5 - 17.5 g/dL    Hematocrit 29.8 (L) 41.0 - 52.0 %    MCV 82 80 - 100 fL    MCH 30.8 26.0 - 34.0 pg    MCHC 37.6 (H) 32.0 - 36.0 g/dL    RDW 15.3 (H) 11.5 - 14.5 %    Platelets 350 150 - 450 x10*3/uL    Neutrophils % 56.3 40.0 - 80.0 %    Immature Granulocytes %, Automated 0.7 0.0 - 0.9 %    Lymphocytes % 27.2 13.0 - 44.0 %    Monocytes % 10.1 2.0 - 10.0 %    Eosinophils % 5.2 0.0 - 6.0 %    Basophils % 0.5 0.0 - 2.0 %    Neutrophils Absolute 4.83 1.20 - 7.70 x10*3/uL    Immature Granulocytes Absolute, Automated 0.06 0.00 - 0.70 x10*3/uL    Lymphocytes Absolute 2.33 1.20 - 4.80 x10*3/uL    Monocytes Absolute 0.87 0.10 - 1.00 x10*3/uL    Eosinophils Absolute 0.45 0.00 - 0.70 x10*3/uL    Basophils Absolute 0.04 0.00 - 0.10 x10*3/uL   Reticulocytes   Result Value Ref Range    Retic % 4.3 (H) 0.5 - 2.0 %    Retic Absolute 0.157 (H) 0.022 - 0.118 x10*6/uL    Reticulocyte Hemoglobin 33 28 - 38 pg    Immature Retic fraction 47.3 (H) <=16.0 %   Coagulation Screen   Result Value Ref Range    Protime 13.1 (H) 9.8 - 12.8 seconds    INR 1.2 (H) 0.9 - 1.1    aPTT 24 (L) 27 - 38 seconds   Blood Gas Venous Full Panel   Result Value Ref Range    POCT pH, Venous 7.47 (H) 7.33 - 7.43 pH    POCT pCO2, Venous 30 (L) 41 - 51 mm Hg    POCT pO2, Venous 64 (H) 35 - 45 mm Hg    POCT SO2, Venous 91 (H) 45 - 75 %    POCT Oxy Hemoglobin, Venous 88.4 (H) 45.0 - 75.0 %    POCT  Hematocrit Calculated, Venous 35.0 (L) 41.0 - 52.0 %    POCT Sodium, Venous 138 136 - 145 mmol/L    POCT Potassium, Venous 3.6 3.5 - 5.3 mmol/L    POCT Chloride, Venous 107 98 - 107 mmol/L    POCT Ionized Calicum, Venous 1.23 1.10 - 1.33 mmol/L    POCT Glucose, Venous 88 74 - 99 mg/dL    POCT Lactate, Venous 1.1 0.4 - 2.0 mmol/L    POCT Base Excess, Venous -1.1 -2.0 - 3.0 mmol/L    POCT HCO3 Calculated, Venous 21.8 (L) 22.0 - 26.0 mmol/L    POCT Hemoglobin, Venous 11.8 (L) 13.5 - 17.5 g/dL    POCT Anion Gap, Venous 13.0 10.0 - 25.0 mmol/L    Patient Temperature 37.0 degrees Celsius    FiO2 21 %          Assessment/Plan     This is a 19-year-old male with a history of sickle cell disease and schizoaffective disorder presenting to the emergency department after an intentional overdose.  Medical toxicology was consulted for further recommendations.    The patient currently is awake, alert and oriented.  He does appear slightly anxious with an increase startle reflex.  He has no signs of respiratory depression or CNS depression.  Patient is outside the window for toxicity related to the oxycodone exposure.  Due to the patient's acetaminophen overdose, recommend obtaining blood work at this time.  When his level returns if detectable, please plot on the nomogram based on the time since ingestion.     Recs  Supportive tx for his nausea  Please obtain acetaminophen and salicylate levels now  Plot on nomogram when acetaminophen level returns, if above treatment line please start NAC and admit medically.   Please call patient into the poison center   Psych consult when appropriate    If acetaminophen level is below the treatment line and salicylate level is negative he can be medically cleared from an overdose standpoint for psychiatric treatment.       Please contact through secure messaging for questions/concerns.     I spent 45 minutes in the professional and overall care of this patient.      Mary Chairez, DO

## 2024-07-08 NOTE — LETTER
July 10, 2024    Patient: Josue Rangel   YOB: 2005   Date of Visit: 7/8/2024       To Whom It May Concern:    Josue Rangel was seen and treated in our hospital on 7/8/2024 and currently remains admitted for an unexpected amount of time. Patient will require further hospitalization for an unknown amount of time. We can accomodates with an additional letter of specific hospitalization dates and discharge date when the time comes.    If you have any questions or concerns, please don't hesitate to call.  [phone]    Lizette Sparks APRMINH/CNP            CC:   No Recipients

## 2024-07-08 NOTE — ED PROVIDER NOTES
This is a 19-year-old male with past medical history of schizoaffective disorder, bipolar disorder as well as sickle cell disease who presents to the ED for multiple medical complaints including sickle cell crisis pain is located in his chest as well as his abdomen and suicidal ideation.  Per EMS patient had told them that he had tried to kill himself and took 2500 mg of Tylenol as well as 20 mg of his home oxycodone around 8 AM this morning.  Patient does state that he did take these medications in attempt to kill himself.  He is also endorsing chest pain, shortness of breath, and a cough.  He denies any fevers or chills.  His grandmother is at bedside who does help provide some of the history.  She states that he is having significant difficulty managing his sickle cell disease in conjunction with his schizoaffective disorder.       History provided by:  Patient, relative and EMS personnel   used: No             Visit Vitals  /50 (BP Location: Right arm, Patient Position: Lying)   Pulse 56   Temp 37.1 °C (98.8 °F)   Resp 16   Ht 1.829 m (6')   Wt 68 kg (150 lb)   SpO2 97%   BMI 20.34 kg/m²   Smoking Status Unknown   BSA 1.86 m²          Physical Exam     Physical exam:   General: Vitals noted, no distress. Afebrile.  Appears uncomfortable.  Minimally cooperative.  Tender to palpation across anterior chest wall  EENT:  Hearing grossly intact. Normal phonation. MMM. Airway patient. PERRL. EOMI.   Neck: No midline tenderness or paraspinal tenderness. FROM.   Cardiac: Regular, rate, rhythm. Normal S1 and S2.  No murmurs, gallops, rubs.   Pulmonary: Good air exchange. Lungs clear bilaterally. No wheezes, rhonchi, rales. No accessory muscle use.   Abdomen: Soft, nonsurgical.  Tender to palpation epigastric region.  No peritoneal signs. Normoactive bowel sounds.   Back: No CVA tenderness. No midline tenderness or paraspinal tenderness. No obvious deformity.   Extremities: No peripheral edema.   Full range of motion. Moves all extremities freely. No tenderness throughout extremities.   Skin: No rash. Warm and Dry.   Neuro: No focal neurologic deficits. CN 2-12 grossly intact. Sensation equal bilaterally. No weakness.         Labs Reviewed   CBC WITH AUTO DIFFERENTIAL - Abnormal       Result Value    WBC 8.6      nRBC 0.7 (*)     RBC 3.64 (*)     Hemoglobin 11.2 (*)     Hematocrit 29.8 (*)     MCV 82      MCH 30.8      MCHC 37.6 (*)     RDW 15.3 (*)     Platelets 350      Neutrophils % 56.3      Immature Granulocytes %, Automated 0.7      Lymphocytes % 27.2      Monocytes % 10.1      Eosinophils % 5.2      Basophils % 0.5      Neutrophils Absolute 4.83      Immature Granulocytes Absolute, Automated 0.06      Lymphocytes Absolute 2.33      Monocytes Absolute 0.87      Eosinophils Absolute 0.45      Basophils Absolute 0.04     COMPREHENSIVE METABOLIC PANEL - Abnormal    Glucose 86      Sodium 140      Potassium 3.5      Chloride 108 (*)     Bicarbonate 22      Anion Gap 14      Urea Nitrogen 11      Creatinine 0.57      eGFR >90      Calcium 9.7      Albumin 4.6      Alkaline Phosphatase 60      Total Protein 7.7      AST 30      Bilirubin, Total 1.5 (*)     ALT 12     RETICULOCYTES - Abnormal    Retic % 4.3 (*)     Retic Absolute 0.157 (*)     Reticulocyte Hemoglobin 33      Immature Retic fraction 47.3 (*)    COAGULATION SCREEN - Abnormal    Protime 13.1 (*)     INR 1.2 (*)     aPTT 24 (*)     Narrative:     The APTT is no longer used for monitoring Unfractionated Heparin Therapy. For monitoring Heparin Therapy, use the Heparin Assay.   BLOOD GAS VENOUS FULL PANEL - Abnormal    POCT pH, Venous 7.47 (*)     POCT pCO2, Venous 30 (*)     POCT pO2, Venous 64 (*)     POCT SO2, Venous 91 (*)     POCT Oxy Hemoglobin, Venous 88.4 (*)     POCT Hematocrit Calculated, Venous 35.0 (*)     POCT Sodium, Venous 138      POCT Potassium, Venous 3.6      POCT Chloride, Venous 107      POCT Ionized Calicum, Venous 1.23       POCT Glucose, Venous 88      POCT Lactate, Venous 1.1      POCT Base Excess, Venous -1.1      POCT HCO3 Calculated, Venous 21.8 (*)     POCT Hemoglobin, Venous 11.8 (*)     POCT Anion Gap, Venous 13.0      Patient Temperature 37.0      FiO2 21     ACUTE TOXICOLOGY PANEL, BLOOD - Normal    Acetaminophen <10.0      Salicylate  <3      Alcohol <10     TSH WITH REFLEX TO FREE T4 IF ABNORMAL - Normal    Thyroid Stimulating Hormone 0.61      Narrative:     TSH testing is performed using different testing methodology at [facility] than at other [facility]. Direct result comparisons should only be made within the same method.     TROPONIN I, HIGH SENSITIVITY - Normal    Troponin I, High Sensitivity (CMC) 3      Narrative:     Less than 99th percentile of normal range cutoff-  Female and children under 18 years old <35 ng/L; Male <54 ng/L: Negative  Repeat testing should be performed if clinically indicated.     Female and children under 18 years old  ng/L; Male  ng/L:  Consistent with possible cardiac damage and possible increased clinical   risk. Serial measurements may help to assess extent of myocardial damage.     >120 ng/L: Consistent with cardiac damage, increased clinical risk and  myocardial infarction. Serial measurements may help assess extent of   myocardial damage.      NOTE: Children less than 1 year old may have higher baseline troponin   levels and results should be interpreted in conjunction with the overall   clinical context.    NOTE: Troponin I testing is performed using a different   testing methodology at [facility] than at other   [facility]. Direct result comparisons should only   be made within the same method.     B-TYPE NATRIURETIC PEPTIDE - Normal    BNP 17      Narrative:        <100 pg/mL - Heart failure unlikely  100-299 pg/mL - Intermediate probability of acute heart                  failure exacerbation. Correlate with clinical                   context and patient history.    >=300 pg/mL - Heart Failure likely. Correlate with clinical                  context and patient history.     Biotin interference may cause falsely decreased results. Patients taking a Biotin dose of up to 5 mg/day should refrain from taking Biotin for 24 hours before sample  collection. Providers may contact their local laboratory for further information.   DRUG SCREEN,URINE       XR chest 1 view   Final Result   1.  No evidence of acute cardiopulmonary process.   2. Redemonstration of chronic interstitial lung markings likely   representing sequela of chronic sickle cell changes.             MACRO:   I personally reviewed the images/study and I agree with the findings   as stated. This study was interpreted at [place], [place].        Signed by: Erna Aguilera 7/8/2024 3:16 PM   Dictation workstation:   [id number]            ED Course & MDM     Medical Decision Making  This is a 19-year-old male with past medical history of sickle cell disease, schizoaffective disorder as well as bipolar disorder who presents to the ED with sickle cell crisis pain in his chest as well as his abdomen with associated shortness of breath and cough as well as suicidal ideation with attempts earlier today via Tylenol and oxycodone overdose.  Vital stable upon arrival to the ED, specifically patient's SpO2 at 96% on room air.  On examination patient is awake, alert, and oriented.  He is mildly agitated and uncooperative.  Patient is able to be redirected with the assistance of his grandmother who was at bedside.  Lungs are clear to auscultation.  No audible cardiac murmurs.  He was tender to palpation across the anterior chest wall as well as the epigastric region of his abdomen.  No rebound or guarding.  Patient discussed with the attending physician.  Laboratory studies ordered as well as EKG and chest x-ray.  Toxicology consulted due to the  patient's reported overdose.  EKG showed no acute ischemic changes.  Toxicology saw and evaluated the patient at bedside.  Patient's Tylenol level was less than 10.  CMP grossly unremarkable.  CBC did show he had hemoglobin of 11.2.  BNP normal at 17.  Troponin normal at 3.  Chest x-ray showed no acute cardiopulmonary process.  EKG obtained and showed no acute ischemic changes.  Low suspicion for acute chest syndrome based on patient's unremarkable workup.  Patient was ordered Zofran as well as Pepcid for his nausea and epigastric abdominal discomfort. Patient was cleared by toxicology concerning his overdose.  On reevaluation he was endorsing significant pain from his sickle cell disease and was ordered Toradol as well as Dilaudid for this.  Due to the patient's suicidal ideation as well as his sickle cell pain crisis he was admitted to medicine with psychiatry consultation to our [facility] unit.  Patient remained in stable condition during remainder of his ED stay.    Amount and/or Complexity of Data Reviewed  Labs: ordered.  Radiology: ordered and independent interpretation performed.     Details: Chest x-ray without visualized pneumonia or pneumothorax  ECG/medicine tests: ordered. Decision-making details documented in ED Course.         ED Course as of 07/08/24 2036 Mon Jul 08, 2024   1503 EKG was obtained interpreted independently by me, normal sinus rhythm with a rate of 63, normal interval, normal axis, no ST or T wave segment changes concerning for ischemia.  No obvious QRS or QTc distortion or KS interval changes that would indicate acute cardiac dysfunction in the setting of overdose [RH]      ED Course User Index  [RH] Rj Stearns DO         Diagnoses as of 07/08/24 2036   Suicide attempt (Multi)   Sickle cell crisis (Multi)       Procedures    LUCIA Lemus, ARNOLD      ATTENDING ATTESTATION  19-year-old male with a history of sickle cell disease depression presenting to the emergency  department with complaint of diffuse body pain consistent with what he describes as typical vaso-occlusive crisis pain also, suicidal with active plan to overdose taking approximately 2.5 g of Tylenol and 20 mg of oxycodone at approximately 8 AM.  Patient refused care for EMS, refused vitals on arrival he states that he is deeply distrusting of medicine: And hospitals.  His grandmother is at the bedside, she is a source of comfort for him, he states that so long as she is advocating for him and we discussed all treatment strategies and testing strategies with her that he will allow them.  This took much talking at the bedside.  Patient appears tearful, hypervigilant staring at me while we have discussion, labile emotions stating that he wishes to not live.  We will establish IV, obtain labs to include venous full panel, blood toxicology, liver function tests and coagulation screen, place patient on cardiopulmonary monitoring obtain EKG.  Open precautions and suicide precautions ordered for the patient.  We will assess reticulocyte count CBC chest x-ray to evaluate for potential acute chest syndrome or aplastic anemia.  Ultimately anticipate the patient will likely require admission to the hospital for his sickle cell crisis psychiatric care could be consulted    Critical Care:  This critically ill patient continues to be at-risk for deterioration / failure due to the above  mentioned dysfunctional unstable organ systems.  I have personally identified and managed all critical care issues.  Assessment, impressions and plans are reflected in the note above as well as the orders.  Critical care time is spent at bedside includes review of diagnostic tests, labs, and radiographs, serial assessments and management of hemodynamics, respiratory status, ventilation and coordination of care   Time spent in critical care is 35 minutes    EKG reviewed independently by me and agree with interpretation above.    Rj Stearns,  [facility]    The patient was seen by the resident/fellow.  I have personally performed a substantive portion of the encounter.  I have seen and examined the patient; agree with the workup, evaluation, MDM, management and diagnosis.    I have reviewed all the nurses' notes and have confirmed their findings, and have incorporated those findings into this medical record.   The care plan has been discussed with the resident/fellow; I have reviewed the resident/fellow’s note and agree with the documented findings with the exception/addition of information listed above.  On my own examination I agree and incorporated in this document my own history, examination findings and clinical decision making.  All notation in this Addendum supersedes information presented by the resident or ESTELITA as listed above.        Jeana Alonso PA-C  07/08/24 2037

## 2024-07-09 ENCOUNTER — APPOINTMENT (OUTPATIENT)
Dept: CARDIOLOGY | Facility: HOSPITAL | Age: 19
End: 2024-07-09
Payer: MEDICAID

## 2024-07-09 LAB
ALBUMIN SERPL BCP-MCNC: 4.1 G/DL (ref 3.4–5)
ALP SERPL-CCNC: 54 U/L (ref 33–120)
ALT SERPL W P-5'-P-CCNC: 11 U/L (ref 10–52)
ANION GAP SERPL CALC-SCNC: 12 MMOL/L (ref 10–20)
AST SERPL W P-5'-P-CCNC: 22 U/L (ref 9–39)
BASOPHILS # BLD AUTO: 0.04 X10*3/UL (ref 0–0.1)
BASOPHILS NFR BLD AUTO: 0.8 %
BILIRUB SERPL-MCNC: 1.3 MG/DL (ref 0–1.2)
BUN SERPL-MCNC: 9 MG/DL (ref 6–23)
CALCIUM SERPL-MCNC: 9 MG/DL (ref 8.6–10.6)
CHLORIDE SERPL-SCNC: 104 MMOL/L (ref 98–107)
CO2 SERPL-SCNC: 24 MMOL/L (ref 21–32)
CREAT SERPL-MCNC: 0.73 MG/DL (ref 0.5–1.3)
EGFRCR SERPLBLD CKD-EPI 2021: >90 ML/MIN/1.73M*2
EOSINOPHIL # BLD AUTO: 0.66 X10*3/UL (ref 0–0.7)
EOSINOPHIL NFR BLD AUTO: 12.6 %
ERYTHROCYTE [DISTWIDTH] IN BLOOD BY AUTOMATED COUNT: 15.2 % (ref 11.5–14.5)
GLUCOSE SERPL-MCNC: 92 MG/DL (ref 74–99)
HCT VFR BLD AUTO: 27.1 % (ref 41–52)
HGB BLD-MCNC: 10.2 G/DL (ref 13.5–17.5)
HGB RETIC QN: 32 PG (ref 28–38)
IMM GRANULOCYTES # BLD AUTO: 0.01 X10*3/UL (ref 0–0.7)
IMM GRANULOCYTES NFR BLD AUTO: 0.2 % (ref 0–0.9)
IMMATURE RETIC FRACTION: 37.7 %
LDH SERPL L TO P-CCNC: 195 U/L (ref 84–246)
LYMPHOCYTES # BLD AUTO: 1.9 X10*3/UL (ref 1.2–4.8)
LYMPHOCYTES NFR BLD AUTO: 36.2 %
MCH RBC QN AUTO: 31.4 PG (ref 26–34)
MCHC RBC AUTO-ENTMCNC: 37.6 G/DL (ref 32–36)
MCV RBC AUTO: 83 FL (ref 80–100)
MONOCYTES # BLD AUTO: 0.63 X10*3/UL (ref 0.1–1)
MONOCYTES NFR BLD AUTO: 12 %
NEUTROPHILS # BLD AUTO: 2.01 X10*3/UL (ref 1.2–7.7)
NEUTROPHILS NFR BLD AUTO: 38.2 %
NRBC BLD-RTO: 1 /100 WBCS (ref 0–0)
PLATELET # BLD AUTO: 299 X10*3/UL (ref 150–450)
POTASSIUM SERPL-SCNC: 3.7 MMOL/L (ref 3.5–5.3)
PROT SERPL-MCNC: 6.9 G/DL (ref 6.4–8.2)
RBC # BLD AUTO: 3.25 X10*6/UL (ref 4.5–5.9)
RETICS #: 0.14 X10*6/UL (ref 0.02–0.12)
RETICS/RBC NFR AUTO: 4.5 % (ref 0.5–2)
SODIUM SERPL-SCNC: 136 MMOL/L (ref 136–145)
WBC # BLD AUTO: 5.3 X10*3/UL (ref 4.4–11.3)

## 2024-07-09 PROCEDURE — 2500000001 HC RX 250 WO HCPCS SELF ADMINISTERED DRUGS (ALT 637 FOR MEDICARE OP): Mod: SE

## 2024-07-09 PROCEDURE — 85025 COMPLETE CBC W/AUTO DIFF WBC: CPT

## 2024-07-09 PROCEDURE — 99233 SBSQ HOSP IP/OBS HIGH 50: CPT

## 2024-07-09 PROCEDURE — 85045 AUTOMATED RETICULOCYTE COUNT: CPT

## 2024-07-09 PROCEDURE — 2500000004 HC RX 250 GENERAL PHARMACY W/ HCPCS (ALT 636 FOR OP/ED): Mod: SE

## 2024-07-09 PROCEDURE — G0378 HOSPITAL OBSERVATION PER HR: HCPCS

## 2024-07-09 PROCEDURE — 93005 ELECTROCARDIOGRAM TRACING: CPT

## 2024-07-09 PROCEDURE — 96376 TX/PRO/DX INJ SAME DRUG ADON: CPT | Performed by: HOSPITALIST

## 2024-07-09 PROCEDURE — 80053 COMPREHEN METABOLIC PANEL: CPT

## 2024-07-09 PROCEDURE — 2500000002 HC RX 250 W HCPCS SELF ADMINISTERED DRUGS (ALT 637 FOR MEDICARE OP, ALT 636 FOR OP/ED): Mod: SE

## 2024-07-09 PROCEDURE — 36415 COLL VENOUS BLD VENIPUNCTURE: CPT

## 2024-07-09 PROCEDURE — 96372 THER/PROPH/DIAG INJ SC/IM: CPT

## 2024-07-09 PROCEDURE — 83615 LACTATE (LD) (LDH) ENZYME: CPT

## 2024-07-09 RX ORDER — HYDROMORPHONE HYDROCHLORIDE 1 MG/ML
0.4 INJECTION, SOLUTION INTRAMUSCULAR; INTRAVENOUS; SUBCUTANEOUS EVERY 6 HOURS PRN
Status: DISCONTINUED | OUTPATIENT
Start: 2024-07-09 | End: 2024-07-09

## 2024-07-09 RX ORDER — MORPHINE SULFATE 4 MG/ML
4 INJECTION INTRAVENOUS EVERY 6 HOURS PRN
Status: DISCONTINUED | OUTPATIENT
Start: 2024-07-09 | End: 2024-07-10

## 2024-07-09 RX ORDER — OXYCODONE HYDROCHLORIDE 5 MG/1
10 TABLET ORAL EVERY 6 HOURS PRN
Status: DISCONTINUED | OUTPATIENT
Start: 2024-07-09 | End: 2024-07-11 | Stop reason: HOSPADM

## 2024-07-09 RX ORDER — DIPHENHYDRAMINE HCL 25 MG
25 CAPSULE ORAL EVERY 6 HOURS PRN
Status: DISCONTINUED | OUTPATIENT
Start: 2024-07-09 | End: 2024-07-11 | Stop reason: HOSPADM

## 2024-07-09 SDOH — SOCIAL STABILITY: SOCIAL NETWORK: ARE YOU MARRIED, WIDOWED, DIVORCED, SEPARATED, NEVER MARRIED, OR LIVING WITH A PARTNER?: NEVER MARRIED

## 2024-07-09 SDOH — ECONOMIC STABILITY: FOOD INSECURITY: WITHIN THE PAST 12 MONTHS, THE FOOD YOU BOUGHT JUST DIDN'T LAST AND YOU DIDN'T HAVE MONEY TO GET MORE.: NEVER TRUE

## 2024-07-09 SDOH — ECONOMIC STABILITY: INCOME INSECURITY: IN THE LAST 12 MONTHS, WAS THERE A TIME WHEN YOU WERE NOT ABLE TO PAY THE MORTGAGE OR RENT ON TIME?: YES

## 2024-07-09 SDOH — SOCIAL STABILITY: SOCIAL INSECURITY: WITHIN THE LAST YEAR, HAVE YOU BEEN HUMILIATED OR EMOTIONALLY ABUSED IN OTHER WAYS BY YOUR PARTNER OR EX-PARTNER?: NO

## 2024-07-09 SDOH — SOCIAL STABILITY: SOCIAL NETWORK: HOW OFTEN DO YOU ATTEND CHURCH OR RELIGIOUS SERVICES?: NEVER

## 2024-07-09 SDOH — ECONOMIC STABILITY: FOOD INSECURITY: WITHIN THE PAST 12 MONTHS, YOU WORRIED THAT YOUR FOOD WOULD RUN OUT BEFORE YOU GOT MONEY TO BUY MORE.: NEVER TRUE

## 2024-07-09 SDOH — HEALTH STABILITY: MENTAL HEALTH
STRESS IS WHEN SOMEONE FEELS TENSE, NERVOUS, ANXIOUS, OR CAN'T SLEEP AT NIGHT BECAUSE THEIR MIND IS TROUBLED. HOW STRESSED ARE YOU?: NOT AT ALL

## 2024-07-09 SDOH — ECONOMIC STABILITY: INCOME INSECURITY: IN THE PAST 12 MONTHS, HAS THE ELECTRIC, GAS, OIL, OR WATER COMPANY THREATENED TO SHUT OFF SERVICE IN YOUR HOME?: NO

## 2024-07-09 SDOH — ECONOMIC STABILITY: HOUSING INSECURITY
IN THE LAST 12 MONTHS, WAS THERE A TIME WHEN YOU DID NOT HAVE A STEADY PLACE TO SLEEP OR SLEPT IN A SHELTER (INCLUDING NOW)?: NO

## 2024-07-09 SDOH — SOCIAL STABILITY: SOCIAL NETWORK: IN A TYPICAL WEEK, HOW MANY TIMES DO YOU TALK ON THE PHONE WITH FAMILY, FRIENDS, OR NEIGHBORS?: TWICE A WEEK

## 2024-07-09 SDOH — ECONOMIC STABILITY: TRANSPORTATION INSECURITY
IN THE PAST 12 MONTHS, HAS THE LACK OF TRANSPORTATION KEPT YOU FROM MEDICAL APPOINTMENTS OR FROM GETTING MEDICATIONS?: NO

## 2024-07-09 SDOH — ECONOMIC STABILITY: HOUSING INSECURITY: IN THE LAST 12 MONTHS, HOW MANY PLACES HAVE YOU LIVED?: 1

## 2024-07-09 SDOH — SOCIAL STABILITY: SOCIAL NETWORK
DO YOU BELONG TO ANY CLUBS OR ORGANIZATIONS SUCH AS CHURCH GROUPS UNIONS, FRATERNAL OR ATHLETIC GROUPS, OR SCHOOL GROUPS?: YES

## 2024-07-09 SDOH — ECONOMIC STABILITY: TRANSPORTATION INSECURITY
IN THE PAST 12 MONTHS, HAS LACK OF TRANSPORTATION KEPT YOU FROM MEETINGS, WORK, OR FROM GETTING THINGS NEEDED FOR DAILY LIVING?: NO

## 2024-07-09 SDOH — SOCIAL STABILITY: SOCIAL INSECURITY: WITHIN THE LAST YEAR, HAVE YOU BEEN AFRAID OF YOUR PARTNER OR EX-PARTNER?: NO

## 2024-07-09 SDOH — SOCIAL STABILITY: SOCIAL NETWORK: HOW OFTEN DO YOU ATTENT MEETINGS OF THE CLUB OR ORGANIZATION YOU BELONG TO?: NEVER

## 2024-07-09 SDOH — SOCIAL STABILITY: SOCIAL NETWORK: HOW OFTEN DO YOU GET TOGETHER WITH FRIENDS OR RELATIVES?: TWICE A WEEK

## 2024-07-09 SDOH — SOCIAL STABILITY: SOCIAL INSECURITY
WITHIN THE LAST YEAR, HAVE TO BEEN RAPED OR FORCED TO HAVE ANY KIND OF SEXUAL ACTIVITY BY YOUR PARTNER OR EX-PARTNER?: NO

## 2024-07-09 SDOH — HEALTH STABILITY: PHYSICAL HEALTH: ON AVERAGE, HOW MANY DAYS PER WEEK DO YOU ENGAGE IN MODERATE TO STRENUOUS EXERCISE (LIKE A BRISK WALK)?: 7 DAYS

## 2024-07-09 SDOH — HEALTH STABILITY: MENTAL HEALTH
HOW OFTEN DO YOU NEED TO HAVE SOMEONE HELP YOU WHEN YOU READ INSTRUCTIONS, PAMPHLETS, OR OTHER WRITTEN MATERIAL FROM YOUR DOCTOR OR PHARMACY?: NEVER

## 2024-07-09 SDOH — SOCIAL STABILITY: SOCIAL INSECURITY
WITHIN THE LAST YEAR, HAVE YOU BEEN KICKED, HIT, SLAPPED, OR OTHERWISE PHYSICALLY HURT BY YOUR PARTNER OR EX-PARTNER?: NO

## 2024-07-09 SDOH — HEALTH STABILITY: PHYSICAL HEALTH: ON AVERAGE, HOW MANY MINUTES DO YOU ENGAGE IN EXERCISE AT THIS LEVEL?: 60 MIN

## 2024-07-09 SDOH — ECONOMIC STABILITY: INCOME INSECURITY: HOW HARD IS IT FOR YOU TO PAY FOR THE VERY BASICS LIKE FOOD, HOUSING, MEDICAL CARE, AND HEATING?: NOT HARD AT ALL

## 2024-07-09 ASSESSMENT — PAIN SCALES - PAIN ASSESSMENT IN ADVANCED DEMENTIA (PAINAD): TOTALSCORE: MEDICATION (SEE MAR)

## 2024-07-09 ASSESSMENT — COLUMBIA-SUICIDE SEVERITY RATING SCALE - C-SSRS
6. HAVE YOU EVER DONE ANYTHING, STARTED TO DO ANYTHING, OR PREPARED TO DO ANYTHING TO END YOUR LIFE?: NO
2. HAVE YOU ACTUALLY HAD ANY THOUGHTS OF KILLING YOURSELF?: NO
1. SINCE LAST CONTACT, HAVE YOU WISHED YOU WERE DEAD OR WISHED YOU COULD GO TO SLEEP AND NOT WAKE UP?: NO

## 2024-07-09 ASSESSMENT — PAIN - FUNCTIONAL ASSESSMENT: PAIN_FUNCTIONAL_ASSESSMENT: 0-10

## 2024-07-09 ASSESSMENT — PAIN DESCRIPTION - LOCATION: LOCATION: PELVIS

## 2024-07-09 ASSESSMENT — PAIN SCALES - GENERAL: PAINLEVEL_OUTOF10: 0 - NO PAIN

## 2024-07-09 NOTE — PROGRESS NOTES
Art Therapy Note    Josue Rangel     Therapy Session  Referral Type: New referral this admission  Visit Type: New visit  Session Start Time: 1457  Intervention Delivery: In-person  Conflict of Service: Declined treatment  Number of staff members present: 1              Treatment/Interventions            Narrative  Assessment Detail: Pt was lying in bed with covers over his head and body and sitter at bedside. ATR greeted pt and introduced self and services to him, stating that could return tomorrow for a full session but pt declined repeatedly. ATR will f/u another time to attempt session, that pt may change his perspective.    Education Documentation  No documentation found.

## 2024-07-09 NOTE — CONSULTS
"Inpatient consult to Psychiatry  Consult performed by: Jacob Alfaro MD  Consult ordered by: Roosevelt Barba MD         History from chart review, patient interview, and available collateral.     HISTORY OF PRESENT ILLNESS:  Josue Rangel is a 19 y.o. male with a past psychiatric history of bipolar II and a past medical history of sickle cell disease who was admitted to [facility] on 7/8/24 for acute sickle cell chest pain. Psychiatry was consulted on 7/8 for intentional ingestion of acetaminophen with intent to end his life.    On chart review he told several hospital staff he had taken the acetaminophen and oxycodone with the intent to end his life. Cleared by toxicology without need for NAC.     On interview, when asked what happened he said \"cause and effect.\" Asked if he felt like ending his life he said \"here and there, we life to die, I don't have a definite answer.\" He says that he does want to life for a family someday.     Collateral from his grandmother, with whom he lives: He has slit his wrists a couple of times trying to end his life when he was 12, 13 years old and, recently, she has started to see him with cuts on his arms around the house. These cuts have also been reported by his sickle cell team. She reports he was diagnosed with schizoaffective disorder bipolar type by outpatient child psychiatry and he was supposed to follow up with adult psychiatry but didn't and hasn't taken any of his prescribed psych medications since getting care at [facility] ~1 years ago. He has never been psychiatrically hospitalized but she thinks he needs to be and is concerned he was trying to kill himself and needs a sitter. She also says he has been smoking cannabis several times a week and drinking alcohol.     PSYCHIATRIC REVIEW OF SYSTEMS  Depression: depressed mood, feelings of hopelessness, markedly diminished interest or pleasure in all or most activities, and recurrent thoughts of death or suicidal " ideation  Anxiety: fatigue  Karla:  per history by outpatient psychiatry, not re-affirmed during this interview  Psychosis: negative  Delirium: negative   Trauma: history of trauma    PSYCHIATRIC HISTORY  Prior diagnoses: bipolar II  Prior hospitalizations: none  History of suicide attempts: age ~12  History of self-harm: cutting  History of trauma/abuse/loss: yes  Current psychiatrist: none  Current mental health agency: none  Current psychiatric medications: none  Past psychiatric medications: olanzapine, lamotrigine    Family psychiatric history:      - Psychiatric disorders: maternal and paternal schizophrenia and bipolar diagnoses    SUBSTANCE USE HISTORY   He reports current alcohol use. He reports current drug use. Drug: Marijuana. No history on file for tobacco use.    Tobacco: denies  Alcohol: reports weekly   Cannabis: reports weekly  Other substances: denies    SOCIAL HISTORY  Social History     Socioeconomic History    Marital status: Single     Spouse name: None    Number of children: None    Years of education: None    Highest education level: None   Occupational History    None   Tobacco Use    Smoking status: Unknown    Smokeless tobacco: None   Substance and Sexual Activity    Alcohol use: Yes     Comment: per patient; socially    Drug use: Yes     Types: Marijuana    Sexual activity: None   Other Topics Concern    None   Social History Narrative    None     Social Determinants of Health     Financial Resource Strain: Patient Unable To Answer (4/14/2024)    Overall Financial Resource Strain (CARDIA)     Difficulty of Paying Living Expenses: Patient unable to answer   Food Insecurity: Not on File (1/9/2023)    Received from [facility]    Food Insecurity     Food: 0   Transportation Needs: Not on File (1/9/2023)    Received from [facility]    Transportation Needs     Transportation: 0   Physical Activity: Not on File (1/9/2023)    Received from [facility]    Physical Activity     Physical Activity: 0   Stress: Not  on File (1/9/2023)    Received from [facility]    Stress     Stress: 0   Social Connections: Not at Risk (1/16/2023)    Received from [facility]    Social Connections     Social Connections and Isolation: 1   Intimate Partner Violence: Not on file   Housing Stability: Unknown (4/14/2024)    Housing Stability Vital Sign     Unable to Pay for Housing in the Last Year: Patient unable to answer     Number of Places Lived in the Last Year: Not on file     Unstable Housing in the Last Year: Not on file      Current living situation: living with grandparents  Current employment/source of income: employment at [employer]  Current stressors: pain  Childhood: traumatic  Legal history: denies  Access to weapons: denies    PAST MEDICAL HISTORY  Past Medical History:   Diagnosis Date    Acute pharyngitis 07/20/2022    Cough 04/17/2024    Dizziness 10/20/2022    Musculoskeletal chest pain 10/20/2022    Parvovirus infection 05/20/2009    Personal history of diseases of the blood and blood-forming organs and certain disorders involving the immune mechanism     History of sickle cell anemia        PAST SURGICAL HISTORY  Past Surgical History:   Procedure Laterality Date    CT GUIDED PERCUTANEOUS BIOPSY BONE DEEP  7/17/2023    CT GUIDED PERCUTANEOUS BIOPSY BONE D[place] [facility] [place] HEAD ANGIO WO IV CONTRAST  10/8/2014    MR HEAD ANGIO WO IV CONTRAST 10/8/2014 [facility] CLINICAL LEGACY    MR HEAD ANGIO WO IV CONTRAST  7/10/2023    MR HEAD ANGIO WO IV CONTRAST [place] [facility] [place]    MR NECK ANGIO WO IV CONTRAST  10/8/2014    MR NECK ANGIO WO IV CONTRAST 10/8/2014 [facility] CLINICAL LEGACY    MR NECK ANGIO WO IV CONTRAST  7/10/2023    MR NECK ANGIO WO IV CONTRAST [place]20[place] [facility] [place]        FAMILY HISTORY  Family History   Problem Relation Name Age of Onset    Diabetes Paternal Grandmother      Diabetes Paternal Grandfather          ALLERGIES  Patient has no known allergies.    OARRS REVIEW  OARRS checked: Jacob Alfaro MD  OARRS comments: ,  "oxycodone    OBJECTIVE    VITALS      4/20/2024     4:15 AM 4/20/2024    10:14 AM 5/2/2024    10:14 AM 5/2/2024    10:29 AM 5/30/2024     1:07 PM 7/8/2024     2:34 PM 7/8/2024     5:45 PM   Vitals   Systolic 113 121 133 110 104 130 123   Diastolic 47 83 72 67 58 96 76   Heart Rate 78 78 80 82 72 97 68   Temp 36.6 °C (97.9 °F) 36.8 °C (98.2 °F) 37 °C (98.6 °F)  36.7 °C (98.1 °F) 37.1 °C (98.8 °F)    Resp 16 18 20  18 18 16   Height (in)   1.785 m (5' 10.28\")  1.771 m (5' 9.72\") 1.829 m (6')    Weight (lb)   151.24  153.22 150    BMI   21.53 kg/m2  22.16 kg/m2 20.34 kg/m2    BSA (m2)   1.84 m2  1.85 m2 1.86 m2         MENTAL STATUS EXAM  Appearance: young man lying in bed under covers  Attitude: minimally cooperative  Behavior: minimal eye contact, eyes mostly shut  Motor Activity: lying still in bed  Speech: slow, mumbled  Mood: \"I don't know\"  Affect: blunted  Thought Process: linear, logical  Thought Content: conflicting statements about if ingestion was a suicide attempt  Thought Perception: not responding to internal stimuli  Cognition: grossly oriented  Insight: limited  Judgement: poor    MEDICAL REVIEW OF SYSTEMS  Review of Systems     HOME MEDICATIONS  Medication Documentation Review Audit       Reviewed by Mary Quintero RN (Registered Nurse) on 05/30/24 at 1336      Medication Order Taking? Sig Documenting Provider Last Dose Status   acetaminophen (Tylenol) 325 mg tablet [id number] Yes Take 2 tablets (650 mg) by mouth every 6 hours if needed for mild pain (1 - 3) or moderate pain (4 - 6) (Note:  check your temperature 1st and do not take if you have a fever of 101 or higher.  -- call sickle cell team for advice). Jennifer Muse, APRN-CNP Taking Active   albuterol 90 mcg/actuation inhaler [id number] No Inhale 2-4 puffs every 4-6 hours as needed for shortness of breath or wheezing   Patient not taking: Reported on 5/30/2024    Maricruz Boyd, APRN-CNP Not Taking Active   amphetamine-dextroamphetamine XR " (Adderall XR) 15 mg 24 hr capsule [id number] Yes Adderall XR 15 MG Oral Capsule Extended Release 24 Hour   Quantity: 30  Refills: 0        Start : 2-Sep-2016   Active Historical Provider, MD Taking Active   bisacodyl (Dulcolax) 5 mg EC tablet [id number] No Take 1 tablet (5 mg) by mouth once daily as needed for constipation. Do not crush, chew, or split.   Patient not taking: Reported on 5/2/2024    Melvi Porter MD Unknown Active   diclofenac sodium (Voltaren) 1 % gel [id number] Yes Apply 4.5 inches (4 g) topically 4 times a day as needed. Historical Provider, MD Unknown Active   dimethicone-colloidal oatmeaL (Aveeno) 1.2 % lotion [id number]  1 Application every 12 hours. Historical Provider, MD  Active   docusate sodium (Colace) 100 mg capsule [id number] No Take 1 capsule (100 mg) by mouth 2 times a day.   Patient not taking: Reported on 5/30/2024    AL Gonzalez Not Taking Active   folic acid (Folvite) 1 mg tablet [id number] Yes Take 1 tablet (1 mg) by mouth once daily. AL Gonzalez Taking Active   food supplemt, lactose-reduced (Ensure Original) 0.04-1.05 gram-kcal/mL liquid [id number] Yes Ensure Oral Liquid   Refills: 0        Start : 3-Aug-2017   Active Historical MD Isaias Taking Active   hydroxyurea (Hydrea) 500 mg capsule [id number] Yes Take 2 capsules (1,000 mg total) by mouth once daily.  Take at the same time each day. AL Gonzalez Taking Active   lidocaine 4 % patch [id number] No Place 1 patch on the skin every 12 hours if needed for mild pain (1 - 3). Remover after 12 hours Historical Provider, MD Not Taking Active   loratadine (Claritin) 10 mg tablet [id number] No Take 1 tablet (10 mg) by mouth once daily at bedtime. Bryce Esparza MD Not Taking Active   naproxen (Naprosyn) 500 mg tablet [id number] Yes Take 1 tablet (500 mg) by mouth every 12 hours. For the next 2 days, then as needed for pain Kat Neri MD Taking Active   oxyCODONE (Roxicodone) 5 mg  immediate release tablet [id number] No Take 1 tablet (5 mg) by mouth every 6 hours. For the next two days, then as needed for breakthrough pain   Patient not taking: Reported on 5/30/2024    Kat Neri MD Not Taking Active   polyethylene glycol (Glycolax, Miralax) 17 gram packet [id number] Yes Take 17 g(contents of 1 packet dissolved in 8 ounces of water)  by mouth once daily as needed (constipation). Kat Neri MD Taking Active   QUEtiapine (SEROquel) 100 mg tablet [id number] Yes Take 1 tablet (100 mg) by mouth once daily at bedtime. Kat Neri MD Taking Active   sennosides (Senokot) 8.6 mg tablet [id number] Yes Take 2 tablets (17.2 mg) by mouth 2 times a day as needed for constipation. Kat Neri MD Taking Active   Symbicort 80-4.5 mcg/actuation inhaler [id number] No Inhale 2 puffs 2 times a day. And 2 puffs every 4 hours as needed. (Max 10 puffs per 24 hours)   Patient not taking: Reported on 5/30/2024    Kat Neri MD Not Taking Active                     CURRENT MEDICATIONS  Scheduled medications  HYDROmorphone, 1 mg, intravenous, q1h        Continuous medications       PRN medications  PRN medications: OLANZapine     LABS  Results for orders placed or performed during the hospital encounter of 07/08/24 (from the past 24 hour(s))   CBC and Auto Differential   Result Value Ref Range    WBC 8.6 4.4 - 11.3 x10*3/uL    nRBC 0.7 (H) 0.0 - 0.0 /100 WBCs    RBC 3.64 (L) 4.50 - 5.90 x10*6/uL    Hemoglobin 11.2 (L) 13.5 - 17.5 g/dL    Hematocrit 29.8 (L) 41.0 - 52.0 %    MCV 82 80 - 100 fL    MCH 30.8 26.0 - 34.0 pg    MCHC 37.6 (H) 32.0 - 36.0 g/dL    RDW 15.3 (H) 11.5 - 14.5 %    Platelets 350 150 - 450 x10*3/uL    Neutrophils % 56.3 40.0 - 80.0 %    Immature Granulocytes %, Automated 0.7 0.0 - 0.9 %    Lymphocytes % 27.2 13.0 - 44.0 %    Monocytes % 10.1 2.0 - 10.0 %    Eosinophils % 5.2 0.0 - 6.0 %    Basophils % 0.5 0.0 - 2.0 %    Neutrophils Absolute 4.83 1.20 - 7.70 x10*3/uL     Immature Granulocytes Absolute, Automated 0.06 0.00 - 0.70 x10*3/uL    Lymphocytes Absolute 2.33 1.20 - 4.80 x10*3/uL    Monocytes Absolute 0.87 0.10 - 1.00 x10*3/uL    Eosinophils Absolute 0.45 0.00 - 0.70 x10*3/uL    Basophils Absolute 0.04 0.00 - 0.10 x10*3/uL   Comprehensive Metabolic Panel   Result Value Ref Range    Glucose 86 74 - 99 mg/dL    Sodium 140 136 - 145 mmol/L    Potassium 3.5 3.5 - 5.3 mmol/L    Chloride 108 (H) 98 - 107 mmol/L    Bicarbonate 22 21 - 32 mmol/L    Anion Gap 14 10 - 20 mmol/L    Urea Nitrogen 11 6 - 23 mg/dL    Creatinine 0.57 0.50 - 1.30 mg/dL    eGFR >90 >60 mL/min/1.73m*2    Calcium 9.7 8.6 - 10.6 mg/dL    Albumin 4.6 3.4 - 5.0 g/dL    Alkaline Phosphatase 60 33 - 120 U/L    Total Protein 7.7 6.4 - 8.2 g/dL    AST 30 9 - 39 U/L    Bilirubin, Total 1.5 (H) 0.0 - 1.2 mg/dL    ALT 12 10 - 52 U/L   Acute Toxicology Panel, Blood   Result Value Ref Range    Acetaminophen <10.0 10.0 - 30.0 ug/mL    Salicylate  <3 4 - 20 mg/dL    Alcohol <10 <=10 mg/dL   TSH with reflex to Free T4 if abnormal   Result Value Ref Range    Thyroid Stimulating Hormone 0.61 0.44 - 3.98 mIU/L   Reticulocytes   Result Value Ref Range    Retic % 4.3 (H) 0.5 - 2.0 %    Retic Absolute 0.157 (H) 0.022 - 0.118 x10*6/uL    Reticulocyte Hemoglobin 33 28 - 38 pg    Immature Retic fraction 47.3 (H) <=16.0 %   Troponin I, High Sensitivity   Result Value Ref Range    Troponin I, High Sensitivity (CMC) 3 0 - 53 ng/L   B-type natriuretic peptide   Result Value Ref Range    BNP 17 0 - 99 pg/mL   Coagulation Screen   Result Value Ref Range    Protime 13.1 (H) 9.8 - 12.8 seconds    INR 1.2 (H) 0.9 - 1.1    aPTT 24 (L) 27 - 38 seconds   Blood Gas Venous Full Panel   Result Value Ref Range    POCT pH, Venous 7.47 (H) 7.33 - 7.43 pH    POCT pCO2, Venous 30 (L) 41 - 51 mm Hg    POCT pO2, Venous 64 (H) 35 - 45 mm Hg    POCT SO2, Venous 91 (H) 45 - 75 %    POCT Oxy Hemoglobin, Venous 88.4 (H) 45.0 - 75.0 %    POCT Hematocrit  Calculated, Venous 35.0 (L) 41.0 - 52.0 %    POCT Sodium, Venous 138 136 - 145 mmol/L    POCT Potassium, Venous 3.6 3.5 - 5.3 mmol/L    POCT Chloride, Venous 107 98 - 107 mmol/L    POCT Ionized Calicum, Venous 1.23 1.10 - 1.33 mmol/L    POCT Glucose, Venous 88 74 - 99 mg/dL    POCT Lactate, Venous 1.1 0.4 - 2.0 mmol/L    POCT Base Excess, Venous -1.1 -2.0 - 3.0 mmol/L    POCT HCO3 Calculated, Venous 21.8 (L) 22.0 - 26.0 mmol/L    POCT Hemoglobin, Venous 11.8 (L) 13.5 - 17.5 g/dL    POCT Anion Gap, Venous 13.0 10.0 - 25.0 mmol/L    Patient Temperature 37.0 degrees Celsius    FiO2 21 %        IMAGING  XR chest 1 view    Result Date: 7/8/2024  Interpreted By:  Erna Aguilera and Booth Cameron STUDY: XR CHEST 1 VIEW;  7/8/2024 3:05 pm   INDICATION: Signs/Symptoms:cp/sob.   COMPARISON: XR chest two views (PA, lateral) 04/17/2024   ACCESSION NUMBER(S): [id number]   ORDERING CLINICIAN: MAGDY BANDA   FINDINGS: XR chest one view (AP) obtained.   CARDIOMEDIASTINAL SILHOUETTE: Cardiomediastinal silhouette is normal in size and configuration.   LUNGS: Lungs are clear. Redemonstration of chronic interstitial lung markings likely representing sequela of chronic sickle cell changes.   ABDOMEN: No remarkable upper abdominal findings.   BONES: No acute osseous changes.       1.  No evidence of acute cardiopulmonary process. 2. Redemonstration of chronic interstitial lung markings likely representing sequela of chronic sickle cell changes.     MACRO: I personally reviewed the images/study and I agree with the findings as stated. This study was interpreted at [place], [place].   Signed by: Erna Aguilera 7/8/2024 3:16 PM Dictation workstation:   [id number]      PSYCHIATRIC RISK ASSESSMENT  Violence Risk Factors:  male, current psychiatric illness, substance abuse , impulsivity, and stress/destabilizers  Acute Risk of Harm to Others is Considered: Low  Suicide Risk Factors: male,  "having a disability , prior suicide attempts , recent suicide attempt, history of trauma or abuse, chronic medical illness, chronic pain, current psychiatric illness, feelings of hopelessness, substance abuse , and decreased self-esteem  Protective Factors: fear of social disapproval, sense of responsibility towards family, positive family relationships, hopefulness/future-orientation, and employment  Acute Risk of Harm to Self is Considered: Moderate    ASSESSMENT AND PLAN  Josue Rangel is a 19 y.o. male with a past psychiatric history of bipolar II and a past medical history of sickle cell disease who was admitted to [facility] on 7/8/24 for acute sickle cell chest pain. Psychiatry was consulted on 7/8 for intentional ingestion of acetaminophen with intent to end his life.    On initial assessment he made conflicting statements to me regarding the intention of his ingestion, saying that he did it simply because of pain with no intent to end his life, but that also it was partially to end his life, and then also that he has felt suicidal \"here and there.\" Per other ED staff report, he clearly expressed this was a suicide attempt. He tried to end his life around 13yo and grandmother reports she is concerned about his potential suicidality having noticed he has recently restarted cutting, using alcohol and cannabis, and is not engaging with psychiatry. Given his behaviors and statements of intent to end his life he requires inpatient psychiatric evaluation and treatment at this time.     IMPRESSION  - suicide attempt by ingestion  - bipolar II, per history    RECOMMENDATIONS    Instructions for transfer to MPU:  - Patient is appropriate for the Med-Psych Unit and can be transferred for co-management with primary team.   - Patients being transferred must be assigned to a primary medical or surgical team.   - Patients being transferred should have initial psychiatric consult recommendations or agitation order set placed " prior to transfer.  - Please place STAT transfer to [address].    - Patient does currently meet criteria for inpatient psychiatric admission. Once patient is deemed medically cleared, please document clearly in note that patient is MEDICALLY CLEARED. Please do not issue Application for Emergency Admission (pink slip) until notified by EPAT that patient is accepted to an inpatient psychiatric unit and is ready to be discharged.  - Patient lacks the capacity to leave AMA at this time and thus cannot leave AMA. Call [facility] if patient attempts to elope.  - Call [facility] as needed for agitated, threatening, and non-redirecteable behaviors.  - Patient does require a 1:1 sitter from a psychiatric perspective at this time.  - Patient should be in hospital attire. Please remove/secure personal belongings from the room.     Workup:  - EKG daily for Qtc monitoring    Medications:  - START: Quetiapine 100mg po at bedtime  - START: olanzapine 2.5mg po/IM q6hrs prn agitation    ==========  Patient discussed with Dr. Castle, who agrees with above plan.    Jacob Alfaro MD  Adult Psychiatry, PGY-3, on behalf of the adult psychiatry EPAT service  EPAT ext [phone]    Jacob Alfaro MD    Medication Consent  Medication Consent: n/a; consult service

## 2024-07-09 NOTE — SIGNIFICANT EVENT
Med tox sign off    APAP, salicylate levels negative  No indication for NAC, bicarb    Ensure patient receives naloxone kit prior to discharge    Please contact through secure messaging for questions/concerns.     Mary Chairez, DO

## 2024-07-09 NOTE — CARE PLAN
The patient's goals for the shift include For pain to be controlled    The clinical goals for the shift include Maintain pain throughout shift    Over the shift,  pt remained safe. Pain controlled and sitter at bedside. Safety measures implemented, call light within reach and bed at lowest position

## 2024-07-09 NOTE — H&P
HPI:  Josue Rangel is a 19 y.o. male with a hx of sickle cell, anxiety, depression, and bipolar disorder who initially presented with acute sickle cell-type chest/abdominal pain and tylenol overdose.    Pt refused multiple parts of interview. Collateral information obtained from patient's grandmother, whom is his legal guardian and lives with him. She describes that when he started a job at [employer]  he began having increased erratic behavior, depressed mood, and poor compliance with medications.  She described that he was stating he was having hallucinations and was expressing that he was tired of having the pain related to the sickle cell but was resistant to the idea of taking medications.  Earlier in the afternoon of 7/8 she came home to her house and found him laying in the bed with strange movements.  He stated that he had called 911 for a suicide attempt and that he took 2500mg tylenol and 10mg of oxycodone.  She endorsed that he has a history of self-harm.  She described he was also complaining of the chest and abdominal pain that is typical of his sickle cell crises.  He often times does not mention when he is having sickle cell related pain and self-medicates with marijuana.  She states he only takes his oxycodone or Tylenol when he gets to the point of a crisis.  She is unsure whether he had taken these medications recently but states when she found him here he appeared to be in his typical sickle cell pain.     On interview, patient did allow questioning related to his sickle cell pain but refused to answer any further questions related to presentation for suicidal ideation. Patient endorsed having hallucinations but stated he was unable to characterize them.  Patient states that pain started this morning and included chest and abdominal pain that is typical for his sickle cell.  Patient is unable to describe whether the pain has improved since admission or with any of the medications that were given  a handout in the ED.  Patient endorses some subjective chills but denies any fever.  Patient also endorses some nausea accompanying the abdominal pain but denies any tenderness on palpation.  Patient also endorsed a headache but denies any dyspnea, weakness, numbness, asymmetry, or peripheral edema.      Of note, pt last admitted 4/14-4/20 for acute sickle cell pain and acute chest syndrome. Treated at that time with  ceftriaxone and azithro and partial manual exchange. Pt also switched from olanzapine to seroquel for his bipolar disorder at that time. Pt was planned to switch from pediatric to adult sickle cell team on 7/3 but had cancelled appointment, was rescheduled for 7/10 per grandmother.         ED course  Vitals:   /75   Pulse 51   Temp 36.7 °C (98.1 °F)   Resp 16   Ht 1.829 m (6')   Wt 68 kg (150 lb)   SpO2 100%   BMI 20.34 kg/m²    - Labs:   CBC: WBC 8.6 Hgb 11.2  plt 350   BMP: Na 140, K 3.5 Cl 108 HCO3 22 BUN 11 Cr 0.57glu 86   LFT: Ca 9.7 tprot 7.7, alb 4.6 alkphos 60 AST30 ALT 12 tbili 1.5 dbili _   Heme: PT 13.1, INR 1.2 aPTT 24  BNP 17 Troponin 3    - Imaging:   XR chest 1 view    Result Date: 7/8/2024  Interpreted By:  Erna Aguilera and Booth Cameron STUDY: XR CHEST 1 VIEW;  7/8/2024 3:05 pm   INDICATION: Signs/Symptoms:cp/sob.   COMPARISON: XR chest two views (PA, lateral) 04/17/2024   ACCESSION NUMBER(S): [id number]   ORDERING CLINICIAN: MAGDY BANDA   FINDINGS: XR chest one view (AP) obtained.   CARDIOMEDIASTINAL SILHOUETTE: Cardiomediastinal silhouette is normal in size and configuration.   LUNGS: Lungs are clear. Redemonstration of chronic interstitial lung markings likely representing sequela of chronic sickle cell changes.   ABDOMEN: No remarkable upper abdominal findings.   BONES: No acute osseous changes.       1.  No evidence of acute cardiopulmonary process. 2. Redemonstration of chronic interstitial lung markings likely representing sequela of chronic sickle  cell changes.     MACRO: I personally reviewed the images/study and I agree with the findings as stated. This study was interpreted at [facility], [place], [place].   Signed by: Erna Aguilera 7/8/2024 3:16 PM Dictation workstation:   [id number]   - Interventions Famotidine 40mg IV x1, toradol 15mg x1, zofran 4mg x1, dilaudid 1mg x1    Past medical history:  Past Medical History:   Diagnosis Date    Acute pharyngitis 07/20/2022    Cough 04/17/2024    Dizziness 10/20/2022    Musculoskeletal chest pain 10/20/2022    Parvovirus infection 05/20/2009    Personal history of diseases of the blood and blood-forming organs and certain disorders involving the immune mechanism     History of sickle cell anemia       Surgical history:  Past Surgical History:   Procedure Laterality Date    CT GUIDED PERCUTANEOUS BIOPSY BONE DEEP  7/17/2023    CT GUIDED PERCUTANEOUS BIOPSY BONE DEEP 7/17/2023 [facility] CT    MR HEAD ANGIO WO IV CONTRAST  10/8/2014    MR HEAD ANGIO WO IV CONTRAST 10/8/2014 [facility] CLINICAL LEGACY    MR HEAD ANGIO WO IV CONTRAST  7/10/2023    MR HEAD ANGIO WO IV CONTRAST 7/10/2023 [facility] MRI    MR NECK ANGIO WO IV CONTRAST  10/8/2014    MR NECK ANGIO WO IV CONTRAST 10/8/2014 [facility] CLINICAL LEGACY    MR NECK ANGIO WO IV CONTRAST  7/10/2023    MR NECK ANGIO WO IV CONTRAST 7/10/2023 [facility] MRI       Medications prior to admission:  Medication Documentation Review Audit       Reviewed by Mary Quintero RN (Registered Nurse) on 05/30/24 at 1336      Medication Order Taking? Sig Documenting Provider Last Dose Status   acetaminophen (Tylenol) 325 mg tablet [id number] Yes Take 2 tablets (650 mg) by mouth every 6 hours if needed for mild pain (1 - 3) or moderate pain (4 - 6) (Note:  check your temperature 1st and do not take if you have a fever of 101 or higher.  -- call sickle cell team for advice). Jennifer Muse, APRN-CNP Taking Active   albuterol 90 mcg/actuation inhaler [id number] No Inhale 2-4  puffs every 4-6 hours as needed for shortness of breath or wheezing   Patient not taking: Reported on 5/30/2024    AL Zazueta Not Taking Active   amphetamine-dextroamphetamine XR (Adderall XR) 15 mg 24 hr capsule [id number] Yes Adderall XR 15 MG Oral Capsule Extended Release 24 Hour   Quantity: 30  Refills: 0        Start : 2-Sep-2016   Active Historical Provider, MD Taking Active   bisacodyl (Dulcolax) 5 mg EC tablet [id number] No Take 1 tablet (5 mg) by mouth once daily as needed for constipation. Do not crush, chew, or split.   Patient not taking: Reported on 5/2/2024    Melvi Porter MD Unknown Active   diclofenac sodium (Voltaren) 1 % gel [id number] Yes Apply 4.5 inches (4 g) topically 4 times a day as needed. Historical Provider, MD Unknown Active   dimethicone-colloidal oatmeaL (Aveeno) 1.2 % lotion [id number]  1 Application every 12 hours. Historical Provider, MD  Active   docusate sodium (Colace) 100 mg capsule [id number] No Take 1 capsule (100 mg) by mouth 2 times a day.   Patient not taking: Reported on 5/30/2024    AL Gonzalez Not Taking Active   folic acid (Folvite) 1 mg tablet [id number] Yes Take 1 tablet (1 mg) by mouth once daily. AL Gonzalez Taking Active   food supplemt, lactose-reduced (Ensure Original) 0.04-1.05 gram-kcal/mL liquid [id number] Yes Ensure Oral Liquid   Refills: 0        Start : 3-Aug-2017   Active Historical Provider, MD Taking Active   hydroxyurea (Hydrea) 500 mg capsule [id number] Yes Take 2 capsules (1,000 mg total) by mouth once daily.  Take at the same time each day. AL Gonzalez Taking Active   lidocaine 4 % patch [id number] No Place 1 patch on the skin every 12 hours if needed for mild pain (1 - 3). Remover after 12 hours Historical Provider, MD Not Taking Active   loratadine (Claritin) 10 mg tablet [id number] No Take 1 tablet (10 mg) by mouth once daily at bedtime. Historical Provider, MD Not Taking Active   naproxen (Naprosyn)  500 mg tablet [id number] Yes Take 1 tablet (500 mg) by mouth every 12 hours. For the next 2 days, then as needed for pain Kat Neri MD Taking Active   oxyCODONE (Roxicodone) 5 mg immediate release tablet [id number] No Take 1 tablet (5 mg) by mouth every 6 hours. For the next two days, then as needed for breakthrough pain   Patient not taking: Reported on 2024    Kat Neri MD Not Taking Active   polyethylene glycol (Glycolax, Miralax) 17 gram packet [id number] Yes Take 17 g(contents of 1 packet dissolved in 8 ounces of water)  by mouth once daily as needed (constipation). Kat Neri MD Taking Active   QUEtiapine (SEROquel) 100 mg tablet [id number] Yes Take 1 tablet (100 mg) by mouth once daily at bedtime. Kat Neri MD Taking Active   sennosides (Senokot) 8.6 mg tablet [id number] Yes Take 2 tablets (17.2 mg) by mouth 2 times a day as needed for constipation. Kat Neri MD Taking Active   Symbicort 80-4.5 mcg/actuation inhaler [id number] No Inhale 2 puffs 2 times a day. And 2 puffs every 4 hours as needed. (Max 10 puffs per 24 hours)   Patient not taking: Reported on 2024    Kat Neri MD Not Taking Active                    Allergies:  No Known Allergies    Family history:  Non-contributory    Social history:   reports current alcohol use. He reports current drug use. Drug: Marijuana. Also vaping.     Review of systems:  12 point ROS otherwise negative      Vitals:    24 Hour Vitals  Temp:  [36.7 °C (98.1 °F)-37.1 °C (98.8 °F)] 36.7 °C (98.1 °F)  Heart Rate:  [51-97] 51  Resp:  [16-18] 16  BP: (118-130)/(50-96) 127/75    Temp (24hrs), Av.9 °C (98.5 °F), Min:36.7 °C (98.1 °F), Max:37.1 °C (98.8 °F)     24 hour Intake/Output  No intake or output data in the 24 hours ending 24 9115     Physical exam:  Constitutional: Well-developed male in no acute distress.  HEENT: NCAT. EOMI. No JVD  Respiratory: Pt refused auscultation. Normal respiratory effort on  RA.  Cardiovascular: Pt refused auscultation. Bradycardic, regular rhythm on telemetry. Skin WWP.  Abdominal: Soft, nondistended, nontender to palpation. No rebound or guarding  Neuro:  Moving all extremities with no focal deficits  MSK: No peripheral edema or asymmetry.  Skin: No grossly visible lesions, wounds, or bruising  Psych: Depressed mood and affect. Endorsed hallucinations, refused to elaborate or answer further questions.      Medications   Scheduled Medications  [START ON 7/9/2024] enoxaparin, 40 mg, subcutaneous, Daily  folic acid, 1 mg, oral, Daily  hydroxyurea, 1,000 mg, oral, Daily  ketorolac, 30 mg, intravenous, q6h SERVANDO  lidocaine, 1 patch, transdermal, Daily  [START ON 7/9/2024] pantoprazole, 40 mg, oral, Daily before breakfast   Or  [START ON 7/9/2024] pantoprazole, 40 mg, intravenous, Daily before breakfast  [START ON 7/9/2024] QUEtiapine, 100 mg, oral, Nightly  sennosides, 1 tablet, oral, BID     Continuous Medications  dextrose 5%-0.45 % sodium chloride, 100 mL/hr     PRN Medications  PRN medications: docusate sodium **OR** polyethylene glycol, HYDROmorphone, OLANZapine, ondansetron **OR** ondansetron, oxygen       Labs  CBC  Results from last 72 hours   Lab Units 07/08/24  1516   WBC AUTO x10*3/uL 8.6   HEMOGLOBIN g/dL 11.2*   HEMATOCRIT % 29.8*   PLATELETS AUTO x10*3/uL 350        BMP  Results from last 72 hours   Lab Units 07/08/24  1516   SODIUM mmol/L 140   POTASSIUM mmol/L 3.5   CHLORIDE mmol/L 108*   BUN mg/dL 11   CREATININE mg/dL 0.57       Imaging:  === 06/19/12 ===    - Impression -  1. Hepatomegaly.  2. Splenic infarcts.  3. Moderate amount of free intraperitoneal fluid.  4. Small left-sided pleural effusion      This study was interpreted at the [facility] [place].    I have personally reviewed the study and agree with the resident's  interpretation as stated.   === 01/03/24 ===    CT BRAIN ATTACK ANGIO HEAD W CONTRAST AND POST PROCEDURE    - Impression -  The examination is degraded by patient  motion artifact. Within this  limitation:    No evidence for significant stenosis of the cervical vessels.    No evidence for significant stenosis or large branch vessel cutoffs  of the intracranial vessels.    MACRO:  None    Signed by: Terri Bynmu 1/3/2024 12:24 PM  Dictation workstation:   [id number]            Assessment and plan:  Josue Rangel is a 19 y.o. male with a hx of sickle cell, anxiety, depression, and bipolar disorder who initially presented with acute sickle cell-type chest pain and suicide attempt with tylenol overdose. Tylenol level <10 on admission and LFTs wnl except for elevated T bili. Pt seen by med tox and determined no need for NAC given low level. Psych  consulted and recommended inpatient admission to med-psych unit. Hemolysis labs and Hgb level at baseline on admission. Low concern for acute chest at this time as pt without hypoxia, chest infiltrate, and is afebrile. Will continue to treat pain and give IVF while awaiting further recommendations from psych.    #HgbSC with pain   ::baseline: Hgb 10-11, retic 3-4%, LDH ~220-240  ::On admission: Hgb 11.2, retic 4.3%,   ::Pt inconsistently taking hydroxyurea  ::Prior pain regimen: morphine 0.1mg/kg q2 scheduled in 1/24 and changed to dilaudid 0.8/1mg q2h in 4/24, cannot find record of care path  Plan:  HbS level ordered on admission  Continue daily LDH, retic, CBC, CMP  Continue home hydroxyurea and folate   Pain regimen: Start Dilaudid 0.8mg q2hr prn per last hospitalization, Lidocaine patch,  IV Toradol 30mg q6hrs scheduled x3 days, on protonix for GI prophylaxis   Avoid tylenol given ingestion prior to admission   D5 1/2 NS at 100cc/hr x10 hr  Bowel regimen for opioid induced constipation with Senna tabs BID and Miralax daily PRN     #Tylenol overdose  #Anxiety  #Bipolar disorder  ::Ingestion of 2500mg tylenol and 10mg of oxycodone prior to admission. Pt's guardian endorsed recent erratic behavior, inconsistent medication  compliance, hallucinations and self harm.   ::acetaminophen level <10, salicylate <3, alcohol <10. No treatment per med tox.  Plan:  olanzapine 2.5mg po q8hrs prn agitation   Decrease home seroquel to 100mg (home 200mg but hadn't been taking) at bedtime  Zofran for nausea treatment  Psych following, appreciate continued recommendations    #Dispo  -Dependent on psych recommendations    F: PRN  E: PRN  N: Regular diet  GI: Protonix  DVT prophylaxis: SubQ lovenox  Code status: Full (CONFIRMED ON ADMISSION)  Surrogate decision maker: Dominick Bo, Grandmother and legal guardian [phone]    Patient to be staffed with attending physician on 7/9.     Jeana Russ MD  PGY-2 Internal Medicine

## 2024-07-09 NOTE — PROGRESS NOTES
"Josue Rangel is a 19 y.o. male on day 0 of admission presenting with Sickle cell anemia with crisis (Multi).    Subjective   Seen at bedside this morning with sitter at bedside. Patient is eating breakfast, wont make eye contact but seems to be hyperfocused on his meal. Patient states that he took tylenol yesterday because of increased generalized body aches consistent with his sickle cell disease. He denies suicidal ideations. We discussed his pain regimen today and transferring over to adult sickle cell. He had no further questions      Objective     Physical Exam  Vitals reviewed.   Constitutional:       Appearance: Normal appearance.   HENT:      Head: Normocephalic and atraumatic.      Nose: Nose normal.      Mouth/Throat:      Mouth: Mucous membranes are moist.      Pharynx: Oropharynx is clear.   Eyes:      Extraocular Movements: Extraocular movements intact.      Pupils: Pupils are equal, round, and reactive to light.   Cardiovascular:      Rate and Rhythm: Normal rate and regular rhythm.      Pulses: Normal pulses.      Heart sounds: Normal heart sounds.   Pulmonary:      Effort: Pulmonary effort is normal.      Breath sounds: Normal breath sounds.   Abdominal:      General: Bowel sounds are normal.      Palpations: Abdomen is soft.   Musculoskeletal:         General: Normal range of motion.   Skin:     General: Skin is warm.   Neurological:      General: No focal deficit present.      Mental Status: He is alert and oriented to person, place, and time. Mental status is at baseline.   Psychiatric:         Attention and Perception: He is inattentive.         Mood and Affect: Mood normal.         Behavior: Behavior is hyperactive.         Last Recorded Vitals  Blood pressure (!) 122/46, pulse 56, temperature 36.6 °C (97.9 °F), resp. rate 20, height 1.753 m (5' 9\"), weight 68 kg (150 lb), SpO2 98%.  Intake/Output last 3 Shifts:  No intake/output data recorded.    Relevant Results  Scheduled " medications  enoxaparin, 40 mg, subcutaneous, Daily  folic acid, 1 mg, oral, Daily  hydroxyurea, 1,000 mg, oral, Daily  ketorolac, 30 mg, intravenous, q6h SERVANDO  lidocaine, 1 patch, transdermal, Daily  pantoprazole, 40 mg, oral, Daily before breakfast  QUEtiapine, 100 mg, oral, Nightly  sennosides, 1 tablet, oral, BID    Continuous medications     PRN medications  PRN medications: diphenhydrAMINE, HYDROmorphone, OLANZapine, ondansetron **OR** ondansetron, oxygen, [DISCONTINUED] docusate sodium **OR** polyethylene glycol         Assessment/Plan   Principal Problem:    Sickle cell anemia with crisis (Multi)    Josue Rangel is a 19 y.o. male with a hx of sickle cell, anxiety, depression, and bipolar disorder who initially presented with acute sickle cell-type chest pain and suicide attempt with tylenol overdose. Tylenol level <10 on admission and LFTs wnl except for elevated T bili. Pt seen by med tox and determined no need for NAC given low level. Psych consulted and recommended inpatient admission to med-psych unit. Hemolysis labs and Hgb level at baseline on admission. Low concern for acute chest at this time as pt without hypoxia, chest infiltrate, and is afebrile. Will continue to treat pain and give IVF while awaiting further recommendations from psych. Discharge pending psych recs and improvement of pain.     # Tylenol overdose c/f suicide attempt   # Anxiety  # Bipolar disorder  - Ingestion of 2500mg tylenol and 10mg of oxycodone prior to admission. Pt's guardian endorsed recent erratic behavior, inconsistent medication compliance, hallucinations and self harm.   - acetaminophen level <10, salicylate <3, alcohol <10. No treatment per med tox  - Psych consulted; recommended olanzapine 2.5mg po q8hrs prn agitation, Decreased home seroquel to 100mg (home 200mg but hadn't been taking) at bedtime and will most likely increase dose back to home dose evening of 7/9; further recs pending   - sitter at bedside   -  continue to trend LFTs daily with recent tylenol injestion     # HgbSC with pain   - OARRS reviewed no abearances   - No active care path   - baseline: Hgb 10-11, retic 3-4%, LDH ~220-240  - On admission: Hgb 11.2, retic 4.3%,   - Pt inconsistently taking hydroxyurea, MCV ~85   - Prior pain regimen: morphine 0.1mg/kg q2 scheduled in 1/24 and changed to dilaudid 0.8/1mg q2h in 4/24, cannot find record of care path  - Hgb S 50.7% (7/8)   - CXR on admission negative for acute process   - No infectious symptoms, or chest pain so concern low for acute chest   - Pain regimen: on admission started Dilaudid 0.8mg q2hr prn per last hospitalization  - Started 10mg PO oxycodone Q6 PRN for moderate pain and 0.4mg IVP dilaudid Q6 PRN for severe pain since he said pain was more controlled when seen at bedside (7/9- )   - supportive care with Lidocaine patches, heat packs   - IV Toradol 30mg q6hrs scheduled x3 days, on protonix for GI prophylaxis   - Continue with home hydroxyurea and folate   - Avoid tylenol given ingestion prior to admission   - s/p D5 1/2 NS at 100cc/hr x10 hr  - Bowel regimen for opioid induced constipation with Senna tabs BID and Miralax daily PRN, zofran for opioid induced nausea and benadryl for opioid induced pruritis     # Prophy   - Lovenox daily, PPI daily, OOB as able, SCD while in bed     Dispo  - Full code (confirmed on admission)   - FUV sickle cell pending   - SANTOSH Tan (Grandma) [phone]       I spent >60 minutes in the professional and overall care of this patient.    Assessment and plan discussed with attending physician Dr. Jose Melgar, APRN-CNP

## 2024-07-10 ENCOUNTER — APPOINTMENT (OUTPATIENT)
Dept: CARDIOLOGY | Facility: HOSPITAL | Age: 19
End: 2024-07-10
Payer: MEDICAID

## 2024-07-10 ENCOUNTER — APPOINTMENT (OUTPATIENT)
Dept: PEDIATRIC HEMATOLOGY/ONCOLOGY | Facility: HOSPITAL | Age: 19
End: 2024-07-10
Payer: MEDICAID

## 2024-07-10 ENCOUNTER — APPOINTMENT (OUTPATIENT)
Dept: HEMATOLOGY/ONCOLOGY | Facility: HOSPITAL | Age: 19
End: 2024-07-10
Payer: MEDICAID

## 2024-07-10 LAB
ALBUMIN SERPL BCP-MCNC: 4.2 G/DL (ref 3.4–5)
ALP SERPL-CCNC: 52 U/L (ref 33–120)
ALT SERPL W P-5'-P-CCNC: 9 U/L (ref 10–52)
AMPHETAMINES UR QL SCN: ABNORMAL
ANION GAP SERPL CALC-SCNC: 9 MMOL/L (ref 10–20)
AST SERPL W P-5'-P-CCNC: 19 U/L (ref 9–39)
BARBITURATES UR QL SCN: ABNORMAL
BASOPHILS # BLD AUTO: 0.06 X10*3/UL (ref 0–0.1)
BASOPHILS NFR BLD AUTO: 0.8 %
BENZODIAZ UR QL SCN: ABNORMAL
BILIRUB SERPL-MCNC: 1.3 MG/DL (ref 0–1.2)
BUN SERPL-MCNC: 9 MG/DL (ref 6–23)
BZE UR QL SCN: ABNORMAL
CALCIUM SERPL-MCNC: 9.2 MG/DL (ref 8.6–10.6)
CANNABINOIDS UR QL SCN: ABNORMAL
CHLORIDE SERPL-SCNC: 105 MMOL/L (ref 98–107)
CO2 SERPL-SCNC: 26 MMOL/L (ref 21–32)
CREAT SERPL-MCNC: 0.68 MG/DL (ref 0.5–1.3)
EGFRCR SERPLBLD CKD-EPI 2021: >90 ML/MIN/1.73M*2
EOSINOPHIL # BLD AUTO: 0.67 X10*3/UL (ref 0–0.7)
EOSINOPHIL NFR BLD AUTO: 8.9 %
ERYTHROCYTE [DISTWIDTH] IN BLOOD BY AUTOMATED COUNT: 14.9 % (ref 11.5–14.5)
FENTANYL+NORFENTANYL UR QL SCN: ABNORMAL
GLUCOSE SERPL-MCNC: 78 MG/DL (ref 74–99)
HCT VFR BLD AUTO: 27.6 % (ref 41–52)
HEMOGLOBIN A2: 3.8 % (ref 2–3.5)
HEMOGLOBIN C: 44.6 %
HEMOGLOBIN F: 0.9 % (ref 0–2)
HEMOGLOBIN IDENTIFICATION INTERPRETATION: ABNORMAL
HEMOGLOBIN S: 50.7 %
HGB BLD-MCNC: 10.5 G/DL (ref 13.5–17.5)
HGB RETIC QN: 31 PG (ref 28–38)
IMM GRANULOCYTES # BLD AUTO: 0.02 X10*3/UL (ref 0–0.7)
IMM GRANULOCYTES NFR BLD AUTO: 0.3 % (ref 0–0.9)
IMMATURE RETIC FRACTION: 33.7 %
LDH SERPL L TO P-CCNC: 193 U/L (ref 84–246)
LYMPHOCYTES # BLD AUTO: 4.16 X10*3/UL (ref 1.2–4.8)
LYMPHOCYTES NFR BLD AUTO: 55.5 %
MCH RBC QN AUTO: 31.3 PG (ref 26–34)
MCHC RBC AUTO-ENTMCNC: 38 G/DL (ref 32–36)
MCV RBC AUTO: 82 FL (ref 80–100)
METHADONE UR QL SCN: ABNORMAL
MONOCYTES # BLD AUTO: 0.64 X10*3/UL (ref 0.1–1)
MONOCYTES NFR BLD AUTO: 8.5 %
NEUTROPHILS # BLD AUTO: 1.94 X10*3/UL (ref 1.2–7.7)
NEUTROPHILS NFR BLD AUTO: 26 %
NRBC BLD-RTO: 0.7 /100 WBCS (ref 0–0)
OPIATES UR QL SCN: ABNORMAL
OXYCODONE+OXYMORPHONE UR QL SCN: ABNORMAL
PATH REVIEW-HGB IDENTIFICATION: ABNORMAL
PCP UR QL SCN: ABNORMAL
PLATELET # BLD AUTO: 316 X10*3/UL (ref 150–450)
POTASSIUM SERPL-SCNC: 4.1 MMOL/L (ref 3.5–5.3)
PROT SERPL-MCNC: 6.6 G/DL (ref 6.4–8.2)
RBC # BLD AUTO: 3.36 X10*6/UL (ref 4.5–5.9)
RETICS #: 0.14 X10*6/UL (ref 0.02–0.12)
RETICS/RBC NFR AUTO: 4.3 % (ref 0.5–2)
SODIUM SERPL-SCNC: 136 MMOL/L (ref 136–145)
WBC # BLD AUTO: 7.5 X10*3/UL (ref 4.4–11.3)

## 2024-07-10 PROCEDURE — 85045 AUTOMATED RETICULOCYTE COUNT: CPT

## 2024-07-10 PROCEDURE — 83615 LACTATE (LD) (LDH) ENZYME: CPT

## 2024-07-10 PROCEDURE — 80053 COMPREHEN METABOLIC PANEL: CPT

## 2024-07-10 PROCEDURE — 96376 TX/PRO/DX INJ SAME DRUG ADON: CPT | Performed by: HOSPITALIST

## 2024-07-10 PROCEDURE — 85025 COMPLETE CBC W/AUTO DIFF WBC: CPT

## 2024-07-10 PROCEDURE — 2500000001 HC RX 250 WO HCPCS SELF ADMINISTERED DRUGS (ALT 637 FOR MEDICARE OP): Mod: SE

## 2024-07-10 PROCEDURE — 99233 SBSQ HOSP IP/OBS HIGH 50: CPT | Performed by: PSYCHIATRY & NEUROLOGY

## 2024-07-10 PROCEDURE — 99233 SBSQ HOSP IP/OBS HIGH 50: CPT

## 2024-07-10 PROCEDURE — 93005 ELECTROCARDIOGRAM TRACING: CPT

## 2024-07-10 PROCEDURE — G0378 HOSPITAL OBSERVATION PER HR: HCPCS

## 2024-07-10 PROCEDURE — 36415 COLL VENOUS BLD VENIPUNCTURE: CPT

## 2024-07-10 PROCEDURE — 80307 DRUG TEST PRSMV CHEM ANLYZR: CPT

## 2024-07-10 PROCEDURE — 2500000004 HC RX 250 GENERAL PHARMACY W/ HCPCS (ALT 636 FOR OP/ED): Mod: SE

## 2024-07-10 RX ORDER — IBUPROFEN 600 MG/1
600 TABLET ORAL EVERY 6 HOURS SCHEDULED
Status: DISCONTINUED | OUTPATIENT
Start: 2024-07-11 | End: 2024-07-11 | Stop reason: HOSPADM

## 2024-07-10 RX ORDER — QUETIAPINE FUMARATE 50 MG/1
150 TABLET, FILM COATED ORAL NIGHTLY
Status: DISCONTINUED | OUTPATIENT
Start: 2024-07-10 | End: 2024-07-11 | Stop reason: HOSPADM

## 2024-07-10 ASSESSMENT — COGNITIVE AND FUNCTIONAL STATUS - GENERAL
MOBILITY SCORE: 24
DAILY ACTIVITIY SCORE: 24

## 2024-07-10 ASSESSMENT — PAIN SCALES - GENERAL
PAINLEVEL_OUTOF10: 0 - NO PAIN
PAINLEVEL_OUTOF10: 0 - NO PAIN

## 2024-07-10 ASSESSMENT — COLUMBIA-SUICIDE SEVERITY RATING SCALE - C-SSRS
2. HAVE YOU ACTUALLY HAD ANY THOUGHTS OF KILLING YOURSELF?: NO
1. SINCE LAST CONTACT, HAVE YOU WISHED YOU WERE DEAD OR WISHED YOU COULD GO TO SLEEP AND NOT WAKE UP?: NO
6. HAVE YOU EVER DONE ANYTHING, STARTED TO DO ANYTHING, OR PREPARED TO DO ANYTHING TO END YOUR LIFE?: NO

## 2024-07-10 NOTE — PROGRESS NOTES
"Josue Rangel is a 19 y.o. male on day 0 of admission presenting with Sickle cell anemia with crisis (Multi).    Subjective   Patient seen at bedside with sitter present. Discussed plan to transition to inpatient psych. Patient agreeable. Discussed stable lab results. Patient states that he is having 8/10 pain but had not received any pain medication for >12 hours because he does not like to take it. Patient denies SOB, CP, heart palpitations, N/V/D/C, headache dizziness, vision changes. States that he has been eating well. Denies any current suicidal ideations.        Objective     Physical Exam  HENT:      Head: Normocephalic.      Right Ear: Tympanic membrane normal.      Nose: Nose normal.      Mouth/Throat:      Mouth: Mucous membranes are moist.   Eyes:      Pupils: Pupils are equal, round, and reactive to light.   Cardiovascular:      Rate and Rhythm: Normal rate.   Pulmonary:      Effort: Pulmonary effort is normal.   Abdominal:      Palpations: Abdomen is soft.   Musculoskeletal:         General: Normal range of motion.      Cervical back: Normal range of motion.   Skin:     General: Skin is warm.      Capillary Refill: Capillary refill takes less than 2 seconds.   Neurological:      General: No focal deficit present.      Mental Status: He is alert.   Psychiatric:         Mood and Affect: Mood normal.         Behavior: Behavior normal.         Last Recorded Vitals  Blood pressure (!) 96/48, pulse 61, temperature 36.6 °C (97.9 °F), resp. rate 17, height 1.753 m (5' 9\"), weight 68 kg (150 lb), SpO2 98%.  Intake/Output last 3 Shifts:  No intake/output data recorded.    Relevant Results                             Assessment/Plan   Principal Problem:    Sickle cell anemia with crisis (Multi)    Josue Rangel is a 19 y.o. male with a hx of sickle cell, anxiety, depression, and bipolar disorder who initially presented with acute sickle cell-type chest pain and suicide attempt with tylenol overdose. Tylenol " level <10 on admission and LFTs wnl except for elevated T bili. Pt seen by med tox and determined no need for NAC given low level. Psych consulted and recommended inpatient admission to med-psych unit. Hemolysis labs and Hgb level at baseline on admission. Low concern for acute chest at this time as pt without hypoxia, chest infiltrate, and is afebrile. During admission patients sickle cell pain well controlled. LFTs remain WNL >48 hours. Tbili milyly elevated on admission likely in setting of chronic sickle cell disease. Improved during admission and now 1.3. Additional labs WNL. Sitter at bedside. Patient medically stable and cleared to transfer to inpatient psych unit for further management.     Patient initially requiring IV Dilaudid for sickle cell pain. Discontinued 7/8 (last dose IV Dilaudid 7/8 at 2314. Ordered morphine IV but patient did not receive any doses. Discontinues Morphine IV 7/10. Transitioned to PO oxycodone 10mg.      # Tylenol overdose c/f suicide attempt   # Anxiety  # Bipolar disorder  - Ingestion of 2500mg tylenol and 10mg of oxycodone prior to admission. Pt's guardian endorsed recent erratic behavior, inconsistent medication compliance, hallucinations and self harm.   - acetaminophen level <10, salicylate <3, alcohol <10. No treatment per med tox  - Psych consulted; recommended olanzapine 2.5mg po q8hrs prn agitation, Decreased home seroquel to 100mg (home 200mg but hadn't been taking) at bedtime and will most likely increase dose back to home dose evening of 7/9; further recs pending   - sitter at bedside   - continue to trend LFTs daily with recent tylenol injestion   - Awaiting bed in inpatient psych unit      # HgbSC with pain   - OARRS reviewed no abearances   - No active care path   - baseline: Hgb 10-11, retic 3-4%, LDH ~220-240  - On admission: Hgb 11.2, retic 4.3%,   - Pt inconsistently taking hydroxyurea, MCV ~85   - Prior pain regimen: morphine 0.1mg/kg q2 scheduled in  1/24 and changed to dilaudid 0.8/1mg q2h in 4/24, cannot find record of care path  - Hgb S 50.7% (7/8)   - CXR on admission negative for acute process   - No infectious symptoms, or chest pain so concern low for acute chest   - Pain regimen: on admission started Dilaudid 0.8mg q2hr prn per last hospitalization  - Started 10mg PO oxycodone Q6 PRN for moderate pain (7/9- ) and 0.4mg IVP dilaudid Q6 PRN for severe pain (7/9-7/10)  - supportive care with Lidocaine patches, heat packs   - IV Toradol 30mg q6hrs scheduled x3 days, on protonix for GI prophylaxis   - Continue with home hydroxyurea and folate   - Avoid tylenol given ingestion prior to admission   - s/p D5 1/2 NS at 100cc/hr x10 hr  - Bowel regimen for opioid induced constipation with Senna tabs BID and Miralax daily PRN, zofran for opioid induced nausea and benadryl for opioid induced pruritis      # Prophy   - Lovenox daily, PPI daily, OOB as able, SCD while in bed      Dispo  - Full code (confirmed on admission)   - FUV sickle cell pending   - SANTOSH Tan (Grandma)           I spent 60 minutes in the professional and overall care of this patient.      Lziette Sparks, DOTTY-CNP  Patient discussed with Dr. Rolon

## 2024-07-10 NOTE — PROGRESS NOTES
07/10/24 0721   Discharge Planning   Living Arrangements Family members  (Home with grandparents.)   Support Systems Family members   Assistance Needed Inpatient psych   Type of Residence Private residence   Who is requesting discharge planning? Patient   Home or Post Acute Services Other (Comment)  (Inpatient psych)   Patient expects to be discharged to: Inpatient psych   Does the patient need discharge transport arranged? Yes   RoundTrip coordination needed? Yes   Has discharge transport been arranged? No   Financial Resource Strain   How hard is it for you to pay for the very basics like food, housing, medical care, and heating? Not hard     Assessment Note:  Met with pt and introduced myself as care coordinator and member of the Care Transitions team for discharge planning.   Pt was living with his grandparents and was independent prior to admission. Pt states he was in school and working at [employer] prior to admission.  Pt uses [email or web address] for [employer] appts.  Pt's address, phone number and contact information was verified.  Pt does not have any questions/concerns at this time.     Previous Home Care: None  DME: None  Pharmacy: [facility] ([facility])  Falls: Denies  PCP:  Pt has a  physician but cannot recall his name.  Mental Health Services:  Pt denies    Pt was admitted with sickle cell pain and intentional overdose, plan to follow up LFTs and manage pain control.  Per psychiatry, pt meets criteria for inpatient psych.  ADOD 7/10. Plan to contact pt's legal guardian/grandmother Dominick Bo.  Care coordinator will continue to follow for discharge planning needs.     Alejandrina Rodriguez MSN, RN-BC  Transitional Care Coordinator (TCC)  [phone]

## 2024-07-10 NOTE — SIGNIFICANT EVENT
Josue Rangel is a 19 y.o. male with a hx of sickle cell, anxiety, depression, and bipolar disorder who initially presented with acute sickle cell-type chest pain and suicide attempt with tylenol overdose. Tylenol level <10 on admission and LFTs wnl except for elevated T bili. Pt seen by med tox and determined no need for NAC given low level. Psych consulted and recommended inpatient admission to med-psych unit. Hemolysis labs and Hgb level at baseline on admission. Low concern for acute chest at this time as pt without hypoxia, chest infiltrate, and is afebrile. During admission patients sickle cell pain well controlled. LFTs remain WNL >48 hours. Tbili milyly elevated on admission likely in setting of chronic sickle cell disease. Improved during admission and now 1.3. Additional labs WNL. Sitter at bedside. Patient medically stable and cleared to transfer to inpatient psych unit for further management.    Patient initially requiring IV Dilaudid for sickle cell pain. Discontinued 7/8 (last dose IV Dilaudid 7/8 at 2314. Ordered morphine IV but patient did not receive any doses. Discontinues Morphine IV 7/10. Transitioned to PO oxycodone 10mg.

## 2024-07-10 NOTE — PROGRESS NOTES
Josue Rangel is a 19 y.o. male on day 0 of admission presenting with Sickle cell anemia with crisis (Multi).    Subjective   Patient is medically cleared for discharge; EPAT order has been palced as patient continues to meet inpatient psychiatry criteria. Awaiting accepting unit.    - Pippa HARIRS MA, \[facility] [place] [facility]\""  Care Transitions   [facility] Secure Chat or [id number]

## 2024-07-10 NOTE — PROGRESS NOTES
"Josue Rangel is a 19 y.o. male on hospital day 0 of admission presenting with Sickle cell anemia with crisis (Multi).    Subjective   Pt chart reviewed, no acute events overnight and no PRNs needed for agitation. Per nursing pt has been under the bed covers most of the day, did not eat breakfast or lunch.     Pt was seen at bedside after pt had used the restroom, per sitter pt was not actually using restroom but was standing in the mirror twirling his hair. Pt guarded with no eye contact, discharge focused. Pt medication adherent with seroquel 100mg last night, pt feels like it is not helping him, but denies side effects to this medication. Pt slept \"some\" last night, but energy level is low and pt feels \"drained\" today. Appetite poor. Pt denies SI, HI, AVH. When asked about pt ingesting tylenol and oxycodone prior to admission and if this was a suicide attempt, the pt does not answer. When asked if the patient is happy that he did not die from this ingestion, the pt states \"it depends\", when asked further, pt repeated \"it depends\". Pt repeated this response when asked if he feels safe in the hospital.       Objective     Vital Signs:      7/8/2024     8:08 PM 7/8/2024    10:19 PM 7/9/2024    12:13 AM 7/9/2024     8:11 AM 7/9/2024     3:51 PM 7/9/2024    11:34 PM 7/10/2024     8:00 AM   Vitals   Systolic 118 127  122 100 114 96   Diastolic 50 75  46 55 59 48   Heart Rate 56 51  56 64 64 61   Temp  36.7 °C (98.1 °F)  36.6 °C (97.9 °F) 36.9 °C (98.4 °F) 36.5 °C (97.7 °F) 36.6 °C (97.9 °F)   Resp 16   20 15 18 17   Height (in)   1.753 m (5' 9\")       Weight (lb)   150       BMI   22.15 kg/m2       BSA (m2)   1.82 m2          Intake/Output last 3 Shifts:  No intake/output data recorded.    Mental Status Exam:  General/Appearance: NAD, appears stated age, sitting on bed , body habitus: slim, grooming/hygiene is reasonable for setting, unkempt hair,   Attitude/Behavior: engages in interview, secretive, no eye contact, " "calm, guarded, withdrawn  Motor Activity: no tics/tremors, no EPS/TD, psychomotor retardation, gait: not assessed  Mood: \"drained\"  Affect: Quality-dysphoric, Intensity-blunted, Range-restricted to lower range, constricted  Speech: mumbling, mild latency  Thought Process: concrete  Thought Content: denies SI/HI, Delusional thinking: none elicited  Thought Perception: denies AVH  Cognition: alert & oriented x 4, no deficits in attention/concentration noted, good fund of knowledge, recent and remote memory intact  Insight: poor  Judgment: poor    Current Medications  Scheduled   enoxaparin, 40 mg, subcutaneous, Daily  folic acid, 1 mg, oral, Daily  hydroxyurea, 1,000 mg, oral, Daily  ketorolac, 30 mg, intravenous, q6h SERVANDO  lidocaine, 1 patch, transdermal, Daily  pantoprazole, 40 mg, oral, Daily before breakfast  QUEtiapine, 100 mg, oral, Nightly  sennosides, 1 tablet, oral, BID      Continuous      PRN   PRN medications: diphenhydrAMINE, morphine, OLANZapine, ondansetron **OR** ondansetron, oxyCODONE, oxygen, [DISCONTINUED] docusate sodium **OR** polyethylene glycol     Labs  Results for orders placed or performed during the hospital encounter of 07/08/24 (from the past 24 hour(s))   CBC and Auto Differential   Result Value Ref Range    WBC 7.5 4.4 - 11.3 x10*3/uL    nRBC 0.7 (H) 0.0 - 0.0 /100 WBCs    RBC 3.36 (L) 4.50 - 5.90 x10*6/uL    Hemoglobin 10.5 (L) 13.5 - 17.5 g/dL    Hematocrit 27.6 (L) 41.0 - 52.0 %    MCV 82 80 - 100 fL    MCH 31.3 26.0 - 34.0 pg    MCHC 38.0 (H) 32.0 - 36.0 g/dL    RDW 14.9 (H) 11.5 - 14.5 %    Platelets 316 150 - 450 x10*3/uL    Neutrophils % 26.0 40.0 - 80.0 %    Immature Granulocytes %, Automated 0.3 0.0 - 0.9 %    Lymphocytes % 55.5 13.0 - 44.0 %    Monocytes % 8.5 2.0 - 10.0 %    Eosinophils % 8.9 0.0 - 6.0 %    Basophils % 0.8 0.0 - 2.0 %    Neutrophils Absolute 1.94 1.20 - 7.70 x10*3/uL    Immature Granulocytes Absolute, Automated 0.02 0.00 - 0.70 x10*3/uL    Lymphocytes Absolute " 4.16 1.20 - 4.80 x10*3/uL    Monocytes Absolute 0.64 0.10 - 1.00 x10*3/uL    Eosinophils Absolute 0.67 0.00 - 0.70 x10*3/uL    Basophils Absolute 0.06 0.00 - 0.10 x10*3/uL   Comprehensive Metabolic Panel   Result Value Ref Range    Glucose 78 74 - 99 mg/dL    Sodium 136 136 - 145 mmol/L    Potassium 4.1 3.5 - 5.3 mmol/L    Chloride 105 98 - 107 mmol/L    Bicarbonate 26 21 - 32 mmol/L    Anion Gap 9 (L) 10 - 20 mmol/L    Urea Nitrogen 9 6 - 23 mg/dL    Creatinine 0.68 0.50 - 1.30 mg/dL    eGFR >90 >60 mL/min/1.73m*2    Calcium 9.2 8.6 - 10.6 mg/dL    Albumin 4.2 3.4 - 5.0 g/dL    Alkaline Phosphatase 52 33 - 120 U/L    Total Protein 6.6 6.4 - 8.2 g/dL    AST 19 9 - 39 U/L    Bilirubin, Total 1.3 (H) 0.0 - 1.2 mg/dL    ALT 9 (L) 10 - 52 U/L   Lactate Dehydrogenase   Result Value Ref Range     84 - 246 U/L   Reticulocytes   Result Value Ref Range    Retic % 4.3 (H) 0.5 - 2.0 %    Retic Absolute 0.142 (H) 0.022 - 0.118 x10*6/uL    Reticulocyte Hemoglobin 31 28 - 38 pg    Immature Retic fraction 33.7 (H) <=16.0 %        Imaging  7/10 EKG: Qtc 429ms     ECG 12 Lead    Result Date: 7/10/2024  Sinus bradycardia Otherwise normal ECG When compared with ECG of 08-JUL-2024 14:57, No significant change was found      Psychiatric Risk Assessment  Acute Risk of Harm to Others is Considered: Low  Acute Risk of Harm to Self is Considered: Moderate    Assessment:  Josue Rangel is a 19 y.o. male with a past psychiatric history of bipolar II and a past medical history of sickle cell disease who was admitted to [facility] on 7/8/24 for acute sickle cell chest pain. Psychiatry was consulted on 7/8 for intentional ingestion of acetaminophen with intent to end his life.     Pt medication adherent with Seroquel 100mg PO last night, no new acute psychiatric issues. Pt with more guarded, withdrawn affect and behaviors today. Reassuring pt has been in behavioral control and that pt does not exhibit any overt psychotic symptoms, but he  could be minimizing psychotic symptoms. Pt notably has not eaten today and has been under the covers in bed most of the day, pt also with cryptic answers to this provider's interview questions. These behaviors could also be personality (likely antisocial) based in nature. Given these behaviors and with patient need for uptitration of Seroquel dose to adequately treat bipolar depression and psychotic symptoms, would recommend increase in Seroquel to 150mg PO nightly. Pt used to be on Seroquel 200mg PO in the past. Qtc 429ms on EKG today, and BP notably soft at 96/48 this morning. pt with previous hypotensive episodes as well during this admission. This provider did consider possibility that Seroquel could potentially lower patient's blood pressure, but suspect pt's blood pressure is low from multifactorial sources such as pt with thin frame, possibly dehydrated and without adequate PO intake, could also be from pt's BP being measured with too large of a BP cuff.  Given these reasons and with the benefits of increased Seroquel dose outweighing risk of potential hypotension, will monitor patient for any adverse side effects with increased Seroquel dose, particularly hypotension. Reassuring that pt has not endorsed any symptoms of hypotension, and has been ambulating with no issues. Encouraged pt to improve PO intake and be adequately hydrated. Patient continues to meet inpatient psychiatric criteria due to a risk of harm to himself. Per primary, pt has been medically cleared.    Impression:  Bipolar II disorder, currently depressed  Suicide attempt by ingestion      Recommendations:  Daily EKG for qtc monitoring     Safety/Monitoring:  Patient meets criteria for inpatient psychiatric admission  Patient does require 1:1 observation, despite enhanced safety features  Daily interdisciplinary safety and planning huddle with Psychiatry  Video monitoring as with all patients on the MPU  Psychiatry will follow patient daily  while on the MPU    Medications:  Increase Seroquel to 150mg PO nightly     Considerations:  Therapies recommended:  music therapy and will continue to assess for benefit and meaningful participation    Delirium Guidelines  Provide glasses, hearing aids and/or communication boards as needed for impairments  Frequent reorientation, minimize room and staff changes  Open blinds during the day, dark/quiet room at night   Minimal interruptions and daytime naps  Early evaluation and intervention by PT, out of bed as tolerated  Minimize use of restraints   Minimize use of benzodiazepines, anticholinergic medications, and opiates (while ensuring adequate treatment of pain)  Keep Mg>2, K>4 (as able)  Ensure regular bowel and bladder function (as able)    Disposition/Discharge Planning:  SW following, appreciate assistance  Pt pending inpatient psychiatric placement.     Multidisciplinary Rounding  Consulting Physician, Fellow, Resident, Charge Nurse, Nurse, Pharmacist, Social Work, Care Coordinator, Music Therapy, Art Therapy, and     Medication Consent  N/A - Consult Service    Primary team has been updated with current psychiatric recommendations.  The pt was discussed and seen with Dr. West who agrees with the above plan.     Tirso Benites, DO     I saw and evaluated the patient. I personally obtained the key and critical portions of the history and physical exam or was physically present for key and critical portions performed by the resident/fellow. I reviewed the resident/fellow's documentation and discussed the patient with the resident/fellow. I agree with the resident/fellow's medical decision making as documented in the note.    Briefly, patient cont to meet criteria for inpatient psychiatric admission given severity of attempt. Ambiguity noted on interview. Currently denying SI/HI/AVH. Informed re: plan.     Matthias Rick West MD

## 2024-07-10 NOTE — PROGRESS NOTES
"Transitional Care Coordinator Progress Note:   Spoke with pt's mother Dominick Bo ([phone]) via phone to discuss discharge planning.  Pt has support of his grandmother, step-grandfather and a great grandmother in the home.  Pt uses [facility] [employer] for drs appts but missed his last appt scheduled for 7/1(1st appt with adult sickle cell team).  Pt was given a prescription for Ensure/Boost. Pt is occasionally non-compliant with meds as he \"does not like to take them\".  Pt also uses marijuana (no other drugs known).  Explained that psychiatry is currently recommending inpatient, grandmother is agreeable.  No other discharge needs identified at this time. Care coordinator will continue to follow for discharge planning needs.     Alejandrina Rodriguez MSN, RN-BC  Transitional Care Coordinator (TCC)  [phone]   "

## 2024-07-10 NOTE — CARE PLAN
The patient's goals for the shift include For pain to be controlled    The clinical goals for the shift include Pt will remain free from acute SI throughout shift    Throughout the shift Pt was guarded and minimally cooperative with healthcare staff assessments. Near the end of the shift Pt states that he does not want to go to inpatient psych. RN told Pt that the psychiatry team assesses him every morning and that he should speak with them when they ask questions so that they can get a better clinical picture of him. Bed locked and low, call light within reach, video monitoring ongoing with  at bedside.

## 2024-07-11 ENCOUNTER — APPOINTMENT (OUTPATIENT)
Dept: CARDIOLOGY | Facility: HOSPITAL | Age: 19
End: 2024-07-11
Payer: MEDICAID

## 2024-07-11 VITALS
HEART RATE: 59 BPM | BODY MASS INDEX: 22.22 KG/M2 | OXYGEN SATURATION: 96 % | RESPIRATION RATE: 17 BRPM | SYSTOLIC BLOOD PRESSURE: 90 MMHG | WEIGHT: 150 LBS | DIASTOLIC BLOOD PRESSURE: 44 MMHG | TEMPERATURE: 97.2 F | HEIGHT: 69 IN

## 2024-07-11 PROBLEM — D57.00 SICKLE CELL ANEMIA WITH CRISIS (MULTI): Status: RESOLVED | Noted: 2024-07-08 | Resolved: 2024-07-11

## 2024-07-11 LAB
ALBUMIN SERPL BCP-MCNC: 4.2 G/DL (ref 3.4–5)
ALP SERPL-CCNC: 56 U/L (ref 33–120)
ALT SERPL W P-5'-P-CCNC: 9 U/L (ref 10–52)
ANION GAP SERPL CALC-SCNC: 12 MMOL/L (ref 10–20)
AST SERPL W P-5'-P-CCNC: 20 U/L (ref 9–39)
ATRIAL RATE: 55 BPM
BASOPHILS # BLD AUTO: 0.04 X10*3/UL (ref 0–0.1)
BASOPHILS NFR BLD AUTO: 0.6 %
BILIRUB SERPL-MCNC: 1.5 MG/DL (ref 0–1.2)
BUN SERPL-MCNC: 8 MG/DL (ref 6–23)
CALCIUM SERPL-MCNC: 9.3 MG/DL (ref 8.6–10.6)
CHLORIDE SERPL-SCNC: 105 MMOL/L (ref 98–107)
CO2 SERPL-SCNC: 25 MMOL/L (ref 21–32)
CREAT SERPL-MCNC: 0.63 MG/DL (ref 0.5–1.3)
EGFRCR SERPLBLD CKD-EPI 2021: >90 ML/MIN/1.73M*2
EOSINOPHIL # BLD AUTO: 0.53 X10*3/UL (ref 0–0.7)
EOSINOPHIL NFR BLD AUTO: 7.7 %
ERYTHROCYTE [DISTWIDTH] IN BLOOD BY AUTOMATED COUNT: 14.7 % (ref 11.5–14.5)
GLUCOSE SERPL-MCNC: 75 MG/DL (ref 74–99)
HCT VFR BLD AUTO: 28.4 % (ref 41–52)
HGB BLD-MCNC: 10.5 G/DL (ref 13.5–17.5)
HGB RETIC QN: 32 PG (ref 28–38)
IMM GRANULOCYTES # BLD AUTO: 0.01 X10*3/UL (ref 0–0.7)
IMM GRANULOCYTES NFR BLD AUTO: 0.1 % (ref 0–0.9)
IMMATURE RETIC FRACTION: 30.7 %
LDH SERPL L TO P-CCNC: 227 U/L (ref 84–246)
LYMPHOCYTES # BLD AUTO: 3.12 X10*3/UL (ref 1.2–4.8)
LYMPHOCYTES NFR BLD AUTO: 45.5 %
MCH RBC QN AUTO: 31.2 PG (ref 26–34)
MCHC RBC AUTO-ENTMCNC: 37 G/DL (ref 32–36)
MCV RBC AUTO: 84 FL (ref 80–100)
MONOCYTES # BLD AUTO: 0.61 X10*3/UL (ref 0.1–1)
MONOCYTES NFR BLD AUTO: 8.9 %
NEUTROPHILS # BLD AUTO: 2.55 X10*3/UL (ref 1.2–7.7)
NEUTROPHILS NFR BLD AUTO: 37.2 %
NRBC BLD-RTO: 0.9 /100 WBCS (ref 0–0)
P AXIS: -7 DEGREES
P OFFSET: 185 MS
P ONSET: 146 MS
PLATELET # BLD AUTO: 301 X10*3/UL (ref 150–450)
POTASSIUM SERPL-SCNC: 4.1 MMOL/L (ref 3.5–5.3)
PR INTERVAL: 148 MS
PROT SERPL-MCNC: 7.1 G/DL (ref 6.4–8.2)
Q ONSET: 220 MS
QRS COUNT: 9 BEATS
QRS DURATION: 110 MS
QT INTERVAL: 434 MS
QTC CALCULATION(BAZETT): 415 MS
QTC FREDERICIA: 421 MS
R AXIS: 69 DEGREES
RBC # BLD AUTO: 3.37 X10*6/UL (ref 4.5–5.9)
RETICS #: 0.15 X10*6/UL (ref 0.02–0.12)
RETICS/RBC NFR AUTO: 4.5 % (ref 0.5–2)
SODIUM SERPL-SCNC: 138 MMOL/L (ref 136–145)
T AXIS: 51 DEGREES
T OFFSET: 437 MS
VENTRICULAR RATE: 55 BPM
WBC # BLD AUTO: 6.9 X10*3/UL (ref 4.4–11.3)

## 2024-07-11 PROCEDURE — 85045 AUTOMATED RETICULOCYTE COUNT: CPT

## 2024-07-11 PROCEDURE — 93005 ELECTROCARDIOGRAM TRACING: CPT

## 2024-07-11 PROCEDURE — 2500000001 HC RX 250 WO HCPCS SELF ADMINISTERED DRUGS (ALT 637 FOR MEDICARE OP): Mod: SE

## 2024-07-11 PROCEDURE — 80053 COMPREHEN METABOLIC PANEL: CPT

## 2024-07-11 PROCEDURE — 83615 LACTATE (LD) (LDH) ENZYME: CPT

## 2024-07-11 PROCEDURE — 36415 COLL VENOUS BLD VENIPUNCTURE: CPT

## 2024-07-11 PROCEDURE — G0378 HOSPITAL OBSERVATION PER HR: HCPCS

## 2024-07-11 PROCEDURE — 99239 HOSP IP/OBS DSCHRG MGMT >30: CPT

## 2024-07-11 PROCEDURE — 85025 COMPLETE CBC W/AUTO DIFF WBC: CPT

## 2024-07-11 PROCEDURE — 2500000004 HC RX 250 GENERAL PHARMACY W/ HCPCS (ALT 636 FOR OP/ED): Mod: SE

## 2024-07-11 RX ORDER — OXYCODONE HYDROCHLORIDE 5 MG/1
5 TABLET ORAL EVERY 6 HOURS PRN
Start: 2024-07-11 | End: 2024-07-21

## 2024-07-11 RX ORDER — OLANZAPINE 10 MG/2ML
2.5 INJECTION, POWDER, FOR SOLUTION INTRAMUSCULAR ONCE AS NEEDED
Status: DISCONTINUED | OUTPATIENT
Start: 2024-07-11 | End: 2024-07-11 | Stop reason: HOSPADM

## 2024-07-11 RX ORDER — OLANZAPINE 2.5 MG/1
2.5 TABLET ORAL EVERY 12 HOURS PRN
Status: DISCONTINUED | OUTPATIENT
Start: 2024-07-11 | End: 2024-07-11

## 2024-07-11 RX ORDER — QUETIAPINE FUMARATE 150 MG/1
150 TABLET, FILM COATED ORAL NIGHTLY
Start: 2024-07-11 | End: 2024-08-10

## 2024-07-11 RX ORDER — IBUPROFEN 600 MG/1
600 TABLET ORAL EVERY 6 HOURS SCHEDULED
Qty: 40 TABLET | Refills: 0 | Status: SHIPPED | OUTPATIENT
Start: 2024-07-11 | End: 2024-07-21

## 2024-07-11 SDOH — ECONOMIC STABILITY: TRANSPORTATION INSECURITY
IN THE PAST 12 MONTHS, HAS THE LACK OF TRANSPORTATION KEPT YOU FROM MEDICAL APPOINTMENTS OR FROM GETTING MEDICATIONS?: PATIENT DECLINED

## 2024-07-11 SDOH — ECONOMIC STABILITY: HOUSING INSECURITY: AT ANY TIME IN THE PAST 12 MONTHS, WERE YOU HOMELESS OR LIVING IN A SHELTER (INCLUDING NOW)?: PATIENT DECLINED

## 2024-07-11 SDOH — HEALTH STABILITY: PHYSICAL HEALTH: ON AVERAGE, HOW MANY MINUTES DO YOU ENGAGE IN EXERCISE AT THIS LEVEL?: PATIENT DECLINED

## 2024-07-11 SDOH — HEALTH STABILITY: PHYSICAL HEALTH
ON AVERAGE, HOW MANY DAYS PER WEEK DO YOU ENGAGE IN MODERATE TO STRENUOUS EXERCISE (LIKE A BRISK WALK)?: PATIENT DECLINED

## 2024-07-11 SDOH — ECONOMIC STABILITY: INCOME INSECURITY: IN THE LAST 12 MONTHS, WAS THERE A TIME WHEN YOU WERE NOT ABLE TO PAY THE MORTGAGE OR RENT ON TIME?: PATIENT DECLINED

## 2024-07-11 SDOH — ECONOMIC STABILITY: HOUSING INSECURITY: IN THE PAST 12 MONTHS, HOW MANY TIMES HAVE YOU MOVED WHERE YOU WERE LIVING?: 0

## 2024-07-11 SDOH — ECONOMIC STABILITY: TRANSPORTATION INSECURITY
IN THE PAST 12 MONTHS, HAS LACK OF TRANSPORTATION KEPT YOU FROM MEETINGS, WORK, OR FROM GETTING THINGS NEEDED FOR DAILY LIVING?: PATIENT DECLINED

## 2024-07-11 SDOH — ECONOMIC STABILITY: INCOME INSECURITY: HOW HARD IS IT FOR YOU TO PAY FOR THE VERY BASICS LIKE FOOD, HOUSING, MEDICAL CARE, AND HEATING?: PATIENT DECLINED

## 2024-07-11 ASSESSMENT — PAIN SCALES - GENERAL
PAINLEVEL_OUTOF10: 0 - NO PAIN
PAINLEVEL_OUTOF10: 0 - NO PAIN

## 2024-07-11 NOTE — NURSING NOTE
"1900 received report from day shift nurse at bedside. Pt awake and alert watching tv. Sitter at bedside.  2100 pt refused all medications at this time. Pt is agitated and angry at this time. Pt states \"I don't know why I'm here, I want to go home. This nurse asked several questions and pt was easily angered at every questioned asked. This nurse stated \"I will let you cool down and notify me when your ready to talk so that I can help you.   2245 Pt in the shower with out permission with sitter at bedside. Pt flooded the bathroom, when this nurse approached the bathroom pt will not let this nurse open the door, police were called to the unit. Pt returned to his bed stating \" I want to go home\".  "

## 2024-07-11 NOTE — PROGRESS NOTES
Application for Emergency Admission    {Provider Tip- Please make sure to refresh the note to ensure the most updated flow of data :99}  Ready for Transfer?  Is the patient medically cleared for transfer to inpatient psychiatry: Yes  Has the patient been accepted to an inpatient psychiatric hospital: Yes    Application for Emergency Admission  IN ACCORDANCE WITH [place] 5122[place]10 [place]  The Chief Clinical Officer of: Dr. Lizette Howell [facility]  7/11/2024 .2:40 PM    Reason for Hospitalization  The undersigned has reason to believe that: Josue Rangel Is a mentally ill person subject to hospitalization by court order under division B Section 5122.01 of the Revised Code, i.e., this person:    1.Yes  Represents a substantial risk of physical harm to self as manifested by evidence of threats of, or attempts at, suicide or serious self-inflicted bodily harm    2.No Represents a substantial risk of physical harm to others as manifested by evidence of recent homicidal or other violent behavior, evidence of recent threats that place another in reasonable fear of violent behavior and serious physical harm, or other evidence of present dangerousness    3.Yes Represents a substantial and immediate risk of serious physical impairment or injury to self as manifested by  evidence that the person is unable to provide for and is not providing for the person's basic physical needs because of the person's mental illness and that appropriate provision for those needs cannot be made  immediately available in the community    4.Yes Would benefit from treatment in a hospital for his mental illness and is in need of such treatment as manifested by evidence of behavior that creates a grave and imminent risk to substantial rights of others or  himself.    5.Yes Would benefit from treatment as manifested by evidence of behavior that indicates all of the following:       (a) The person is unlikely to survive safely in the  community without supervision, based on a clinical determination.       (b) The person has a history of lack of compliance with treatment for mental illness and one of the following applies:      (i) At least twice within the thirty-six months prior to the filing of an affidavit seeking court-ordered treatment of the person under section 5122.111 of the Revised Code, the lack of compliance has been a significant factor in necessitating hospitalization in a hospital or receipt of services in a forensic or other mental health unit of a correctional facility, provided that the thirty-six-month period shall be extended by the length of any hospitalization or incarceration of the person that occurred within the thirty-six-month period.      (ii) Within the forty-eight months prior to the filing of an affidavit seeking court-ordered treatment of the person under section 5122.111 of the Revised Code, the lack of compliance resulted in one or more acts of serious violent behavior toward self or others or threats of, or attempts at, serious physical harm to self or others, provided that the forty-eight-month period shall be extended by the length of any hospitalization or incarceration of the person that occurred within the forty-eight-month period.      (c) The person, as a result of mental illness, is unlikely to voluntarily participate in necessary treatment.       (d) In view of the person's treatment history and current behavior, the person is in need of treatment in order to prevent a relapse or deterioration that would be likely to result in substantial risk of serious harm to the person or others.    (e) Represents a substantial risk of physical harm to self or others if allowed to remain at liberty pending examination.    Therefore, it is requested that said person be admitted to the above named facility.    STATEMENT OF BELIEF    Must be filled out by one of the following: a psychiatrist, licensed physician,  licensed clinical psychologist, health or ,  or .  (Statement shall include the circumstances under which the individual was taken into custody and the reason for the person's belief that hospitalization is necessary. The statement shall also include a reference to efforts made to secure the individual's property at his residence if he was taken into custody there. Every reasonable and appropriate effort should be made to take this person into custody in the least conspicuous manner possible.)    Ewa Rolon MD 7/11/2024  ____________________________________________________________   Place of Employment: [facility]     STATEMENT OF OBSERVATION BY PSYCHIATRIST, LICENSED PHYSICIAN, OR LICENSED CLINICAL PSYCHOLOGIST, IF APPLICABLE    Place of Observation (e.g., [facility] mental [facility] center, general hospital, office, emergency facility)  (If applicable, please complete)    Ewa Rolon 7/11/2024  ___________________________________________________________

## 2024-07-11 NOTE — NURSING NOTE
1621: RN attempted to call report. Receiving nurse requested unit phone number and states that she will call back to receive report. This RN notified her that Pt is scheduled to leave at 1630 which RN said was okay.    1659: This RN called back to attempt report again. Receiving RN states she had read through the chart. Brief report was given to RN. Unit phone number given in case there are any additional questions. Pt has no IVC at this time. Pt not on telemetry.    1800: Pt left floor safely with community care transport.

## 2024-07-11 NOTE — PROGRESS NOTES
"Josue Rangel is a 19 y.o. male on hospital day 0 of admission presenting with Sickle cell anemia with crisis (Multi).    Subjective   Pt chart reviewed, pt notable flooded his bathroom last night while showering without permission. Pt did not let nursing into the room so police were called. Pt agitated at this time but did not require any PRN medication for agitation, pt tearful after this. Pt encouraged by nursing staff to participate in care. No other events overnight. Pt refused Seroquel dose last night     Pt seen at bedside, with covers pulled over his head. PT with only eyes uncovered by blanket. Pt states that he refused seroquel last night because he thinks he does not need it. Pt with poor sleep last night, continued poor appetite. When asked about the event of flooding the bathroom last night, pt stated it was a \"misunderstanding\" and denied not allowing nursing to access his room. Pt remains discharge focused, and repeatedly asks \"why do I need to be here\". Pt validated, but this provider encouraged pt to be cooperative with nursing staff and procedures, to be adherent with Seroquel at night, and to participate more in psychiatric interviews. Pt continues to endorse mood as \"drained\" today. Pt denies SI, HI, AVH.     Objective     Vital Signs:      7/9/2024     3:51 PM 7/9/2024    11:34 PM 7/10/2024     8:00 AM 7/10/2024     3:54 PM 7/10/2024     7:17 PM 7/10/2024    11:13 PM 7/11/2024     8:29 AM   Vitals   Systolic 100 114 96 87 108 127 90   Diastolic 55 59 48 42 73 75 44   Heart Rate 64 64 61 63 62 93 59   Temp 36.9 °C (98.4 °F) 36.5 °C (97.7 °F) 36.6 °C (97.9 °F) 36.6 °C (97.9 °F) 36.7 °C (98.1 °F) 37.1 °C (98.8 °F) 36.2 °C (97.2 °F)   Resp 15 18 17 19   17      Intake/Output last 3 Shifts:  No intake/output data recorded.    Mental Status Exam:  General/Appearance: NAD, appears stated age, laying in bed covered in blanket, body habitus: thin, grooming/hygiene is reasonable for setting, unkempt " "hair,   Attitude/Behavior: engages in interview, defensive, no eye contact, guarded, oppositional, withdrawn  Motor Activity: no psychomotor agitation/retardation, no tics/tremors, no EPS/TD, gait: not assessed  Mood: \"drained\"  Affect: Quality-mood congruent, dysphoric, Intensity-blunted, Range-restricted to lower range, constricted  Speech: mumbling, otherwise normal rate, low volume   Thought Process: concrete  Thought Content: denies SI/HI, Delusional thinking: none elicited  Thought Perception: denies AVH, not responding to internal stimuli  Cognition: alert & oriented x 4, no deficits in attention/concentration noted, good fund of knowledge, recent and remote memory intact  Insight: poor  Judgment: poor    Current Medications  Scheduled   enoxaparin, 40 mg, subcutaneous, Daily  folic acid, 1 mg, oral, Daily  hydroxyurea, 1,000 mg, oral, Daily  ibuprofen, 600 mg, oral, q6h SERVANDO  lidocaine, 1 patch, transdermal, Daily  pantoprazole, 40 mg, oral, Daily before breakfast  QUEtiapine, 150 mg, oral, Nightly  sennosides, 1 tablet, oral, BID      Continuous      PRN   PRN medications: diphenhydrAMINE, OLANZapine, ondansetron **OR** ondansetron, oxyCODONE, oxygen, [DISCONTINUED] docusate sodium **OR** polyethylene glycol     Labs  Results for orders placed or performed during the hospital encounter of 07/08/24 (from the past 24 hour(s))   Drug Screen, Urine   Result Value Ref Range    Amphetamine Screen, Urine Presumptive Negative Presumptive Negative    Barbiturate Screen, Urine Presumptive Negative Presumptive Negative    Benzodiazepines Screen, Urine Presumptive Negative Presumptive Negative    Cannabinoid Screen, Urine Presumptive Positive (A) Presumptive Negative    Cocaine Metabolite Screen, Urine Presumptive Negative Presumptive Negative    Fentanyl Screen, Urine Presumptive Negative Presumptive Negative    Opiate Screen, Urine Presumptive Negative Presumptive Negative    Oxycodone Screen, Urine Presumptive " Positive (A) Presumptive Negative    PCP Screen, Urine Presumptive Negative Presumptive Negative    Methadone Screen, Urine Presumptive Negative Presumptive Negative   CBC and Auto Differential   Result Value Ref Range    WBC 6.9 4.4 - 11.3 x10*3/uL    nRBC 0.9 (H) 0.0 - 0.0 /100 WBCs    RBC 3.37 (L) 4.50 - 5.90 x10*6/uL    Hemoglobin 10.5 (L) 13.5 - 17.5 g/dL    Hematocrit 28.4 (L) 41.0 - 52.0 %    MCV 84 80 - 100 fL    MCH 31.2 26.0 - 34.0 pg    MCHC 37.0 (H) 32.0 - 36.0 g/dL    RDW 14.7 (H) 11.5 - 14.5 %    Platelets 301 150 - 450 x10*3/uL    Neutrophils % 37.2 40.0 - 80.0 %    Immature Granulocytes %, Automated 0.1 0.0 - 0.9 %    Lymphocytes % 45.5 13.0 - 44.0 %    Monocytes % 8.9 2.0 - 10.0 %    Eosinophils % 7.7 0.0 - 6.0 %    Basophils % 0.6 0.0 - 2.0 %    Neutrophils Absolute 2.55 1.20 - 7.70 x10*3/uL    Immature Granulocytes Absolute, Automated 0.01 0.00 - 0.70 x10*3/uL    Lymphocytes Absolute 3.12 1.20 - 4.80 x10*3/uL    Monocytes Absolute 0.61 0.10 - 1.00 x10*3/uL    Eosinophils Absolute 0.53 0.00 - 0.70 x10*3/uL    Basophils Absolute 0.04 0.00 - 0.10 x10*3/uL   Comprehensive Metabolic Panel   Result Value Ref Range    Glucose 75 74 - 99 mg/dL    Sodium 138 136 - 145 mmol/L    Potassium 4.1 3.5 - 5.3 mmol/L    Chloride 105 98 - 107 mmol/L    Bicarbonate 25 21 - 32 mmol/L    Anion Gap 12 10 - 20 mmol/L    Urea Nitrogen 8 6 - 23 mg/dL    Creatinine 0.63 0.50 - 1.30 mg/dL    eGFR >90 >60 mL/min/1.73m*2    Calcium 9.3 8.6 - 10.6 mg/dL    Albumin 4.2 3.4 - 5.0 g/dL    Alkaline Phosphatase 56 33 - 120 U/L    Total Protein 7.1 6.4 - 8.2 g/dL    AST 20 9 - 39 U/L    Bilirubin, Total 1.5 (H) 0.0 - 1.2 mg/dL    ALT 9 (L) 10 - 52 U/L   Lactate Dehydrogenase   Result Value Ref Range     84 - 246 U/L   Reticulocytes   Result Value Ref Range    Retic % 4.5 (H) 0.5 - 2.0 %    Retic Absolute 0.153 (H) 0.022 - 0.118 x10*6/uL    Reticulocyte Hemoglobin 32 28 - 38 pg    Immature Retic fraction 30.7 (H) <=16.0 %         Imaging  No results found.     Psychiatric Risk Assessment  Acute Risk of Harm to Others is Considered: Low  Acute Risk of Harm to Self is Considered: Moderate    Assessment:  Josue Rangel is a 19 y.o. male with a past psychiatric history of bipolar II and a past medical history of sickle cell disease who was admitted to [facility] on 7/8/24 for acute sickle cell chest pain. Psychiatry was consulted on 7/8 for intentional ingestion of acetaminophen with intent to end his life.     Pt continues to be discharge focused, guarded, reclusive, and minimally engaging in care with nursing staff and this provider. Pt with notable irritation and event last night of flooding bathroom with police needing to be called, and pt in bed with covers over his head most of the time since being on the unit. Suspect affect, behaviors, and minimal psychiatric interview are from potential antisocial personality disorder. Pt with limited insight and judgement with regards to not being able to recognize need for Seroquel treatment, and refused Seroquel last night. Pt educated and encouraged to be adherent with Seroquel and to participate in care. Will continue to offer Seroquel 150mg PO nightly, will CTM pt's continued hypotension. Pt continues to meet criteria for inpatient psychiatric admission as he is deemed a risk of harm to himself, and pt is awaiting placement. Pt medically cleared and SCC pain is improved.     Impression:  Bipolar II disorder, currently depressed  Suicide attempt by ingestion    Recommendations:  Daily EKG for qtc monitoring     Safety/Monitoring:  Patient meets criteria for inpatient psychiatric admission  Patient does require 1:1 observation, despite enhanced safety features  Daily interdisciplinary safety and planning huddle with Psychiatry  Video monitoring as with all patients on the MPU  Psychiatry will follow patient daily while on the MPU    Medications:  Cont to offer Seroquel 150mg PO nightly      Considerations:  Therapies recommended:  will continue to assess for benefit and meaningful participation    Delirium Guidelines  Provide glasses, hearing aids and/or communication boards as needed for impairments  Frequent reorientation, minimize room and staff changes  Open blinds during the day, dark/quiet room at night   Minimal interruptions and daytime naps  Early evaluation and intervention by PT, out of bed as tolerated  Minimize use of restraints   Minimize use of benzodiazepines, anticholinergic medications, and opiates (while ensuring adequate treatment of pain)  Keep Mg>2, K>4 (as able)  Ensure regular bowel and bladder function (as able)    Disposition/Discharge Planning:  SW following, appreciate assistance  Awaiting inpatient psych placement     Multidisciplinary Rounding  Consulting Physician, Fellow, Resident, Charge Nurse, Nurse, Pharmacist, and Social Work    Medication Consent  N/A - Consult Service    Primary team has been updated with current psychiatric recommendations.  The pt was discussed and seen with Dr. West who agrees with the above plan.         Tirso Benites, DO

## 2024-07-11 NOTE — PROGRESS NOTES
Transitional Care Coordinator Progress Note:   Pt is medically cleared for discharge to inpatient psych. EP[facility] sent a referral to [facility], awaiting acceptance (they do not have any beds available).  Care coordinator will continue to follow for discharge planning needs.     Alejandrina Rodriguez MSN, RN-BC  Transitional Care Coordinator (TCC)  [phone]

## 2024-07-11 NOTE — CARE PLAN
The patient's goals for the shift include For pain to be controlled    The clinical goals for the shift include Pt wishes to leave soon but will not speak much to RN    Throughout shift Pt was guarded and gave minimal responses to RN when questions were asked. RN encouraged Pt to speak with medical and psych teams which Pt ignored. Bed locked and low, call light within reach, Pt observer at bedside, video monitoring ongoing with  at monitor.

## 2024-07-11 NOTE — DISCHARGE SUMMARY
Discharge Diagnosis  Sickle cell anemia with crisis (Multi)    Issues Requiring Follow-Up  Sickle cell and suicidal ideations     Test Results Pending At Discharge  Pending Labs       No current pending labs.            Hospital Course  Josue Rangel is a 19 y.o. male with a hx of sickle cell, anxiety, depression, and bipolar disorder who initially presented with acute sickle cell-type chest pain and suicide attempt with tylenol overdose. Tylenol level <10 on admission and LFTs wnl except for elevated T bili. Pt seen by med tox and determined no need for NAC given low level. Psych consulted and recommended inpatient admission to med-psych unit. Hemolysis labs and Hgb level at baseline on admission. Low concern for acute chest at this time as pt without hypoxia, chest infiltrate, and is afebrile. During admission patients sickle cell pain well controlled. LFTs remain WNL >48 hours. Tbili milyly elevated on admission likely in setting of chronic sickle cell disease. Improved during admission. Additional labs WNL. Sitter at bedside. Patient medically stable and cleared to transfer to inpatient psych unit for further management. Patient initially requiring IV Dilaudid for sickle cell pain. Discontinued 7/8 (last dose IV Dilaudid 7/8 at 2314. Ordered morphine IV but patient did not receive any doses. Discontinues Morphine IV 7/10. Transitioned to PO oxycodone 10mg. Seroquel increased to 150mg daily by inpatient psychology team. 7/11 patient transferred to [facility] inpatient psych unit. Patient will be under Dr. Boggs. After discharge from [facility] patient will follow up with Dr. Lupe to the sickle cell team. Request for follow up appointment pending.  On the day of discharge, the patient reported feeling well and pain was controlled. Vitals and labs were stable. On exam:  Constitutional: Awake, NAD  ENMT: mucous membranes moist, no apparent injury, no lesions seen  Head/Neck: NCAT  Respiratory/Thorax: CTAB, no  wheezing  Cardiovascular: RRR, S1+S2, no murmur  Gastrointestinal: Soft, NT, nondistended, +BS  Extremities: No LE edema  Psychological: Appropriate mood and behavior  Skin: no rash noted  Attending has reviewed all labs and vitals, and discussed and agreed with the discharge plan prior to patient discharge.  Patient discharged in stable condition. > 30 minutes spent on discharge planning        Pertinent Physical Exam At Time of Discharge  Physical Exam  HENT:      Head: Normocephalic.      Nose: Nose normal.      Mouth/Throat:      Mouth: Mucous membranes are moist.   Eyes:      Pupils: Pupils are equal, round, and reactive to light.   Cardiovascular:      Rate and Rhythm: Normal rate.   Pulmonary:      Effort: Pulmonary effort is normal.   Abdominal:      Palpations: Abdomen is soft.   Musculoskeletal:         General: Normal range of motion.      Cervical back: Normal range of motion.   Skin:     General: Skin is warm.      Capillary Refill: Capillary refill takes less than 2 seconds.   Neurological:      General: No focal deficit present.      Mental Status: He is alert.   Psychiatric:         Mood and Affect: Mood normal.         Behavior: Behavior normal.         Home Medications     Medication List      START taking these medications     ibuprofen 600 mg tablet; Take 1 tablet (600 mg) by mouth every 6 hours   for 10 days.     CHANGE how you take these medications     oxyCODONE 5 mg immediate release tablet; Commonly known as: Roxicodone;   Take 1 tablet (5 mg) by mouth every 6 hours if needed for severe pain (7 -   10) for up to 10 days. For the next two days, then as needed for   breakthrough pain; What changed: when to take this, reasons to take this   QUEtiapine 150 mg tablet; Take 150 mg by mouth once daily at bedtime.;   What changed: medication strength, how much to take     CONTINUE taking these medications     Adderall XR 15 mg 24 hr capsule; Generic drug:   amphetamine-dextroamphetamine XR    diclofenac sodium 1 % gel; Commonly known as: Voltaren   dimethicone-colloidal oatmeaL 1.2 % lotion; Commonly known as: Aveeno   Ensure Original 0.04-1.05 gram-kcal/mL liquid; Generic drug: food   supplemt, lactose-reduced   folic acid 1 mg tablet; Commonly known as: Folvite; Take 1 tablet (1 mg)   by mouth once daily.   hydroxyurea 500 mg capsule; Commonly known as: Hydrea; Take 2 capsules   (1,000 mg total) by mouth once daily.  Take at the same time each day.   lidocaine 4 % patch   loratadine 10 mg tablet; Commonly known as: Claritin   naproxen 500 mg tablet; Commonly known as: Naprosyn; Take 1 tablet (500   mg) by mouth every 12 hours. For the next 2 days, then as needed for pain   polyethylene glycol 17 gram packet; Commonly known as: Glycolax,   Miralax; Take 17 g(contents of 1 packet dissolved in 8 ounces of water)    by mouth once daily as needed (constipation).   senna 8.6 mg tablet; Generic drug: sennosides; Take 2 tablets (17.2 mg)   by mouth 2 times a day as needed for constipation.     STOP taking these medications     acetaminophen 325 mg tablet; Commonly known as: Tylenol     ASK your doctor about these medications     albuterol 90 mcg/actuation inhaler; Inhale 2-4 puffs every 4-6 hours as   needed for shortness of breath or wheezing   bisacodyl 5 mg EC tablet; Commonly known as: Dulcolax; Take 1 tablet (5   mg) by mouth once daily as needed for constipation. Do not crush, chew, or   split.   Symbicort 80-4.5 mcg/actuation inhaler; Generic drug:   budesonide-formoteroL; Inhale 2 puffs 2 times a day. And 2 puffs every 4   hours as needed. (Max 10 puffs per 24 hours)       Outpatient Follow-Up  Future Appointments   Date Time Provider Department Center   9/6/2024 11:40 AM AL Zazueta [id number] [facility]-CNP

## 2024-07-11 NOTE — PROGRESS NOTES
Application for Emergency Admission        Ready for Transfer?  Is the patient medically cleared for transfer to inpatient psychiatry: Yes  Has the patient been accepted to an inpatient psychiatric hospital: Yes    Application for Emergency Admission  IN ACCORDANCE WITH SE[place] 5122.10 [place]  The Chief Clinical Officer of:  7/11/2024 .2:46 PM    Reason for Hospitalization  The undersigned has reason to believe that: Josue Rangel Is a mentally ill person subject to hospitalization by court order under division B Section 5122.01 of the Revised Code, i.e., this person:    1.Yes  Represents a substantial risk of physical harm to self as manifested by evidence of threats of, or attempts at, suicide or serious self-inflicted bodily harm    2.Yes Represents a substantial risk of physical harm to others as manifested by evidence of recent homicidal or other violent behavior, evidence of recent threats that place another in reasonable fear of violent behavior and serious physical harm, or other evidence of present dangerousness    3.Yes Represents a substantial and immediate risk of serious physical impairment or injury to self as manifested by  evidence that the person is unable to provide for and is not providing for the person's basic physical needs because of the person's mental illness and that appropriate provision for those needs cannot be made  immediately available in the community    4.Yes Would benefit from treatment in a hospital for his mental illness and is in need of such treatment as manifested by evidence of behavior that creates a grave and imminent risk to substantial rights of others or  himself.    5.Yes Would benefit from treatment as manifested by evidence of behavior that indicates all of the following:       (a) The person is unlikely to survive safely in the community without supervision, based on a clinical determination.       (b) The person has a history of lack of compliance with treatment  for mental illness and one of the following applies:      (i) At least twice within the thirty-six months prior to the filing of an affidavit seeking court-ordered treatment of the person under section 5122.111 of the Revised Code, the lack of compliance has been a significant factor in necessitating hospitalization in a hospital or receipt of services in a forensic or other mental health unit of a correctional facility, provided that the thirty-six-month period shall be extended by the length of any hospitalization or incarceration of the person that occurred within the thirty-six-month period.      (ii) Within the forty-eight months prior to the filing of an affidavit seeking court-ordered treatment of the person under section 5122.111 of the Revised Code, the lack of compliance resulted in one or more acts of serious violent behavior toward self or others or threats of, or attempts at, serious physical harm to self or others, provided that the forty-eight-month period shall be extended by the length of any hospitalization or incarceration of the person that occurred within the forty-eight-month period.      (c) The person, as a result of mental illness, is unlikely to voluntarily participate in necessary treatment.       (d) In view of the person's treatment history and current behavior, the person is in need of treatment in order to prevent a relapse or deterioration that would be likely to result in substantial risk of serious harm to the person or others.    (e) Represents a substantial risk of physical harm to self or others if allowed to remain at liberty pending examination.    Therefore, it is requested that said person be admitted to the above named facility.    STATEMENT OF BELIEF    Must be filled out by one of the following: a psychiatrist, licensed physician, licensed clinical psychologist, health or ,  or .  (Statement shall include the circumstances under which the  individual was taken into custody and the reason for the person's belief that hospitalization is necessary. The statement shall also include a reference to efforts made to secure the individual's property at his residence if he was taken into custody there. Every reasonable and appropriate effort should be made to take this person into custody in the least conspicuous manner possible.)    Patient admitted for SI, tylenol overdose, acute uncomplicated sickle cell pain crisis. Vitals/labs stable. Stable to dc from inpatient [facility] to inpatient psych unit      Ewa Garcia MD 7/11/2024     _____________________________________________________________   Place of Employment: [facility]    STATEMENT OF OBSERVATION BY PSYCHIATRIST, LICENSED PHYSICIAN, OR LICENSED CLINICAL PSYCHOLOGIST, IF APPLICABLE    Place of Observation (e.g., [facility], general hospital, office, emergency facility)  (If applicable, please complete)    Ewa Garcia MD 7/11/2024    _____________________________________________________________

## 2024-07-11 NOTE — CARE PLAN
Problem: Skin  Goal: Decreased wound size/increased tissue granulation at next dressing change  Outcome: Progressing  Goal: Participates in plan/prevention/treatment measures  Outcome: Progressing  Goal: Prevent/manage excess moisture  Outcome: Progressing  Goal: Prevent/minimize sheer/friction injuries  Outcome: Progressing  Goal: Promote/optimize nutrition  Outcome: Progressing  Goal: Promote skin healing  Outcome: Progressing     Problem: Fall/Injury  Goal: Not fall by end of shift  Outcome: Progressing  Goal: Be free from injury by end of the shift  Outcome: Progressing  Goal: Verbalize understanding of personal risk factors for fall in the hospital  Outcome: Progressing  Goal: Verbalize understanding of risk factor reduction measures to prevent injury from fall in the home  Outcome: Progressing  Goal: Use assistive devices by end of the shift  Outcome: Progressing  Goal: Pace activities to prevent fatigue by end of the shift  Outcome: Progressing     Problem: Pain  Goal: Takes deep breaths with improved pain control throughout the shift  Outcome: Progressing  Goal: Turns in bed with improved pain control throughout the shift  Outcome: Progressing  Goal: Walks with improved pain control throughout the shift  Outcome: Progressing  Goal: Performs ADL's with improved pain control throughout shift  Outcome: Progressing  Goal: Participates in PT with improved pain control throughout the shift  Outcome: Progressing  Goal: Free from opioid side effects throughout the shift  Outcome: Progressing  Goal: Free from acute confusion related to pain meds throughout the shift  Outcome: Progressing     Problem: Sensory Perceptual Alteration as Evidenced by  Goal: Cooperates with admission process  Outcome: Progressing  Goal: Patient/Family participate in treatment and discharge plans  Outcome: Progressing  Goal: Patient/Family verbalizes awareness of resources  Outcome: Progressing  Goal: Participates in unit  activities  Outcome: Progressing  Goal: Discusses signs/symptoms of illness/treatment options  Outcome: Progressing  Goal: Initiates reality-based interactions  Outcome: Progressing  Goal: Able to discuss content of hallucinations/delusions  Outcome: Progressing  Goal: Notifies staff when experiencing hallucinations/delusions  Outcome: Progressing  Goal: Verbalizes reduction in hallucinations/delusions  Outcome: Progressing  Goal: Will not act on psychotic perception  Outcome: Progressing  Goal: Understands least restrictive measures  Outcome: Progressing  Goal: Free from restraint events  Outcome: Progressing     Problem: Altered Thought Processes as Evidenced by  Goal: STG - Desires improvement in ability to think and concentrate  Outcome: Progressing  Goal: STG - Participates in Occupational Therapy and other cognitive assessments  Outcome: Progressing     Problem: Potential for Harm to Self or Others  Goal: Cooperates with admission process  Outcome: Progressing  Goal: Participates in unit activities  Outcome: Progressing  Goal: Patient/Family participate in treatment and discharge plans  Outcome: Progressing  Goal: Identifies deescalation techniques  Outcome: Progressing  Goal: Understands least restrictive measures  Outcome: Progressing  Goal: Identifies stressors that lead to harmful behaviors  Outcome: Progressing  Goal: Notifies staff when experiencing harmful thoughts toward self/others  Outcome: Progressing  Goal: Denies harm toward self or others  Outcome: Progressing  Goal: Free from restraint events  Outcome: Progressing     Problem: Educational/Scholastic Disruption  Goal: Meets educational requirements during hospitalization  Outcome: Progressing  Goal: Attends class without disruptive behavior  Outcome: Progressing  Goal: Completes daily assignments  Outcome: Progressing     Problem: Ineffective Coping  Goal: Cooperates with admission process  Outcome: Progressing  Goal: Identifies ineffective  coping skills  Outcome: Progressing  Goal: Identifies healthy coping skills  Outcome: Progressing  Goal: Demonstrates healthy coping skills  Outcome: Progressing  Goal: Participates in unit activities  Outcome: Progressing  Goal: Patient/Family participate in treatment and discharge plans  Outcome: Progressing  Goal: Patient/Family verbalizes awareness of resources  Outcome: Progressing  Goal: Understands least restrictive measures  Outcome: Progressing  Goal: Free from restraint events  Outcome: Progressing     Problem: Alteration in Sleep  Goal: STG - Reports nightly sleep, duration, and quality  Outcome: Progressing  Goal: STG - Identifies sleep hygiene aids  Outcome: Progressing  Goal: STG - Informs staff if unable to sleep  Outcome: Progressing  Goal: STG - Attends breathing and relaxation group  Outcome: Progressing     Problem: Potential for Substance Withdrawal  Goal: Verbalizes signs/symptoms of withdrawal  Outcome: Progressing  Goal: Reports signs/symptoms of withdrawal  Outcome: Progressing  Goal: Free of withdrawal symptoms  Outcome: Progressing     Problem: Anxiety  Goal: Patient/family understands admission protocols  Outcome: Progressing  Goal: Attempts to manage anxiety with help  Outcome: Progressing  Goal: Verbalizes ways to manage anxiety  Outcome: Progressing  Goal: Implements measures to reduce anxiety  Outcome: Progressing  Goal: Free from restraint events  Outcome: Progressing   The patient's goals for the shift include For pain to be controlled    The clinical goals for the shift include Pt will remain free from acute SI throughout shift

## 2024-07-11 NOTE — PROGRESS NOTES
Application for Emergency Admission        Ready for Transfer?  Is the patient medically cleared for transfer to inpatient psychiatry: Yes  Has the patient been accepted to an inpatient psychiatric hospital: Yes    Application for Emergency Admission  IN ACCORDANCE WITH SECTION 5122.10 [place]  The Chief Clinical Officer of: Dr Lizette Howell, [facility] 7/11/2024 .2:56 PM    Reason for Hospitalization  The undersigned has reason to believe that: Josue Rangel Is a mentally ill person subject to hospitalization by court order under division B Section 5122.01 of the Revised Code, i.e., this person:    1.Yes  Represents a substantial risk of physical harm to self as manifested by evidence of threats of, or attempts at, suicide or serious self-inflicted bodily harm    2.No Represents a substantial risk of physical harm to others as manifested by evidence of recent homicidal or other violent behavior, evidence of recent threats that place another in reasonable fear of violent behavior and serious physical harm, or other evidence of present dangerousness    3.Yes Represents a substantial and immediate risk of serious physical impairment or injury to self as manifested by  evidence that the person is unable to provide for and is not providing for the person's basic physical needs because of the person's mental illness and that appropriate provision for those needs cannot be made  immediately available in the community    4.Yes Would benefit from treatment in a hospital for his mental illness and is in need of such treatment as manifested by evidence of behavior that creates a grave and imminent risk to substantial rights of others or  himself.    5.Yes Would benefit from treatment as manifested by evidence of behavior that indicates all of the following:       (a) The person is unlikely to survive safely in the community without supervision, based on a clinical determination.       (b) The person has a  history of lack of compliance with treatment for mental illness and one of the following applies:      (i) At least twice within the thirty-six months prior to the filing of an affidavit seeking court-ordered treatment of the person under section 5122.111 of the Revised Code, the lack of compliance has been a significant factor in necessitating hospitalization in a hospital or receipt of services in a forensic or other mental health unit of a correctional facility, provided that the thirty-six-month period shall be extended by the length of any hospitalization or incarceration of the person that occurred within the thirty-six-month period.      (ii) Within the forty-eight months prior to the filing of an affidavit seeking court-ordered treatment of the person under section 5122.111 of the Revised Code, the lack of compliance resulted in one or more acts of serious violent behavior toward self or others or threats of, or attempts at, serious physical harm to self or others, provided that the forty-eight-month period shall be extended by the length of any hospitalization or incarceration of the person that occurred within the forty-eight-month period.      (c) The person, as a result of mental illness, is unlikely to voluntarily participate in necessary treatment.       (d) In view of the person's treatment history and current behavior, the person is in need of treatment in order to prevent a relapse or deterioration that would be likely to result in substantial risk of serious harm to the person or others.    (e) Represents a substantial risk of physical harm to self or others if allowed to remain at liberty pending examination.    Therefore, it is requested that said person be admitted to the above named facility.    STATEMENT OF BELIEF    Must be filled out by one of the following: a psychiatrist, licensed physician, licensed clinical psychologist, health or ,  or .  (Statement  shall include the circumstances under which the individual was taken into custody and the reason for the person's belief that hospitalization is necessary. The statement shall also include a reference to efforts made to secure the individual's property at his residence if he was taken into custody there. Every reasonable and appropriate effort should be made to take this person into custody in the least conspicuous manner possible.)    Admitted for SI, Tylenol overdose, acute uncomplicated sickle cell crisis. Vitals/hemolysis labs stable. Pain improved, liver tests WNL. Per psych would benefit from inpatient psychiatric care. Stable for dc from inpatient [facility]      Ewa Garcia MD 7/11/2024     _____________________________________________________________   Place of Employment: [facility]     STATEMENT OF OBSERVATION BY PSYCHIATRIST, LICENSED PHYSICIAN, OR LICENSED CLINICAL PSYCHOLOGIST, IF APPLICABLE    Place of Observation (e.g., [facility], general hospital, office, emergency facility)  (If applicable, please complete)    Ewa Garcia MD 7/11/2024    _____________________________________________________________

## 2024-07-12 LAB
ATRIAL RATE: 54 BPM
P AXIS: 192 DEGREES
P OFFSET: 188 MS
P ONSET: 136 MS
PR INTERVAL: 166 MS
Q ONSET: 219 MS
QRS COUNT: 8 BEATS
QRS DURATION: 112 MS
QT INTERVAL: 434 MS
QTC CALCULATION(BAZETT): 411 MS
QTC FREDERICIA: 418 MS
R AXIS: 137 DEGREES
T AXIS: 141 DEGREES
T OFFSET: 436 MS
VENTRICULAR RATE: 54 BPM

## 2024-07-15 ENCOUNTER — DOCUMENTATION (OUTPATIENT)
Dept: HEMATOLOGY/ONCOLOGY | Facility: HOSPITAL | Age: 19
End: 2024-07-15

## 2024-07-15 ENCOUNTER — TELEPHONE (OUTPATIENT)
Dept: PEDIATRIC HEMATOLOGY/ONCOLOGY | Facility: HOSPITAL | Age: 19
End: 2024-07-15

## 2024-07-15 ENCOUNTER — TELEPHONE (OUTPATIENT)
Dept: HEMATOLOGY/ONCOLOGY | Facility: HOSPITAL | Age: 19
End: 2024-07-15

## 2024-07-15 DIAGNOSIS — D57.20: ICD-10-CM

## 2024-07-15 RX ORDER — HYDROXYUREA 500 MG/1
1000 CAPSULE ORAL DAILY
Qty: 60 CAPSULE | Refills: 3 | Status: SHIPPED | OUTPATIENT
Start: 2024-07-15

## 2024-07-15 NOTE — TELEPHONE ENCOUNTER
Grandmother called as she is asking for refill of medication as he is going home form inpatient admission with psychiatry, but is in need of some sickle cell medications.  Call to grandmother and she states was able to contact Adult sickle cell team.  Was able to have medications refilled and new appt made with Adult team.

## 2024-07-15 NOTE — PROGRESS NOTES
7/15/24 1400  Patient was in patient and sickle cell consult was made. Patient is a peds sickle cell patient. I think our scheduling team thought patient was being transferred to adult, but I dont see any indication that this is the case. I have added his Peds team and asked them to get patient scheduled appropriately. YNENY Johnson

## 2024-07-16 ENCOUNTER — PATIENT OUTREACH (OUTPATIENT)
Dept: CARE COORDINATION | Facility: CLINIC | Age: 19
End: 2024-07-16

## 2024-07-16 ENCOUNTER — DOCUMENTATION (OUTPATIENT)
Dept: PEDIATRIC HEMATOLOGY/ONCOLOGY | Facility: HOSPITAL | Age: 19
End: 2024-07-16

## 2024-07-16 ENCOUNTER — PATIENT OUTREACH (OUTPATIENT)
Dept: CARE COORDINATION | Facility: CLINIC | Age: 19
End: 2024-07-16
Payer: MEDICAID

## 2024-07-16 DIAGNOSIS — K02.9 DENTAL CARIES: Primary | ICD-10-CM

## 2024-07-16 SDOH — ECONOMIC STABILITY: GENERAL: WOULD YOU LIKE HELP WITH ANY OF THE FOLLOWING NEEDS?: FOOD INSECURITY

## 2024-07-16 NOTE — PROGRESS NOTES
Per existing documentation and verification with current care providers, upon discharge from [facility] the patient will follow up with a psychiatric provider at [facility]. He is scheduled to see a [facility] based psychiatrist on July 18 th, as well as a psychiatric care coordinator on July 31 st. His care team is aware of this transition and termination of this providers services. This provider is in full agreement with the transition of care to [facility], as the patient will have access to a higher level of care and comprehensive mental health services.

## 2024-07-16 NOTE — PROGRESS NOTES
Pt. Identified for post discharge services. Initial outreach call to introduce self, available services, and assess needs.  Voicemail message left with my contact information.   MITA Méndez  [facility]    Contact: [phone]

## 2024-07-17 ENCOUNTER — PRE-ADMISSION TESTING (OUTPATIENT)
Dept: PREADMISSION TESTING | Facility: HOSPITAL | Age: 19
End: 2024-07-17
Payer: MEDICAID

## 2024-07-17 ENCOUNTER — LAB (OUTPATIENT)
Dept: LAB | Facility: LAB | Age: 19
End: 2024-07-17
Payer: MEDICAID

## 2024-07-17 VITALS
WEIGHT: 141.54 LBS | HEART RATE: 88 BPM | SYSTOLIC BLOOD PRESSURE: 130 MMHG | HEIGHT: 70 IN | RESPIRATION RATE: 18 BRPM | DIASTOLIC BLOOD PRESSURE: 90 MMHG | BODY MASS INDEX: 20.26 KG/M2 | OXYGEN SATURATION: 96 % | TEMPERATURE: 98.5 F

## 2024-07-17 DIAGNOSIS — K02.9 DENTAL CARIES: ICD-10-CM

## 2024-07-17 DIAGNOSIS — Z01.818 PREOP TESTING: ICD-10-CM

## 2024-07-17 DIAGNOSIS — Z01.818 PREOP TESTING: Primary | ICD-10-CM

## 2024-07-17 LAB
AMPHETAMINES UR QL SCN: ABNORMAL
BARBITURATES UR QL SCN: ABNORMAL
BENZODIAZ UR QL SCN: ABNORMAL
BZE UR QL SCN: ABNORMAL
CANNABINOIDS UR QL SCN: ABNORMAL
FENTANYL+NORFENTANYL UR QL SCN: ABNORMAL
METHADONE UR QL SCN: ABNORMAL
OPIATES UR QL SCN: ABNORMAL
OXYCODONE+OXYMORPHONE UR QL SCN: ABNORMAL
PCP UR QL SCN: ABNORMAL

## 2024-07-17 PROCEDURE — 80307 DRUG TEST PRSMV CHEM ANLYZR: CPT

## 2024-07-17 PROCEDURE — 80349 CANNABINOIDS NATURAL: CPT

## 2024-07-17 PROCEDURE — 99244 OFF/OP CNSLTJ NEW/EST MOD 40: CPT | Performed by: PHYSICIAN ASSISTANT

## 2024-07-17 PROCEDURE — 80324 DRUG SCREEN AMPHETAMINES 1/2: CPT

## 2024-07-17 ASSESSMENT — ENCOUNTER SYMPTOMS
CONSTITUTIONAL NEGATIVE: 1
GASTROINTESTINAL NEGATIVE: 1
MUSCULOSKELETAL NEGATIVE: 1
HEMATOLOGIC/LYMPHATIC NEGATIVE: 1
EYES NEGATIVE: 1
CARDIOVASCULAR NEGATIVE: 1
PSYCHIATRIC NEGATIVE: 1
RESPIRATORY NEGATIVE: 1
NEUROLOGICAL NEGATIVE: 1
ALLERGIC/IMMUNOLOGIC NEGATIVE: 1
ENDOCRINE NEGATIVE: 1

## 2024-07-17 NOTE — CPM/PAT H&P
[facility] Evaluation       Name: Josue Rangel (Josue Rangel)  /Age: 2005/19 y.o.     In-Person       Chief Complaint: Impacted Teeth     HPI    Date of Consult: 24    Referring Provider: Dr. Gan    Surgery, Date, and Length: Teeth Extractions # 17 & # 32 ; 24; 90 minutes     Josue Rangel  is a 19 year-old male who presents to the [facility] for perioperative risk assessment prior to surgery. Patient presents complaining of impacted teeth.  Admits to associated discomfort.    This note was created in part upon personal review of patient's medical records.      Patient is scheduled to have Teeth Extractions # 17 & # 32     Medical History  Past Medical History:   Diagnosis Date    ADHD     Anxiety     Asthma (HHS-HCC)     stable    Bipolar disorder (Multi)     Depression     Seizure-like activity (Multi)     EEG normal    Sickle cell disease (Multi)         STOP BANG = 1   (male)    Caprini =  1  (surgery)    Surgical History  Past Surgical History:   Procedure Laterality Date    CT GUIDED PERCUTANEOUS BIOPSY BONE DEEP  2023    CT GUIDED PERCUTANEOUS BIOPSY BONE DEEP 2023 [facility] CT    MR HEAD ANGIO WO IV CONTRAST  10/8/2014    MR HEAD ANGIO WO IV CONTRAST 10/8/2014 [facility] CLINICAL LEGACY    MR HEAD ANGIO WO IV CONTRAST  7/10/2023    MR HEAD ANGIO WO IV CONTRAST 7/10/2023 [facility] MRI    MR NECK ANGIO WO IV CONTRAST  10/8/2014    MR NECK ANGIO WO IV CONTRAST 10/8/2014 [facility] CLINICAL LEGACY    MR NECK ANGIO WO IV CONTRAST  7/10/2023    MR NECK ANGIO WO IV CONTRAST 7/10/2023 [facility] MRI             Family history:  Family History   Problem Relation Name Age of Onset    Diabetes Paternal Grandmother      Diabetes Paternal Grandfather          Social history:  Social History     Socioeconomic History    Marital status: Single     Spouse name: Not on file    Number of children: Not on file    Years of education: Not on file    Highest education level: Not on file   Occupational History    Not on file    Tobacco Use    Smoking status: Never     Passive exposure: Past    Smokeless tobacco: Never   Vaping Use    Vaping status: Some Days    Substances: THC   Substance and Sexual Activity    Alcohol use: Yes     Comment: per patient; socially    Drug use: Yes     Types: Marijuana    Sexual activity: Defer   Other Topics Concern    Not on file   Social History Narrative    Not on file     Social Determinants of Health     Financial Resource Strain: Low Risk  (7/15/2024)    Received from [facility]    Overall Financial Resource Strain (CARDIA)     Difficulty of Paying Living Expenses: Not very hard   Food Insecurity: No Food Insecurity (7/15/2024)    Received from [facility]    Hunger Vital Sign     Worried About Running Out of Food in the Last Year: Never true     Ran Out of Food in the Last Year: Never true   Transportation Needs: Unmet Transportation Needs (7/15/2024)    Received from [facility]    PRAPARE - Transportation     Lack of Transportation (Medical): Yes     Lack of Transportation (Non-Medical): Yes   Physical Activity: Sufficiently Active (7/15/2024)    Received from [facility]    Exercise Vital Sign     Days of Exercise per Week: 7 days     Minutes of Exercise per Session: 150+ min   Stress: Stress Concern Present (7/15/2024)    Received from [facility]    [employer] - Occupational Stress Questionnaire     Feeling of Stress : Very much   Social Connections: Socially Isolated (7/15/2024)    Received from [facility]    Social Connection and Isolation Panel [NHANES]     Frequency of Communication with Friends and Family: More than three times a week     Frequency of Social Gatherings with Friends and Family: More than three times a week     Attends Gnosticist Services: Never     Active Member of Clubs or Organizations: No     Attends Club or Organization Meetings: Never     Marital Status: Never    Intimate Partner Violence: Not At Risk (7/15/2024)    Received from  [facility]    Humiliation, Afraid, Rape, and Kick questionnaire     Fear of Current or Ex-Partner: No     Emotionally Abused: No     Physically Abused: No     Sexually Abused: No   Housing Stability: Low Risk  (7/15/2024)    Received from [facility]    Housing Stability Vital Sign     Unable to Pay for Housing in the Last Year: No     Number of Times Moved in the Last Year: 0     Homeless in the Last Year: No   Recent Concern: Housing Stability - High Risk (7/9/2024)    Housing Stability Vital Sign     Unable to Pay for Housing in the Last Year: Yes     Number of Places Lived in the Last Year: 1     Unstable Housing in the Last Year: No          Current Outpatient Medications:     albuterol 90 mcg/actuation inhaler, Inhale 2-4 puffs every 4-6 hours as needed for shortness of breath or wheezing, Disp: 18 g, Rfl: 3    amphetamine-dextroamphetamine XR (Adderall XR) 15 mg 24 hr capsule, Adderall XR 15 MG Oral Capsule Extended Release 24 Hour  Quantity: 30  Refills: 0      Start : 2-Sep-2016  Active, Disp: , Rfl:     bisacodyl (Dulcolax) 5 mg EC tablet, Take 1 tablet (5 mg) by mouth once daily as needed for constipation. Do not crush, chew, or split., Disp: 30 tablet, Rfl: 0    folic acid (Folvite) 1 mg tablet, Take 1 tablet (1 mg) by mouth once daily., Disp: 30 tablet, Rfl: 2    food supplemt, lactose-reduced (Ensure Original) 0.04-1.05 gram-kcal/mL liquid, Ensure Oral Liquid  Refills: 0      Start : 3-Aug-2017  Active, Disp: , Rfl:     hydroxyurea (Hydrea) 500 mg capsule, Take 2 capsules (1,000 mg total) by mouth once daily.  Take at the same time each day., Disp: 60 capsule, Rfl: 3    ibuprofen 600 mg tablet, Take 1 tablet (600 mg) by mouth every 6 hours for 10 days., Disp: 40 tablet, Rfl: 0    naproxen (Naprosyn) 500 mg tablet, Take 1 tablet (500 mg) by mouth every 12 hours. For the next 2 days, then as needed for pain (Patient taking differently: Take 1 tablet (500 mg) by mouth every 12 hours.), Disp: 60 tablet,  "Rfl: 0    polyethylene glycol (Glycolax, Miralax) 17 gram packet, Take 17 g(contents of 1 packet dissolved in 8 ounces of water)  by mouth once daily as needed (constipation)., Disp: 30 each, Rfl: 0    diclofenac sodium (Voltaren) 1 % gel, Apply 4.5 inches (4 g) topically 4 times a day as needed., Disp: , Rfl:     dimethicone-colloidal oatmeaL (Aveeno) 1.2 % lotion, 1 Application every 12 hours., Disp: , Rfl:     lidocaine 4 % patch, Place 1 patch on the skin every 12 hours if needed for mild pain (1 - 3). Remover after 12 hours, Disp: , Rfl:     loratadine (Claritin) 10 mg tablet, Take 1 tablet (10 mg) by mouth once daily at bedtime., Disp: , Rfl:     oxyCODONE (Roxicodone) 5 mg immediate release tablet, Take 1 tablet (5 mg) by mouth every 6 hours if needed for severe pain (7 - 10) for up to 10 days. For the next two days, then as needed for breakthrough pain (Patient not taking: Reported on 7/17/2024), Disp: , Rfl:     QUEtiapine 150 mg tablet, Take 150 mg by mouth once daily at bedtime. (Patient not taking: Reported on 7/17/2024), Disp: , Rfl:     sennosides (Senokot) 8.6 mg tablet, Take 2 tablets (17.2 mg) by mouth 2 times a day as needed for constipation. (Patient not taking: Reported on 7/17/2024), Disp: 60 tablet, Rfl: 0    Symbicort 80-4.5 mcg/actuation inhaler, Inhale 2 puffs 2 times a day. And 2 puffs every 4 hours as needed. (Max 10 puffs per 24 hours) (Patient not taking: Reported on 5/30/2024), Disp: 10.2 g, Rfl: 1  No current facility-administered medications for this visit.       Visit Vitals  /90   Pulse 88   Temp 36.9 °C (98.5 °F)   Resp 18   Ht 1.778 m (5' 10\")   Wt 64.2 kg (141 lb 8.6 oz)   SpO2 96%   BMI 20.31 kg/m²   Smoking Status Never   BSA 1.78 m²      Review of Systems   Constitutional: Negative.    HENT: Negative.     Eyes: Negative.    Respiratory: Negative.     Cardiovascular: Negative.         METS 4   Gastrointestinal: Negative.    Endocrine: Negative.    Genitourinary:  Negative " for penile discharge.   Musculoskeletal: Negative.    Skin: Negative.    Allergic/Immunologic: Negative.    Neurological: Negative.    Hematological: Negative.    Psychiatric/Behavioral: Negative.          Physical Exam  Constitutional:       Appearance: Normal appearance.   HENT:      Head: Normocephalic and atraumatic.      Right Ear: External ear normal.      Left Ear: External ear normal.      Nose: Nose normal.      Mouth/Throat:      Pharynx: Oropharynx is clear.   Eyes:      Conjunctiva/sclera: Conjunctivae normal.   Cardiovascular:      Rate and Rhythm: Normal rate and regular rhythm.      Heart sounds: Normal heart sounds.   Pulmonary:      Effort: Pulmonary effort is normal.      Breath sounds: Normal breath sounds.   Abdominal:      General: Abdomen is flat.      Palpations: Abdomen is soft.   Musculoskeletal:         General: Normal range of motion.      Cervical back: Normal range of motion and neck supple.   Skin:     General: Skin is warm and dry.   Neurological:      General: No focal deficit present.      Mental Status: He is alert and oriented to person, place, and time.   Psychiatric:         Mood and Affect: Mood normal.         Behavior: Behavior normal.         Thought Content: Thought content normal.         Judgment: Judgment normal.          PAT AIRWAY:   Airway:     Mallampati::  I    Neck ROM::  Full      Lab Results   Component Value Date    WBC 6.9 07/11/2024    HGB 10.5 (L) 07/11/2024    HCT 28.4 (L) 07/11/2024    MCV 84 07/11/2024     07/11/2024      Lab Results   Component Value Date    GLUCOSE 75 07/11/2024    CALCIUM 9.3 07/11/2024     07/11/2024    K 4.1 07/11/2024    CO2 25 07/11/2024     07/11/2024    BUN 8 07/11/2024    CREATININE 0.63 07/11/2024      Lab Results   Component Value Date    ALT 9 (L) 07/11/2024    AST 20 07/11/2024    GGT 17 05/02/2024    ALKPHOS 56 07/11/2024    BILITOT 1.5 (H) 07/11/2024       Drug screen  Amphetamine +  Cannabinoid  "+  Otherwise negative     RCRI  0  , 3.9 % Risk of MACE       Pulmonary  Asthma - denies use of inhalers     Hematology   Clearance Dr. Erik Irby pending     Sickle - cell - continue hydroxyurea DOS     Addendum 7/22/24:  Risk assessment received from Dr. Erik Andrade 7/22/24 which states:  \"Pt is optimized and stable for surgery. Periop rec: patient (to be) well hydrated and pain controlled.\"             Patient instructed to ambulate as soon as possible postoperatively to decrease thromboembolic risk.      Initiate mechanical DVT prophylaxis as soon as possible and initiate chemical prophylaxis when deemed safe from a bleeding standpoint post surgery.       VTE prophylaxis per surgical team       Tests ordered in PAT: drug tox    Risk assessment complete.  Patient is scheduled for a low surgical risk procedure.        Preoperative medication instructions were provided and reviewed with the patient.  Any additional testing or evaluation was explained to the patient.  Nothing by mouth instructions were discussed and patient's questions were answered prior to conclusion to this encounter.  Patient verbalized understanding of preoperative instructions given in preadmission testing; discharge instructions available in EMR.    This note was dictated by a speech recognition.  Minor errors may have been detected in a speech recognition.    "

## 2024-07-17 NOTE — PREPROCEDURE INSTRUCTIONS
Medication List            Accurate as of July 17, 2024  3:06 PM. Always use your most recent med list.                Adderall XR 15 mg 24 hr capsule  Generic drug: amphetamine-dextroamphetamine XR  Medication Adjustments for Surgery: Continue until night before surgery     albuterol 90 mcg/actuation inhaler  Inhale 2-4 puffs every 4-6 hours as needed for shortness of breath or wheezing  Notes to patient: Use if needed morning of surgery     bisacodyl 5 mg EC tablet  Commonly known as: Dulcolax  Take 1 tablet (5 mg) by mouth once daily as needed for constipation. Do not crush, chew, or split.  Medication Adjustments for Surgery: Continue until night before surgery     diclofenac sodium 1 % gel  Commonly known as: Voltaren  Medication Adjustments for Surgery: Continue until night before surgery  Notes to patient: Patient states not taking this medication     dimethicone-colloidal oatmeaL 1.2 % lotion  Commonly known as: Aveeno  Medication Adjustments for Surgery: Continue until night before surgery  Notes to patient: Patient states not taking this medication     Ensure Original 0.04-1.05 gram-kcal/mL liquid  Generic drug: food supplemt, lactose-reduced  Medication Adjustments for Surgery: Continue until night before surgery     folic acid 1 mg tablet  Commonly known as: Folvite  Take 1 tablet (1 mg) by mouth once daily.  Medication Adjustments for Surgery: Continue until night before surgery     hydroxyurea 500 mg capsule  Commonly known as: Hydrea  Take 2 capsules (1,000 mg total) by mouth once daily.  Take at the same time each day.  Medication Adjustments for Surgery: Take morning of surgery with sip of water, no other fluids     ibuprofen 600 mg tablet  Take 1 tablet (600 mg) by mouth every 6 hours for 10 days.  Medication Adjustments for Surgery: Stop 7 days before surgery     lidocaine 4 % patch  Notes to patient: Patient states not taking this medication     loratadine 10 mg tablet  Commonly known as:  Claritin     naproxen 500 mg tablet  Commonly known as: Naprosyn  Take 1 tablet (500 mg) by mouth every 12 hours. For the next 2 days, then as needed for pain  Medication Adjustments for Surgery: Stop 7 days before surgery     oxyCODONE 5 mg immediate release tablet  Commonly known as: Roxicodone  Take 1 tablet (5 mg) by mouth every 6 hours if needed for severe pain (7 - 10) for up to 10 days. For the next two days, then as needed for breakthrough pain  Notes to patient: Patient states not taking this medication     polyethylene glycol 17 gram packet  Commonly known as: Glycolax, Miralax  Take 17 g(contents of 1 packet dissolved in 8 ounces of water)  by mouth once daily as needed (constipation).  Medication Adjustments for Surgery: Continue until night before surgery     QUEtiapine 150 mg tablet  Take 150 mg by mouth once daily at bedtime.  Notes to patient: Patient states not taking this medication     senna 8.6 mg tablet  Generic drug: sennosides  Take 2 tablets (17.2 mg) by mouth 2 times a day as needed for constipation.  Notes to patient: Patient states not taking this medication     Symbicort 80-4.5 mcg/actuation inhaler  Generic drug: budesonide-formoteroL  Inhale 2 puffs 2 times a day. And 2 puffs every 4 hours as needed. (Max 10 puffs per 24 hours)  Notes to patient: Patient states not taking this medication                 Concerning above medication instructions - If medication is normally taken at night continue normal schedule - do not take night prior and morning of surgery.     CONTACT SURGEON'S OFFICE IF YOU DEVELOP:  * Fever = 100.4 F   * New respiratory symptoms (e.g. cough, shortness of breath, respiratory distress, sore throat)  * Recent loss of taste or smell  *Flu like symptoms such as headache, fatigue or gastrointestinal symptoms  * You develop any open sores, shingles, burning or painful urination   AND/OR:  * You no longer wish to have the surgery.  * Any other personal circumstances change  that may lead to the need to cancel or defer this surgery.  *You were admitted to any hospital within one week of your planned procedure.    SMOKING:  *Quitting smoking can make a huge difference to your health and recovery from surgery.    *If you need help with quitting, call 9-110-QUIT-NOW.    THE DAY BEFORE SURGERY:  *Do not eat any food after midnight the night before surgery/procedure.   *You are permitted to drink clear liquids (i.e. water, black coffee/tea, (no milk or cream) apple juice, and electrolyte drinks (Gatorade)13.5 ounces up to 2 hours before your instructed arrival time to the hospital.  *You may chew gum until  2 hours before your surgery/procedure.    SURGICAL TIME  *You will be contacted between 2 p.m. and 6 p.m. the business day before your surgery with your arrival time.  *If you haven't received a call by 6pm, call [phone].  *Scheduled surgery times may change and you will be notified if this occurs-check your personal voicemail for any updates.    ON THE MORNING OF SURGERY:  *Wear comfortable, loose fitting clothing.   *Do not use moisturizers, creams, lotions or perfume.  *All jewelry and valuables should be left at home.  *Prosthetic devices such as contact lenses, hearing aids, dentures, eyelash extensions, hairpins and body piercing must be removed before surgery.    BRING WITH YOU:  *Photo ID and insurance card  *Current list of medicines and allergies  *Pacemaker/Defibrillator/Heart stent cards  *CPAP machine and mask  *Slings/splints/crutches  *Copy of your complete Advanced Directive/DHPOA-if applicable  *Neurostimulator implant remote    PARKING AND ARRIVAL:  *Check in at the Main Entrance desk and let them know you are here for surgery.  *You will be directed to the 2nd floor surgical waiting area.    AFTER OUTPATIENT SURGERY:  *A responsible adult MUST accompany you at the time of discharge and stay with you for 24 hours after your surgery.  *You may NOT drive yourself home  after surgery.  *You may use a taxi or ride sharing service ([employer], [employer]) to return home ONLY if you are accompanied by a friend or family member.  *Instructions for resuming your medications will be provided by your surgeon.

## 2024-07-21 LAB — CARBOXYTHC UR-MCNC: 46 NG/ML

## 2024-07-22 ENCOUNTER — SOCIAL WORK (OUTPATIENT)
Dept: HEMATOLOGY/ONCOLOGY | Facility: HOSPITAL | Age: 19
End: 2024-07-22
Payer: MEDICAID

## 2024-07-22 ENCOUNTER — OFFICE VISIT (OUTPATIENT)
Dept: HEMATOLOGY/ONCOLOGY | Facility: HOSPITAL | Age: 19
End: 2024-07-22
Payer: MEDICAID

## 2024-07-22 VITALS
OXYGEN SATURATION: 97 % | SYSTOLIC BLOOD PRESSURE: 121 MMHG | WEIGHT: 144.62 LBS | BODY MASS INDEX: 20.75 KG/M2 | HEART RATE: 71 BPM | RESPIRATION RATE: 18 BRPM | DIASTOLIC BLOOD PRESSURE: 61 MMHG | TEMPERATURE: 98.8 F

## 2024-07-22 DIAGNOSIS — D57.1: ICD-10-CM

## 2024-07-22 DIAGNOSIS — F12.90 MARIJUANA USE: ICD-10-CM

## 2024-07-22 DIAGNOSIS — D57.20: ICD-10-CM

## 2024-07-22 DIAGNOSIS — D57.00 SICKLE CELL ANEMIA WITH CRISIS (MULTI): ICD-10-CM

## 2024-07-22 DIAGNOSIS — H36.829: ICD-10-CM

## 2024-07-22 DIAGNOSIS — K59.00 CONSTIPATION, UNSPECIFIED CONSTIPATION TYPE: Primary | ICD-10-CM

## 2024-07-22 DIAGNOSIS — Z86.59 HISTORY OF ADHD: ICD-10-CM

## 2024-07-22 DIAGNOSIS — F31.81 BIPOLAR II DISORDER (MULTI): ICD-10-CM

## 2024-07-22 LAB
AMPHET UR-MCNC: 4639 NG/ML
MDA UR-MCNC: <200 NG/ML
MDEA UR-MCNC: <200 NG/ML
MDMA UR-MCNC: <200 NG/ML
METHAMPHET UR-MCNC: <200 NG/ML
PHENTERMINE UR CFM-MCNC: <200 NG/ML

## 2024-07-22 PROCEDURE — 99215 OFFICE O/P EST HI 40 MIN: CPT | Performed by: PEDIATRICS

## 2024-07-22 RX ORDER — HYDROXYUREA 500 MG/1
1000 CAPSULE ORAL DAILY
Qty: 60 CAPSULE | Refills: 3 | Status: SHIPPED | OUTPATIENT
Start: 2024-07-22

## 2024-07-22 RX ORDER — DICLOFENAC SODIUM 10 MG/G
4 GEL TOPICAL 4 TIMES DAILY PRN
Qty: 50 G | Refills: 2 | Status: SHIPPED | OUTPATIENT
Start: 2024-07-22

## 2024-07-22 ASSESSMENT — PAIN SCALES - GENERAL: PAINLEVEL: 0-NO PAIN

## 2024-07-22 NOTE — PROGRESS NOTES
PSYCHOSOCIAL ASSESSMENT     Demographic Information  Josue Rangel  2005  [id number]  Preferred Name: Josue  Assessment Type:  Transition Patient Assessment  Date of assessment: 07/22/24  Provider(s): Erik Andrade MD  Diagnosis: HbSS  Person(s) present during assessment: GrandmotherDominick  Primary language: English  Interpretive services used: no                  Living Environment/Support Systems  Partner Status: Single  Children: none  Support systems: Grandmother and Great-Grandmother, Tiffanie  Primary caregiver: Self and Extended Family  Current Living Situation: House Other  Resides with: Grandmother and great-grandmother  Concerns with Housing Environment: None  Comments:     Safety  Patient safe at home? yes  History of Domestic Violence: denies      Functional Status  Functional status: Independent  Patient currently ambulates: Independently  Patient has following equipment: None  Other physical health issues that the patient is experiencing: further follow up needed  What supports are in place to assist the patient: None  Transportation:  Family/Friends: Grandmother  Comments:         Finances/Insurance  Insurance: [employer] Medicaid  Does the patient have any pending insurance applications: No   Hospital Financial Assistance: None  Patient's income source: SSI/SSDI  Work History: Works at [employer]   History: No  Background  Food Insecurity: No   Does the patient have any financial concerns: No   Any difficulties affording medications? No   Applicable [place]: No   Comments:      Advance Directives  No Advance Directives- Additional information provided  Health Care Agent Status:Not Activated  Health Care Agent, When applicable: TBD  Comments:    Legal Involvement  Relevant current or previous legal concerns: no     [employer] or Spiritual Identity  Comments: no    Mental Health  Active SI/HI: Recent History, recently discharge after attempt on 7/11/2024 ; Safety Plan,  mobile crisis hotline  Mental Health Diagnosis, if applicable: Bipolar/Mood Disorder  Mood: good   Concerns relating to substance use (including alcohol/tobacco): needs further assessment   Patient identified coping skills: Going outside, Art  Learning Preferences: needs further assessment  Cognitive Comments: needs further assessment  Relevant Providers: [facility]  Comments:       Assessment  Potential Barriers to Care:  Cognition and Mental Health  Patient Strengths:  Strong Support System    Plan  Referrals: N/A  Applications: N/A  Other: N/A    Narrative: LISW met with pt and patient's grandmother during first Adult Sickle Cell Appointment. Pt was reserved during visit providing mostly one word answers to LISW questions. Pt currently resides with grandmother and great-grandmother. Grandma reports pt is closest with his Great-grandma. Pt gave Sickle Cell team to speak to both grandma and great grandma regarding his care. Pt does not currently have advance directives. LISW provided education on HCPOA and guardianship. LISW will continue to have conversation with patient. Pt currently works at [employer] and requires at Return to work letter due to recent hospitalization. Letter signed and completed by Erik Andrade MD and provided by patient. Patient denies any further psychosocial or support service needs at this time. Patient encouraged to contact LISW if any needs arise.     LISW will continue to follow for further assessment and support.

## 2024-07-23 ENCOUNTER — ANESTHESIA EVENT (OUTPATIENT)
Dept: OPERATING ROOM | Facility: HOSPITAL | Age: 19
End: 2024-07-23
Payer: MEDICAID

## 2024-07-24 ENCOUNTER — ANESTHESIA (OUTPATIENT)
Dept: OPERATING ROOM | Facility: HOSPITAL | Age: 19
End: 2024-07-24
Payer: MEDICAID

## 2024-07-24 ENCOUNTER — HOSPITAL ENCOUNTER (OUTPATIENT)
Facility: HOSPITAL | Age: 19
Setting detail: OUTPATIENT SURGERY
Discharge: HOME | End: 2024-07-24
Attending: DENTIST | Admitting: DENTIST
Payer: MEDICAID

## 2024-07-24 VITALS
HEIGHT: 70 IN | OXYGEN SATURATION: 95 % | WEIGHT: 146.16 LBS | TEMPERATURE: 97.2 F | HEART RATE: 66 BPM | BODY MASS INDEX: 20.93 KG/M2 | DIASTOLIC BLOOD PRESSURE: 74 MMHG | RESPIRATION RATE: 13 BRPM | SYSTOLIC BLOOD PRESSURE: 118 MMHG

## 2024-07-24 DIAGNOSIS — R52 PAIN: Primary | ICD-10-CM

## 2024-07-24 PROCEDURE — 3700000002 HC GENERAL ANESTHESIA TIME - EACH INCREMENTAL 1 MINUTE: Performed by: DENTIST

## 2024-07-24 PROCEDURE — 2720000007 HC OR 272 NO HCPCS: Performed by: DENTIST

## 2024-07-24 PROCEDURE — 7100000001 HC RECOVERY ROOM TIME - INITIAL BASE CHARGE: Performed by: DENTIST

## 2024-07-24 PROCEDURE — 7100000009 HC PHASE TWO TIME - INITIAL BASE CHARGE: Performed by: DENTIST

## 2024-07-24 PROCEDURE — 3600000007 HC OR TIME - EACH INCREMENTAL 1 MINUTE - PROCEDURE LEVEL TWO: Performed by: DENTIST

## 2024-07-24 PROCEDURE — 2500000004 HC RX 250 GENERAL PHARMACY W/ HCPCS (ALT 636 FOR OP/ED): Performed by: REGISTERED NURSE

## 2024-07-24 PROCEDURE — 7100000010 HC PHASE TWO TIME - EACH INCREMENTAL 1 MINUTE: Performed by: DENTIST

## 2024-07-24 PROCEDURE — 2500000004 HC RX 250 GENERAL PHARMACY W/ HCPCS (ALT 636 FOR OP/ED): Performed by: ANESTHESIOLOGY

## 2024-07-24 PROCEDURE — 2500000001 HC RX 250 WO HCPCS SELF ADMINISTERED DRUGS (ALT 637 FOR MEDICARE OP): Performed by: REGISTERED NURSE

## 2024-07-24 PROCEDURE — 3700000001 HC GENERAL ANESTHESIA TIME - INITIAL BASE CHARGE: Performed by: DENTIST

## 2024-07-24 PROCEDURE — 3600000002 HC OR TIME - INITIAL BASE CHARGE - PROCEDURE LEVEL TWO: Performed by: DENTIST

## 2024-07-24 PROCEDURE — 2500000005 HC RX 250 GENERAL PHARMACY W/O HCPCS: Performed by: REGISTERED NURSE

## 2024-07-24 PROCEDURE — 2500000005 HC RX 250 GENERAL PHARMACY W/O HCPCS: Performed by: DENTIST

## 2024-07-24 PROCEDURE — 7100000002 HC RECOVERY ROOM TIME - EACH INCREMENTAL 1 MINUTE: Performed by: DENTIST

## 2024-07-24 RX ORDER — CHLORHEXIDINE GLUCONATE ORAL RINSE 1.2 MG/ML
15 SOLUTION DENTAL AS NEEDED
Qty: 120 ML | Refills: 0 | Status: SHIPPED | OUTPATIENT
Start: 2024-07-24

## 2024-07-24 RX ORDER — MIDAZOLAM HYDROCHLORIDE 1 MG/ML
INJECTION INTRAMUSCULAR; INTRAVENOUS AS NEEDED
Status: DISCONTINUED | OUTPATIENT
Start: 2024-07-24 | End: 2024-07-24

## 2024-07-24 RX ORDER — SODIUM CHLORIDE 0.9 G/100ML
IRRIGANT IRRIGATION AS NEEDED
Status: DISCONTINUED | OUTPATIENT
Start: 2024-07-24 | End: 2024-07-24 | Stop reason: HOSPADM

## 2024-07-24 RX ORDER — IPRATROPIUM BROMIDE 0.5 MG/2.5ML
500 SOLUTION RESPIRATORY (INHALATION) ONCE
Status: DISCONTINUED | OUTPATIENT
Start: 2024-07-24 | End: 2024-07-24 | Stop reason: HOSPADM

## 2024-07-24 RX ORDER — ALBUTEROL SULFATE 0.83 MG/ML
2.5 SOLUTION RESPIRATORY (INHALATION) ONCE AS NEEDED
Status: DISCONTINUED | OUTPATIENT
Start: 2024-07-24 | End: 2024-07-24 | Stop reason: HOSPADM

## 2024-07-24 RX ORDER — LIDOCAINE HYDROCHLORIDE 10 MG/ML
0.1 INJECTION, SOLUTION EPIDURAL; INFILTRATION; INTRACAUDAL; PERINEURAL ONCE
Status: DISCONTINUED | OUTPATIENT
Start: 2024-07-24 | End: 2024-07-24 | Stop reason: HOSPADM

## 2024-07-24 RX ORDER — OXYCODONE HYDROCHLORIDE 5 MG/1
5 TABLET ORAL EVERY 6 HOURS PRN
Qty: 15 TABLET | Refills: 0 | Status: SHIPPED | OUTPATIENT
Start: 2024-07-24 | End: 2024-07-29

## 2024-07-24 RX ORDER — OXYCODONE HYDROCHLORIDE 5 MG/1
5 TABLET ORAL EVERY 4 HOURS PRN
Status: DISCONTINUED | OUTPATIENT
Start: 2024-07-24 | End: 2024-07-24 | Stop reason: HOSPADM

## 2024-07-24 RX ORDER — DROPERIDOL 2.5 MG/ML
0.62 INJECTION, SOLUTION INTRAMUSCULAR; INTRAVENOUS ONCE AS NEEDED
Status: DISCONTINUED | OUTPATIENT
Start: 2024-07-24 | End: 2024-07-24 | Stop reason: HOSPADM

## 2024-07-24 RX ORDER — OXYMETAZOLINE HCL 0.05 %
SPRAY, NON-AEROSOL (ML) NASAL AS NEEDED
Status: DISCONTINUED | OUTPATIENT
Start: 2024-07-24 | End: 2024-07-24

## 2024-07-24 RX ORDER — PROPOFOL 10 MG/ML
INJECTION, EMULSION INTRAVENOUS AS NEEDED
Status: DISCONTINUED | OUTPATIENT
Start: 2024-07-24 | End: 2024-07-24

## 2024-07-24 RX ORDER — ONDANSETRON HYDROCHLORIDE 2 MG/ML
INJECTION, SOLUTION INTRAVENOUS AS NEEDED
Status: DISCONTINUED | OUTPATIENT
Start: 2024-07-24 | End: 2024-07-24

## 2024-07-24 RX ORDER — LIDOCAINE HYDROCHLORIDE AND EPINEPHRINE 10; 10 MG/ML; UG/ML
INJECTION, SOLUTION INFILTRATION; PERINEURAL AS NEEDED
Status: DISCONTINUED | OUTPATIENT
Start: 2024-07-24 | End: 2024-07-24 | Stop reason: HOSPADM

## 2024-07-24 RX ORDER — IBUPROFEN 600 MG/1
600 TABLET ORAL EVERY 6 HOURS PRN
Qty: 30 TABLET | Refills: 0 | Status: SHIPPED | OUTPATIENT
Start: 2024-07-24

## 2024-07-24 RX ORDER — SODIUM CHLORIDE, SODIUM LACTATE, POTASSIUM CHLORIDE, CALCIUM CHLORIDE 600; 310; 30; 20 MG/100ML; MG/100ML; MG/100ML; MG/100ML
100 INJECTION, SOLUTION INTRAVENOUS CONTINUOUS
Status: DISCONTINUED | OUTPATIENT
Start: 2024-07-24 | End: 2024-07-24 | Stop reason: HOSPADM

## 2024-07-24 RX ORDER — ROCURONIUM BROMIDE 10 MG/ML
INJECTION, SOLUTION INTRAVENOUS AS NEEDED
Status: DISCONTINUED | OUTPATIENT
Start: 2024-07-24 | End: 2024-07-24

## 2024-07-24 RX ORDER — ACETAMINOPHEN 325 MG/1
650 TABLET ORAL EVERY 4 HOURS PRN
Status: DISCONTINUED | OUTPATIENT
Start: 2024-07-24 | End: 2024-07-24 | Stop reason: HOSPADM

## 2024-07-24 RX ORDER — ACETAMINOPHEN 500 MG
1000 TABLET ORAL EVERY 6 HOURS PRN
Qty: 30 TABLET | Refills: 0 | Status: SHIPPED | OUTPATIENT
Start: 2024-07-24

## 2024-07-24 RX ORDER — CEFAZOLIN 1 G/1
INJECTION, POWDER, FOR SOLUTION INTRAVENOUS AS NEEDED
Status: DISCONTINUED | OUTPATIENT
Start: 2024-07-24 | End: 2024-07-24

## 2024-07-24 RX ORDER — LIDOCAINE HYDROCHLORIDE 20 MG/ML
INJECTION, SOLUTION EPIDURAL; INFILTRATION; INTRACAUDAL; PERINEURAL AS NEEDED
Status: DISCONTINUED | OUTPATIENT
Start: 2024-07-24 | End: 2024-07-24

## 2024-07-24 RX ORDER — DEXMEDETOMIDINE IN 0.9 % NACL 20 MCG/5ML
SYRINGE (ML) INTRAVENOUS AS NEEDED
Status: DISCONTINUED | OUTPATIENT
Start: 2024-07-24 | End: 2024-07-24

## 2024-07-24 RX ORDER — ONDANSETRON HYDROCHLORIDE 2 MG/ML
4 INJECTION, SOLUTION INTRAVENOUS ONCE AS NEEDED
Status: DISCONTINUED | OUTPATIENT
Start: 2024-07-24 | End: 2024-07-24 | Stop reason: HOSPADM

## 2024-07-24 RX ORDER — FENTANYL CITRATE 50 UG/ML
INJECTION, SOLUTION INTRAMUSCULAR; INTRAVENOUS AS NEEDED
Status: DISCONTINUED | OUTPATIENT
Start: 2024-07-24 | End: 2024-07-24

## 2024-07-24 ASSESSMENT — PAIN - FUNCTIONAL ASSESSMENT
PAIN_FUNCTIONAL_ASSESSMENT: 0-10

## 2024-07-24 ASSESSMENT — PAIN SCALES - GENERAL
PAINLEVEL_OUTOF10: 0 - NO PAIN

## 2024-07-24 NOTE — ANESTHESIA PROCEDURE NOTES
Airway  Date/Time: 7/24/2024 7:40 AM  Urgency: elective    Airway not difficult    Staffing  Performed: CRNA   Authorized by: Roosevelt Thompson MD    Performed by: DOTTY Bridges-CRNA, THERESA  Patient location during procedure: OR    Indications and Patient Condition  Indications for airway management: anesthesia  Spontaneous ventilation: present  Sedation level: deep  Preoxygenated: yes  Patient position: sniffing  Mask difficulty assessment: 1 - vent by mask    Final Airway Details  Final airway type: endotracheal airway      Successful airway: ETT  Cuffed: yes   Successful intubation technique: video laryngoscopy (Hickman)  Facilitating devices/methods: intubating stylet  Endotracheal tube insertion site: oral (oral jose luis)  Blade: Aurelia  Blade size: #3  ETT size (mm): 7.0  Cormack-Lehane Classification: grade I - full view of glottis  Placement verified by: chest auscultation and capnometry   Cuff volume (mL): 5  Measured from: lips  ETT to lips (cm): 21  Number of attempts at approach: 1  Number of other approaches attempted: 0    Additional Comments  Easy, atraumatic, grade one view, on first attempt using elective Hickman without difficulty. Head and neck maintained in neutral alignment. Dentition unchanged, no complications noted.

## 2024-07-24 NOTE — ANESTHESIA POSTPROCEDURE EVALUATION
Patient: Josue Rangel    Procedure Summary       Date: 07/24/24 Room / Location: [facility] OR 09 / Virtual [facility] OR    Anesthesia Start: 0727 Anesthesia Stop: 0821    Procedure: Teeth Extractions # 17 & # 32 (Mouth) Diagnosis:       Tooth impaction      (Impacted Teeth [K01.1])    Surgeons: Alexis Gan MD, DDS Responsible Provider: Roosevelt Thompson MD    Anesthesia Type: general ASA Status: 3            Anesthesia Type: general    Vitals Value Taken Time   /70 07/24/24 0817   Temp 36.2 °C (97.2 °F) 07/24/24 0817   Pulse 88 07/24/24 0817   Resp 21 07/24/24 0817   SpO2 97 % 07/24/24 0817       Anesthesia Post Evaluation    Patient location during evaluation: bedside  Patient participation: complete - patient participated  Level of consciousness: awake  Pain management: adequate  Airway patency: patent  Cardiovascular status: acceptable  Respiratory status: acceptable  Hydration status: acceptable  Postoperative Nausea and Vomiting: none        No notable events documented.

## 2024-07-24 NOTE — OP NOTE
Teeth Extractions # 17 & # 32 Operative Note     Date: 2024  OR Location: [facility] OR    Name: Joseu Rangel, : 2005, Age: 19 y.o., MRN: [id number], Sex: male    Diagnosis  Pre-op Diagnosis      * Tooth impaction [K01.1] Post-op Diagnosis     * Tooth impaction [K01.1]     Procedures  Teeth Extractions # 17 & # 32  [address]271 - MI UNLISTED PROCEDURE DENTOALVEOLAR STRUCTURES      Surgeons      * Alexis Gan - Primary    Resident/Fellow/Other Assistant:  Surgeons and Role:  * No surgeons found with a matching role *    Procedure Summary  Anesthesia: Anesthesia type not filed in the log.  ASA: III  Anesthesia Staff: Anesthesiologist: Roosevelt Thompson MD  CRNA: DOTTY Bridges-CRNA, THERESA  Estimated Blood Loss: 5mL  Intra-op Medications:   Administrations occurring from 0730 to 0900 on 24:   Medication Name Total Dose   sodium chloride 0.9 % irrigation solution 1,000 mL   lidocaine-epinephrine (Xylocaine W/EPI) 1 %-1:100,000 injection 10 mL   gelatin sponge,absorb-porcine (Gelfoam) sponge 1 each   lactated Ringer's infusion Cannot be calculated              Anesthesia Record               Intraprocedure I/O Totals          Intake    lactated Ringer's infusion 600.00 mL    Total Intake 600 mL          Specimen: No specimens collected     Staff:   Circulator: Payal Rankin Person: Talib  Circulator: Meredith         Drains and/or Catheters: * None in log *    Tourniquet Times:         Implants:     Findings: Impacted teeth 17 and 32    Indications: Josue Rangel is an 19 y.o. male who is having surgery for Impacted Teeth [K01.1].     The patient was seen in the preoperative area. The risks, benefits, complications, treatment options, non-operative alternatives, expected recovery and outcomes were discussed with the patient. The possibilities of reaction to medication, pulmonary aspiration, injury to surrounding structures, bleeding, recurrent infection, the need for additional procedures, failure to  diagnose a condition, and creating a complication requiring transfusion or operation were discussed with the patient. The patient concurred with the proposed plan, giving informed consent.  The site of surgery was properly noted/marked if necessary per policy. The patient has been actively warmed in preoperative area. Preoperative antibiotics have been ordered and given within 1 hours of incision. Venous thrombosis prophylaxis are not indicated.    Procedure Details:   The patient was greeted in the pre-op holding area, where all preoperative risks and complications were reviewed. Later, the patient was brought into the operating room by the anesthesia staff and was placed in the supine position. A time out was performed to confirmed patient's identity and the procedure to be performed. The patient was then induced for general anesthesia and intubated with a oral endotracheal tube. Following intubation, the oral endotracheal tube was secured by the anesthesia team. The patient was draped in standard oral and maxillofacial surgery fashion for dental extractions. Throat pack was placed and OR staff notified.    Bite block and sweetheart placed for retraction. 1% lidocaine with 1:100K epi was administered via inferior alveolar nerve blocks and local infiltration at the planned incision site. A preincision pause was performed. A 15 blade was then used to incise mucosa down to bone distobuccal to tooth #17. Periosteal elevator used to elevate the flaps and expose the teeth which were elevated and delivered with forceps. Extraction sites were irrigated with copious amount of normal saline. A 3-0 chromic gut suture was placed to reapproximate the tissue of site #17. A 15 blade was then used to incise mucosa down to bone distobuccal to teeth #32. Periosteal elevator used to elevate the flaps and expose the teeth which were elevated and delivered with forceps. Extraction sites were irrigated with copious amount of normal  saline. A 3-0 chromic gut suture was placed to reapproximate the tissue of site #32. Throat pack was removed and OR staff notified.    Care of the patient was then turned over to the anesthesia team. The patient was emerged from anesthesia, extubated and was taken to PACU in stable condition.     Dr. Gan was present for all critical portions of the case.    Complications:  None; patient tolerated the procedure well.    Disposition: PACU - hemodynamically stable.  Condition: stable         Additional Details:     Attending Attestation:     Alexis Gan  Phone Number: [phone]

## 2024-07-24 NOTE — PERIOPERATIVE NURSING NOTE
817: Phase 1 care begins  830: Pt contact updated via message  850: Phase 2 care begins  900: Discharge instructions reviewed with pt and family  909: IV removed  915: Pt transported to [facility]

## 2024-07-24 NOTE — ANESTHESIA PREPROCEDURE EVALUATION
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 : N  Date of Last Liquid: 07/24/24  Time of Last Liquid: 0500  Date of Last Solid: 07/23/24  Time of Last Solid: 2000  Last Intake Type: Clear fluids  Time of Last Void: 0530         Physical Exam    Airway  Mallampati: II     Cardiovascular   Rhythm: regular  Rate: normal     Dental    Pulmonary    Abdominal            Anesthesia Plan    History of general anesthesia?: yes  History of complications of general anesthesia?: no    ASA 3     general     intravenous induction   Anesthetic plan and risks discussed with patient.    Plan discussed with CRNA.

## 2024-07-24 NOTE — DISCHARGE INSTRUCTIONS
BLEEDING  ° Change gauze as directed every 20-30 minutes until active bleeding has subsided (usually 2-3 hours).  ° Make sure to apply good pressure to the gauze.  ° You may remove gauze to begin drinking, but return fresh gauze to surgery sites if bleeding is still present.  ° It is normal to experience light bleeding or oozing for up to 24 hours. If there is severe bleeding follow instructions at the bottom of the form under ``Excessive Bleeding´´    HYGIENE  ° Do not brush your teeth, rinse your mouth or spit for the first 24 hours  ° May brush your teeth as normal the next day  ° If prescribed Chlorhexidine rinse, gently rinse and spit the medication three times per day. Do not swallow the medication.    DIET  ° Do not drink through a straw.  ° Start first with clear liquids, and then gradually advance to soup, and soft foods.  ° Avoiding eating foods that are spicy, hot, tough, or chrunchy until the surgical sites have healed.    MEDICATIONS  ° Pain medication should be taken only during the time that you feel significant discomfort. If the pain is severe, the prescription medication can be used. However, if only mild discomfort is experienced, try to use a less potent, over the counter medication such as Advil (ibuprofen and/or Tylenol (Acetaminophen). These can be taken in crushed tablets or in liquid form.  ° Antibiotics: If prescribed please take antibiotics at the appropriate interval as described on the bottle. Be sure not to miss any doses and take the medicine until it is gone.    FIRST DAY AFTER SURGERY  ° Hygiene: Return to your normal brushing routine, being very careful around surgery site(s).  ° Avoid using full-strength mouthwashes for 2 weeks.  ° Begin using a chlorhexidine or warm salt-water rinse (1/4 teaspoon salt in a glass of warm water) after meals for the first week.  ° Pain and swelling is normal and expected, and may last for 10-14 days. Don´t be alarmed if the third day is the worst.  °  Continue eating soft foods. You may begin to gradually return to your normal diet as tolerated. Avoid spicy foods and drinks for 2 weeks.    PLEASE REPORT ANY OF THE FOLLOWING TO OUR TEAM:  ° Sudden or excessive bleeding, swelling, or bruising.  ° Any itching, rash, or adverse reaction to medication.  ° Fever over 100 degrees Fahrenheit.  ° Drainage or discharge from the incision sites (other than blood).  ° Any injury to the face over the next 6 weeks.    If you have any questions or concerns please contact us during regular office hours ([phone]). For any emergency or after hours questions do not hesitate to contact the resident on call. You may call [phone] for the hospital  and ask for the ``Oral Surgeon On Call´´.

## 2024-07-25 ENCOUNTER — PATIENT OUTREACH (OUTPATIENT)
Dept: CARE COORDINATION | Facility: CLINIC | Age: 19
End: 2024-07-25
Payer: MEDICAID

## 2024-07-25 NOTE — PROGRESS NOTES
Pt. Identified for post discharge services. Final outreach call to introduce self, available services, and assess needs.  Caller answered and then disconnected the call.   MITA Méndez  [facility]    Contact: [phone]

## 2024-07-31 ENCOUNTER — APPOINTMENT (OUTPATIENT)
Dept: HEMATOLOGY/ONCOLOGY | Facility: HOSPITAL | Age: 19
End: 2024-07-31
Payer: MEDICAID

## 2024-08-26 ENCOUNTER — TELEPHONE (OUTPATIENT)
Dept: HEMATOLOGY/ONCOLOGY | Facility: HOSPITAL | Age: 19
End: 2024-08-26
Payer: MEDICAID

## 2024-08-26 DIAGNOSIS — D57.20: ICD-10-CM

## 2024-08-26 DIAGNOSIS — D57.00 SICKLE CELL ANEMIA WITH CRISIS (MULTI): ICD-10-CM

## 2024-08-26 RX ORDER — HYDROXYUREA 500 MG/1
1000 CAPSULE ORAL DAILY
Qty: 60 CAPSULE | Refills: 3 | Status: SHIPPED | OUTPATIENT
Start: 2024-08-26

## 2024-08-26 RX ORDER — FOLIC ACID 1 MG/1
1 TABLET ORAL DAILY
Qty: 90 TABLET | Refills: 3 | Status: SHIPPED | OUTPATIENT
Start: 2024-08-26

## 2024-08-26 NOTE — TELEPHONE ENCOUNTER
Refill requests received for the following medications:    Hydroxyurea 500mg  Folic Acid 1mg    Preferred pharmacy is [facility] at [address] [place] [address] [place] in [place].    Message sent to [employer] [employer] team.

## 2024-08-28 ENCOUNTER — TELEPHONE (OUTPATIENT)
Dept: ADMISSION | Facility: HOSPITAL | Age: 19
End: 2024-08-28
Payer: MEDICAID

## 2024-08-28 DIAGNOSIS — D57.00 SICKLE CELL DISEASE WITH CRISIS (MULTI): ICD-10-CM

## 2024-08-28 DIAGNOSIS — J45.20 MILD INTERMITTENT ASTHMA WITHOUT COMPLICATION (HHS-HCC): ICD-10-CM

## 2024-08-28 RX ORDER — ALBUTEROL SULFATE 90 UG/1
INHALANT RESPIRATORY (INHALATION)
Qty: 18 G | Refills: 3 | Status: SHIPPED | OUTPATIENT
Start: 2024-08-28

## 2024-08-28 RX ORDER — OXYCODONE HYDROCHLORIDE 5 MG/1
5 TABLET ORAL EVERY 6 HOURS PRN
Qty: 10 TABLET | Refills: 0 | Status: SHIPPED | OUTPATIENT
Start: 2024-08-28 | End: 2024-09-04

## 2024-08-28 RX ORDER — BISACODYL 5 MG
5 TABLET, DELAYED RELEASE (ENTERIC COATED) ORAL DAILY PRN
Qty: 30 TABLET | Refills: 0 | Status: SHIPPED | OUTPATIENT
Start: 2024-08-28

## 2024-08-28 RX ORDER — SENNOSIDES 8.6 MG/1
2 TABLET ORAL 2 TIMES DAILY PRN
Qty: 60 TABLET | Refills: 0 | Status: SHIPPED | OUTPATIENT
Start: 2024-08-28

## 2024-08-28 NOTE — TELEPHONE ENCOUNTER
Josue Rangel's grandma called the refill line for Senna, Oxycodone, Dulcolax and Albuterol . Requesting refills be sent to [facility] pharmacy; message sent to [employer] team to submit.

## 2024-09-04 ENCOUNTER — OFFICE VISIT (OUTPATIENT)
Dept: HEMATOLOGY/ONCOLOGY | Facility: HOSPITAL | Age: 19
End: 2024-09-04
Payer: MEDICAID

## 2024-09-04 VITALS
OXYGEN SATURATION: 98 % | SYSTOLIC BLOOD PRESSURE: 122 MMHG | WEIGHT: 148.1 LBS | BODY MASS INDEX: 21.25 KG/M2 | TEMPERATURE: 97.2 F | DIASTOLIC BLOOD PRESSURE: 77 MMHG | HEART RATE: 63 BPM

## 2024-09-04 DIAGNOSIS — K59.00 CONSTIPATION, UNSPECIFIED CONSTIPATION TYPE: ICD-10-CM

## 2024-09-04 DIAGNOSIS — F31.81 BIPOLAR II DISORDER (MULTI): Primary | ICD-10-CM

## 2024-09-04 DIAGNOSIS — R52 PAIN: ICD-10-CM

## 2024-09-04 DIAGNOSIS — Z51.81 ENCOUNTER FOR MONITORING OF HYDROXYUREA THERAPY: ICD-10-CM

## 2024-09-04 DIAGNOSIS — D57.20: ICD-10-CM

## 2024-09-04 DIAGNOSIS — Z79.64 ENCOUNTER FOR MONITORING OF HYDROXYUREA THERAPY: ICD-10-CM

## 2024-09-04 PROCEDURE — 99214 OFFICE O/P EST MOD 30 MIN: CPT | Performed by: PEDIATRICS

## 2024-09-04 RX ORDER — CHLORHEXIDINE GLUCONATE ORAL RINSE 1.2 MG/ML
15 SOLUTION DENTAL AS NEEDED
Qty: 120 ML | Refills: 0 | Status: SHIPPED | OUTPATIENT
Start: 2024-09-04

## 2024-09-04 RX ORDER — LIDOCAINE 560 MG/1
1 PATCH PERCUTANEOUS; TOPICAL; TRANSDERMAL EVERY 12 HOURS PRN
Qty: 10 PATCH | Refills: 2 | Status: SHIPPED | OUTPATIENT
Start: 2024-09-04

## 2024-09-04 ASSESSMENT — PAIN SCALES - GENERAL: PAINLEVEL: 0-NO PAIN

## 2024-09-07 ASSESSMENT — ENCOUNTER SYMPTOMS
DIARRHEA: 0
SORE THROAT: 0
SPEECH DIFFICULTY: 0
DIZZINESS: 0
HEMATURIA: 0
CHEST TIGHTNESS: 0
DIAPHORESIS: 0
ARTHRALGIAS: 0
COUGH: 0
NAUSEA: 0
HEADACHES: 0
VOMITING: 0
SEIZURES: 0
BACK PAIN: 0
SCLERAL ICTERUS: 0
WOUND: 0
DYSURIA: 0
EXTREMITY WEAKNESS: 0
PALPITATIONS: 0
SHORTNESS OF BREATH: 0
FATIGUE: 0
BRUISES/BLEEDS EASILY: 0
DEPRESSION: 0
WHEEZING: 0
NUMBNESS: 0
EYE PROBLEMS: 0
HEMOPTYSIS: 0
MYALGIAS: 0
TROUBLE SWALLOWING: 0
FEVER: 0
CONSTIPATION: 0
ABDOMINAL PAIN: 0
LEG SWELLING: 0
NERVOUS/ANXIOUS: 0
LIGHT-HEADEDNESS: 0

## 2024-09-07 NOTE — PROGRESS NOTES
SICKLE CELL OUTPATIENT NOTE  Patient ID: Josue Rangel   Visit Type: Follow up visit       ASSESSMENT AND PLAN  Josue Rangel is 19 y.o. 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 He denies suicidal ideation in clinic today   Cannabis use   Chronic constipation   Bronchial asthma, well controlled without recent acute exacerbation     PLAN   - No changes in his current home oral pain medications. This will be as follows   Oral Tylenol 650 mg every 6 hours as needed and or ibuprofen 600 mg every 8 hours prn or naprosyn 500 mg BID as needed for mild to moderate pain   Oral oxycodone 5 mg every 6 hours as needed for moderate, severe and breakthrough pain  Topical Lidoderm patch as needed to sites of sickle cell pain   Discussed non pharmacologic methods of pain control   Reviewed OAR[place]  He is due for a UDS and controlled substance agreement but these were deferred because he needs to get to his psych appointment   - Continue hydroxyurea 1000 mg daily   - He has an appointment with behavioral health scheduled this afternoon and emphasized importance of this appointment and also discuss need to continue psych medications because of risk of relapse   - Discussed marijuana smoke cessation   - Daily folic acid   - Inhaled albuterol as needed and scheduled inhaled Symbicort BID. We will plan on referral to pulmonology at his next office visit encounter   - Dulcolax and miralax as needed for constipation   - Follow up office visit will be in 1 months time     Chief Complaint: Follow up for HB SC SCD on Hydroxyurea      Interval History: Brain is present with his girl friend in clinic today. He has a history of HB SC SCD with significant complications of SCD necessitating the initiation of oral hydroxyurea. He is  tolerating this at 1000 mg daily. He denies acute sickle cell pain in clinic today. His most recent hospitalization was in July for suicidal idea which was complicated by pain. Following resolution of sickle cell pain which was managed with IV dilaudid followed by oral oxycodone, he was discharged to inpatient psych facility. He was supposed to follow up with behavioral health but has not done so outpatient and has not been taking any of his medications. He denies acute sickle cell pain in clinic today. He indicates that his mood and affect are normal. He denies headaches, dizziness, nausea or vomiting and his appetite is good. He does not have any focal neurologic deficits, priapism or leg swelling. He continues to smoke marijuana. Chronic constipation is well controlled.     Review of System:   Review of Systems   Constitutional:  Negative for diaphoresis, fatigue and fever.   HENT:   Negative for mouth sores, nosebleeds, sore throat and trouble swallowing.    Eyes:  Negative for eye problems and icterus.   Respiratory:  Negative for chest tightness, cough, hemoptysis, shortness of breath and wheezing.    Cardiovascular:  Negative for chest pain, leg swelling and palpitations.   Gastrointestinal:  Negative for abdominal pain, constipation, diarrhea, nausea and vomiting.   Genitourinary:  Negative for dysuria and hematuria.    Musculoskeletal:  Negative for arthralgias, back pain and myalgias.   Skin:  Negative for rash and wound.   Neurological:  Negative for dizziness, extremity weakness, headaches, light-headedness, numbness, seizures and speech difficulty.   Hematological:  Does not bruise/bleed easily.   Psychiatric/Behavioral:  Negative for depression and suicidal ideas. The patient is not nervous/anxious.       Allergies:   No Known Allergies    Current Medications:   Outpatient Encounter Medications as of 9/4/2024   Medication Sig    acetaminophen (Tylenol) 500 mg tablet Take 2 tablets (1,000 mg) by mouth  every 6 hours if needed for mild pain (1 - 3).    albuterol 90 mcg/actuation inhaler Inhale 2-4 puffs every 4-6 hours as needed for shortness of breath or wheezing    amphetamine-dextroamphetamine XR (Adderall XR) 15 mg 24 hr capsule Adderall XR 15 MG Oral Capsule Extended Release 24 Hour   Quantity: 30  Refills: 0  Start : 2-Sep-2016   Active    bisacodyl (Dulcolax) 5 mg EC tablet Take 1 tablet (5 mg) by mouth once daily as needed for constipation. Do not crush, chew, or split.    diclofenac sodium (Voltaren) 1 % gel Apply 4.5 inches (4 g) topically 4 times a day as needed (applied tropically for pain).    folic acid (Folvite) 1 mg tablet Take 1 tablet (1 mg) by mouth once daily.    hydroxyurea (Hydrea) 500 mg capsule Take 2 capsules (1,000 mg total) by mouth once daily.  Take at the same time each day.    ibuprofen 600 mg tablet Take 1 tablet (600 mg) by mouth every 6 hours if needed for mild pain (1 - 3).    loratadine (Claritin) 10 mg tablet Take 1 tablet (10 mg) by mouth once daily at bedtime.    [] oxyCODONE (Roxicodone) 5 mg immediate release tablet Take 1 tablet (5 mg) by mouth every 6 hours if needed for severe pain (7 - 10) for up to 7 days.    polyethylene glycol (Glycolax, Miralax) 17 gram packet Take 17 g(contents of 1 packet dissolved in 8 ounces of water)  by mouth once daily as needed (constipation).    sennosides (Senokot) 8.6 mg tablet Take 2 tablets (17.2 mg) by mouth 2 times a day as needed for constipation.    Symbicort 80-4.5 mcg/actuation inhaler Inhale 2 puffs 2 times a day. And 2 puffs every 4 hours as needed. (Max 10 puffs per 24 hours)    chlorhexidine (Peridex) 0.12 % solution Use 15 mL in the mouth or throat if needed for wound care.    dimethicone-colloidal oatmeaL (Aveeno) 1.2 % lotion 1 Application every 12 hours.    food supplemt, lactose-reduced (Ensure Original) 0.04-1.05 gram-kcal/mL liquid Ensure Oral Liquid   Refills: 0        Start : 3-Aug-2017   Active    lidocaine 4 %  patch Place 1 patch over 12 hours on the skin every 12 hours if needed for mild pain (1 - 3) or moderate pain (4 - 6). Remover after 12 hours    naproxen (Naprosyn) 500 mg tablet Take 1 tablet (500 mg) by mouth every 12 hours. For the next 2 days, then as needed for pain (Patient taking differently: Take 1 tablet (500 mg) by mouth every 12 hours.)    QUEtiapine 150 mg tablet Take 150 mg by mouth once daily at bedtime. (Patient not taking: Reported on 7/17/2024)     Past Medical History:    has a past medical history of ADHD, Anxiety, Asthma ([facility]), Bipolar disorder (Multi), Depression, History of suicidal ideation, Seizure-like activity (Multi), and Sickle cell disease (Multi).    Past Surgical History:    has a past surgical history that includes MR angio head wo IV contrast (10/8/2014); MR angio neck wo IV contrast (10/8/2014); MR angio head wo IV contrast (7/10/2023); MR angio neck wo IV contrast (7/10/2023); and CT guided percutaneous biopsy bone deep (7/17/2023).    Family History:   Family History   Problem Relation Name Age of Onset    Diabetes Paternal Grandmother      Diabetes Paternal Grandfather         Social History:   Josue Rangel  reports that he has never smoked. He has been exposed to tobacco smoke. He has never used smokeless tobacco.  reports current drug use. Drug: Marijuana.    EXAMINATION FINDINGS   /77 (BP Location: Left arm, Patient Position: Sitting, BP Cuff Size: Adult)   Pulse 63   Temp 36.2 °C (97.2 °F) (Temporal)   Wt 67.2 kg (148 lb 1.6 oz)   SpO2 98%   BMI 21.25 kg/m²   1.82 meters squared    Physical Exam  Vitals reviewed.   Constitutional:       General: He is not in acute distress.     Appearance: Normal appearance. He is not ill-appearing.      Comments: Smells of mariajuana and appeared a bit stoned    HENT:      Nose: Nose normal.   Eyes:      General: No scleral icterus.     Extraocular Movements: Extraocular movements intact.      Conjunctiva/sclera:  Conjunctivae normal.   Cardiovascular:      Rate and Rhythm: Normal rate and regular rhythm.      Pulses: Normal pulses.      Heart sounds: Normal heart sounds. No murmur heard.  Pulmonary:      Effort: Pulmonary effort is normal. No respiratory distress.      Breath sounds: Normal breath sounds. No wheezing.   Abdominal:      General: Bowel sounds are normal.      Palpations: Abdomen is soft.      Tenderness: There is no abdominal tenderness. There is no guarding.   Musculoskeletal:      Right lower leg: No edema.      Left lower leg: No edema.   Skin:     General: Skin is warm.      Capillary Refill: Capillary refill takes less than 2 seconds.      Findings: No lesion or rash.   Neurological:      General: No focal deficit present.      Mental Status: He is alert and oriented to person, place, and time. Mental status is at baseline.      Cranial Nerves: No cranial nerve deficit.      Motor: No weakness.   Psychiatric:         Mood and Affect: Mood normal.         Behavior: Behavior normal.         Thought Content: Thought content normal.       LABS   Lab on 07/17/2024   Component Date Value Ref Range Status    Amphetamine Screen, Urine 07/17/2024 Presumptive Positive (A)  Presumptive Negative Final    CUTOFF LEVEL: 500 NG/ML   Cross-reactivity has been reported with high concentrations   of the following drugs: buproprion, chloroquine, chlorpromazine,   ephedrine, mephentermine, fenfluramine, phentermine,   phenylpropanolamine, pseudoephedrine, and propranolol.    Barbiturate Screen, Urine 07/17/2024 Presumptive Negative  Presumptive Negative Final    CUTOFF LEVEL: 200 NG/ML    Benzodiazepines Screen, Urine 07/17/2024 Presumptive Negative  Presumptive Negative Final    CUTOFF LEVEL: 200 NG/ML    Cannabinoid Screen, Urine 07/17/2024 Presumptive Positive (A)  Presumptive Negative Final    CUTOFF LEVEL: 50 NG/ML    Cocaine Metabolite Screen, Urine 07/17/2024 Presumptive Negative  Presumptive Negative Final    CUTOFF  LEVEL: 150 NG/ML    Fentanyl Screen, Urine 07/17/2024 Presumptive Negative  Presumptive Negative Final    CUTOFF LEVEL: 5 NG/ML    Opiate Screen, Urine 07/17/2024 Presumptive Negative  Presumptive Negative Final    CUTOFF LEVEL: 300 NG/ML  The opiate screen does not detect fentanyl, meperidine, or   tramadol. Oxycodone is not consistently detected (refer to  Oxycodone Screen, Urine result).    Oxycodone Screen, Urine 07/17/2024 Presumptive Negative  Presumptive Negative Final    CUTOFF LEVEL: 100 NG/ML  This test will accurately detect both oxycodone and oxymorphone.    PCP Screen, Urine 07/17/2024 Presumptive Negative  Presumptive Negative Final    CUTOFF LEVEL:  25 NG/ML  Cross-reactivity has been reported with dextromethorphan.    Methadone Screen, Urine 07/17/2024 Presumptive Negative  Presumptive Negative Final    CUTOFF LEVEL: 150 NG/ML  The metabolite L-alpha-acetylmethadol (LAAM) is not  detected by this method in concentrations that would  be found in the urine of patients on LAAM therapy.    Methamphetamine Quant, Ur 07/17/2024 <200  ng/mL Final    MDA, Urine 07/17/2024 <200  ng/mL Final    MDEA, Urine 07/17/2024 <200  ng/mL Final    Phentermine,Urine 07/17/2024 <200  ng/mL Final    Performed By: [facility]  [address] [place] [address]  : Sunil Melo MD, PhD  CLIA Number: [id number]    Amphetamines,Urine 07/17/2024 4639  ng/mL Final    INTERPRETIVE INFORMATION: Amphetamines, Urine,                             Quantitative    Methodology: Quantitative Liquid Chromatography-Tandem Mass   Spectrometry    Positive cutoff: 200 ng/mL unless specified below:  Amphetamine     50 ng/mL    For medical purposes only; not valid for forensic use.     The absence of expected drug(s) and/or drug metabolite(s) may   indicate non-compliance, inappropriate timing of specimen   collection relative to drug administration, poor drug absorption,   diluted/adulterated urine, or  limitations of testing. The   concentration value must be greater than or equal to the cutoff to   be reported as positive. Interpretive questions should be directed   to the laboratory.    This test was developed and its performance characteristics   determined by [facility]. It has not been cleared or   approved by the [place] Food and Drug Administration. This test was   performed in a CLIA certified laboratory and is intended for   clinical purposes.    MDMA, Urine 07/17/2024 <200  ng/mL Final    39-Oic-5-carboxy-THC, Urn, Quant 07/17/2024 46  ng/mL Final    Comment: INTERPRETIVE INFORMATION: THC Metabolite, Urine,                             Quantitative    Methodology: Quantitative Liquid Chromatography-Tandem Mass   Spectrometry  Positive cutoff: 15 ng/mL  For medical purposes only; not valid for forensic use.  The drug analyte detected in this assay, 9-carboxy THC, is a   metabolite of delta-9-tetrahydrocannabinol (THC). Detection of   9-carboxy THC suggests use of, or exposure to, a product   containing THC.  This test cannot distinguish between prescribed   or non-prescribed forms of THC, nor can it distinguish between   active or passive use. The 9-carboxy THC metabolite can be   detected in urine for several weeks. Normalization of results to   creatinine concentration can help document elimination or suggest   recent use, when specimens are collected at least one week apart.    This test was developed and its performance characteristics   determined by [facility]. It has not been cleared or   approved by the [place] Food and Drug                            Administration. This test was   performed in a CLIA certified laboratory and is intended for   clinical purposes.  Performed By: [facility]  [address] [place] [id number]  : Sunil Melo MD, PhD  CLIA Number: [id number]      Latest Reference Range & Units 07/11/24 05:20   GLUCOSE 74 - 99 mg/dL 75    SODIUM 136 - 145 mmol/L 138   POTASSIUM 3.5 - 5.3 mmol/L 4.1   CHLORIDE 98 - 107 mmol/L 105   Bicarbonate 21 - 32 mmol/L 25   Anion Gap 10 - 20 mmol/L 12   Blood Urea Nitrogen 6 - 23 mg/dL 8   Creatinine 0.50 - 1.30 mg/dL 0.63   EGFR >60 mL/min/1.73m*2 >90   Calcium 8.6 - 10.6 mg/dL 9.3   Albumin 3.4 - 5.0 g/dL 4.2   Alkaline Phosphatase 33 - 120 U/L 56   ALT 10 - 52 U/L 9 (L)   AST 9 - 39 U/L 20   Bilirubin Total 0.0 - 1.2 mg/dL 1.5 (H)   Total Protein 6.4 - 8.2 g/dL 7.1   LDH 84 - 246 U/L 227   WBC 4.4 - 11.3 x10*3/uL 6.9   nRBC 0.0 - 0.0 /100 WBCs 0.9 (H)   RBC 4.50 - 5.90 x10*6/uL 3.37 (L)   HEMOGLOBIN 13.5 - 17.5 g/dL 10.5 (L)   HEMATOCRIT 41.0 - 52.0 % 28.4 (L)   MCV 80 - 100 fL 84   MCH 26.0 - 34.0 pg 31.2   MCHC 32.0 - 36.0 g/dL 37.0 (H)   RED CELL DISTRIBUTION WIDTH 11.5 - 14.5 % 14.7 (H)   Platelets 150 - 450 x10*3/uL 301   Neutrophils % 40.0 - 80.0 % 37.2   Immature Granulocytes %, Automated 0.0 - 0.9 % 0.1   Lymphocytes % 13.0 - 44.0 % 45.5   Monocytes % 2.0 - 10.0 % 8.9   Eosinophils % 0.0 - 6.0 % 7.7   Basophils % 0.0 - 2.0 % 0.6   Neutrophils Absolute 1.20 - 7.70 x10*3/uL 2.55   Immature Granulocytes Absolute, Automated 0.00 - 0.70 x10*3/uL 0.01   Lymphocytes Absolute 1.20 - 4.80 x10*3/uL 3.12   Monocytes Absolute 0.10 - 1.00 x10*3/uL 0.61   Eosinophils Absolute 0.00 - 0.70 x10*3/uL 0.53   Basophils Absolute 0.00 - 0.10 x10*3/uL 0.04   Immature Retic fraction <=16.0 % 30.7 (H)   Retic Absolute 0.022 - 0.118 x10*6/uL 0.153 (H)   Retic % 0.5 - 2.0 % 4.5 (H)   Reticulocyte Hemoglobin 28 - 38 pg 32              Erik Andrade MD

## 2024-09-17 ENCOUNTER — APPOINTMENT (OUTPATIENT)
Dept: RESPIRATORY THERAPY | Facility: HOSPITAL | Age: 19
End: 2024-09-17
Payer: MEDICAID

## 2024-09-21 NOTE — PROGRESS NOTES
SICKLE CELL OUTPATIENT NOTE  Patient ID: Josue Rangel   Visit Type: Follow up visit     ASSESSMENT AND PLAN  Josue Rangel is 19 y.o. 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 No changes in his home oral pain regimen which will be as follows   - Oral Tylenol 650 mg every 6 hours as needed and or ibuprofen 600 mg every 8 hours prn or naprosyn 500 mg BID as needed for mild to moderate pain   - Oral oxycodone 5 mg every 6 hours as needed for moderate, severe and breakthrough pain  - Topical Lidoderm patch and diclofenac gel as needed applied to sites of sickle cell pain   - Discussed non pharmacologic methods of pain control   - Reviewed OARRS and there are no prescriptions of concern   - IV morphine 4 mg every 60 mins X 3 doses in the ED and IV morphine 4 mg every 3-4 hours as needed if he is admitted to hospital. I will develop a formal care plan at his next office visit   - Referral placed for a PCP      Encounter for management of DMT with oral hydroxyurea. He has not been compliant with medication   - Discussed importance of medication compliance and we will continue with hydroxyurea 1000 mg daily     History of ADHD, Anxiety and Bipolar disorder   - follow up with behavioral health as scheduled and emphasized importance of this follow up. 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 He will need a CBC, CMP, Ferritin, LDH and retic at this encounter     Chief Complaint: Transfer of care for HB SC SCD      Interval History: Josue is present for follow up of HB SC SCD. He is scheduled to have a tooth extraction on 7/29/24 of his lower wisdom tooth. He is without any pain in clinic today. Typical sites of sickle cell pain is usually his abdomen , lower back and legs with weather changes being a significant trigger. Last pain episode was 2 weeks ago which was well controlled with his home oral pain medications of tylenol, naprosyn and oxycodone 5 mg as needed. IV Morphine typically controls his pain. Chronic constipation is well controlled with his current laxatives. He was last admitted to hospital from 7/9-7/11 for sickel cell pain episodes.   He has continued blurry vision but denies any floaters or flashes of light. He is easily short of breath typically with exertion. He denies history of priapism or focal neurologic deficits or symptoms to suggest an acute stroke. Bronchial asthma has been well controlled  without recent acute exacerbation. He is supposed to be on hydroxyurea but has not been very compliant with this. He has a history of ADHD and bipolar disorder but not following up regularly with behavioral health and also not very compliant with his psych medications. He denies feeling anxious or depressed and he does not have any suicidal ideation.      Review of System:   Review of Systems   Constitutional:  Negative for chills, fatigue and fever.   HENT:   Negative for mouth sores, nosebleeds and sore throat.    Eyes:  Negative for eye problems and icterus.   Respiratory:  Negative for chest tightness, cough, hemoptysis, shortness of breath and wheezing.    Cardiovascular:   Negative for chest pain, leg swelling and palpitations.   Gastrointestinal:  Negative for abdominal pain, constipation, diarrhea, nausea and vomiting.   Genitourinary: Negative.     Musculoskeletal:  Negative for arthralgias, back pain, gait problem and myalgias.   Skin:  Negative for itching, rash and wound.   Neurological:  Negative for dizziness, extremity weakness, gait problem, headaches, light-headedness and seizures.   Hematological:  Does not bruise/bleed easily.   Psychiatric/Behavioral:  Negative for depression. The patient is not nervous/anxious.       Allergies:   No Known Allergies    Current Medications:   Outpatient Encounter Medications as of 2024   Medication Sig    amphetamine-dextroamphetamine XR (Adderall XR) 15 mg 24 hr capsule Adderall XR 15 MG Oral Capsule Extended Release 24 Hour    food supplemt, lactose-reduced (Ensure Original) 0.04-1.05 gram-kcal/mL liquid Ensure Oral Liquid    loratadine (Claritin) 10 mg tablet Take 1 tablet (10 mg) by mouth once daily at bedtime.    naproxen (Naprosyn) 500 mg tablet Take 1 tablet (500 mg) by mouth every 12 hours. For the next 2 days, then as needed for pain (Patient taking differently: Take 1 tablet (500 mg) by mouth every 12 hours.)    polyethylene glycol (Glycolax, Miralax) 17 gram packet Take 17 g(contents of 1 packet dissolved in 8 ounces of water)  by mouth once daily as needed (constipation).    Symbicort 80-4.5 mcg/actuation inhaler Inhale 2 puffs 2 times a day. And 2 puffs every 4 hours as needed. (Max 10 puffs per 24 hours)    diclofenac sodium (Voltaren) 1 % gel Apply 4.5 inches (4 g) topically 4 times a day as needed (applied tropically for pain).    dimethicone-colloidal oatmeaL (Aveeno) 1.2 % lotion 1 Application every 12 hours.    [] ibuprofen 600 mg tablet Take 1 tablet (600 mg) by mouth every 6 hours for 10 days.    QUEtiapine 150 mg tablet Take 150 mg by mouth once daily at bedtime. (Patient not taking: Reported on  7/17/2024)     Past Medical/Surgical History:   ADHD  Anxiety  Asthma   Bipolar disorder  Depression  History of suicidal ideation  Seizure-like activity (Multi), EEG normal   Hb SC Sickle cell disease  Multiple acute chest syndrome  History of red cell exchange transfusion for ACS  Multiple sickle cell pain episodes   History of septic arthritis vrs osteomyelitis of the hip   Sickel cell retinopathy     Family History:   Family History   Problem Relation Name Age of Onset    Diabetes Paternal Grandmother      Diabetes Paternal Grandfather         Social History:   Josue Rangel  reports that he has never smoked. He has been exposed to tobacco smoke. He has never used smokeless tobacco.  reports current drug use. Drug: Marijuana. Lives with his Grand parents and mother is not actively involved. He does not smoke cigarettes but uses Marijuana daily. He denies alcohol use. Unfortunately he did not graduate from high school.     EXAMINATION FINDINGS   /61 (BP Location: Right arm, Patient Position: Sitting)   Pulse 71   Temp 37.1 °C (98.8 °F) (Temporal)   Resp 18   Wt 65.6 kg (144 lb 10 oz)   SpO2 97%   BMI 20.75 kg/m²   1.8 meters squared    Physical Exam  Vitals and nursing note reviewed.   Constitutional:       General: He is not in acute distress.     Appearance: Normal appearance. He is normal weight. He is not ill-appearing.   HENT:      Nose: Nose normal. No congestion or rhinorrhea.      Mouth/Throat:      Mouth: Mucous membranes are moist.      Pharynx: Oropharynx is clear. No oropharyngeal exudate.   Eyes:      General: No scleral icterus.     Extraocular Movements: Extraocular movements intact.      Conjunctiva/sclera: Conjunctivae normal.      Pupils: Pupils are equal, round, and reactive to light.   Cardiovascular:      Rate and Rhythm: Normal rate and regular rhythm.      Pulses: Normal pulses.      Heart sounds: No murmur heard.  Pulmonary:      Effort: Pulmonary effort is normal. No  respiratory distress.      Breath sounds: Normal breath sounds. No wheezing or rales.   Abdominal:      General: Abdomen is flat. Bowel sounds are normal. There is no distension.      Palpations: Abdomen is soft.      Tenderness: There is no abdominal tenderness. There is no guarding.   Musculoskeletal:         General: No swelling or deformity. Normal range of motion.      Cervical back: Normal range of motion and neck supple.      Right lower leg: No edema.      Left lower leg: No edema.   Skin:     General: Skin is warm.      Capillary Refill: Capillary refill takes less than 2 seconds.      Coloration: Skin is not jaundiced.      Findings: No bruising or lesion.   Neurological:      General: No focal deficit present.      Mental Status: He is alert and oriented to person, place, and time. Mental status is at baseline.      Cranial Nerves: No cranial nerve deficit.      Motor: No weakness.      Gait: Gait normal.   Psychiatric:         Mood and Affect: Mood normal.         Behavior: Behavior normal.          LABS   Lab on 07/17/2024   Component Date Value Ref Range Status    Amphetamine Screen, Urine 07/17/2024 Presumptive Positive (A)  Presumptive Negative Final    CUTOFF LEVEL: 500 NG/ML   Cross-reactivity has been reported with high concentrations   of the following drugs: buproprion, chloroquine, chlorpromazine,   ephedrine, mephentermine, fenfluramine, phentermine,   phenylpropanolamine, pseudoephedrine, and propranolol.    Barbiturate Screen, Urine 07/17/2024 Presumptive Negative  Presumptive Negative Final    CUTOFF LEVEL: 200 NG/ML    Benzodiazepines Screen, Urine 07/17/2024 Presumptive Negative  Presumptive Negative Final    CUTOFF LEVEL: 200 NG/ML    Cannabinoid Screen, Urine 07/17/2024 Presumptive Positive (A)  Presumptive Negative Final    CUTOFF LEVEL: 50 NG/ML    Cocaine Metabolite Screen, Urine 07/17/2024 Presumptive Negative  Presumptive Negative Final    CUTOFF LEVEL: 150 NG/ML    Fentanyl  Screen, Urine 07/17/2024 Presumptive Negative  Presumptive Negative Final    CUTOFF LEVEL: 5 NG/ML    Opiate Screen, Urine 07/17/2024 Presumptive Negative  Presumptive Negative Final    CUTOFF LEVEL: 300 NG/ML  The opiate screen does not detect fentanyl, meperidine, or   tramadol. Oxycodone is not consistently detected (refer to  Oxycodone Screen, Urine result).    Oxycodone Screen, Urine 07/17/2024 Presumptive Negative  Presumptive Negative Final    CUTOFF LEVEL: 100 NG/ML  This test will accurately detect both oxycodone and oxymorphone.    PCP Screen, Urine 07/17/2024 Presumptive Negative  Presumptive Negative Final    CUTOFF LEVEL:  25 NG/ML  Cross-reactivity has been reported with dextromethorphan.    Methadone Screen, Urine 07/17/2024 Presumptive Negative  Presumptive Negative Final    CUTOFF LEVEL: 150 NG/ML  The metabolite L-alpha-acetylmethadol (LAAM) is not  detected by this method in concentrations that would  be found in the urine of patients on LAAM therapy.    Methamphetamine Quant, Ur 07/17/2024 <200  ng/mL Final    MDA, Urine 07/17/2024 <200  ng/mL Final    MDEA, Urine 07/17/2024 <200  ng/mL Final    Phentermine,Urine 07/17/2024 <200  ng/mL Final    Performed By: [facility]  [address] [place] [address]  : Sunil Melo MD, PhD  CLIA Number: [id number]    Amphetamines,Urine 07/17/2024 4639  ng/mL Final    INTERPRETIVE INFORMATION: Amphetamines, Urine,                             Quantitative    Methodology: Quantitative Liquid Chromatography-Tandem Mass   Spectrometry    Positive cutoff: 200 ng/mL unless specified below:  Amphetamine     50 ng/mL    For medical purposes only; not valid for forensic use.     The absence of expected drug(s) and/or drug metabolite(s) may   indicate non-compliance, inappropriate timing of specimen   collection relative to drug administration, poor drug absorption,   diluted/adulterated urine, or limitations of testing. The    concentration value must be greater than or equal to the cutoff to   be reported as positive. Interpretive questions should be directed   to the laboratory.    This test was developed and its performance characteristics   determined by "[facility]. It has not been cleared or   approved by the [place] Food and Drug Administration. This test was   performed in a CLIA certified laboratory and is intended for   clinical purposes.    MDMA, Urine 07/17/2024 <200  ng/mL Final    26-Elu-3-carboxy-THC, Urn, Quant 07/17/2024 46  ng/mL Final    Comment: INTERPRETIVE INFORMATION: THC Metabolite, Urine,                             Quantitative    Methodology: Quantitative Liquid Chromatography-Tandem Mass   Spectrometry  Positive cutoff: 15 ng/mL  For medical purposes only; not valid for forensic use.  The drug analyte detected in this assay, 9-carboxy THC, is a   metabolite of delta-9-tetrahydrocannabinol (THC). Detection of   9-carboxy THC suggests use of, or exposure to, a product   containing THC.  This test cannot distinguish between prescribed   or non-prescribed forms of THC, nor can it distinguish between   active or passive use. The 9-carboxy THC metabolite can be   detected in urine for several weeks. Normalization of results to   creatinine concentration can help document elimination or suggest   recent use, when specimens are collected at least one week apart.    This test was developed and its performance characteristics   determined by "[facility]. It has not been cleared or   approved by the US Food and Drug                            Administration. This test was   performed in a CLIA certified laboratory and is intended for   clinical purposes.  Performed By: "[facility]  [address] [place] [id number]  : Sunil Melo MD, PhD  CLIA Number: [id number]                 Erik Andrade MD

## 2024-09-24 ENCOUNTER — LAB (OUTPATIENT)
Dept: LAB | Facility: HOSPITAL | Age: 19
End: 2024-09-24
Payer: MEDICAID

## 2024-09-24 DIAGNOSIS — D57.20: ICD-10-CM

## 2024-09-24 LAB
ALBUMIN SERPL BCP-MCNC: 4.7 G/DL (ref 3.4–5)
ALP SERPL-CCNC: 66 U/L (ref 33–120)
ALT SERPL W P-5'-P-CCNC: 10 U/L (ref 10–52)
ANION GAP SERPL CALC-SCNC: 13 MMOL/L (ref 10–20)
AST SERPL W P-5'-P-CCNC: 19 U/L (ref 9–39)
BASOPHILS # BLD AUTO: 0.02 X10*3/UL (ref 0–0.1)
BASOPHILS NFR BLD AUTO: 0.3 %
BILIRUB SERPL-MCNC: 1.1 MG/DL (ref 0–1.2)
BUN SERPL-MCNC: 11 MG/DL (ref 6–23)
CALCIUM SERPL-MCNC: 10.1 MG/DL (ref 8.6–10.6)
CHLORIDE SERPL-SCNC: 103 MMOL/L (ref 98–107)
CO2 SERPL-SCNC: 25 MMOL/L (ref 21–32)
CREAT SERPL-MCNC: 0.68 MG/DL (ref 0.5–1.3)
EGFRCR SERPLBLD CKD-EPI 2021: >90 ML/MIN/1.73M*2
EOSINOPHIL # BLD AUTO: 0.24 X10*3/UL (ref 0–0.7)
EOSINOPHIL NFR BLD AUTO: 3.7 %
ERYTHROCYTE [DISTWIDTH] IN BLOOD BY AUTOMATED COUNT: 15.2 % (ref 11.5–14.5)
GLUCOSE SERPL-MCNC: 81 MG/DL (ref 74–99)
HCT VFR BLD AUTO: 28.6 % (ref 41–52)
HGB BLD-MCNC: 10.9 G/DL (ref 13.5–17.5)
HGB RETIC QN: 32 PG (ref 28–38)
IMM GRANULOCYTES # BLD AUTO: 0.02 X10*3/UL (ref 0–0.7)
IMM GRANULOCYTES NFR BLD AUTO: 0.3 % (ref 0–0.9)
IMMATURE RETIC FRACTION: 35.8 %
LDH SERPL L TO P-CCNC: 213 U/L (ref 84–246)
LYMPHOCYTES # BLD AUTO: 1.7 X10*3/UL (ref 1.2–4.8)
LYMPHOCYTES NFR BLD AUTO: 26.2 %
MCH RBC QN AUTO: 31.9 PG (ref 26–34)
MCHC RBC AUTO-ENTMCNC: 38.1 G/DL (ref 32–36)
MCV RBC AUTO: 84 FL (ref 80–100)
MONOCYTES # BLD AUTO: 0.62 X10*3/UL (ref 0.1–1)
MONOCYTES NFR BLD AUTO: 9.5 %
NEUTROPHILS # BLD AUTO: 3.9 X10*3/UL (ref 1.2–7.7)
NEUTROPHILS NFR BLD AUTO: 60 %
NRBC BLD-RTO: 0.8 /100 WBCS (ref 0–0)
PAPPENHEIMER BOD BLD QL SMEAR: PRESENT
PLATELET # BLD AUTO: 372 X10*3/UL (ref 150–450)
PLATELET CLUMP BLD QL SMEAR: PRESENT
POLYCHROMASIA BLD QL SMEAR: NORMAL
POTASSIUM SERPL-SCNC: 4.4 MMOL/L (ref 3.5–5.3)
PROT SERPL-MCNC: 8.1 G/DL (ref 6.4–8.2)
RBC # BLD AUTO: 3.42 X10*6/UL (ref 4.5–5.9)
RBC MORPH BLD: NORMAL
RETICS #: 0.14 X10*6/UL (ref 0.02–0.12)
RETICS/RBC NFR AUTO: 4 % (ref 0.5–2)
SCHISTOCYTES BLD QL SMEAR: NORMAL
SICKLE CELLS BLD QL SMEAR: NORMAL
SODIUM SERPL-SCNC: 137 MMOL/L (ref 136–145)
TARGETS BLD QL SMEAR: NORMAL
WBC # BLD AUTO: 6.5 X10*3/UL (ref 4.4–11.3)

## 2024-09-24 PROCEDURE — 83615 LACTATE (LD) (LDH) ENZYME: CPT

## 2024-09-24 PROCEDURE — 85045 AUTOMATED RETICULOCYTE COUNT: CPT

## 2024-09-24 PROCEDURE — 80053 COMPREHEN METABOLIC PANEL: CPT

## 2024-09-24 PROCEDURE — 85025 COMPLETE CBC W/AUTO DIFF WBC: CPT

## 2024-09-24 PROCEDURE — 36415 COLL VENOUS BLD VENIPUNCTURE: CPT

## 2024-10-01 ENCOUNTER — HOSPITAL ENCOUNTER (OUTPATIENT)
Dept: RESPIRATORY THERAPY | Facility: HOSPITAL | Age: 19
Discharge: HOME | End: 2024-10-01
Payer: MEDICAID

## 2024-10-01 DIAGNOSIS — D57.3 SICKLE CELL TRAIT (CMS-HCC): ICD-10-CM

## 2024-10-01 PROCEDURE — 94010 BREATHING CAPACITY TEST: CPT | Performed by: INTERNAL MEDICINE

## 2024-10-01 PROCEDURE — 94010 BREATHING CAPACITY TEST: CPT

## 2024-10-04 ENCOUNTER — APPOINTMENT (OUTPATIENT)
Dept: CARDIOLOGY | Facility: HOSPITAL | Age: 19
End: 2024-10-04
Payer: MEDICAID

## 2024-10-04 ENCOUNTER — APPOINTMENT (OUTPATIENT)
Dept: RADIOLOGY | Facility: HOSPITAL | Age: 19
End: 2024-10-04
Payer: MEDICAID

## 2024-10-04 ENCOUNTER — HOSPITAL ENCOUNTER (EMERGENCY)
Facility: HOSPITAL | Age: 19
Discharge: LEFT BEFORE DISCHARGE | End: 2024-10-05
Attending: EMERGENCY MEDICINE
Payer: MEDICAID

## 2024-10-04 VITALS
BODY MASS INDEX: 21.7 KG/M2 | OXYGEN SATURATION: 99 % | HEIGHT: 71 IN | SYSTOLIC BLOOD PRESSURE: 128 MMHG | DIASTOLIC BLOOD PRESSURE: 85 MMHG | RESPIRATION RATE: 16 BRPM | WEIGHT: 155 LBS | TEMPERATURE: 100.6 F | HEART RATE: 91 BPM

## 2024-10-04 DIAGNOSIS — Z53.29 LEFT AGAINST MEDICAL ADVICE: ICD-10-CM

## 2024-10-04 DIAGNOSIS — A41.9 SEPSIS WITHOUT SEPTIC SHOCK (MULTI): ICD-10-CM

## 2024-10-04 DIAGNOSIS — J02.0 STREP PHARYNGITIS: ICD-10-CM

## 2024-10-04 DIAGNOSIS — Z86.2 HISTORY OF SICKLE CELL ANEMIA: Primary | ICD-10-CM

## 2024-10-04 LAB
ALBUMIN SERPL BCP-MCNC: 4.5 G/DL (ref 3.4–5)
ALP SERPL-CCNC: 57 U/L (ref 33–120)
ALT SERPL W P-5'-P-CCNC: 11 U/L (ref 10–52)
ANION GAP SERPL CALC-SCNC: 14 MMOL/L (ref 10–20)
APPEARANCE UR: CLEAR
AST SERPL W P-5'-P-CCNC: 50 U/L (ref 9–39)
BASOPHILS # BLD AUTO: 0.05 X10*3/UL (ref 0–0.1)
BASOPHILS NFR BLD AUTO: 0.2 %
BILIRUB SERPL-MCNC: 1.7 MG/DL (ref 0–1.2)
BILIRUB UR STRIP.AUTO-MCNC: NEGATIVE MG/DL
BUN SERPL-MCNC: 9 MG/DL (ref 6–23)
CALCIUM SERPL-MCNC: 9.1 MG/DL (ref 8.6–10.3)
CHLORIDE SERPL-SCNC: 103 MMOL/L (ref 98–107)
CO2 SERPL-SCNC: 20 MMOL/L (ref 21–32)
COLOR UR: COLORLESS
CREAT SERPL-MCNC: 0.75 MG/DL (ref 0.5–1.3)
EGFRCR SERPLBLD CKD-EPI 2021: >90 ML/MIN/1.73M*2
EOSINOPHIL # BLD AUTO: 0.16 X10*3/UL (ref 0–0.7)
EOSINOPHIL NFR BLD AUTO: 0.8 %
ERYTHROCYTE [DISTWIDTH] IN BLOOD BY AUTOMATED COUNT: 15.6 % (ref 11.5–14.5)
FLUAV RNA RESP QL NAA+PROBE: NOT DETECTED
FLUBV RNA RESP QL NAA+PROBE: NOT DETECTED
GLUCOSE SERPL-MCNC: 76 MG/DL (ref 74–99)
GLUCOSE UR STRIP.AUTO-MCNC: NORMAL MG/DL
HCT VFR BLD AUTO: 31.3 % (ref 41–52)
HGB BLD-MCNC: 11.2 G/DL (ref 13.5–17.5)
HGB RETIC QN: 33 PG (ref 28–38)
IMM GRANULOCYTES # BLD AUTO: 0.11 X10*3/UL (ref 0–0.7)
IMM GRANULOCYTES NFR BLD AUTO: 0.5 % (ref 0–0.9)
IMMATURE RETIC FRACTION: 42.3 %
KETONES UR STRIP.AUTO-MCNC: NEGATIVE MG/DL
LACTATE SERPL-SCNC: 1.7 MMOL/L (ref 0.4–2)
LDH SERPL L TO P-CCNC: 356 U/L (ref 84–246)
LEUKOCYTE ESTERASE UR QL STRIP.AUTO: NEGATIVE
LYMPHOCYTES # BLD AUTO: 1.6 X10*3/UL (ref 1.2–4.8)
LYMPHOCYTES NFR BLD AUTO: 7.7 %
MCH RBC QN AUTO: 31.4 PG (ref 26–34)
MCHC RBC AUTO-ENTMCNC: 35.8 G/DL (ref 32–36)
MCV RBC AUTO: 88 FL (ref 80–100)
MONOCYTES # BLD AUTO: 1.23 X10*3/UL (ref 0.1–1)
MONOCYTES NFR BLD AUTO: 5.9 %
NEUTROPHILS # BLD AUTO: 17.61 X10*3/UL (ref 1.2–7.7)
NEUTROPHILS NFR BLD AUTO: 84.9 %
NITRITE UR QL STRIP.AUTO: NEGATIVE
NRBC BLD-RTO: 0.7 /100 WBCS (ref 0–0)
PH UR STRIP.AUTO: 8 [PH]
PLATELET # BLD AUTO: 435 X10*3/UL (ref 150–450)
POTASSIUM SERPL-SCNC: 4.2 MMOL/L (ref 3.5–5.3)
PROT SERPL-MCNC: 7.4 G/DL (ref 6.4–8.2)
PROT UR STRIP.AUTO-MCNC: NEGATIVE MG/DL
RBC # BLD AUTO: 3.57 X10*6/UL (ref 4.5–5.9)
RBC # UR STRIP.AUTO: NEGATIVE /UL
RETICS #: 0.17 X10*6/UL (ref 0.02–0.12)
RETICS/RBC NFR AUTO: 4.8 % (ref 0.5–2)
S PYO DNA THROAT QL NAA+PROBE: DETECTED
SARS-COV-2 RNA RESP QL NAA+PROBE: NOT DETECTED
SODIUM SERPL-SCNC: 133 MMOL/L (ref 136–145)
SP GR UR STRIP.AUTO: 1.01
UROBILINOGEN UR STRIP.AUTO-MCNC: NORMAL MG/DL
WBC # BLD AUTO: 20.8 X10*3/UL (ref 4.4–11.3)

## 2024-10-04 PROCEDURE — 87040 BLOOD CULTURE FOR BACTERIA: CPT | Mod: PARLAB | Performed by: PHYSICIAN ASSISTANT

## 2024-10-04 PROCEDURE — 2500000001 HC RX 250 WO HCPCS SELF ADMINISTERED DRUGS (ALT 637 FOR MEDICARE OP): Performed by: PHYSICIAN ASSISTANT

## 2024-10-04 PROCEDURE — 84075 ASSAY ALKALINE PHOSPHATASE: CPT | Performed by: PHYSICIAN ASSISTANT

## 2024-10-04 PROCEDURE — 71045 X-RAY EXAM CHEST 1 VIEW: CPT | Performed by: RADIOLOGY

## 2024-10-04 PROCEDURE — 81003 URINALYSIS AUTO W/O SCOPE: CPT | Performed by: PHYSICIAN ASSISTANT

## 2024-10-04 PROCEDURE — 71045 X-RAY EXAM CHEST 1 VIEW: CPT

## 2024-10-04 PROCEDURE — 96366 THER/PROPH/DIAG IV INF ADDON: CPT

## 2024-10-04 PROCEDURE — 96365 THER/PROPH/DIAG IV INF INIT: CPT

## 2024-10-04 PROCEDURE — 36415 COLL VENOUS BLD VENIPUNCTURE: CPT | Performed by: PHYSICIAN ASSISTANT

## 2024-10-04 PROCEDURE — 96361 HYDRATE IV INFUSION ADD-ON: CPT

## 2024-10-04 PROCEDURE — 83605 ASSAY OF LACTIC ACID: CPT | Performed by: PHYSICIAN ASSISTANT

## 2024-10-04 PROCEDURE — 96375 TX/PRO/DX INJ NEW DRUG ADDON: CPT

## 2024-10-04 PROCEDURE — 87075 CULTR BACTERIA EXCEPT BLOOD: CPT | Mod: PARLAB,59 | Performed by: PHYSICIAN ASSISTANT

## 2024-10-04 PROCEDURE — 83615 LACTATE (LD) (LDH) ENZYME: CPT | Performed by: PHYSICIAN ASSISTANT

## 2024-10-04 PROCEDURE — 2500000004 HC RX 250 GENERAL PHARMACY W/ HCPCS (ALT 636 FOR OP/ED): Performed by: PHYSICIAN ASSISTANT

## 2024-10-04 PROCEDURE — 87651 STREP A DNA AMP PROBE: CPT | Performed by: PHYSICIAN ASSISTANT

## 2024-10-04 PROCEDURE — 93005 ELECTROCARDIOGRAM TRACING: CPT

## 2024-10-04 PROCEDURE — 99284 EMERGENCY DEPT VISIT MOD MDM: CPT | Mod: 25

## 2024-10-04 PROCEDURE — 85045 AUTOMATED RETICULOCYTE COUNT: CPT | Performed by: PHYSICIAN ASSISTANT

## 2024-10-04 PROCEDURE — 87636 SARSCOV2 & INF A&B AMP PRB: CPT | Performed by: PHYSICIAN ASSISTANT

## 2024-10-04 PROCEDURE — 85025 COMPLETE CBC W/AUTO DIFF WBC: CPT | Performed by: PHYSICIAN ASSISTANT

## 2024-10-04 RX ORDER — ACETAMINOPHEN 325 MG/1
975 TABLET ORAL ONCE
Status: COMPLETED | OUTPATIENT
Start: 2024-10-04 | End: 2024-10-04

## 2024-10-04 RX ORDER — KETOROLAC TROMETHAMINE 30 MG/ML
15 INJECTION, SOLUTION INTRAMUSCULAR; INTRAVENOUS ONCE
Status: COMPLETED | OUTPATIENT
Start: 2024-10-04 | End: 2024-10-04

## 2024-10-04 RX ADMIN — SODIUM CHLORIDE 1000 ML: 0.9 INJECTION, SOLUTION INTRAVENOUS at 21:58

## 2024-10-04 RX ADMIN — AMPICILLIN SODIUM AND SULBACTAM SODIUM 3 G: 2; 1 INJECTION, POWDER, FOR SOLUTION INTRAMUSCULAR; INTRAVENOUS at 22:59

## 2024-10-04 RX ADMIN — KETOROLAC TROMETHAMINE 15 MG: 30 INJECTION, SOLUTION INTRAMUSCULAR at 21:58

## 2024-10-04 RX ADMIN — ACETAMINOPHEN 975 MG: 325 TABLET ORAL at 21:58

## 2024-10-04 ASSESSMENT — COLUMBIA-SUICIDE SEVERITY RATING SCALE - C-SSRS
6. HAVE YOU EVER DONE ANYTHING, STARTED TO DO ANYTHING, OR PREPARED TO DO ANYTHING TO END YOUR LIFE?: NO
2. HAVE YOU ACTUALLY HAD ANY THOUGHTS OF KILLING YOURSELF?: NO
1. IN THE PAST MONTH, HAVE YOU WISHED YOU WERE DEAD OR WISHED YOU COULD GO TO SLEEP AND NOT WAKE UP?: NO

## 2024-10-04 ASSESSMENT — PAIN SCALES - GENERAL
PAINLEVEL_OUTOF10: 10 - WORST POSSIBLE PAIN
PAINLEVEL_OUTOF10: 0 - NO PAIN
PAINLEVEL_OUTOF10: 7
PAINLEVEL_OUTOF10: 10 - WORST POSSIBLE PAIN

## 2024-10-04 ASSESSMENT — PAIN - FUNCTIONAL ASSESSMENT: PAIN_FUNCTIONAL_ASSESSMENT: 0-10

## 2024-10-04 ASSESSMENT — LIFESTYLE VARIABLES
HAVE YOU EVER FELT YOU SHOULD CUT DOWN ON YOUR DRINKING: NO
EVER HAD A DRINK FIRST THING IN THE MORNING TO STEADY YOUR NERVES TO GET RID OF A HANGOVER: NO
TOTAL SCORE: 0
HAVE PEOPLE ANNOYED YOU BY CRITICIZING YOUR DRINKING: NO
EVER FELT BAD OR GUILTY ABOUT YOUR DRINKING: NO

## 2024-10-05 ENCOUNTER — HOSPITAL ENCOUNTER (INPATIENT)
Facility: HOSPITAL | Age: 19
LOS: 1 days | Discharge: LEFT BEFORE DISCHARGE | End: 2024-10-05
Attending: EMERGENCY MEDICINE | Admitting: HOSPITALIST
Payer: MEDICAID

## 2024-10-05 ENCOUNTER — APPOINTMENT (OUTPATIENT)
Dept: RADIOLOGY | Facility: HOSPITAL | Age: 19
End: 2024-10-05
Payer: MEDICAID

## 2024-10-05 ENCOUNTER — CLINICAL SUPPORT (OUTPATIENT)
Dept: EMERGENCY MEDICINE | Facility: HOSPITAL | Age: 19
End: 2024-10-05
Payer: MEDICAID

## 2024-10-05 VITALS
HEART RATE: 90 BPM | HEIGHT: 71 IN | OXYGEN SATURATION: 97 % | DIASTOLIC BLOOD PRESSURE: 80 MMHG | SYSTOLIC BLOOD PRESSURE: 130 MMHG | WEIGHT: 155 LBS | BODY MASS INDEX: 21.7 KG/M2 | TEMPERATURE: 99.1 F | RESPIRATION RATE: 18 BRPM

## 2024-10-05 DIAGNOSIS — D57.00 SICKLE-CELL DISEASE WITH PAIN (MULTI): Primary | ICD-10-CM

## 2024-10-05 DIAGNOSIS — J02.0 STREP PHARYNGITIS: ICD-10-CM

## 2024-10-05 LAB
ALBUMIN SERPL BCP-MCNC: 4.4 G/DL (ref 3.4–5)
ALP SERPL-CCNC: 57 U/L (ref 33–120)
ALT SERPL W P-5'-P-CCNC: 18 U/L (ref 10–52)
ANION GAP SERPL CALC-SCNC: 15 MMOL/L (ref 10–20)
AST SERPL W P-5'-P-CCNC: 66 U/L (ref 9–39)
BASOPHILS # BLD AUTO: 0.05 X10*3/UL (ref 0–0.1)
BASOPHILS NFR BLD AUTO: 0.2 %
BILIRUB SERPL-MCNC: 1.5 MG/DL (ref 0–1.2)
BUN SERPL-MCNC: 11 MG/DL (ref 6–23)
CALCIUM SERPL-MCNC: 9.2 MG/DL (ref 8.6–10.6)
CARDIAC TROPONIN I PNL SERPL HS: <3 NG/L (ref 0–53)
CHLORIDE SERPL-SCNC: 102 MMOL/L (ref 98–107)
CO2 SERPL-SCNC: 20 MMOL/L (ref 21–32)
CREAT SERPL-MCNC: 0.73 MG/DL (ref 0.5–1.3)
EGFRCR SERPLBLD CKD-EPI 2021: >90 ML/MIN/1.73M*2
EOSINOPHIL # BLD AUTO: 0.21 X10*3/UL (ref 0–0.7)
EOSINOPHIL NFR BLD AUTO: 1 %
ERYTHROCYTE [DISTWIDTH] IN BLOOD BY AUTOMATED COUNT: 14.9 % (ref 11.5–14.5)
GLUCOSE SERPL-MCNC: 102 MG/DL (ref 74–99)
HCT VFR BLD AUTO: 27.2 % (ref 41–52)
HGB BLD-MCNC: 10.1 G/DL (ref 13.5–17.5)
HGB RETIC QN: 31 PG (ref 28–38)
HOLD SPECIMEN: NORMAL
IMM GRANULOCYTES # BLD AUTO: 0.1 X10*3/UL (ref 0–0.7)
IMM GRANULOCYTES NFR BLD AUTO: 0.5 % (ref 0–0.9)
IMMATURE RETIC FRACTION: 25.5 %
LDH SERPL L TO P-CCNC: 414 U/L (ref 84–246)
LYMPHOCYTES # BLD AUTO: 2.32 X10*3/UL (ref 1.2–4.8)
LYMPHOCYTES NFR BLD AUTO: 11.5 %
MCH RBC QN AUTO: 31.4 PG (ref 26–34)
MCHC RBC AUTO-ENTMCNC: 37.1 G/DL (ref 32–36)
MCV RBC AUTO: 85 FL (ref 80–100)
MONOCYTES # BLD AUTO: 1.09 X10*3/UL (ref 0.1–1)
MONOCYTES NFR BLD AUTO: 5.4 %
NEUTROPHILS # BLD AUTO: 16.47 X10*3/UL (ref 1.2–7.7)
NEUTROPHILS NFR BLD AUTO: 81.4 %
NRBC BLD-RTO: 0.3 /100 WBCS (ref 0–0)
PLATELET # BLD AUTO: 399 X10*3/UL (ref 150–450)
POTASSIUM SERPL-SCNC: 4 MMOL/L (ref 3.5–5.3)
PROT SERPL-MCNC: 7.4 G/DL (ref 6.4–8.2)
RBC # BLD AUTO: 3.22 X10*6/UL (ref 4.5–5.9)
RETICS #: 0.17 X10*6/UL (ref 0.02–0.12)
RETICS/RBC NFR AUTO: 5.3 % (ref 0.5–2)
SODIUM SERPL-SCNC: 133 MMOL/L (ref 136–145)
WBC # BLD AUTO: 20.2 X10*3/UL (ref 4.4–11.3)

## 2024-10-05 PROCEDURE — 83615 LACTATE (LD) (LDH) ENZYME: CPT | Performed by: NURSE PRACTITIONER

## 2024-10-05 PROCEDURE — 85045 AUTOMATED RETICULOCYTE COUNT: CPT | Performed by: NURSE PRACTITIONER

## 2024-10-05 PROCEDURE — 36415 COLL VENOUS BLD VENIPUNCTURE: CPT | Performed by: NURSE PRACTITIONER

## 2024-10-05 PROCEDURE — 96365 THER/PROPH/DIAG IV INF INIT: CPT

## 2024-10-05 PROCEDURE — 83021 HEMOGLOBIN CHROMOTOGRAPHY: CPT

## 2024-10-05 PROCEDURE — 76705 ECHO EXAM OF ABDOMEN: CPT

## 2024-10-05 PROCEDURE — 1210000001 HC SEMI-PRIVATE ROOM DAILY

## 2024-10-05 PROCEDURE — 2500000001 HC RX 250 WO HCPCS SELF ADMINISTERED DRUGS (ALT 637 FOR MEDICARE OP)

## 2024-10-05 PROCEDURE — 99285 EMERGENCY DEPT VISIT HI MDM: CPT

## 2024-10-05 PROCEDURE — 71046 X-RAY EXAM CHEST 2 VIEWS: CPT

## 2024-10-05 PROCEDURE — 93010 ELECTROCARDIOGRAM REPORT: CPT | Performed by: EMERGENCY MEDICINE

## 2024-10-05 PROCEDURE — 80053 COMPREHEN METABOLIC PANEL: CPT | Performed by: NURSE PRACTITIONER

## 2024-10-05 PROCEDURE — 2500000004 HC RX 250 GENERAL PHARMACY W/ HCPCS (ALT 636 FOR OP/ED): Mod: SE | Performed by: NURSE PRACTITIONER

## 2024-10-05 PROCEDURE — 96375 TX/PRO/DX INJ NEW DRUG ADDON: CPT

## 2024-10-05 PROCEDURE — 71046 X-RAY EXAM CHEST 2 VIEWS: CPT | Performed by: STUDENT IN AN ORGANIZED HEALTH CARE EDUCATION/TRAINING PROGRAM

## 2024-10-05 PROCEDURE — 84484 ASSAY OF TROPONIN QUANT: CPT | Performed by: NURSE PRACTITIONER

## 2024-10-05 PROCEDURE — 96366 THER/PROPH/DIAG IV INF ADDON: CPT

## 2024-10-05 PROCEDURE — 99285 EMERGENCY DEPT VISIT HI MDM: CPT | Performed by: NURSE PRACTITIONER

## 2024-10-05 PROCEDURE — 85025 COMPLETE CBC W/AUTO DIFF WBC: CPT | Performed by: NURSE PRACTITIONER

## 2024-10-05 PROCEDURE — 93005 ELECTROCARDIOGRAM TRACING: CPT

## 2024-10-05 PROCEDURE — 2500000004 HC RX 250 GENERAL PHARMACY W/ HCPCS (ALT 636 FOR OP/ED)

## 2024-10-05 PROCEDURE — 76705 ECHO EXAM OF ABDOMEN: CPT | Performed by: RADIOLOGY

## 2024-10-05 RX ORDER — NALOXONE HYDROCHLORIDE 0.4 MG/ML
0.2 INJECTION, SOLUTION INTRAMUSCULAR; INTRAVENOUS; SUBCUTANEOUS EVERY 5 MIN PRN
Status: DISCONTINUED | OUTPATIENT
Start: 2024-10-05 | End: 2024-10-06 | Stop reason: HOSPADM

## 2024-10-05 RX ORDER — OXYCODONE HYDROCHLORIDE 5 MG/1
10 TABLET ORAL EVERY 6 HOURS PRN
Status: DISCONTINUED | OUTPATIENT
Start: 2024-10-05 | End: 2024-10-06 | Stop reason: HOSPADM

## 2024-10-05 RX ORDER — BISACODYL 5 MG
5 TABLET, DELAYED RELEASE (ENTERIC COATED) ORAL DAILY PRN
Status: DISCONTINUED | OUTPATIENT
Start: 2024-10-05 | End: 2024-10-06 | Stop reason: HOSPADM

## 2024-10-05 RX ORDER — DIPHENHYDRAMINE HCL 25 MG
25 CAPSULE ORAL EVERY 6 HOURS PRN
Status: DISCONTINUED | OUTPATIENT
Start: 2024-10-05 | End: 2024-10-06 | Stop reason: HOSPADM

## 2024-10-05 RX ORDER — SENNOSIDES 8.6 MG/1
2 TABLET ORAL 2 TIMES DAILY PRN
Status: DISCONTINUED | OUTPATIENT
Start: 2024-10-05 | End: 2024-10-06 | Stop reason: HOSPADM

## 2024-10-05 RX ORDER — AMOXICILLIN 500 MG/1
500 CAPSULE ORAL 2 TIMES DAILY
Qty: 20 CAPSULE | Refills: 0 | Status: SHIPPED | OUTPATIENT
Start: 2024-10-05 | End: 2024-10-15

## 2024-10-05 RX ORDER — ALBUTEROL SULFATE 90 UG/1
2 INHALANT RESPIRATORY (INHALATION) EVERY 6 HOURS PRN
Status: DISCONTINUED | OUTPATIENT
Start: 2024-10-05 | End: 2024-10-06 | Stop reason: HOSPADM

## 2024-10-05 RX ORDER — HYDROXYUREA 500 MG/1
1000 CAPSULE ORAL DAILY
Status: DISCONTINUED | OUTPATIENT
Start: 2024-10-05 | End: 2024-10-06 | Stop reason: HOSPADM

## 2024-10-05 RX ORDER — CHLORHEXIDINE GLUCONATE ORAL RINSE 1.2 MG/ML
15 SOLUTION DENTAL AS NEEDED
Status: DISCONTINUED | OUTPATIENT
Start: 2024-10-05 | End: 2024-10-06 | Stop reason: HOSPADM

## 2024-10-05 RX ORDER — LIDOCAINE 560 MG/1
1 PATCH PERCUTANEOUS; TOPICAL; TRANSDERMAL EVERY 12 HOURS PRN
Status: DISCONTINUED | OUTPATIENT
Start: 2024-10-05 | End: 2024-10-06 | Stop reason: HOSPADM

## 2024-10-05 RX ORDER — FOLIC ACID 1 MG/1
1 TABLET ORAL DAILY
Status: DISCONTINUED | OUTPATIENT
Start: 2024-10-05 | End: 2024-10-06 | Stop reason: HOSPADM

## 2024-10-05 RX ORDER — ACETAMINOPHEN 325 MG/1
1000 TABLET ORAL EVERY 6 HOURS PRN
Status: DISCONTINUED | OUTPATIENT
Start: 2024-10-05 | End: 2024-10-06 | Stop reason: HOSPADM

## 2024-10-05 RX ORDER — POLYETHYLENE GLYCOL 3350 17 G/17G
17 POWDER, FOR SOLUTION ORAL DAILY PRN
Status: DISCONTINUED | OUTPATIENT
Start: 2024-10-05 | End: 2024-10-06 | Stop reason: HOSPADM

## 2024-10-05 RX ORDER — CETIRIZINE HYDROCHLORIDE 10 MG/1
10 TABLET ORAL DAILY
Status: DISCONTINUED | OUTPATIENT
Start: 2024-10-05 | End: 2024-10-06 | Stop reason: HOSPADM

## 2024-10-05 RX ORDER — DICLOFENAC SODIUM 10 MG/G
4 GEL TOPICAL 4 TIMES DAILY PRN
Status: DISCONTINUED | OUTPATIENT
Start: 2024-10-05 | End: 2024-10-06 | Stop reason: HOSPADM

## 2024-10-05 RX ORDER — OLANZAPINE 5 MG/1
2.5 TABLET ORAL EVERY 8 HOURS PRN
Status: DISCONTINUED | OUTPATIENT
Start: 2024-10-05 | End: 2024-10-06 | Stop reason: HOSPADM

## 2024-10-05 RX ORDER — KETOROLAC TROMETHAMINE 30 MG/ML
30 INJECTION, SOLUTION INTRAMUSCULAR; INTRAVENOUS EVERY 6 HOURS SCHEDULED
Status: DISCONTINUED | OUTPATIENT
Start: 2024-10-05 | End: 2024-10-06 | Stop reason: HOSPADM

## 2024-10-05 RX ORDER — DEXTROAMPHETAMINE SACCHARATE, AMPHETAMINE ASPARTATE, DEXTROAMPHETAMINE SULFATE AND AMPHETAMINE SULFATE 2.5; 2.5; 2.5; 2.5 MG/1; MG/1; MG/1; MG/1
5 TABLET ORAL
Status: DISCONTINUED | OUTPATIENT
Start: 2024-10-06 | End: 2024-10-06 | Stop reason: HOSPADM

## 2024-10-05 RX ORDER — HYDROMORPHONE HYDROCHLORIDE 1 MG/ML
0.4 INJECTION, SOLUTION INTRAMUSCULAR; INTRAVENOUS; SUBCUTANEOUS EVERY 6 HOURS PRN
Status: DISCONTINUED | OUTPATIENT
Start: 2024-10-05 | End: 2024-10-06 | Stop reason: HOSPADM

## 2024-10-05 RX ORDER — KETOROLAC TROMETHAMINE 15 MG/ML
15 INJECTION, SOLUTION INTRAMUSCULAR; INTRAVENOUS ONCE
Status: COMPLETED | OUTPATIENT
Start: 2024-10-05 | End: 2024-10-05

## 2024-10-05 RX ORDER — DEXTROSE MONOHYDRATE AND SODIUM CHLORIDE 5; .45 G/100ML; G/100ML
100 INJECTION, SOLUTION INTRAVENOUS CONTINUOUS
Status: DISCONTINUED | OUTPATIENT
Start: 2024-10-05 | End: 2024-10-06 | Stop reason: HOSPADM

## 2024-10-05 RX ADMIN — AMPICILLIN SODIUM AND SULBACTAM SODIUM 3 G: 2; 1 INJECTION, POWDER, FOR SOLUTION INTRAMUSCULAR; INTRAVENOUS at 22:22

## 2024-10-05 RX ADMIN — AMPICILLIN SODIUM AND SULBACTAM SODIUM 3 G: 2; 1 INJECTION, POWDER, FOR SOLUTION INTRAMUSCULAR; INTRAVENOUS at 16:31

## 2024-10-05 RX ADMIN — KETOROLAC TROMETHAMINE 30 MG: 30 INJECTION, SOLUTION INTRAMUSCULAR; INTRAVENOUS at 19:08

## 2024-10-05 RX ADMIN — KETOROLAC TROMETHAMINE 15 MG: 15 INJECTION, SOLUTION INTRAMUSCULAR; INTRAVENOUS at 16:30

## 2024-10-05 RX ADMIN — FOLIC ACID 1 MG: 1 TABLET ORAL at 20:39

## 2024-10-05 RX ADMIN — CETIRIZINE HYDROCHLORIDE 10 MG: 10 TABLET, FILM COATED ORAL at 20:39

## 2024-10-05 RX ADMIN — DEXTROSE AND SODIUM CHLORIDE 100 ML/HR: 5; 450 INJECTION, SOLUTION INTRAVENOUS at 20:40

## 2024-10-05 ASSESSMENT — PAIN DESCRIPTION - LOCATION
LOCATION: RIB CAGE
LOCATION: CHEST

## 2024-10-05 ASSESSMENT — PAIN DESCRIPTION - DESCRIPTORS: DESCRIPTORS: ACHING

## 2024-10-05 ASSESSMENT — PAIN DESCRIPTION - PAIN TYPE: TYPE: ACUTE PAIN;CHRONIC PAIN

## 2024-10-05 ASSESSMENT — PAIN - FUNCTIONAL ASSESSMENT: PAIN_FUNCTIONAL_ASSESSMENT: 0-10

## 2024-10-05 ASSESSMENT — PAIN SCALES - GENERAL
PAINLEVEL_OUTOF10: 9
PAINLEVEL_OUTOF10: 9

## 2024-10-05 NOTE — ED PROVIDER NOTES
HPI   Chief Complaint   Patient presents with    Sickle Cell Pain Crisis    Sore Throat       HPI  This is a 19 year old male with past medical history significant for sickle cell anemia , bipolar disorder, depression, ADHD, anxiety and marijuana use presents to the ED with pain In his throat.   He was at [facility] yesterday, was febrile with a WBC of 20.8 and tested positive for strep. The plan was to admit him but at some point he decided to leave AMA.   He received IV antibiotics but did not receive any PO abx.   He returned to our ED today still complaining of a bad sore throat with intact airway. He is able to manage his secretions.   He takes hydroxyurea at home.       Patient History   Past Medical History:   Diagnosis Date    ADHD     Anxiety     Asthma ([facility]-H[facility])     stable    Bipolar disorder (Multi)     Depression     History of suicidal ideation     Seizure-like activity (Multi)     EEG normal    Sickle cell disease (Multi)      Past Surgical History:   Procedure Laterality Date    CT GUIDED PERCUTANEOUS BIOPSY BONE DEEP  7/17/2023    CT GUIDED PERCUTANEOUS BIOPSY BONE DEEP 7/17/2023 [facility] [place]    MR HEAD ANGIO WO IV CONTRAST  10/8/2014    MR HEAD ANGIO WO IV CONTRAST 10/8/2014 [facility] CLINICAL LEGACY    MR HEAD ANGIO WO IV CONTRAST  7/10/2023    MR HEAD ANGIO WO IV CONTRAST 7/10/2023 [facility] MRI    MR NECK ANGIO WO IV CONTRAST  10/8/2014    MR NECK ANGIO WO IV CONTRAST 10/8/2014 [facility] CLINICAL LEGACY    MR NECK ANGIO WO IV CONTRAST  7/10/2023    MR NECK ANGIO WO IV CONTRAST 7/10/2023 [facility] MRI     Family History   Problem Relation Name Age of Onset    Diabetes Paternal Grandmother      Diabetes Paternal Grandfather       Social History     Tobacco Use    Smoking status: Never     Passive exposure: Past    Smokeless tobacco: Never   Vaping Use    Vaping status: Some Days    Substances: THC   Substance Use Topics    Alcohol use: Yes     Comment: per patient; socially    Drug use: Yes     Types: Marijuana        Physical Exam   ED Triage Vitals [10/05/24 1510]   Temperature Heart Rate Respirations BP   37.3 °C (99.1 °F) 91 17 115/72      Pulse Ox Temp Source Heart Rate Source Patient Position   97 % Temporal Monitor Sitting      BP Location FiO2 (%)     Left arm --       Physical Exam  Constitutional:       Appearance: He is well-developed.   HENT:      Head: Normocephalic and atraumatic.      Mouth/Throat:      Mouth: Mucous membranes are moist.      Pharynx: Oropharyngeal exudate and posterior oropharyngeal erythema present.      Comments: Bilateral tonsillar hypertrophy with exudate left >right and mild erythema   Eyes:      Extraocular Movements: Extraocular movements intact.      Pupils: Pupils are equal, round, and reactive to light.   Cardiovascular:      Rate and Rhythm: Normal rate and regular rhythm.   Pulmonary:      Effort: Pulmonary effort is normal.      Breath sounds: Normal breath sounds.   Abdominal:      Palpations: Abdomen is soft.   Musculoskeletal:         General: Normal range of motion.      Cervical back: Normal range of motion and neck supple.   Skin:     General: Skin is warm and dry.   Neurological:      General: No focal deficit present.      Mental Status: He is alert and oriented to person, place, and time.   Psychiatric:         Mood and Affect: Mood normal.         Behavior: Behavior normal.           ED Course & MDM   ED Course as of 10/05/24 1755   Sat Oct 05, 2024   1544 ECG 12 lead  Interpreted by me.  10 5/3/2024 at 1510.  Sinus rhythm 90 bpm.    QTc 442.  No ST elevation. [MC]      ED Course User Index  [MC] Jarrod Blanco MD         Diagnoses as of 10/05/24 1755   Sickle-cell disease with pain (Multi)   Strep pharyngitis                 No data recorded     Edison Coma Scale Score: 15 (10/05/24 1510 : Flynn Reyez, YENNY)                           Medical Decision Making  This is a 19 year old male with past medical history significant for sickle cell anemia ,  bipolar disorder, depression, ADHD, anxiety and marijuana use presents to the ED with pain In his throat.   He was at [facility] yesterday, was febrile with a WBC of 20.8 and tested positive for strep. The plan was to admit him but at some point he decided to leave AMA.   He received IV antibiotics but did not receive any PO abx.   He returned to our ED today still complaining of a bad sore throat with intact airway. He is able to manage his secretions.     Labs showed Na of 133, , negative troponin, WBC of 20.2, H&H of 10.1 & 27.2  Chest x-ray showed no acute abnormalities. Unasyn IV was re-started and he also received one time of dose of Toradol 15 mg IV.   Once labs were all resulted, he was admitted to medicine for further management.   He was staffed with Dr. Blanco.     Procedure  Procedures     Jeanne Loving, DOTTY-CNP, DNP  10/05/24 1815

## 2024-10-05 NOTE — ED PROVIDER NOTES
HPI   No chief complaint on file.      19-year-old male with a significant past medical history of bipolar disorder, depression, chronic constipation, ADHD, anxiety, cannabis use, and sickle cell disease currently on hydroxyurea presents today with multiple complaints.  The patient reports intermittent fevers and chills along with generalized myalgias.  Reports that his symptoms began this evening.  Additionally he reports a sore throat and nausea.  No reports of sick contacts with similar symptoms.  Denies rash.  No reports of recent travel outside the [place].  Denies neck pain and/or stiffness.  Denies cough.  No reports of UTI-like symptoms.  Denies diarrhea.  No reports of chest pain, shortness of breath or difficulty breathing.  Patient states that he has been compliant with his hydroxyurea              Patient History   Past Medical History:   Diagnosis Date    ADHD     Anxiety     Asthma ([facility])     stable    Bipolar disorder (Multi)     Depression     History of suicidal ideation     Seizure-like activity (Multi)     EEG normal    Sickle cell disease (Multi)      Past Surgical History:   Procedure Laterality Date    CT GUIDED PERCUTANEOUS BIOPSY BONE DEEP  7/17/2023    CT GUIDED PERCUTANEOUS BIOPSY BONE DEEP [place] [facility] [place]    [place] HEAD ANGIO WO IV CONTRAST  10/8/2014    MR HEAD ANGIO WO IV CONTRAST 10/8/2014 [facility]ICAL LEGAC[facility]    MR HEAD ANGIO WO IV CONTRAST  7/10/2023    MR HEAD ANGIO WO IV CONTRAST [place]/2023 [facility] [place]    MR NECK ANGIO WO IV CONTRAST  10/8/2014    MR NECK ANGIO WO IV CONTRAST 10/8/2014 [facility] CLINICAL LEGACY    MR NECK ANGIO WO IV CONTRAST  7/10/2023    MR NECK ANGIO WO IV CONTRAST 7[place]10/2023 [facility] [place] [facility]     Family History   Problem Relation Name Age of Onset    Diabetes Paternal Grandmother      Diabetes Paternal Grandfather       Social History     Tobacco Use    Smoking status: Never     Passive exposure: Past    Smokeless tobacco: Never   Vaping Use    Vaping status: Some Days    Substances: THC    Substance Use Topics    Alcohol use: Yes     Comment: per patient; socially    Drug use: Yes     Types: Marijuana       Physical Exam   ED Triage Vitals [10/04/24 2117]   Temperature Heart Rate Respirations BP   36.5 °C (97.7 °F) 89 18 134/88      Pulse Ox Temp Source Heart Rate Source Patient Position   100 % Temporal Monitor Sitting      BP Location FiO2 (%)     Right arm --       Physical Exam  Vitals and nursing note reviewed.   Constitutional:       General: He is not in acute distress.     Appearance: Normal appearance. He is normal weight. He is not ill-appearing, toxic-appearing or diaphoretic.   HENT:      Head: Normocephalic.      Nose: Nose normal.      Mouth/Throat:      Mouth: Mucous membranes are moist.      Comments: Erythema to the posterior oropharynx.  No trismus.  Normal phonation.  Uvula midline without edema  Eyes:      Extraocular Movements: Extraocular movements intact.      Conjunctiva/sclera: Conjunctivae normal.   Cardiovascular:      Rate and Rhythm: Normal rate and regular rhythm.      Pulses: Normal pulses.   Pulmonary:      Effort: Pulmonary effort is normal. No respiratory distress.      Breath sounds: Normal breath sounds.   Abdominal:      General: Abdomen is flat. There is no distension.      Palpations: Abdomen is soft.      Tenderness: There is no abdominal tenderness. There is no guarding or rebound.   Musculoskeletal:         General: Normal range of motion.      Cervical back: Normal range of motion and neck supple.   Skin:     General: Skin is warm and dry.      Capillary Refill: Capillary refill takes less than 2 seconds.   Neurological:      General: No focal deficit present.      Mental Status: He is alert and oriented to person, place, and time.   Psychiatric:         Mood and Affect: Mood normal.         Behavior: Behavior normal.         Thought Content: Thought content normal.         Judgment: Judgment normal.           ED Course & MDM   ED Course as of 10/11/24 3452    Fri Oct 04, 2024   2154 IMPRESSION:  1.  No acute abnormality. Chronic changes present   [DS]   2242 WBC(!): 20.8 [DS]   2242 Lactate: 1.7 [DS]   2243 Group A Strep PCR(!): Detected [DS]   2243 Retic %(!): 4.8 [DS]   2243 Retic Absolute(!): 0.171 [DS]   2243 HEMOGLOBIN(!): 11.2  UA negative  [DS]      ED Course User Index  [DS] Otf Westfall PA-C         Diagnoses as of 10/11/24 0312   History of sickle cell anemia   Strep pharyngitis   Sepsis without septic shock (Multi)   Left against medical advice                 No data recorded                                 Medical Decision Making  Patient found to be well-appearing on exam.  He is no obvious distress.  He is febrile 102.0 with an oxygen saturation of 100% on room air.  Extensive lab work was ordered given the patient's complex medical history. Work up revealed a leukocytosis of 20.8.  Patient was also found to be positive for strep pharyngitis.  This point sepsis was recognized.  2 sets of blood cultures were obtained along with a lactate level.  Lactate level was found to be within normal limits at 1.7.  Patient was given IV hydration along with Tylenol and Toradol.  Chest x-ray negative.  Urinalysis nonspecific.  Patient was provided with Unasyn.  I spoke with Dr. Rita gibbs on-call through the transfer center.  Admission for continued antibiotics was agreed upon.  He was agreeable with the patient staying here at [facility].  Patient was notified of the findings along with the recommendation.  Patient was agreeable to the admission.  Sepsis reperfusion exam was performed.  Patient is hemodynamically stable.  He remains febrile however he has markedly improved.  Shortly after the patient was admitted he proceeded to request discharge.  He stated that he no longer wants to stay.  It was explained to him that his condition may worsen causing permanent disability or death.  Nursing staff tried to convince the patient to stay however  this was unsuccessful.  Patient proceeded to leave AGAINST MEDICAL ADVICE prior to signing the paperwork.        Procedure  Procedures     Otf Westfall PA-C  10/05/24 0000       Otf Westfall PA-C  10/05/24 0152       Benji Horta MD  10/11/24 0312

## 2024-10-05 NOTE — H&P
History Of Present Illness  Pt is a 19M w/ hx of SCD (on hydroxyurea), bipolar d/o, chronic constipation, ADHD, anxiety, and cannabis use presents w/ fever, chills, SOB, CP, and abd pain. Sx began last night w/ intermittent fevers (T 102F), chills, and severe cold sweats. The patient initially presented to [facility]:    Vitals @ [facility] ED: T 102F, WBC 20.8, Lactate 1.7, Hgb 11.2, Retic 4.8%, (+) GAS PCR, CXR (-), UA nonspecific.  Meds given: IV hydration, Tylenol, Toradol, Unasyn 3g q6h.  Pt left AMA.    At home, sx persisted w/ fevers, chills, and abd pain. Pt returned to [facility] ED today for worsening abd pain and ongoing throat pain. Afebrile now (T 99.1). Reports significant R mid-abd and RUQ pain but denies worsening CP, SOB, or AMS. Confirms compliance w/ hydroxyurea. Denies rash, cough, diarrhea, or UTI sx. Denies N/V/D or dietary changes.     ER Course (today):  Vitals: T 99.1F, WBC 20.2, rest of vitals WNL.  PE: Tonsillar pharyngitis, anterior cervical LAD, abd tender (R flank, epigastric, R upper/mid abd), peripheral IV site pain. No other significant findings.  Labs: AST 66, ALT 18, Tbili 1.5, , Hgb 10.1, Retic 5.3%, (-) Flu/COVID.  CXR: (-) for acute process.  Tx: Continue Unasyn 3g q6h, Toradol 15mg IV.    Past Medical History  He has a past medical history of ADHD, Anxiety, Asthma ([facility]-H[facility]), Bipolar disorder (Multi), Depression, History of suicidal ideation, Seizure-like activity (Multi), and Sickle cell disease (Multi).    Surgical History  He has a past surgical history that includes MR angio head wo IV contrast (10/8/2014); MR angio neck wo IV contrast (10/8/2014); MR angio head wo IV contrast (7/10/2023); MR angio neck wo IV contrast (7/10/2023); and CT guided percutaneous biopsy bone deep (7/17/2023).     Social History  He reports that he has never smoked. He has been exposed to tobacco smoke. He has never used smokeless tobacco. He reports current alcohol use. He reports current drug use.  Drug: Marijuana.    Family History  Family History   Problem Relation Name Age of Onset    Diabetes Paternal Grandmother      Diabetes Paternal Grandfather          Allergies  Patient has no known allergies.    Review of Systems:  Constitutional: (+) fever, chills, (-) weight loss, (-) fatigue.  HEENT: (+) sore throat, (-) ear pain, (-) nasal congestion.  Respiratory: (-) cough, (-) SOB, (-) wheezing.  CV: (+) chest pain, (-) palpitations.  GI: (+) R-sided abd pain, (-) N/V/D, (-) constipation.  : (-) dysuria, (-) hematuria.  MSK: (+) myalgias, (-) joint swelling.  Neuro: (-) HA, (-) dizziness, (-) AMS.  Skin: (-) rash, (-) jaundice.  Psych: (-) anxiety, (-) depression.     Physical Exam :  Gen: NAD, alert, and oriented x3.  HEENT: (+) tonsillar erythema and exudates, (+) anterior cervical LAD, no neck stiffness.  Lungs: CTA bilaterally, no wheezing, rales, or rhonchi.  CV: RRR, no murmurs, rubs, or gallops.  Abd: TTP in R mid-abd and RUQ, no rebound or guarding, (-) Lazar's sign, soft, non-distended.  Ext: No edema, no cyanosis, (+) mild tenderness at peripheral IV insertion site.  Skin: Warm, dry, no rash, no jaundice.  Neuro: A&Ox3, CN II-XII intact, no focal deficits.     Last Recorded Vitals  /72 (BP Location: Left arm, Patient Position: Sitting)   Pulse 91   Temp 37.3 °C (99.1 °F) (Temporal)   Resp 17   Wt 70.3 kg (155 lb)   SpO2 97%     Relevant Results             Assessment/Plan   Assessment & Plan  Sickle-cell disease with pain (Multi)    #GAS pharyngitis  #Leukocytosis   ::patient febrile 102F, with sore throat and positive GAS PCR   :: WBC 20.2     Plan:  -Continue Unasyn 3 g IV q6h for GAS coverage.  -Can transition to Amoxicillin after RUQ   -Analgesia: Use APAP 975 mg PO q6h PRN for throat pain; max daily dose <=4 g to avoid hepatotoxicity.  -Blood culture x2 pending     SCD exacerbation (possible VOC and hemolysis)  ::Labs: AST 66, ALT 18, Tbili 1.5, , Hgb 10.1, Retic 5.3%, (-)  Flu/COVID.  ::CXR: (-) for acute process.  Plan:  -IVF: Continue D5 1/2 NS at 100 mL/hr to maintain hydration and reduce sickling risk.  -Pain mgmt: stated below   -Labs: Repeat CBC w/diff, Retic, LDH, Haptoglobin, Indirect Bili, Periph smear to assess for hemolysis.  -O2 therapy: Administer O2 to keep SpO? >=92%; consider ABG if hypoxia or worsening resp sx.  -Transfusion: Type and crossmatch; consider PRBC transfusion if Hgb <7 g/dL or symptomatic anemia.  -Folic acid 1mg daily continue with home treatment .  -Hydroxyurea:; continue current home medication 1000mg daily     #RUQ abd pain  -pain in RUQ and increased  AST to 66   Plan:  -Order RUQ U/S to eval for cholelithiasis, cholecystitis, hepatic infarct  -LFTs: Monitor AST, ALT, ALP, GGT, Total/Direct Bili daily.  -Consider CT A/P w/ contrast if U/S inconclusive.    #Pain mgmt  Plan:  - Toradol injection 30mg Q6  - Tylenol 975mg Q6 Prn for mild pain  - Oxycodone 10mg Q6 Prn for mild pain   - Dilaudid 0.4mg Q6 Prn for severe pain       Code status: FULL CODE  DVT ppx: None  GI ppx: None  Access: PIV  Abx: Unasyn  Oxygen: JERRY OSBORNEK: Dominick ([facility]) [phone]     Josefina Navas DO

## 2024-10-05 NOTE — ED TRIAGE NOTES
Pt to ED with c/o sickle cell disease, sore throat x 3 days. Pt was seen at [facility] and left because they said they were sitting in the hallway for too long. Pt was told he has strep and an elevated WBC. Endorsing CP/SOB, 9/10, around his lower chest.

## 2024-10-05 NOTE — SIGNIFICANT EVENT
"SENIOR STAFFING NOTE:    HPI:  Patient is a 19-year-old male with history of HBSC, bipolar disorder with recent suicide attempt who presents for two days of fever, myalgias sore throat, mild dysphagia, and right lower chest pain. During fevering yesterday, he had an episode of nausea/vomiting and questionable syncope although girlfriend at bedside stated that he took a nap and was arousable during this time. He originally went to the [facility] ED and was found to have Strep throat. Due to fever and elevated white count, sepsis protocol was initiated and he was going to be admitted at [place] for treatment with Unasyn. Patient felt better following fluids and antibiotics and left AMA.    He continued to have chest pain and some difficulty with his sore throat and so presented to [facility] where he states he feels more comfortable with his care. In the ED he was again given Unasyn and toradol for his pain. He states that his pain is very much improved to the point he is not sure that he is in pain crisis anymore, and he feels his sore throat is much improved.    He denies cough, shortness of breath, wheezing, diarrhea, melena, hematochezia, jaundice. The right lower chest or RUQ is an unusual location for pain for him.    [START ON 10/6/2024] amphetamine-dextroamphetamine, 5 mg, oral, BID  ampicillin-sulbactam, 3 g, intravenous, q6h  cetirizine, 10 mg, oral, Daily  folic acid, 1 mg, oral, Daily  hydroxyurea, 1,000 mg, oral, Daily  ketorolac, 30 mg, intravenous, q6h SERVANDO       PRN medications: acetaminophen, albuterol, bisacodyl, chlorhexidine, diclofenac sodium, diphenhydrAMINE, HYDROmorphone, lidocaine, naloxone, naloxone, OLANZapine, oxyCODONE, oxygen, polyethylene glycol, sennosides     OBJECTIVE:  Visit Vitals  /72 (BP Location: Left arm, Patient Position: Sitting)   Pulse 91   Temp 37.3 °C (99.1 °F) (Temporal)   Resp 17   Ht 1.803 m (5' 11\")   Wt 70.3 kg (155 lb)   SpO2 97%   BMI 21.62 kg/m²   Smoking Status Never "   BSA 1.88 m²      Physical exam:  General: Alert and in no acute distress  Neuro: Aox3 with no focal deficits  Cardio: RRR with no murmurs  Pulm: Normal WOB, CTAB  GI: Soft, non-distended, tender in RUQ with negative Lazar  Extremities: Warm, dry, with no edema    Results from last 7 days   Lab Units 10/05/24  1637   WBC AUTO x10*3/uL 20.2*   HEMOGLOBIN g/dL 10.1*   HEMATOCRIT % 27.2*   PLATELETS AUTO x10*3/uL 399      Results from last 7 days   Lab Units 10/05/24  1637   SODIUM mmol/L 133*   POTASSIUM mmol/L 4.0   CHLORIDE mmol/L 102   CO2 mmol/L 20*   BUN mg/dL 11   CREATININE mg/dL 0.73   CALCIUM mg/dL 9.2   PROTEIN TOTAL g/dL 7.4   BILIRUBIN TOTAL mg/dL 1.5*   ALK PHOS U/L 57   ALT U/L 18   AST U/L 66*   GLUCOSE mg/dL 102*      ASSESSMENT and PLAN:  Patient is a 19-year-old male with a history of HBSC, bipolar disorder with recent suicide attempt who is presenting for Strep throat and a mild sickle cell pain episode, both of which seem to be resolving. Given fever, leukocytosis, RUQ pain will order RUQ U/S to evaluate for further pathology.    #Sickle cell pain crisis  -C/w Folate, hydroxyurea 1000mg daily  -30mg Toradol q6h for 3 days  -Oxy 10mg q6h PRN for moderate pain, Dilaudid 0.4mg q6h PRN for severe pain  -Trend hemolysis labs  -D5 1/2 NS @ 100cc/hr    #Strep throat  -C/w Unasyn, can deescalate to Augmentin or amoxicillin pending RUQ U/S    #SIRS positive  #RUQ pain  -C/w Unasyn  -RUQ U/S ordered to evaluate for cholecystitis    Code status: FULL CODE  DVT ppx: None  GI ppx: None  Access: PIV  Abx: Unasyn  Oxygen: RA  NOK: Dominick ([facility]) [phone]     Jacob Nunez MD  PGY-2 Internal Medicine    Patient to be staffed with attending in the AM

## 2024-10-06 LAB
BACTERIA BLD CULT: NORMAL
BACTERIA BLD CULT: NORMAL

## 2024-10-06 NOTE — SIGNIFICANT EVENT
Patient was admitted earlier this evening for sickle cell pain crisis and sore throat found to have group A strep pharyngitis on testing. Notified by ED nurse that patient wanted to leave AMA because he was feeling much better. Discussed risks of leaving AMA with patient and patient acknowledged and has capacity to make his own medical decisions. AMA paperwork completed in ED. Patient provided script for amoxicillin 500mg BID x 10 sent to [facility] on [address] to complete treatment for his GAS infection and he was counseled to complete antibiotic course. Patient left from ED.     Denise Vazquez PGY3 (NACR)

## 2024-10-08 LAB
HEMOGLOBIN A2: 3.9 % (ref 2–3.5)
HEMOGLOBIN C: 46 %
HEMOGLOBIN F: 1 % (ref 0–2)
HEMOGLOBIN IDENTIFICATION INTERPRETATION: ABNORMAL
HEMOGLOBIN S: 49.1 %
PATH REVIEW-HGB IDENTIFICATION: ABNORMAL

## 2024-10-09 LAB
BACTERIA BLD CULT: NORMAL
BACTERIA BLD CULT: NORMAL

## 2024-10-11 ASSESSMENT — ENCOUNTER SYMPTOMS
SHORTNESS OF BREATH: 0
BRUISES/BLEEDS EASILY: 0
SEIZURES: 0
FATIGUE: 0
WHEEZING: 0
NAUSEA: 0
PALPITATIONS: 0
HEADACHES: 0
EYE PROBLEMS: 0
SORE THROAT: 0
LEG SWELLING: 0
ARTHRALGIAS: 0
DIZZINESS: 0
DIARRHEA: 0
HEMOPTYSIS: 0
SCLERAL ICTERUS: 0
EXTREMITY WEAKNESS: 0
CHEST TIGHTNESS: 0
CONSTIPATION: 0
NERVOUS/ANXIOUS: 0
CHILLS: 0
MYALGIAS: 0
BACK PAIN: 0
WOUND: 0
FEVER: 0
LIGHT-HEADEDNESS: 0
VOMITING: 0
ABDOMINAL PAIN: 0
COUGH: 0
DEPRESSION: 0

## 2024-10-13 LAB
ATRIAL RATE: 93 BPM
ATRIAL RATE: 96 BPM
P AXIS: 3 DEGREES
P AXIS: 87 DEGREES
P OFFSET: 206 MS
P ONSET: 151 MS
PR INTERVAL: 142 MS
PR INTERVAL: 150 MS
Q ONSET: 222 MS
Q ONSET: 251 MS
QRS COUNT: 15 BEATS
QRS COUNT: 16 BEATS
QRS DURATION: 102 MS
QRS DURATION: 108 MS
QT INTERVAL: 339 MS
QT INTERVAL: 356 MS
QTC CALCULATION(BAZETT): 429 MS
QTC CALCULATION(BAZETT): 442 MS
QTC FREDERICIA: 396 MS
QTC FREDERICIA: 412 MS
R AXIS: 52 DEGREES
R AXIS: 80 DEGREES
T AXIS: 62 DEGREES
T AXIS: 71 DEGREES
T OFFSET: 400 MS
T OFFSET: 420 MS
VENTRICULAR RATE: 93 BPM
VENTRICULAR RATE: 96 BPM

## 2024-11-13 ENCOUNTER — APPOINTMENT (OUTPATIENT)
Dept: HEMATOLOGY/ONCOLOGY | Facility: HOSPITAL | Age: 19
End: 2024-11-13
Payer: MEDICAID

## 2024-11-20 ENCOUNTER — SOCIAL WORK (OUTPATIENT)
Dept: HEMATOLOGY/ONCOLOGY | Facility: HOSPITAL | Age: 19
End: 2024-11-20
Payer: MEDICAID

## 2024-11-20 NOTE — PROGRESS NOTES
Social Work Note    Referral Source: Sickle Cell Team  Meeting Location: Phone  Person(s) Present: Pt's Gr. Aunt, Ms. Pringle ([phone])  Identified Needs: Care Coordination  Impression and Plan: LISW attempted to reach pt at ([phone]) to follow up on missed sickle cell appointments. LISW left  requesting a return call. LISW called pt's emergency contact, his grandmother at [phone], as previously requested by pt when he is unable to be reached. Pt's gr. Aunt, Ms. Pringle answered the house phone. Per Ms. Pringle, pt no longer resides in the home[place] Pt will be stopping by their home today and Ms. Pringle will encourage him to contact clinic and reschedule his appointments. Team updated.   Interventions Provided: Care Coordination  Estimated Time Spent: 10 min    SW will continue to follow as needed.

## 2024-12-04 ENCOUNTER — PATIENT OUTREACH (OUTPATIENT)
Dept: CARE COORDINATION | Facility: CLINIC | Age: 19
End: 2024-12-04
Payer: MEDICAID

## 2024-12-04 NOTE — PROGRESS NOTES
Chart review completed. Patient with admission for possible HbSS crisis.   Patient is connected with  sickle cell team.   No outreach required.

## 2025-01-08 ENCOUNTER — HOSPITAL ENCOUNTER (EMERGENCY)
Facility: HOSPITAL | Age: 20
Discharge: LEFT BEFORE DISCHARGE | End: 2025-01-08
Payer: MEDICAID

## 2025-01-08 VITALS
OXYGEN SATURATION: 98 % | RESPIRATION RATE: 16 BRPM | SYSTOLIC BLOOD PRESSURE: 101 MMHG | HEART RATE: 79 BPM | DIASTOLIC BLOOD PRESSURE: 58 MMHG | TEMPERATURE: 98.4 F

## 2025-01-08 PROCEDURE — 99281 EMR DPT VST MAYX REQ PHY/QHP: CPT

## 2025-01-08 PROCEDURE — 4500999001 HC ED NO CHARGE

## 2025-01-08 ASSESSMENT — COLUMBIA-SUICIDE SEVERITY RATING SCALE - C-SSRS
1. IN THE PAST MONTH, HAVE YOU WISHED YOU WERE DEAD OR WISHED YOU COULD GO TO SLEEP AND NOT WAKE UP?: NO
6. HAVE YOU EVER DONE ANYTHING, STARTED TO DO ANYTHING, OR PREPARED TO DO ANYTHING TO END YOUR LIFE?: NO
2. HAVE YOU ACTUALLY HAD ANY THOUGHTS OF KILLING YOURSELF?: NO

## 2025-01-20 ENCOUNTER — TELEPHONE (OUTPATIENT)
Dept: HEMATOLOGY/ONCOLOGY | Facility: HOSPITAL | Age: 20
End: 2025-01-20
Payer: MEDICAID

## 2025-01-20 ENCOUNTER — APPOINTMENT (OUTPATIENT)
Dept: HEMATOLOGY/ONCOLOGY | Facility: HOSPITAL | Age: 20
End: 2025-01-20
Payer: MEDICAID

## 2025-01-20 NOTE — TELEPHONE ENCOUNTER
Pt no showed to Dr. Andrade appointment today. I contacted the patient who reports that he overslept and missed appointment. He was very apologetic and asked to reschedule. Per Dr. Andrade, schedule for virtual on Wednesday. Pt agreeable to appointment at 230pm.

## 2025-01-22 ENCOUNTER — TELEMEDICINE (OUTPATIENT)
Dept: HEMATOLOGY/ONCOLOGY | Facility: HOSPITAL | Age: 20
End: 2025-01-22
Payer: MEDICAID

## 2025-01-22 DIAGNOSIS — D57.20: ICD-10-CM

## 2025-01-22 DIAGNOSIS — D57.00 SICKLE CELL DISEASE WITH CRISIS (MULTI): ICD-10-CM

## 2025-01-22 DIAGNOSIS — D57.00 SICKLE CELL ANEMIA WITH CRISIS (MULTI): ICD-10-CM

## 2025-01-22 RX ORDER — BISACODYL 5 MG
5 TABLET, DELAYED RELEASE (ENTERIC COATED) ORAL DAILY PRN
Qty: 30 TABLET | Refills: 0 | Status: SHIPPED | OUTPATIENT
Start: 2025-01-22

## 2025-01-22 RX ORDER — HYDROXYUREA 500 MG/1
1000 CAPSULE ORAL DAILY
Qty: 60 CAPSULE | Refills: 3 | Status: SHIPPED | OUTPATIENT
Start: 2025-01-22

## 2025-01-22 RX ORDER — DICLOFENAC SODIUM 10 MG/G
4 GEL TOPICAL 4 TIMES DAILY PRN
Qty: 50 G | Refills: 2 | Status: SHIPPED | OUTPATIENT
Start: 2025-01-22

## 2025-01-23 ASSESSMENT — ENCOUNTER SYMPTOMS
FATIGUE: 0
WOUND: 0
EYE PROBLEMS: 0
NUMBNESS: 0
COUGH: 0
DEPRESSION: 0
SEIZURES: 0
LIGHT-HEADEDNESS: 0
LEG SWELLING: 0
DIAPHORESIS: 0
HEMATURIA: 0
SPEECH DIFFICULTY: 0
EXTREMITY WEAKNESS: 0
CONSTIPATION: 0
SHORTNESS OF BREATH: 0
HEADACHES: 0
PALPITATIONS: 0
ABDOMINAL PAIN: 0
FEVER: 0
VOMITING: 0
HEMOPTYSIS: 0
DYSURIA: 0
DIARRHEA: 0
NERVOUS/ANXIOUS: 0
MYALGIAS: 0
SCLERAL ICTERUS: 0
TROUBLE SWALLOWING: 0
DIZZINESS: 0
SORE THROAT: 0
BRUISES/BLEEDS EASILY: 0
WHEEZING: 0
NAUSEA: 0
BACK PAIN: 0
ARTHRALGIAS: 0
CHEST TIGHTNESS: 0

## 2025-01-23 NOTE — PROGRESS NOTES
SICKLE CELL OUTPATIENT NOTE  Patient ID: Josue Rangel   Visit Type: Follow up visit     ASSESSMENT AND PLAN  Josue Rangel is 19 y.o. 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 He was discharged from hospital on Jan 8th, 2025 following admission to hospital for acute sickle cell pain. He remains intermittently compliant with hydroxyurea. Josue also has a history of ADHD, Bipolar disorder but has not followed up with behavioral health as recommended and not taking his psych medications as prescribed for the past several weeks. Chronic constipation is well controlled and Bronchial asthma, also well controlled without recent acute exacerbation     PLAN   - No planned changes in his current home oral pain medications. This will be as follows   Oral Tylenol 650 mg every 6 hours as needed and or ibuprofen 600 mg every 8 hours prn or naprosyn 500 mg BID as needed for mild to moderate pain   Oral oxycodone 5 mg every 6 hours as needed for moderate, severe and breakthrough pain  Topical Lidoderm patch as needed to sites of sickle cell pain   Non pharmacologic methods of pain control   Reviewed OARRS  - Continue hydroxyurea 1000 mg daily   - Patient encouraged to call psychiatry to reschedule appointment and also for medication refills but he was not interested in doing so.   - Daily folic acid   - Inhaled albuterol as needed and scheduled inhaled Symbicort BID  - Dulcolax and miralax as needed for constipation   - Follow up office visit will be in 1 months time     Chief Complaint: Follow up for HB SC SCD on Hydroxyurea      Interval History: Josue is present for virtual visit, reason being inclement weather in [place].  He is located at home.  He was discharged from hospital on January 8, 2025 for acute sickle cell pain.  He has been doing well following discharge and denies any pain in clinic today and has no uses pain medication with oxycodone or ibuprofen in the  past 2 weeks.  He has mild URI symptoms, which are improving. He is intermittently compliant with hydroxyurea.     He has not followed up with behavioral health and has not taken his behavioral medications and indicates that he is fine and does not have any symptoms.  He denies feeling suicidal.    His mood and affect are normal. He denies headaches, dizziness, nausea or vomiting and his appetite is good. He does not have any focal neurologic deficits, priapism or leg swelling.  Chronic constipation is well controlled.  Asthma is also well-controlled without recent acute exacerbation    Review of System:   Review of Systems   Constitutional:  Negative for diaphoresis, fatigue and fever.   HENT:   Negative for mouth sores, nosebleeds, sore throat and trouble swallowing.    Eyes:  Negative for eye problems and icterus.   Respiratory:  Negative for chest tightness, cough, hemoptysis, shortness of breath and wheezing.    Cardiovascular:  Negative for chest pain, leg swelling and palpitations.   Gastrointestinal:  Negative for abdominal pain, constipation, diarrhea, nausea and vomiting.   Genitourinary:  Negative for dysuria and hematuria.    Musculoskeletal:  Negative for arthralgias, back pain and myalgias.   Skin:  Negative for rash and wound.   Neurological:  Negative for dizziness, extremity weakness, headaches, light-headedness, numbness, seizures and speech difficulty.   Hematological:  Does not bruise/bleed easily.   Psychiatric/Behavioral:  Negative for depression and suicidal ideas. The patient is not nervous/anxious.       Allergies:   No Known Allergies    Current Medications:   Medication Documentation Review Audit       Reviewed by Maddie Temple RN (Registered Nurse) on 01/08/25 at 0918      Medication Sig   acetaminophen (Tylenol) 500 mg tablet Take 2 tablets (1,000 mg) by mouth every 6 hours if needed for mild pain (1 - 3).   albuterol 90 mcg/actuation inhaler Inhale 2-4 puffs every 4-6 hours as  needed for shortness of breath or wheezing   bisacodyl (Dulcolax) 5 mg EC tablet Take 1 tablet (5 mg) by mouth once daily as needed for constipation. Do not crush, chew, or split.   chlorhexidine (Peridex) 0.12 % solution Use 15 mL in the mouth or throat if needed for wound care.   diclofenac sodium (Voltaren) 1 % gel Apply 4.5 inches (4 g) topically 4 times a day as needed (applied tropically for pain).   dimethicone-colloidal oatmeaL (Aveeno) 1.2 % lotion 1 Application every 12 hours.   folic acid (Folvite) 1 mg tablet Take 1 tablet (1 mg) by mouth once daily.   hydroxyurea (Hydrea) 500 mg capsule Take 2 capsules (1,000 mg total) by mouth once daily.  Take at the same time each day.   ibuprofen 600 mg tablet Take 1 tablet (600 mg) by mouth every 6 hours if needed for mild pain (1 - 3).   lidocaine 4 % patch Place 1 patch over 12 hours on the skin every 12 hours if needed for mild pain (1 - 3) or moderate pain (4 - 6). Remover after 12 hours   loratadine (Claritin) 10 mg tablet Take 1 tablet (10 mg) by mouth once daily at bedtime.   naproxen (Naprosyn) 500 mg tablet Take 1 tablet (500 mg) by mouth every 12 hours. For the next 2 days, then as needed for pain   Patient taking differently: Take 1 tablet (500 mg) by mouth every 12 hours.      polyethylene glycol (Glycolax, Miralax) 17 gram packet Take 17 g(contents of 1 packet dissolved in 8 ounces of water)  by mouth once daily as needed (constipation).   sennosides (Senokot) 8.6 mg tablet Take 2 tablets (17.2 mg) by mouth 2 times a day as needed for constipation.   Symbicort 80-4.5 mcg/actuation inhaler Inhale 2 puffs 2 times a day. And 2 puffs every 4 hours as needed. (Max 10 puffs per 24 hours)             Past Medical History:    has a past medical history of ADHD, Anxiety, Asthma, Bipolar disorder, Depression, History of suicidal ideation, Seizure-like activity (Multi), and Sickle cell disease (Multi).    Past Surgical History:    has a past surgical history that  includes MR angio head wo IV contrast (10/8/2014); MR angio neck wo IV contrast (10/8/2014); MR angio head wo IV contrast (7/10/2023); MR angio neck wo IV contrast (7/10/2023); and CT guided percutaneous biopsy bone deep (7/17/2023).    Family History:   Family History   Problem Relation Name Age of Onset    Diabetes Paternal Grandmother      Diabetes Paternal Grandfather         Social History:   Josue Rangel  reports that he has never smoked. He has been exposed to tobacco smoke. He has never used smokeless tobacco.  reports current drug use. Drug: Marijuana.    Physical Exam  Not done since this was a virtual visit     LABS   Reviewed labs in patients electronic medical records         Erik Andrade MD          This was a virtual visit with the patient located at home in [place] and provider also in [facility]: Informed consent obtained. Start time of 12:30 and end time of 12:50. Had to abandon video visit for a phone visit because of technical issues

## 2025-01-26 ENCOUNTER — APPOINTMENT (OUTPATIENT)
Dept: CARDIOLOGY | Facility: HOSPITAL | Age: 20
End: 2025-01-26
Payer: MEDICAID

## 2025-01-26 ENCOUNTER — HOSPITAL ENCOUNTER (EMERGENCY)
Facility: HOSPITAL | Age: 20
Discharge: LEFT BEFORE DISCHARGE | End: 2025-01-26
Payer: MEDICAID

## 2025-01-26 VITALS
BODY MASS INDEX: 19.9 KG/M2 | OXYGEN SATURATION: 97 % | HEART RATE: 128 BPM | HEIGHT: 70 IN | RESPIRATION RATE: 20 BRPM | WEIGHT: 139 LBS | SYSTOLIC BLOOD PRESSURE: 114 MMHG | DIASTOLIC BLOOD PRESSURE: 67 MMHG | TEMPERATURE: 101.3 F

## 2025-01-26 LAB
ALBUMIN SERPL BCP-MCNC: 4.3 G/DL (ref 3.4–5)
ALP SERPL-CCNC: 76 U/L (ref 33–120)
ALT SERPL W P-5'-P-CCNC: 8 U/L (ref 10–52)
ANION GAP SERPL CALC-SCNC: 14 MMOL/L (ref 10–20)
AST SERPL W P-5'-P-CCNC: 16 U/L (ref 9–39)
BASOPHILS # BLD AUTO: 0.08 X10*3/UL (ref 0–0.1)
BASOPHILS NFR BLD AUTO: 0.3 %
BILIRUB SERPL-MCNC: 1.1 MG/DL (ref 0–1.2)
BUN SERPL-MCNC: 7 MG/DL (ref 6–23)
CALCIUM SERPL-MCNC: 9.2 MG/DL (ref 8.6–10.3)
CHLORIDE SERPL-SCNC: 101 MMOL/L (ref 98–107)
CO2 SERPL-SCNC: 22 MMOL/L (ref 21–32)
CREAT SERPL-MCNC: 0.67 MG/DL (ref 0.5–1.3)
EGFRCR SERPLBLD CKD-EPI 2021: >90 ML/MIN/1.73M*2
EOSINOPHIL # BLD AUTO: 0.31 X10*3/UL (ref 0–0.7)
EOSINOPHIL NFR BLD AUTO: 1 %
ERYTHROCYTE [DISTWIDTH] IN BLOOD BY AUTOMATED COUNT: 18 % (ref 11.5–14.5)
FLUAV RNA RESP QL NAA+PROBE: NOT DETECTED
FLUBV RNA RESP QL NAA+PROBE: NOT DETECTED
GLUCOSE SERPL-MCNC: 99 MG/DL (ref 74–99)
HCT VFR BLD AUTO: 27.3 % (ref 41–52)
HGB BLD-MCNC: 9.8 G/DL (ref 13.5–17.5)
IMM GRANULOCYTES # BLD AUTO: 0.38 X10*3/UL (ref 0–0.7)
IMM GRANULOCYTES NFR BLD AUTO: 1.2 % (ref 0–0.9)
LACTATE SERPL-SCNC: 0.8 MMOL/L (ref 0.4–2)
LYMPHOCYTES # BLD AUTO: 1.2 X10*3/UL (ref 1.2–4.8)
LYMPHOCYTES NFR BLD AUTO: 3.9 %
MCH RBC QN AUTO: 28.7 PG (ref 26–34)
MCHC RBC AUTO-ENTMCNC: 35.9 G/DL (ref 32–36)
MCV RBC AUTO: 80 FL (ref 80–100)
MONOCYTES # BLD AUTO: 1.56 X10*3/UL (ref 0.1–1)
MONOCYTES NFR BLD AUTO: 5 %
NEUTROPHILS # BLD AUTO: 27.39 X10*3/UL (ref 1.2–7.7)
NEUTROPHILS NFR BLD AUTO: 88.6 %
NRBC BLD-RTO: 0.3 /100 WBCS (ref 0–0)
PLATELET # BLD AUTO: 623 X10*3/UL (ref 150–450)
POTASSIUM SERPL-SCNC: 4.5 MMOL/L (ref 3.5–5.3)
PROT SERPL-MCNC: 8.3 G/DL (ref 6.4–8.2)
RBC # BLD AUTO: 3.42 X10*6/UL (ref 4.5–5.9)
SARS-COV-2 RNA RESP QL NAA+PROBE: NOT DETECTED
SODIUM SERPL-SCNC: 132 MMOL/L (ref 136–145)
WBC # BLD AUTO: 30.9 X10*3/UL (ref 4.4–11.3)

## 2025-01-26 PROCEDURE — 83605 ASSAY OF LACTIC ACID: CPT | Performed by: STUDENT IN AN ORGANIZED HEALTH CARE EDUCATION/TRAINING PROGRAM

## 2025-01-26 PROCEDURE — 99281 EMR DPT VST MAYX REQ PHY/QHP: CPT

## 2025-01-26 PROCEDURE — 99284 EMERGENCY DEPT VISIT MOD MDM: CPT

## 2025-01-26 PROCEDURE — 85025 COMPLETE CBC W/AUTO DIFF WBC: CPT | Performed by: STUDENT IN AN ORGANIZED HEALTH CARE EDUCATION/TRAINING PROGRAM

## 2025-01-26 PROCEDURE — 80053 COMPREHEN METABOLIC PANEL: CPT | Performed by: STUDENT IN AN ORGANIZED HEALTH CARE EDUCATION/TRAINING PROGRAM

## 2025-01-26 PROCEDURE — 87636 SARSCOV2 & INF A&B AMP PRB: CPT | Performed by: STUDENT IN AN ORGANIZED HEALTH CARE EDUCATION/TRAINING PROGRAM

## 2025-01-26 PROCEDURE — 36415 COLL VENOUS BLD VENIPUNCTURE: CPT | Performed by: STUDENT IN AN ORGANIZED HEALTH CARE EDUCATION/TRAINING PROGRAM

## 2025-01-26 PROCEDURE — 93005 ELECTROCARDIOGRAM TRACING: CPT

## 2025-01-26 PROCEDURE — 2500000001 HC RX 250 WO HCPCS SELF ADMINISTERED DRUGS (ALT 637 FOR MEDICARE OP): Performed by: STUDENT IN AN ORGANIZED HEALTH CARE EDUCATION/TRAINING PROGRAM

## 2025-01-26 RX ORDER — ACETAMINOPHEN 325 MG/1
975 TABLET ORAL ONCE
Status: COMPLETED | OUTPATIENT
Start: 2025-01-26 | End: 2025-01-26

## 2025-01-26 RX ADMIN — ACETAMINOPHEN 975 MG: 325 TABLET ORAL at 15:57

## 2025-01-26 ASSESSMENT — PAIN DESCRIPTION - LOCATION: LOCATION: THROAT

## 2025-01-26 ASSESSMENT — PAIN SCALES - GENERAL: PAINLEVEL_OUTOF10: 9

## 2025-01-26 ASSESSMENT — PAIN DESCRIPTION - DESCRIPTORS: DESCRIPTORS: ACHING

## 2025-01-26 ASSESSMENT — PAIN - FUNCTIONAL ASSESSMENT: PAIN_FUNCTIONAL_ASSESSMENT: 0-10

## 2025-01-26 ASSESSMENT — PAIN DESCRIPTION - PAIN TYPE: TYPE: ACUTE PAIN

## 2025-01-31 LAB
ATRIAL RATE: 126 BPM
P AXIS: 53 DEGREES
P OFFSET: 210 MS
P ONSET: 155 MS
PR INTERVAL: 134 MS
Q ONSET: 222 MS
QRS COUNT: 21 BEATS
QRS DURATION: 88 MS
QT INTERVAL: 296 MS
QTC CALCULATION(BAZETT): 428 MS
QTC FREDERICIA: 379 MS
R AXIS: 35 DEGREES
T AXIS: 24 DEGREES
T OFFSET: 370 MS
VENTRICULAR RATE: 126 BPM

## 2025-03-03 ENCOUNTER — OFFICE VISIT (OUTPATIENT)
Dept: HEMATOLOGY/ONCOLOGY | Facility: HOSPITAL | Age: 20
End: 2025-03-03
Payer: MEDICAID

## 2025-03-03 VITALS
OXYGEN SATURATION: 100 % | HEART RATE: 63 BPM | TEMPERATURE: 97.3 F | BODY MASS INDEX: 19.97 KG/M2 | WEIGHT: 139.2 LBS | SYSTOLIC BLOOD PRESSURE: 115 MMHG | RESPIRATION RATE: 16 BRPM | DIASTOLIC BLOOD PRESSURE: 64 MMHG

## 2025-03-03 DIAGNOSIS — Z51.81 ENCOUNTER FOR MONITORING OF HYDROXYUREA THERAPY: ICD-10-CM

## 2025-03-03 DIAGNOSIS — D57.20: ICD-10-CM

## 2025-03-03 DIAGNOSIS — K59.00 CONSTIPATION, UNSPECIFIED CONSTIPATION TYPE: ICD-10-CM

## 2025-03-03 DIAGNOSIS — F31.9 BIPOLAR 1 DISORDER (MULTI): ICD-10-CM

## 2025-03-03 DIAGNOSIS — Z79.64 ENCOUNTER FOR MONITORING OF HYDROXYUREA THERAPY: ICD-10-CM

## 2025-03-03 PROCEDURE — 99214 OFFICE O/P EST MOD 30 MIN: CPT | Performed by: PEDIATRICS

## 2025-03-03 RX ORDER — CHLORHEXIDINE GLUCONATE ORAL RINSE 1.2 MG/ML
15 SOLUTION DENTAL AS NEEDED
Qty: 120 ML | Refills: 0 | Status: SHIPPED | OUTPATIENT
Start: 2025-03-03

## 2025-03-03 RX ORDER — NAPROXEN 500 MG/1
500 TABLET ORAL EVERY 12 HOURS
Qty: 60 TABLET | Refills: 0 | Status: SHIPPED | OUTPATIENT
Start: 2025-03-03

## 2025-03-03 RX ORDER — ACETAMINOPHEN 500 MG
1000 TABLET ORAL EVERY 6 HOURS PRN
Qty: 30 TABLET | Refills: 0 | Status: SHIPPED | OUTPATIENT
Start: 2025-03-03

## 2025-03-03 RX ORDER — OXYCODONE HYDROCHLORIDE 5 MG/1
5 TABLET ORAL EVERY 6 HOURS PRN
Qty: 10 TABLET | Refills: 0 | Status: SHIPPED | OUTPATIENT
Start: 2025-03-03 | End: 2025-03-10

## 2025-03-03 RX ORDER — DICLOFENAC SODIUM 10 MG/G
4 GEL TOPICAL 4 TIMES DAILY PRN
Qty: 50 G | Refills: 2 | Status: SHIPPED | OUTPATIENT
Start: 2025-03-03

## 2025-03-03 RX ORDER — HYDROXYUREA 500 MG/1
1000 CAPSULE ORAL DAILY
Qty: 60 CAPSULE | Refills: 3 | Status: SHIPPED | OUTPATIENT
Start: 2025-03-03

## 2025-03-03 ASSESSMENT — PAIN SCALES - GENERAL: PAINLEVEL_OUTOF10: 0-NO PAIN

## 2025-03-08 ASSESSMENT — ENCOUNTER SYMPTOMS
DIZZINESS: 0
SEIZURES: 0
CONSTIPATION: 0
CHEST TIGHTNESS: 0
WOUND: 0
SPEECH DIFFICULTY: 0
EXTREMITY WEAKNESS: 0
FATIGUE: 0
BRUISES/BLEEDS EASILY: 0
NAUSEA: 0
SHORTNESS OF BREATH: 0
NUMBNESS: 0
EYE PROBLEMS: 0
HEADACHES: 0
ABDOMINAL PAIN: 0
DEPRESSION: 0
HEMATURIA: 0
PALPITATIONS: 0
HEMOPTYSIS: 0
DIAPHORESIS: 0
FEVER: 0
COUGH: 0
TROUBLE SWALLOWING: 0
WHEEZING: 0
DIARRHEA: 0
MYALGIAS: 0
LIGHT-HEADEDNESS: 0
SORE THROAT: 0
NERVOUS/ANXIOUS: 0
VOMITING: 0
LEG SWELLING: 0
SCLERAL ICTERUS: 0
DYSURIA: 0
ARTHRALGIAS: 0
BACK PAIN: 0

## 2025-03-08 NOTE — PROGRESS NOTES
SICKLE CELL OUTPATIENT NOTE  Patient ID: Josue Rangel   Visit Type: Follow up visit     ASSESSMENT AND PLAN  Josue Rangel is 19 y.o. 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 He denies suicidal ideation. He has not followed with behavioral health and has not been taking his medications.   Chronic constipation     PLAN   -Continue hydroxyurea 1000 mg daily and no changes in dose   -Patient encouraged to follow-up with behavioral health, and also resume his behavioral medications.  He declined, indicating he is fine and does not need any behavioral health interventions.  -No changes in his current home oral pain medications, with   Oral Tylenol 650 mg every 6 hours as needed and or ibuprofen 600 mg every 8 hours prn or naprosyn 500 mg BID as needed for mild to moderate pain   Oral oxycodone 5 mg every 6 hours as needed for moderate, severe and breakthrough pain  Topical Lidoderm patch as needed to sites of sickle cell pain   Discussed non pharmacologic methods of pain control   Reviewed OARRS  - Daily folic acid   - Dulcolax and miralax as needed for constipation   - Follow up office visit will be in 3 months time     Chief Complaint: Follow up for HB SC SCD on Hydroxyurea      Interval History: Josue is present with his girl friend in clinic today, for follow-up of hemoglobin SC sickle cell disease.  He was last discharged from hospital on 1/8/2025 for acute sickle cell pain, and has been doing well since this encounter.  He continues to have mild recurrent clear runny nose, as well as a cough, possibly from allergies versus viral URI.  Bronchial asthma has been well-controlled otherwise.  He denies acute sickle cell pain in clinic today, and has been intermittently compliant with hydroxyurea, missing several doses in the week.  He denies focal neurologic  deficits, priapism, bilateral lower extremity edema or leg ulcers.  He has not followed up with behavioral health for the past several months, and has also not been taking his behavioral medications.  Josue denies headaches, dizziness, nausea or vomiting and his appetite is good. He continues to smoke marijuana. Chronic constipation is well controlled.     Review of System:   Review of Systems   Constitutional:  Negative for diaphoresis, fatigue and fever.   HENT:   Negative for mouth sores, nosebleeds, sore throat and trouble swallowing.    Eyes:  Negative for eye problems and icterus.   Respiratory:  Negative for chest tightness, cough, hemoptysis, shortness of breath and wheezing.    Cardiovascular:  Negative for chest pain, leg swelling and palpitations.   Gastrointestinal:  Negative for abdominal pain, constipation, diarrhea, nausea and vomiting.   Genitourinary:  Negative for dysuria and hematuria.    Musculoskeletal:  Negative for arthralgias, back pain and myalgias.   Skin:  Negative for rash and wound.   Neurological:  Negative for dizziness, extremity weakness, headaches, light-headedness, numbness, seizures and speech difficulty.   Hematological:  Does not bruise/bleed easily.   Psychiatric/Behavioral:  Negative for depression and suicidal ideas. The patient is not nervous/anxious.       Allergies:   No Known Allergies    Current Medications:   Medication Documentation Review Audit            Medication Order Taking? Sig Documenting Provider Last Dose Status   acetaminophen (Tylenol) 500 mg tablet [id number] Yes Take 2 tablets (1,000 mg) by mouth every 6 hours if needed for mild pain (1 - 3). Erik Andrade MD  Active   albuterol 90 mcg/actuation inhaler [id number] Yes Inhale 2-4 puffs every 4-6 hours as needed for shortness of breath or wheezing Erik Andrade MD  Active   amphetamine-dextroamphetamine XR (Adderall XR) 15 mg 24 hr capsule [id number]  Adderall XR 15 MG Oral Capsule Extended Release 24 Hour    Quantity: 30  Refills: 0        Start : 2-Sep-2016   Active Historical Provider, MD  Active   bisacodyl (Dulcolax) 5 mg EC tablet [id number] Yes Take 1 tablet (5 mg) by mouth once daily as needed for constipation. Do not crush, chew, or split. Erik Andrade MD  Active   chlorhexidine (Peridex) 0.12 % solution [id number]  Use 15 mL in the mouth or throat if needed for wound care. Erik Andrade MD  Active   diclofenac sodium (Voltaren) 1 % gel [id number] Yes Apply 4.5 inches (4 g) topically 4 times a day as needed (applied tropically for pain). Erik Andrade MD  Active   dimethicone-colloidal oatmeaL (Aveeno) 1.2 % lotion [id number]  1 Application every 12 hours. Historical Provider, MD  Active   folic acid (Folvite) 1 mg tablet [id number] Yes Take 1 tablet (1 mg) by mouth once daily. Erik Andrade MD  Active   food supplemt, lactose-reduced (Ensure Original) 0.04-1.05 gram-kcal/mL liquid [id number]  Ensure Oral Liquid   Refills: 0        Start : 3-Aug-2017   Active Historical Provider, MD  Active   hydroxyurea (Hydrea) 500 mg capsule [id number] Yes Take 2 capsules (1,000 mg total) by mouth once daily.  Take at the same time each day. Erik Andrade MD  Active   ibuprofen 600 mg tablet [id number] Yes Take 1 tablet (600 mg) by mouth every 6 hours if needed for mild pain (1 - 3). Christopher Martin DMD, MD  Active   lidocaine 4 % patch [id number] Yes Place 1 patch over 12 hours on the skin every 12 hours if needed for mild pain (1 - 3) or moderate pain (4 - 6). Remover after 12 hours Erik Andrade MD  Active   loratadine (Claritin) 10 mg tablet [id number] Yes Take 1 tablet (10 mg) by mouth once daily at bedtime. Historical Provider, MD  Active   naproxen (Naprosyn) 500 mg tablet [id number] Yes Take 1 tablet (500 mg) by mouth every 12 hours. For the next 2 days, then as needed for pain Erik Andrade MD  Active   oxyCODONE (Roxicodone) 5 mg immediate release tablet [id number] Yes Take 1 tablet (5 mg) by mouth every 6 hours if  needed for severe pain (7 - 10) for up to 7 days. Erik Andrade MD  Active   polyethylene glycol (Glycolax, Miralax) 17 gram packet [id number] Yes Take 17 g(contents of 1 packet dissolved in 8 ounces of water)  by mouth once daily as needed (constipation). Kat Neri MD  Active   QUEtiapine 150 mg tablet [id number]  Take 150 mg by mouth once daily at bedtime.   Patient not taking: Reported on 2024    Lizette Sparks, APRN-CNP   08/10/24 2359   sennosides (Senokot) 8.6 mg tablet [id number] Yes Take 2 tablets (17.2 mg) by mouth 2 times a day as needed for constipation. Erik Andrade MD  Active   Symbicort 80-4.5 mcg/actuation inhaler [id number] Yes Inhale 2 puffs 2 times a day. And 2 puffs every 4 hours as needed. (Max 10 puffs per 24 hours) Kat Neri MD  Active                     Past Medical History:    has a past medical history of ADHD, Anxiety, Asthma, Bipolar disorder, Depression, History of suicidal ideation, Seizure-like activity (Multi), and Sickle cell disease (Multi).    Past Surgical History:    has a past surgical history that includes MR angio head wo IV contrast (10/8/2014); MR angio neck wo IV contrast (10/8/2014); MR angio head wo IV contrast (7/10/2023); MR angio neck wo IV contrast (7/10/2023); and CT guided percutaneous biopsy bone deep (2023).    Family History:   Family History   Problem Relation Name Age of Onset    Diabetes Paternal Grandmother      Diabetes Paternal Grandfather         Social History:   Josue Rangel  reports that he has never smoked. He has been exposed to tobacco smoke. He has never used smokeless tobacco.  reports current drug use. Drug: Marijuana.    EXAMINATION FINDINGS   /64 (BP Location: Left arm, Patient Position: Sitting, BP Cuff Size: Adult)   Pulse 63   Temp 36.3 °C (97.3 °F) (Temporal)   Resp 16   Wt 63.1 kg (139 lb 3.2 oz)   SpO2 100%   BMI 19.97 kg/m²   1.77 meters squared    Physical Exam  Vitals reviewed.    Constitutional:       General: He is not in acute distress.     Appearance: Normal appearance. He is not ill-appearing.      Comments: Smells of mariajuana and appeared a bit stoned    HENT:      Nose: Nose normal.   Eyes:      General: No scleral icterus.     Extraocular Movements: Extraocular movements intact.      Conjunctiva/sclera: Conjunctivae normal.   Cardiovascular:      Rate and Rhythm: Normal rate and regular rhythm.      Pulses: Normal pulses.      Heart sounds: Normal heart sounds. No murmur heard.  Pulmonary:      Effort: Pulmonary effort is normal. No respiratory distress.      Breath sounds: Normal breath sounds. No wheezing.   Abdominal:      General: Bowel sounds are normal.      Palpations: Abdomen is soft.      Tenderness: There is no abdominal tenderness. There is no guarding.   Musculoskeletal:      Right lower leg: No edema.      Left lower leg: No edema.   Skin:     General: Skin is warm.      Capillary Refill: Capillary refill takes less than 2 seconds.      Findings: No lesion or rash.   Neurological:      General: No focal deficit present.      Mental Status: He is alert and oriented to person, place, and time. Mental status is at baseline.      Cranial Nerves: No cranial nerve deficit.      Motor: No weakness.   Psychiatric:         Mood and Affect: Mood normal.         Behavior: Behavior normal.         Thought Content: Thought content normal.       LABS   Admission on 10/05/2024, Discharged on 10/05/2024   Component Date Value Ref Range Status    Ventricular Rate 10/05/2024 93  BPM Final    Atrial Rate 10/05/2024 93  BPM Final    IL Interval 10/05/2024 142  ms Final    QRS Duration 10/05/2024 108  ms Final    QT Interval 10/05/2024 356  ms Final    QTC Calculation(Bazett) 10/05/2024 442  ms Final    P Axis 10/05/2024 87  degrees Final    R Axis 10/05/2024 80  degrees Final    T Axis 10/05/2024 71  degrees Final    QRS Count 10/05/2024 15  beats Final    Q Onset 10/05/2024 222  ms  Final    P Onset 10/05/2024 151  ms Final    P Offset 10/05/2024 206  ms Final    T Offset 10/05/2024 400  ms Final    QTC Fredericia 10/05/2024 412  ms Final    WBC 10/05/2024 20.2 (H)  4.4 - 11.3 x10*3/uL Final    nRBC 10/05/2024 0.3 (H)  0.0 - 0.0 /100 WBCs Final    RBC 10/05/2024 3.22 (L)  4.50 - 5.90 x10*6/uL Final    Hemoglobin 10/05/2024 10.1 (L)  13.5 - 17.5 g/dL Final    Hematocrit 10/05/2024 27.2 (L)  41.0 - 52.0 % Final    MCV 10/05/2024 85  80 - 100 fL Final    MCH 10/05/2024 31.4  26.0 - 34.0 pg Final    MCHC 10/05/2024 37.1 (H)  32.0 - 36.0 g/dL Final    RDW 10/05/2024 14.9 (H)  11.5 - 14.5 % Final    Platelets 10/05/2024 399  150 - 450 x10*3/uL Final    Neutrophils % 10/05/2024 81.4  40.0 - 80.0 % Final    Immature Granulocytes %, Automated 10/05/2024 0.5  0.0 - 0.9 % Final    Immature Granulocyte Count (IG) includes promyelocytes, myelocytes and metamyelocytes but does not include bands. Percent differential counts (%) should be interpreted in the context of the absolute cell counts (cells/UL).    Lymphocytes % 10/05/2024 11.5  13.0 - 44.0 % Final    Monocytes % 10/05/2024 5.4  2.0 - 10.0 % Final    Eosinophils % 10/05/2024 1.0  0.0 - 6.0 % Final    Basophils % 10/05/2024 0.2  0.0 - 2.0 % Final    Neutrophils Absolute 10/05/2024 16.47 (H)  1.20 - 7.70 x10*3/uL Final    Percent differential counts (%) should be interpreted in the context of the absolute cell counts (cells/uL).    Immature Granulocytes Absolute, Au* 10/05/2024 0.10  0.00 - 0.70 x10*3/uL Final    Lymphocytes Absolute 10/05/2024 2.32  1.20 - 4.80 x10*3/uL Final    Monocytes Absolute 10/05/2024 1.09 (H)  0.10 - 1.00 x10*3/uL Final    Eosinophils Absolute 10/05/2024 0.21  0.00 - 0.70 x10*3/uL Final    Basophils Absolute 10/05/2024 0.05  0.00 - 0.10 x10*3/uL Final    Glucose 10/05/2024 102 (H)  74 - 99 mg/dL Final    Sodium 10/05/2024 133 (L)  136 - 145 mmol/L Final    Potassium 10/05/2024 4.0  3.5 - 5.3 mmol/L Final    MILD HEMOLYSIS  DETECTED. The result may be falsely elevated due to hemolysis or other interferents. Clinical correlation is recommended. Repeat testing may be considered.    Chloride 10/05/2024 102  98 - 107 mmol/L Final    Bicarbonate 10/05/2024 20 (L)  21 - 32 mmol/L Final    Anion Gap 10/05/2024 15  10 - 20 mmol/L Final    Urea Nitrogen 10/05/2024 11  6 - 23 mg/dL Final    Creatinine 10/05/2024 0.73  0.50 - 1.30 mg/dL Final    eGFR 10/05/2024 >90  >60 mL/min/1.73m*2 Final    Calculations of estimated GFR are performed using the 2021 CKD-EPI Study Refit equation without the race variable for the IDMS-Traceable creatinine methods.  https://jasn.asnjournals.org/content/early/2021/09/22/ASN.7038833276    Calcium 10/05/2024 9.2  8.6 - 10.6 mg/dL Final    Albumin 10/05/2024 4.4  3.4 - 5.0 g/dL Final    Alkaline Phosphatase 10/05/2024 57  33 - 120 U/L Final    Total Protein 10/05/2024 7.4  6.4 - 8.2 g/dL Final    AST 10/05/2024 66 (H)  9 - 39 U/L Final    MILD HEMOLYSIS DETECTED. The result may be falsely elevated due to hemolysis or other interferents. Clinical correlation is recommended. Repeat testing may be considered.    Bilirubin, Total 10/05/2024 1.5 (H)  0.0 - 1.2 mg/dL Final    ALT 10/05/2024 18  10 - 52 U/L Final    Patients treated with Sulfasalazine may generate falsely decreased results for ALT.    LDH 10/05/2024 414 (H)  84 - 246 U/L Final    MILD HEMOLYSIS DETECTED. The result may be falsely elevated due to hemolysis or other interferents. Clinical correlation is recommended. Repeat testing may be considered.    Retic % 10/05/2024 5.3 (H)  0.5 - 2.0 % Final    Retic Absolute 10/05/2024 0.169 (H)  0.022 - 0.118 x10*6/uL Final    Reticulocyte Hemoglobin 10/05/2024 31  28 - 38 pg Final    Immature Retic fraction 10/05/2024 25.5 (H)  <=16.0 % Final    Reticulocytes are measured based on a fluorescent technique. The IRF, or immature reticulocyte fraction, is the percent of reticulocytes that show medium (MFR) or high (HFR)  "fluorescence.  This value can be used to assess the relative maturity of the reticulocyte population in response to anemia. The \"shift reticulocytes\" are not measured by this technique, eliminating the need for their correction in the reticulocyte index.    Troponin I, High Sensitivity (CMC) 10/05/2024 <3  0 - 53 ng/L Final    Hemoglobin F 10/05/2024 1.0  0.0 - 2.0 % Final    Hemoglobin S 10/05/2024 49.1 (H)  <=0.0 % Final    Hemoglobin C 10/05/2024 46.0 (H)  <=0.0 % Final    Hemoglobin A2 10/05/2024 3.9 (H)  2.0 - 3.5 % Final    Hemoglobin Identification Interpre* 10/05/2024 See Below (A)  Normal Final    Known Hemoglobin SC Disease (Hb SC)    Pathologist Review-Hemoglobin Iden* 10/05/2024 Kiersten Braun MD    Electronically signed out by Kiersten Braun MD on 10/8/24 at 1:57 PM.  By the signature on this report, the individual or group listed as making the Final Interpretation/Diagnosis certifies that they have reviewed this case.   Final      Latest Reference Range & Units 07/11/24 05:20   GLUCOSE 74 - 99 mg/dL 75   SODIUM 136 - 145 mmol/L 138   POTASSIUM 3.5 - 5.3 mmol/L 4.1   CHLORIDE 98 - 107 mmol/L 105   Bicarbonate 21 - 32 mmol/L 25   Anion Gap 10 - 20 mmol/L 12   Blood Urea Nitrogen 6 - 23 mg/dL 8   Creatinine 0.50 - 1.30 mg/dL 0.63   EGFR >60 mL/min/1.73m*2 >90   Calcium 8.6 - 10.6 mg/dL 9.3   Albumin 3.4 - 5.0 g/dL 4.2   Alkaline Phosphatase 33 - 120 U/L 56   ALT 10 - 52 U/L 9 (L)   AST 9 - 39 U/L 20   Bilirubin Total 0.0 - 1.2 mg/dL 1.5 (H)   Total Protein 6.4 - 8.2 g/dL 7.1   LDH 84 - 246 U/L 227   WBC 4.4 - 11.3 x10*3/uL 6.9   nRBC 0.0 - 0.0 /100 WBCs 0.9 (H)   RBC 4.50 - 5.90 x10*6/uL 3.37 (L)   HEMOGLOBIN 13.5 - 17.5 g/dL 10.5 (L)   HEMATOCRIT 41.0 - 52.0 % 28.4 (L)   MCV 80 - 100 fL 84   MCH 26.0 - 34.0 pg 31.2   MCHC 32.0 - 36.0 g/dL 37.0 (H)   RED CELL DISTRIBUTION WIDTH 11.5 - 14.5 % 14.7 (H)   Platelets 150 - 450 x10*3/uL 301   Neutrophils % 40.0 - 80.0 % 37.2   Immature Granulocytes %, Automated " 0.0 - 0.9 % 0.1   Lymphocytes % 13.0 - 44.0 % 45.5   Monocytes % 2.0 - 10.0 % 8.9   Eosinophils % 0.0 - 6.0 % 7.7   Basophils % 0.0 - 2.0 % 0.6   Neutrophils Absolute 1.20 - 7.70 x10*3/uL 2.55   Immature Granulocytes Absolute, Automated 0.00 - 0.70 x10*3/uL 0.01   Lymphocytes Absolute 1.20 - 4.80 x10*3/uL 3.12   Monocytes Absolute 0.10 - 1.00 x10*3/uL 0.61   Eosinophils Absolute 0.00 - 0.70 x10*3/uL 0.53   Basophils Absolute 0.00 - 0.10 x10*3/uL 0.04   Immature Retic fraction <=16.0 % 30.7 (H)   Retic Absolute 0.022 - 0.118 x10*6/uL 0.153 (H)   Retic % 0.5 - 2.0 % 4.5 (H)   Reticulocyte Hemoglobin 28 - 38 pg 32              Erik Andrade MD

## 2025-03-12 ENCOUNTER — OFFICE VISIT (OUTPATIENT)
Dept: URGENT CARE | Age: 20
End: 2025-03-12
Payer: COMMERCIAL

## 2025-03-12 VITALS
DIASTOLIC BLOOD PRESSURE: 81 MMHG | BODY MASS INDEX: 20.76 KG/M2 | RESPIRATION RATE: 20 BRPM | OXYGEN SATURATION: 96 % | HEIGHT: 70 IN | WEIGHT: 145 LBS | HEART RATE: 94 BPM | TEMPERATURE: 98.3 F | SYSTOLIC BLOOD PRESSURE: 141 MMHG

## 2025-03-12 DIAGNOSIS — R10.31 GROIN PAIN, RIGHT: ICD-10-CM

## 2025-03-12 LAB
POC APPEARANCE, URINE: CLEAR
POC BILIRUBIN, URINE: NEGATIVE
POC BLOOD, URINE: NEGATIVE
POC COLOR, URINE: YELLOW
POC GLUCOSE, URINE: NEGATIVE MG/DL
POC KETONES, URINE: NEGATIVE MG/DL
POC LEUKOCYTES, URINE: NEGATIVE
POC NITRITE,URINE: NEGATIVE
POC PH, URINE: 6.5 PH
POC PROTEIN, URINE: NEGATIVE MG/DL
POC SPECIFIC GRAVITY, URINE: 1.01
POC UROBILINOGEN, URINE: 0.2 EU/DL

## 2025-03-12 PROCEDURE — 3008F BODY MASS INDEX DOCD: CPT

## 2025-03-12 PROCEDURE — 81003 URINALYSIS AUTO W/O SCOPE: CPT

## 2025-03-12 PROCEDURE — 99204 OFFICE O/P NEW MOD 45 MIN: CPT

## 2025-03-12 RX ORDER — NAPROXEN 500 MG/1
500 TABLET ORAL EVERY 12 HOURS PRN
Qty: 20 TABLET | Refills: 0 | Status: SHIPPED | OUTPATIENT
Start: 2025-03-12 | End: 2025-03-22

## 2025-03-12 RX ORDER — CYCLOBENZAPRINE HCL 10 MG
TABLET ORAL
Qty: 15 TABLET | Refills: 0 | Status: SHIPPED | OUTPATIENT
Start: 2025-03-12

## 2025-03-12 RX ORDER — CYCLOBENZAPRINE HCL 10 MG
10 TABLET ORAL EVERY 8 HOURS PRN
Qty: 15 TABLET | Refills: 0 | Status: SHIPPED | OUTPATIENT
Start: 2025-03-12 | End: 2025-03-12

## 2025-03-13 ENCOUNTER — APPOINTMENT (OUTPATIENT)
Dept: RADIOLOGY | Facility: HOSPITAL | Age: 20
End: 2025-03-13
Payer: MEDICAID

## 2025-03-13 ENCOUNTER — HOSPITAL ENCOUNTER (EMERGENCY)
Facility: HOSPITAL | Age: 20
Discharge: HOME | End: 2025-03-13
Attending: EMERGENCY MEDICINE
Payer: MEDICAID

## 2025-03-13 ENCOUNTER — APPOINTMENT (OUTPATIENT)
Dept: VASCULAR MEDICINE | Facility: HOSPITAL | Age: 20
End: 2025-03-13
Payer: MEDICAID

## 2025-03-13 VITALS
BODY MASS INDEX: 20.76 KG/M2 | HEART RATE: 79 BPM | OXYGEN SATURATION: 96 % | HEIGHT: 70 IN | SYSTOLIC BLOOD PRESSURE: 129 MMHG | DIASTOLIC BLOOD PRESSURE: 78 MMHG | RESPIRATION RATE: 16 BRPM | WEIGHT: 145 LBS | TEMPERATURE: 97.5 F

## 2025-03-13 DIAGNOSIS — R10.31 RIGHT GROIN PAIN: Primary | ICD-10-CM

## 2025-03-13 DIAGNOSIS — M79.605 PAIN IN LEFT LEG: ICD-10-CM

## 2025-03-13 DIAGNOSIS — M79.604 PAIN IN RIGHT LEG: ICD-10-CM

## 2025-03-13 LAB
ALBUMIN SERPL BCP-MCNC: 4.7 G/DL (ref 3.4–5)
ALP SERPL-CCNC: 69 U/L (ref 33–120)
ALT SERPL W P-5'-P-CCNC: 8 U/L (ref 10–52)
ANION GAP SERPL CALC-SCNC: 12 MMOL/L (ref 10–20)
APPEARANCE UR: CLEAR
AST SERPL W P-5'-P-CCNC: 21 U/L (ref 9–39)
BASOPHILS # BLD AUTO: 0.07 X10*3/UL (ref 0–0.1)
BASOPHILS NFR BLD AUTO: 0.5 %
BILIRUB SERPL-MCNC: 1.3 MG/DL (ref 0–1.2)
BILIRUB UR STRIP.AUTO-MCNC: NEGATIVE MG/DL
BUN SERPL-MCNC: 16 MG/DL (ref 6–23)
CALCIUM SERPL-MCNC: 9.6 MG/DL (ref 8.6–10.3)
CHLORIDE SERPL-SCNC: 101 MMOL/L (ref 98–107)
CO2 SERPL-SCNC: 25 MMOL/L (ref 21–32)
COLOR UR: NORMAL
CREAT SERPL-MCNC: 0.73 MG/DL (ref 0.5–1.3)
EGFRCR SERPLBLD CKD-EPI 2021: >90 ML/MIN/1.73M*2
EOSINOPHIL # BLD AUTO: 0.44 X10*3/UL (ref 0–0.7)
EOSINOPHIL NFR BLD AUTO: 3 %
ERYTHROCYTE [DISTWIDTH] IN BLOOD BY AUTOMATED COUNT: 17.6 % (ref 11.5–14.5)
GLUCOSE SERPL-MCNC: 98 MG/DL (ref 74–99)
GLUCOSE UR STRIP.AUTO-MCNC: NORMAL MG/DL
HCT VFR BLD AUTO: 30.5 % (ref 41–52)
HGB BLD-MCNC: 11.1 G/DL (ref 13.5–17.5)
HGB RETIC QN: 31 PG (ref 28–38)
HOLD SPECIMEN: NORMAL
IMM GRANULOCYTES # BLD AUTO: 0.06 X10*3/UL (ref 0–0.7)
IMM GRANULOCYTES NFR BLD AUTO: 0.4 % (ref 0–0.9)
IMMATURE RETIC FRACTION: 35.3 %
KETONES UR STRIP.AUTO-MCNC: NEGATIVE MG/DL
LDH SERPL L TO P-CCNC: 213 U/L (ref 84–246)
LEUKOCYTE ESTERASE UR QL STRIP.AUTO: NEGATIVE
LYMPHOCYTES # BLD AUTO: 2.32 X10*3/UL (ref 1.2–4.8)
LYMPHOCYTES NFR BLD AUTO: 16.1 %
MAGNESIUM SERPL-MCNC: 1.91 MG/DL (ref 1.6–2.4)
MCH RBC QN AUTO: 28.8 PG (ref 26–34)
MCHC RBC AUTO-ENTMCNC: 36.4 G/DL (ref 32–36)
MCV RBC AUTO: 79 FL (ref 80–100)
MONOCYTES # BLD AUTO: 1.45 X10*3/UL (ref 0.1–1)
MONOCYTES NFR BLD AUTO: 10 %
NEUTROPHILS # BLD AUTO: 10.1 X10*3/UL (ref 1.2–7.7)
NEUTROPHILS NFR BLD AUTO: 70 %
NITRITE UR QL STRIP.AUTO: NEGATIVE
NRBC BLD-RTO: 0.5 /100 WBCS (ref 0–0)
PH UR STRIP.AUTO: 6 [PH]
PLATELET # BLD AUTO: 369 X10*3/UL (ref 150–450)
POTASSIUM SERPL-SCNC: 4.2 MMOL/L (ref 3.5–5.3)
PROT SERPL-MCNC: 8.4 G/DL (ref 6.4–8.2)
PROT UR STRIP.AUTO-MCNC: NEGATIVE MG/DL
RBC # BLD AUTO: 3.85 X10*6/UL (ref 4.5–5.9)
RBC # UR STRIP.AUTO: NEGATIVE MG/DL
RETICS #: 0.17 X10*6/UL (ref 0.02–0.12)
RETICS/RBC NFR AUTO: 4.5 % (ref 0.5–2)
SODIUM SERPL-SCNC: 134 MMOL/L (ref 136–145)
SP GR UR STRIP.AUTO: 1.01
UROBILINOGEN UR STRIP.AUTO-MCNC: NORMAL MG/DL
WBC # BLD AUTO: 14.4 X10*3/UL (ref 4.4–11.3)

## 2025-03-13 PROCEDURE — 80053 COMPREHEN METABOLIC PANEL: CPT | Performed by: EMERGENCY MEDICINE

## 2025-03-13 PROCEDURE — 96374 THER/PROPH/DIAG INJ IV PUSH: CPT

## 2025-03-13 PROCEDURE — 93970 EXTREMITY STUDY: CPT | Performed by: SURGERY

## 2025-03-13 PROCEDURE — 76870 US EXAM SCROTUM: CPT

## 2025-03-13 PROCEDURE — 85045 AUTOMATED RETICULOCYTE COUNT: CPT | Performed by: EMERGENCY MEDICINE

## 2025-03-13 PROCEDURE — 81003 URINALYSIS AUTO W/O SCOPE: CPT | Performed by: EMERGENCY MEDICINE

## 2025-03-13 PROCEDURE — 99284 EMERGENCY DEPT VISIT MOD MDM: CPT | Mod: 25 | Performed by: EMERGENCY MEDICINE

## 2025-03-13 PROCEDURE — 83615 LACTATE (LD) (LDH) ENZYME: CPT | Performed by: EMERGENCY MEDICINE

## 2025-03-13 PROCEDURE — 2500000004 HC RX 250 GENERAL PHARMACY W/ HCPCS (ALT 636 FOR OP/ED): Performed by: EMERGENCY MEDICINE

## 2025-03-13 PROCEDURE — 83735 ASSAY OF MAGNESIUM: CPT | Performed by: EMERGENCY MEDICINE

## 2025-03-13 PROCEDURE — 2500000001 HC RX 250 WO HCPCS SELF ADMINISTERED DRUGS (ALT 637 FOR MEDICARE OP): Performed by: EMERGENCY MEDICINE

## 2025-03-13 PROCEDURE — 85025 COMPLETE CBC W/AUTO DIFF WBC: CPT | Performed by: EMERGENCY MEDICINE

## 2025-03-13 PROCEDURE — 93970 EXTREMITY STUDY: CPT

## 2025-03-13 PROCEDURE — 36415 COLL VENOUS BLD VENIPUNCTURE: CPT | Performed by: EMERGENCY MEDICINE

## 2025-03-13 RX ORDER — METHOCARBAMOL 500 MG/1
1000 TABLET, FILM COATED ORAL ONCE
Status: COMPLETED | OUTPATIENT
Start: 2025-03-13 | End: 2025-03-13

## 2025-03-13 RX ORDER — METHOCARBAMOL 500 MG/1
500 TABLET, FILM COATED ORAL 3 TIMES DAILY PRN
Qty: 20 TABLET | Refills: 0 | Status: SHIPPED | OUTPATIENT
Start: 2025-03-13 | End: 2025-03-23

## 2025-03-13 RX ORDER — MORPHINE SULFATE 4 MG/ML
4 INJECTION, SOLUTION INTRAMUSCULAR; INTRAVENOUS ONCE
Status: COMPLETED | OUTPATIENT
Start: 2025-03-13 | End: 2025-03-13

## 2025-03-13 RX ADMIN — METHOCARBAMOL 1000 MG: 500 TABLET ORAL at 06:43

## 2025-03-13 RX ADMIN — MORPHINE SULFATE 4 MG: 4 INJECTION, SOLUTION INTRAMUSCULAR; INTRAVENOUS at 09:34

## 2025-03-13 ASSESSMENT — COLUMBIA-SUICIDE SEVERITY RATING SCALE - C-SSRS
6. HAVE YOU EVER DONE ANYTHING, STARTED TO DO ANYTHING, OR PREPARED TO DO ANYTHING TO END YOUR LIFE?: NO
1. IN THE PAST MONTH, HAVE YOU WISHED YOU WERE DEAD OR WISHED YOU COULD GO TO SLEEP AND NOT WAKE UP?: NO
2. HAVE YOU ACTUALLY HAD ANY THOUGHTS OF KILLING YOURSELF?: NO

## 2025-03-13 ASSESSMENT — PAIN SCALES - GENERAL
PAINLEVEL_OUTOF10: 10 - WORST POSSIBLE PAIN
PAINLEVEL_OUTOF10: 10 - WORST POSSIBLE PAIN
PAINLEVEL_OUTOF10: 8
PAINLEVEL_OUTOF10: 0 - NO PAIN

## 2025-03-13 ASSESSMENT — PAIN - FUNCTIONAL ASSESSMENT
PAIN_FUNCTIONAL_ASSESSMENT: 0-10
PAIN_FUNCTIONAL_ASSESSMENT: 0-10

## 2025-03-13 ASSESSMENT — PAIN DESCRIPTION - LOCATION: LOCATION: LEG

## 2025-03-13 ASSESSMENT — PAIN DESCRIPTION - FREQUENCY: FREQUENCY: CONSTANT/CONTINUOUS

## 2025-03-13 ASSESSMENT — PAIN DESCRIPTION - ORIENTATION: ORIENTATION: RIGHT

## 2025-03-13 ASSESSMENT — PAIN DESCRIPTION - PAIN TYPE: TYPE: ACUTE PAIN

## 2025-03-13 NOTE — ED TRIAGE NOTES
18 y/o male complains of right leg pain that began 3 days ago. Patient denies injury and does not feel as if pain is related to sickle cell crisis. Pain starts in right groin area and radiated down right leg.

## 2025-03-13 NOTE — PROGRESS NOTES
Emergency Medicine Transition of Care Note.    I received Josue Rangel in signout from Dr. Klerman.  Please see the previous ED provider note for all HPI, PE and MDM up to the time of signout at 0700. This is in addition to the primary record.    In brief Josue Rangel is an 19 y.o. male presenting for   Chief Complaint   Patient presents with    Leg Pain     At the time of signout we were awaiting: labwork and imaging results    Diagnoses as of 03/13/25 1329   Right groin pain       Medical Decision Making  Patient presents the ED for right groin/leg pain for which he was previously seen at [facility].  Patient does have a history of sickle cell anemia.  Patient was administered Robaxin here in the ED for pain.  He was signed out to myself pending lab work and imaging results.    CBC is unremarkable.  Hemoglobin appears baseline at 11.1.  CMP is unremarkable.  Magnesium is normal at 1.9.  Urinalysis is not indicative of infection.    Vascular US lower extremity venous duplex bilateral   Final Result      US scrotum w doppler   Final Result   No acute findings.   No evidence of testicular torsion.   Bilateral testicular diffuse microlithiasis.   Small varicoceles.        MACRO:   None        Signed by: Dario Gibson 3/13/2025 8:24 AM   Dictation workstation:   [id number]        Patient did also receive morphine here in the ED for continued pain.  Upon review of patient's medical record, he was recently worked up at [facility] For similar symptoms.  His ultrasound was consistent with orchitis and patient was prescribed doxycycline.  When I went and reassessed the patient, he was sleeping comfortably.  When I asked him about his doxycycline, he states that he never picked it up and is therefore not currently taking it. Patient was informed of their lab and imaging results, and all questions and concerns were answered. Discharge planning with close outpatient follow-up was discussed at this time.   Patient is prescribed Robaxin for continued management of his pain.  He is also instructed to  his doxycycline and start taking it as prescribed.  Strict return precautions were given, and patient was discharged home in stable condition.      Final diagnoses:   [R10.31] Right groin pain       Procedure  Procedures    Cami Allred MD

## 2025-03-13 NOTE — ED PROVIDER NOTES
HPI   Chief Complaint   Patient presents with    Leg Pain       This is a 19-year-old male with hemoglobin SS bipolar asthma who presents with leg pain.  Patient report severe pain that started in the apex of his right leg and radiating to his right thigh with occasional radiation down to his right knee calf and foot.  He also reports occasional left groin pain complains of mild tenderness to his right testicle as well.  Patient said he was evaluated 4 days ago at [facility] for the same problem and discharged with pain meds that are not helping him.      History provided by:  Patient and medical records   used: No            Patient History   Past Medical History:   Diagnosis Date    ADHD     Anxiety     Asthma     stable    Bipolar disorder     Depression     History of suicidal ideation     Seizure-like activity (Multi)     EEG normal    Sickle cell disease (Multi)      Past Surgical History:   Procedure Laterality Date    CT GUIDED PERCUTANEOUS BIOPSY BONE DEEP  7/17/2023    CT GUIDED PERCUTANEOUS BIOPSY BONE DEEP 7/17/2023 [facility] [place]    MR HEAD ANGIO WO IV CONTRAST  10/8/2014    MR HEAD ANGIO WO IV CONTRAST 10/8/2014 [facility] CLINICAL LEGACY    MR HEAD ANGIO WO IV CONTRAST  7/10/2023    MR HEAD ANGIO WO IV CONTRAST 7/10/2023 [facility] MRI    MR NECK ANGIO WO IV CONTRAST  10/8/2014    MR NECK ANGIO WO IV CONTRAST 10/8/2014 [facility] CLINICAL LEGACY    MR NECK ANGIO WO IV CONTRAST  7/10/2023    MR NECK ANGIO WO IV CONTRAST 7/10/2023 [facility] MRI     Family History   Problem Relation Name Age of Onset    Diabetes Paternal Grandmother      Diabetes Paternal Grandfather       Social History     Tobacco Use    Smoking status: Never     Passive exposure: Past    Smokeless tobacco: Never   Vaping Use    Vaping status: Some Days    Substances: THC   Substance Use Topics    Alcohol use: Yes     Comment: per patient; socially    Drug use: Yes     Types: Marijuana       Physical Exam   ED Triage Vitals [03/13/25 0527]    Temperature Heart Rate Respirations BP   36.9 °C (98.4 °F) 90 16 128/82      Pulse Ox Temp Source Heart Rate Source Patient Position   100 % Temporal Monitor Sitting      BP Location FiO2 (%)     Left arm --       Physical Exam  Vitals and nursing note reviewed.   Constitutional:       General: He is not in acute distress.     Appearance: Normal appearance. He is normal weight. He is not ill-appearing, toxic-appearing or diaphoretic.   HENT:      Head: Normocephalic and atraumatic.      Nose: Nose normal. No congestion or rhinorrhea.      Mouth/Throat:      Mouth: Mucous membranes are moist.   Eyes:      General: No scleral icterus.        Right eye: No discharge.         Left eye: No discharge.      Extraocular Movements: Extraocular movements intact.      Conjunctiva/sclera: Conjunctivae normal.   Cardiovascular:      Rate and Rhythm: Normal rate and regular rhythm.      Heart sounds: No murmur heard.     No friction rub. No gallop.   Pulmonary:      Effort: Pulmonary effort is normal. No respiratory distress.      Breath sounds: Normal breath sounds. No stridor. No wheezing, rhonchi or rales.   Abdominal:      General: There is no distension.      Palpations: Abdomen is soft.      Tenderness: There is no abdominal tenderness. There is no guarding or rebound.   Musculoskeletal:         General: No swelling, tenderness or deformity. Normal range of motion.      Cervical back: Normal range of motion and neck supple. No rigidity.      Comments: +tense/cramping muscles of R thigh, 2+ R DP pulse   Skin:     General: Skin is warm and dry.      Coloration: Skin is not jaundiced or pale.      Findings: No bruising, erythema, lesion or rash.   Neurological:      General: No focal deficit present.      Mental Status: He is alert and oriented to person, place, and time.      Cranial Nerves: No cranial nerve deficit.      Sensory: No sensory deficit.      Motor: No weakness.   Psychiatric:         Mood and Affect: Mood  normal.         Behavior: Behavior normal.           ED Course & MDM   Diagnoses as of 03/16/25 2028   Right groin pain                 No data recorded     [place] Coma Scale Score: 15 (03/13/25 0545 : Lena Huston, RN)                           Medical Decision Making  19yM HgbSS bipolar asthma presents with pain, mostly to RLE in his thigh, with shooting pains to knee, calf, ankle and foot, and intermittent pain to L upper thigh and groin as well, with ongoing testicular pain.  This is pt's 3rd evaluation in an acute care setting for the same symptoms in less than 5 days (seen at [facility] ED on 3/11, then  urgent care on 3/12 and now [facility] ED on 3/13), with extensive outpatient workup notable for ?orchitis, prescribed doxycycline.  Pt nontoxic appearing, hemodynamically stable, without fever or resp distress, with abd soft/benign and mild testicular tenderness without concern for torsion.  B/l LE NVI despite may be muscle cramps.  Will repeat labs (including evaluation for sickle cell crisis), scrotal US and obtain RLE US to r/o DVT, though suspicion remains low.  Pt signed out to Dr. Allred pending results.    Amount and/or Complexity of Data Reviewed  External Data Reviewed: labs and notes.  Labs: ordered.  Radiology: ordered.    Risk  Prescription drug management.        Procedure  Procedures     Elyse H Klerman, MD  03/16/25 2033

## 2025-03-13 NOTE — DISCHARGE INSTRUCTIONS
Your doxycycline from [facility] was sent to the same pharmacy as your muscle relaxants today. Pick these up and start taking them as prescribed.

## 2025-03-14 ASSESSMENT — ENCOUNTER SYMPTOMS
NUMBNESS: 0
FLANK PAIN: 0
FREQUENCY: 0
SHORTNESS OF BREATH: 0
FEVER: 0
HEMATURIA: 0
WEAKNESS: 0
VOMITING: 0
DIZZINESS: 0
DYSURIA: 0

## 2025-03-14 NOTE — PROGRESS NOTES
Subjective   Patient ID: Josue Rangel is a 19 y.o. male. They present today with a chief complaint of Injury (Pt was stretching 2 days ago. Pt felt fine at time and started having pain the next day. Pain in right groin radiating down left leg. Pt feels the pain is near his testicles).    HISTORY OF PRESENT ILLNESS:    Patient presents to the clinic with  for pain of the right groin and right lower extremity x 1-2 days. Describes as a constant aching pain localized around the right testicle and radiating down to the right thigh and occasionally to the right lower leg. Denies direct trauma or injury to the area; however, he states he was doing stretches a couple of days ago and may have pulled a muscle. Pain began 1 day after the stretches. Patient also has sickle cell disease but states this does not feel consistent with a pain crisis. He reports taking Toradol, Tylenol, ibuprofen, and oxycodone, all without significant pain relief. Patient was evaluated in the ED yesterday for this issue, at which time he underwent labwork, CT of the abdomen/pelvis, US of the scrotum, and venous duplex US of the right lower extremity. All testing within normal limits. Pt was diagnosed with unspecified orchitis and was prescribed doxycycline. The symptoms have not changed since being discharged from the ED yesterday.    Past Medical History  Allergies as of 03/12/2025    (No Known Allergies)       Current Outpatient Medications   Medication Instructions    acetaminophen (TYLENOL) 1,000 mg, oral, Every 6 hours PRN    albuterol 90 mcg/actuation inhaler Inhale 2-4 puffs every 4-6 hours as needed for shortness of breath or wheezing    amphetamine-dextroamphetamine XR (Adderall XR) 15 mg 24 hr capsule Adderall XR 15 MG Oral Capsule Extended Release 24 Hour   Quantity: 30  Refills: 0        Start : 2-Sep-2016   Active    bisacodyl (DULCOLAX) 5 mg, oral, Daily PRN, Do not crush, chew, or split.    chlorhexidine (Peridex) 0.12 %  solution 15 mL, Mouth/Throat, As needed    cyclobenzaprine (Flexeril) 10 mg tablet Take 0.5 to 1 tablet by mouth as needed at bedtime for muscle spasms. May cause drowsiness. Use precautions.    diclofenac sodium (VOLTAREN) 4 g, Topical, 4 times daily PRN    dimethicone-colloidal oatmeaL (Aveeno) 1.2 % lotion 1 Application, Every 12 hours    folic acid (FOLVITE) 1 mg, oral, Daily    food supplemt, lactose-reduced (Ensure Original) 0.04-1.05 gram-kcal/mL liquid Ensure Oral Liquid   Refills: 0        Start : 3-Aug-2017   Active    hydroxyurea (HYDREA) 1,000 mg, oral, Daily, Take at the same time each day.    ibuprofen 600 mg, oral, Every 6 hours PRN    lidocaine 4 % patch 1 patch, transdermal, Every 12 hours PRN, Remover after 12 hours    loratadine (Claritin) 10 mg tablet 1 tablet, Nightly    methocarbamol (ROBAXIN) 500 mg, oral, 3 times daily PRN    naproxen (NAPROSYN) 500 mg, oral, Every 12 hours PRN, Take with food. Do not take with other NSAID medications (Toradol, ibuprofen, aspirin, etc.)    polyethylene glycol (Glycolax, Miralax) 17 gram packet Take 17 g(contents of 1 packet dissolved in 8 ounces of water)  by mouth once daily as needed (constipation).    QUEtiapine 150 mg, oral, Nightly    sennosides (SENOKOT) 17.2 mg, oral, 2 times daily PRN    Symbicort 80-4.5 mcg/actuation inhaler 2 puffs, inhalation, 2 times daily RT, And 2 puffs every 4 hours as needed. (Max 10 puffs per 24 hours)         Past Medical History:   Diagnosis Date    ADHD     Anxiety     Asthma     stable    Bipolar disorder     Depression     History of suicidal ideation     Seizure-like activity (Multi)     EEG normal    Sickle cell disease (Multi)        Past Surgical History:   Procedure Laterality Date    CT GUIDED PERCUTANEOUS BIOPSY BONE DEEP  7/17/2023    CT GUIDED PERCUTANEOUS BIOPSY BONE DEEP 7/17/2023 [facility] [place]    MR HEAD ANGIO WO IV CONTRAST  10/8/2014    MR HEAD ANGIO WO IV CONTRAST 10/8/2014 [facility] CLINICAL LEGACY    MR HEAD ANGIO  "WO IV CONTRAST  7/10/2023    MR HEAD ANGIO WO IV CONTRAST 7/10/2023 [facility] MRI    MR NECK ANGIO WO IV CONTRAST  10/8/2014    MR NECK ANGIO WO IV CONTRAST 10/8/2014 [facility] CLINICAL LEGACY    MR NECK ANGIO WO IV CONTRAST  7/10/2023    MR NECK ANGIO WO IV CONTRAST 7/10/2023 [facility] MRI        reports that he has never smoked. He has been exposed to tobacco smoke. He has never used smokeless tobacco. He reports current alcohol use. He reports current drug use. Drug: Marijuana.    Review of Systems    Review of Systems   Constitutional:  Negative for fever.   Respiratory:  Negative for shortness of breath.    Cardiovascular:  Negative for chest pain.   Gastrointestinal:  Negative for vomiting.   Genitourinary:  Positive for testicular pain (right). Negative for dysuria, flank pain, frequency, hematuria, penile discharge, scrotal swelling and urgency.   Musculoskeletal:         (+) pain in right lower extremity   Skin:  Negative for rash.   Neurological:  Negative for dizziness, syncope, weakness and numbness.                                 Objective    Vitals:    03/12/25 1841   BP: 141/81   BP Location: Right arm   Patient Position: Sitting   BP Cuff Size: Large adult   Pulse: 94   Resp: 20   Temp: 36.8 °C (98.3 °F)   TempSrc: Oral   SpO2: 96%   Weight: 65.8 kg (145 lb)   Height: 1.778 m (5' 10\")     No LMP for male patient.  PHYSICAL EXAMINATION:    Physical Exam  Vitals and nursing note reviewed.   Constitutional:       General: He is not in acute distress.     Appearance: Normal appearance. He is not ill-appearing.      Comments: + Moderate pain   HENT:      Head: Normocephalic and atraumatic.      Nose: Nose normal.   Eyes:      General:         Right eye: No discharge.         Left eye: No discharge.      Extraocular Movements: Extraocular movements intact.      Conjunctiva/sclera: Conjunctivae normal.   Cardiovascular:      Rate and Rhythm: Normal rate.   Pulmonary:      Effort: Pulmonary effort is normal. No respiratory " distress.   Musculoskeletal:      Cervical back: Normal range of motion and neck supple.   Skin:     General: Skin is warm and dry.      Findings: No rash.   Neurological:      General: No focal deficit present.      Mental Status: He is alert and oriented to person, place, and time.      GCS: GCS eye subscore is 4. GCS verbal subscore is 5. GCS motor subscore is 6.      Gait: Gait normal.   Psychiatric:         Mood and Affect: Mood normal.         Behavior: Behavior normal.          Procedures    Results for orders placed or performed in visit on 03/12/25   POCT UA Automated manually resulted   Result Value Ref Range    POC Color, Urine Yellow Straw, Yellow, Light-Yellow    POC Appearance, Urine Clear Clear    POC Glucose, Urine NEGATIVE NEGATIVE mg/dl    POC Bilirubin, Urine NEGATIVE NEGATIVE    POC Ketones, Urine NEGATIVE NEGATIVE mg/dl    POC Specific Gravity, Urine 1.010 1.005 - 1.035    POC Blood, Urine NEGATIVE NEGATIVE    POC PH, Urine 6.5 No Reference Range Established PH    POC Protein, Urine NEGATIVE NEGATIVE mg/dl    POC Urobilinogen, Urine 0.2 0.2, 1.0 EU/DL    Poc Nitrite, Urine NEGATIVE NEGATIVE    POC Leukocytes, Urine NEGATIVE NEGATIVE       Diagnostic study results (if any) were reviewed by Subha Harrington PA-C.    Assessment/Plan   Allergies, medications, history, and pertinent labs/EKGs/Imaging reviewed by Subha Harrington PA-C.     Orders and Diagnoses  Diagnoses and all orders for this visit:  Groin pain, right  -     POCT UA Automated manually resulted  -     Referral to Orthopaedic Surgery; Future  -     naproxen (Naprosyn) 500 mg tablet; Take 1 tablet (500 mg) by mouth every 12 hours if needed (Pain) for up to 10 days. Take with food. Do not take with other NSAID medications (Toradol, ibuprofen, aspirin, etc.)  -     cyclobenzaprine (Flexeril) 10 mg tablet; Take 0.5 to 1 tablet by mouth as needed at bedtime for muscle spasms. May cause drowsiness. Use precautions.      Medical Admin  Record    Given overall well appearance, vital signs, history, and exam as above, there is no indication for further emergent testing/intervention at this time.      I discussed with the patient my clinical thoughts at this time given the above and we had a shared decision-making conversation in a patient-centered decision-making model on how to proceed forward. Pt was instructed on the importance of close follow-up. They were told that an urgent care diagnosis is often a preliminary impression and that definitive care is often not able to be given in the urgent care setting. Pt was educated that close follow-up is essential for good health and good outcomes. Patient was provided with the following instructions:         Continue abx as directed.      Naproxen for inflammation/pain as directed. May take Flexeril (muscle relaxer) for additional relief as directed/as needed.      Plenty of fluids and rest.      Go to the ED for new, worsening, or unimproving sx.        Clinical impression as well as limitations of available testing/examination, all discussed with patient. Pt is well at this time in the urgent care. They are comfortable with the present assessment and plan to be discharged home. Pt given the opportunity to ask questions prior to discharge and all questions were answered at this time. Via our discussion, the patient was advised of warning signs and instructions were reviewed. Strict ED precautions were given, acknowledged, and understood. Advised to present to the ED if present condition changes/worsens or if they develop new symptoms at any time after discharge. Pt verbalized understanding and agreement with plan and instructions. Patient discharged home in stable condition.      Follow Up Instructions  No follow-ups on file.    Patient disposition: Home    Electronically signed by Subha Harrington PA-C  12:16 PM

## 2025-05-12 ENCOUNTER — APPOINTMENT (OUTPATIENT)
Dept: PRIMARY CARE | Facility: CLINIC | Age: 20
End: 2025-05-12
Payer: MEDICAID

## 2025-05-12 VITALS
HEART RATE: 84 BPM | DIASTOLIC BLOOD PRESSURE: 72 MMHG | BODY MASS INDEX: 21.09 KG/M2 | WEIGHT: 147 LBS | SYSTOLIC BLOOD PRESSURE: 115 MMHG | OXYGEN SATURATION: 95 %

## 2025-05-12 DIAGNOSIS — M79.605 PAIN IN BOTH LOWER EXTREMITIES: ICD-10-CM

## 2025-05-12 DIAGNOSIS — D57.00 SICKLE CELL DISEASE WITH CRISIS (MULTI): ICD-10-CM

## 2025-05-12 DIAGNOSIS — M79.604 PAIN IN BOTH LOWER EXTREMITIES: ICD-10-CM

## 2025-05-12 DIAGNOSIS — F25.0 SCHIZOAFFECTIVE DISORDER, BIPOLAR TYPE (MULTI): ICD-10-CM

## 2025-05-12 DIAGNOSIS — Z11.3 ROUTINE SCREENING FOR STI (SEXUALLY TRANSMITTED INFECTION): Primary | ICD-10-CM

## 2025-05-12 PROBLEM — F32.2 CURRENT SEVERE EPISODE OF MAJOR DEPRESSIVE DISORDER WITHOUT PSYCHOTIC FEATURES WITHOUT PRIOR EPISODE (MULTI): Status: ACTIVE | Noted: 2024-07-11

## 2025-05-12 PROBLEM — Z86.59 HISTORY OF ATTENTION DEFICIT HYPERACTIVITY DISORDER (ADHD): Status: RESOLVED | Noted: 2023-01-16 | Resolved: 2025-05-12

## 2025-05-12 PROBLEM — F39 UNSPECIFIED MOOD DISORDER: Chronic | Status: RESOLVED | Noted: 2023-01-16 | Resolved: 2025-05-12

## 2025-05-12 PROBLEM — J45.909 ASTHMA: Status: RESOLVED | Noted: 2025-02-07 | Resolved: 2025-05-12

## 2025-05-12 PROBLEM — Z86.59 HISTORY OF ATTENTION DEFICIT HYPERACTIVITY DISORDER (ADHD): Status: ACTIVE | Noted: 2023-01-16

## 2025-05-12 PROBLEM — H52.00 HYPEROPIA: Status: RESOLVED | Noted: 2023-10-27 | Resolved: 2025-05-12

## 2025-05-12 PROBLEM — F90.2 ATTENTION DEFICIT HYPERACTIVITY DISORDER (ADHD), COMBINED TYPE: Status: RESOLVED | Noted: 2023-10-27 | Resolved: 2025-05-12

## 2025-05-12 PROBLEM — F25.1 SCHIZOAFFECTIVE DISORDER, DEPRESSIVE TYPE (MULTI): Status: ACTIVE | Noted: 2025-05-12

## 2025-05-12 PROBLEM — R17 ELEVATED BILIRUBIN: Status: ACTIVE | Noted: 2024-07-13

## 2025-05-12 PROBLEM — Z86.59 HISTORY OF ADHD: Status: RESOLVED | Noted: 2023-01-16 | Resolved: 2025-05-12

## 2025-05-12 PROBLEM — D22.30 MELANOCYTIC NEVUS OF FACE: Status: RESOLVED | Noted: 2022-09-12 | Resolved: 2025-05-12

## 2025-05-12 PROBLEM — R06.83 SNORING: Status: RESOLVED | Noted: 2023-10-27 | Resolved: 2025-05-12

## 2025-05-12 PROBLEM — Z86.2 HISTORY OF SICKLE CELL ANEMIA: Status: ACTIVE | Noted: 2024-10-05

## 2025-05-12 PROBLEM — R68.89 EXERCISE INTOLERANCE: Status: RESOLVED | Noted: 2023-10-27 | Resolved: 2025-05-12

## 2025-05-12 PROBLEM — K59.00 CONSTIPATION: Status: ACTIVE | Noted: 2025-02-07

## 2025-05-12 PROBLEM — Q82.5 CONGENITAL NON-NEOPLASTIC NEVUS: Status: ACTIVE | Noted: 2022-09-12

## 2025-05-12 PROBLEM — J45.909 ASTHMA: Status: ACTIVE | Noted: 2025-02-07

## 2025-05-12 PROBLEM — H52.03 HYPERMETROPIA OF BOTH EYES: Status: RESOLVED | Noted: 2023-10-27 | Resolved: 2025-05-12

## 2025-05-12 PROBLEM — D22.39 MELANOCYTIC NEVI OF OTHER PARTS OF FACE: Status: ACTIVE | Noted: 2022-09-12

## 2025-05-12 PROBLEM — F12.90 MARIJUANA USE: Status: RESOLVED | Noted: 2023-01-16 | Resolved: 2025-05-12

## 2025-05-12 PROBLEM — D57.20: Status: RESOLVED | Noted: 2023-10-27 | Resolved: 2025-05-12

## 2025-05-12 PROBLEM — D22.39 MELANOCYTIC NEVI OF OTHER PARTS OF FACE: Status: RESOLVED | Noted: 2022-09-12 | Resolved: 2025-05-12

## 2025-05-12 PROBLEM — F12.90 MARIJUANA USER: Status: ACTIVE | Noted: 2023-01-16

## 2025-05-12 PROBLEM — F39 MOOD DISORDER: Chronic | Status: ACTIVE | Noted: 2023-01-16

## 2025-05-12 PROBLEM — F90.2 ATTENTION DEFICIT HYPERACTIVITY DISORDER, COMBINED TYPE: Status: ACTIVE | Noted: 2023-10-27

## 2025-05-12 PROBLEM — Z86.2 HISTORY OF SICKLE CELL ANEMIA: Status: RESOLVED | Noted: 2024-10-05 | Resolved: 2025-05-12

## 2025-05-12 PROCEDURE — 99203 OFFICE O/P NEW LOW 30 MIN: CPT | Performed by: FAMILY MEDICINE

## 2025-05-12 PROCEDURE — 1036F TOBACCO NON-USER: CPT | Performed by: FAMILY MEDICINE

## 2025-05-12 RX ORDER — DULOXETIN HYDROCHLORIDE 60 MG/1
60 CAPSULE, DELAYED RELEASE ORAL
COMMUNITY
Start: 2024-07-16

## 2025-05-12 RX ORDER — QUETIAPINE FUMARATE 100 MG/1
100 TABLET, FILM COATED ORAL NIGHTLY
COMMUNITY
Start: 2024-07-15

## 2025-05-12 RX ORDER — DOCUSATE SODIUM 100 MG/1
100 CAPSULE ORAL 2 TIMES DAILY PRN
COMMUNITY
Start: 2024-11-29

## 2025-05-12 RX ORDER — OXYCODONE AND ACETAMINOPHEN 5; 325 MG/1; MG/1
1 TABLET ORAL EVERY 8 HOURS PRN
COMMUNITY
Start: 2024-11-28

## 2025-05-12 RX ORDER — ACETAMINOPHEN 325 MG/1
325 TABLET ORAL EVERY 8 HOURS PRN
COMMUNITY
Start: 2024-07-15

## 2025-05-12 ASSESSMENT — PATIENT HEALTH QUESTIONNAIRE - PHQ9
SUM OF ALL RESPONSES TO PHQ9 QUESTIONS 1 AND 2: 0
1. LITTLE INTEREST OR PLEASURE IN DOING THINGS: NOT AT ALL
2. FEELING DOWN, DEPRESSED OR HOPELESS: NOT AT ALL

## 2025-05-12 ASSESSMENT — PAIN SCALES - GENERAL: PAINLEVEL_OUTOF10: 0-NO PAIN

## 2025-05-12 NOTE — PROGRESS NOTES
Subjective   Patient ID: Josue Rangel is a 20 y.o. male who presents for New Patient Visit.    HPI   -Leg pain x 2 days in thigh  PMH:   - chronic illness: sickle cell disease  - mental illness: ADHD, PTSD  - back surgery 2 yrs ago  - hospitalizations: 4 months ago acute chest pain --> morphine for it  - meds: hydroxyurea as prescribed   Docolex  - immunizations: up to date  FMH:    - mom/dad are trait carriers, no other notable   - siblings are trait carriers   - mental illness history in the family (nonspecific)   Sx:   - alcohol: very rare   - smokes marijuana every other day  - sexually active, 1 partner, no condom usage, would like to be tested  - work: works as a cook  - education: highschool  - diet could be better, exercises regularly   - lives with partner in a home       Review of Systems   All other systems reviewed and are negative.        Objective   There were no vitals taken for this visit.    Physical Exam  Vitals and nursing note reviewed.   Cardiovascular:      Rate and Rhythm: Normal rate and regular rhythm.   Pulmonary:      Effort: Pulmonary effort is normal.      Breath sounds: Normal breath sounds.   Musculoskeletal:      Cervical back: Neck supple.      Right upper leg: Tenderness present.      Left upper leg: Tenderness present.   Lymphadenopathy:      Cervical: No cervical adenopathy.   Neurological:      Mental Status: He is alert.   Psychiatric:         Mood and Affect: Mood normal.         Behavior: Behavior normal.         Thought Content: Thought content normal.         Judgment: Judgment normal.       Heart RRR, lungs clear to auscultation  Assessment/Plan   Diagnoses and all orders for this visit:  Routine screening for STI (sexually transmitted infection)  -     C. trachomatis / N. gonorrhoeae, Amplified, Urogenital; Future  Schizoaffective disorder, bipolar type (Multi)  Sickle cell disease with crisis (Multi)  Pain in both lower extremities  Comments:  recommended to follow up  with hematology to r/o SCD crisis pain.  Other orders  -     Follow Up In Primary Care - Medicare Annual; Future

## 2025-05-12 NOTE — PROGRESS NOTES
I saw and evaluated the patient. I personally obtained the key and critical portions of the history and physical exam or was physically present for key and critical portions performed by the resident/fellow. I reviewed the resident/fellow's documentation and discussed the patient with the resident/fellow. I agree with the resident/fellow's medical decision making as documented in the note.    Richi Foy MD

## 2025-06-02 ENCOUNTER — HOSPITAL ENCOUNTER (EMERGENCY)
Facility: HOSPITAL | Age: 20
Discharge: HOME | End: 2025-06-02
Payer: MEDICAID

## 2025-06-02 ENCOUNTER — APPOINTMENT (OUTPATIENT)
Dept: PRIMARY CARE | Facility: CLINIC | Age: 20
End: 2025-06-02
Payer: MEDICAID

## 2025-06-02 VITALS
DIASTOLIC BLOOD PRESSURE: 68 MMHG | RESPIRATION RATE: 18 BRPM | SYSTOLIC BLOOD PRESSURE: 140 MMHG | WEIGHT: 140 LBS | HEIGHT: 70 IN | TEMPERATURE: 98.8 F | BODY MASS INDEX: 20.04 KG/M2 | HEART RATE: 73 BPM | OXYGEN SATURATION: 99 %

## 2025-06-02 PROCEDURE — 99281 EMR DPT VST MAYX REQ PHY/QHP: CPT

## 2025-06-02 PROCEDURE — 4500999001 HC ED NO CHARGE

## 2025-06-02 ASSESSMENT — PAIN DESCRIPTION - PAIN TYPE: TYPE: ACUTE PAIN

## 2025-06-02 ASSESSMENT — PAIN - FUNCTIONAL ASSESSMENT: PAIN_FUNCTIONAL_ASSESSMENT: 0-10

## 2025-06-02 ASSESSMENT — PAIN SCALES - GENERAL: PAINLEVEL_OUTOF10: 10 - WORST POSSIBLE PAIN

## 2025-06-02 ASSESSMENT — PAIN DESCRIPTION - LOCATION: LOCATION: GENERALIZED

## 2025-06-02 NOTE — ED TRIAGE NOTES
Patient presents to ED with complaints of generalized body aches, fever, chills, fatigue since 2 days. Patient states nausea, vomiting, diarrhea. Patient states he's unable to keep food and drink down. Patient states history of sickle cell pain.

## 2025-06-09 ENCOUNTER — APPOINTMENT (OUTPATIENT)
Dept: HEMATOLOGY/ONCOLOGY | Facility: HOSPITAL | Age: 20
End: 2025-06-09
Payer: MEDICAID

## 2025-06-09 ENCOUNTER — TELEPHONE (OUTPATIENT)
Dept: HEMATOLOGY/ONCOLOGY | Facility: HOSPITAL | Age: 20
End: 2025-06-09
Payer: MEDICAID

## 2025-06-09 NOTE — TELEPHONE ENCOUNTER
Call made to pt to reschedule follow up visit today with Dr. Andrade. No answer VM left.    full weight-bearing

## 2025-06-10 ENCOUNTER — PATIENT OUTREACH (OUTPATIENT)
Dept: PRIMARY CARE | Facility: CLINIC | Age: 20
End: 2025-06-10
Payer: MEDICAID

## 2025-06-10 ENCOUNTER — TELEPHONE (OUTPATIENT)
Dept: PRIMARY CARE | Facility: CLINIC | Age: 20
End: 2025-06-10
Payer: MEDICAID

## 2025-06-10 NOTE — PROGRESS NOTES
Discharge Facility: [facility]  Discharge Diagnosis: Sickle Cell Pain Crisis  Admission Date: 6/2/25  Discharge Date: 6/9/25    PCP Appointment Date: No contact made. Task to office.   Specialist Appointment Date: Unknown  Hospital Encounter and Summary Linked: Yes    Unknown     Two attempts were made to reach patient within two business days after discharge. Left voicemail with contact information for patient to call back with any non-emergent questions or concerns.    Jose Higgins LPN

## 2025-06-10 NOTE — TELEPHONE ENCOUNTER
----- Message from Jose Higgins sent at 6/10/2025 12:37 PM EDT -----  Discharge Facility: [facility] [place] Diagnosis: Sickle Cell Pain Crisis  Admission Date: 6/2/25  Discharge Date: 6/9/25    Unsuccessful attempts x2 to reach patient for PCP Follow-up  Please have office staff reach out to patient and schedule an appointment within 14 days from discharge date.          Jose Higgins LPN

## 2025-06-17 ENCOUNTER — APPOINTMENT (OUTPATIENT)
Dept: PRIMARY CARE | Facility: CLINIC | Age: 20
End: 2025-06-17
Payer: MEDICAID

## 2025-06-27 ENCOUNTER — PATIENT OUTREACH (OUTPATIENT)
Dept: PRIMARY CARE | Facility: CLINIC | Age: 20
End: 2025-06-27
Payer: MEDICAID

## 2025-06-27 NOTE — PROGRESS NOTES
Unable to reach patient for follow up call after recent hospitalization.   Left voicemail with call back number for patient to call if needed.    If no voicemail available call attempts x 2 were made to contact the patient to assist with any questions or concerns patient may have.      Jose Higgins LPN

## 2025-07-07 ENCOUNTER — APPOINTMENT (OUTPATIENT)
Dept: PRIMARY CARE | Facility: CLINIC | Age: 20
End: 2025-07-07
Payer: MEDICAID

## 2025-08-08 ENCOUNTER — OFFICE VISIT (OUTPATIENT)
Dept: PRIMARY CARE | Facility: CLINIC | Age: 20
End: 2025-08-08
Payer: MEDICAID

## 2025-08-08 ENCOUNTER — APPOINTMENT (OUTPATIENT)
Dept: PRIMARY CARE | Facility: CLINIC | Age: 20
End: 2025-08-08
Payer: MEDICAID

## 2025-08-08 VITALS
SYSTOLIC BLOOD PRESSURE: 119 MMHG | HEART RATE: 86 BPM | DIASTOLIC BLOOD PRESSURE: 72 MMHG | OXYGEN SATURATION: 95 % | TEMPERATURE: 98.3 F | WEIGHT: 140.2 LBS | BODY MASS INDEX: 20.12 KG/M2

## 2025-08-08 DIAGNOSIS — J45.20 MILD INTERMITTENT ASTHMA WITHOUT COMPLICATION (HHS-HCC): ICD-10-CM

## 2025-08-08 DIAGNOSIS — F25.0 SCHIZOAFFECTIVE DISORDER, BIPOLAR TYPE (MULTI): Primary | ICD-10-CM

## 2025-08-08 DIAGNOSIS — K59.03 DRUG-INDUCED CONSTIPATION: ICD-10-CM

## 2025-08-08 DIAGNOSIS — D57.20: ICD-10-CM

## 2025-08-08 PROBLEM — D57.00 SICKLE-CELL DISEASE WITH PAIN (MULTI): Status: RESOLVED | Noted: 2024-10-05 | Resolved: 2025-08-08

## 2025-08-08 PROBLEM — D57.00 SICKLE CELL CRISIS (MULTI): Status: RESOLVED | Noted: 2024-01-04 | Resolved: 2025-08-08

## 2025-08-08 PROBLEM — D57.00 VASO-OCCLUSIVE SICKLE CELL CRISIS (MULTI): Status: RESOLVED | Noted: 2024-02-11 | Resolved: 2025-08-08

## 2025-08-08 PROBLEM — I78.1 NON-NEOPLASTIC NEVUS: Status: ACTIVE | Noted: 2022-09-12

## 2025-08-08 PROCEDURE — 99214 OFFICE O/P EST MOD 30 MIN: CPT | Performed by: FAMILY MEDICINE

## 2025-08-08 RX ORDER — HYDROXYUREA 500 MG/1
1000 CAPSULE ORAL DAILY
Qty: 60 CAPSULE | Refills: 3 | Status: SHIPPED | OUTPATIENT
Start: 2025-08-08

## 2025-08-08 RX ORDER — BISACODYL 5 MG
5 TABLET, DELAYED RELEASE (ENTERIC COATED) ORAL DAILY PRN
Qty: 90 TABLET | Refills: 0 | Status: SHIPPED | OUTPATIENT
Start: 2025-08-08

## 2025-08-08 RX ORDER — SENNOSIDES 8.6 MG/1
2 TABLET ORAL 2 TIMES DAILY PRN
Qty: 60 TABLET | Refills: 3 | Status: CANCELLED | OUTPATIENT
Start: 2025-08-08 | End: 2026-08-08

## 2025-08-08 RX ORDER — DEXTROAMPHETAMINE SACCHARATE, AMPHETAMINE ASPARTATE MONOHYDRATE, DEXTROAMPHETAMINE SULFATE AND AMPHETAMINE SULFATE 3.75; 3.75; 3.75; 3.75 MG/1; MG/1; MG/1; MG/1
15 CAPSULE, EXTENDED RELEASE ORAL EVERY MORNING
Qty: 30 CAPSULE | Refills: 0 | Status: CANCELLED | OUTPATIENT
Start: 2025-08-08 | End: 2025-09-07

## 2025-08-08 RX ORDER — CYCLOBENZAPRINE HCL 10 MG
TABLET ORAL
Qty: 15 TABLET | Refills: 0 | Status: SHIPPED | OUTPATIENT
Start: 2025-08-08 | End: 2025-08-08 | Stop reason: WASHOUT

## 2025-08-08 RX ORDER — ALBUTEROL SULFATE 90 UG/1
INHALANT RESPIRATORY (INHALATION)
Qty: 18 G | Refills: 3 | Status: SHIPPED | OUTPATIENT
Start: 2025-08-08

## 2025-08-08 RX ORDER — BUDESONIDE AND FORMOTEROL FUMARATE DIHYDRATE 80; 4.5 UG/1; UG/1
2 AEROSOL RESPIRATORY (INHALATION)
Qty: 10.2 G | Refills: 1 | Status: SHIPPED | OUTPATIENT
Start: 2025-08-08

## 2025-08-08 RX ORDER — DICLOFENAC SODIUM 10 MG/G
4 GEL TOPICAL 4 TIMES DAILY PRN
Qty: 50 G | Refills: 2 | Status: SHIPPED | OUTPATIENT
Start: 2025-08-08

## 2025-08-08 RX ORDER — QUETIAPINE FUMARATE 100 MG/1
100 TABLET, FILM COATED ORAL NIGHTLY
Qty: 90 TABLET | Refills: 1 | Status: SHIPPED | OUTPATIENT
Start: 2025-08-08 | End: 2026-08-08

## 2025-08-08 ASSESSMENT — PATIENT HEALTH QUESTIONNAIRE - PHQ9
2. FEELING DOWN, DEPRESSED OR HOPELESS: NOT AT ALL
1. LITTLE INTEREST OR PLEASURE IN DOING THINGS: NOT AT ALL
SUM OF ALL RESPONSES TO PHQ9 QUESTIONS 1 AND 2: 0

## 2025-08-08 ASSESSMENT — PAIN SCALES - GENERAL: PAINLEVEL_OUTOF10: 5

## 2025-08-08 NOTE — PROGRESS NOTES
Subjective   Patient ID: Josue Rangel is a 20 y.o. male who presents for No chief complaint on file..    HPI   Medication refill  Stopped Adderral for several months  Regualrly smokes Dread  Have not seen pshcyiatrist for a while  Has live in    Review of Systems   All other systems reviewed and are negative.      Objective   /72 (BP Location: Left arm, Patient Position: Sitting, BP Cuff Size: Adult)   Pulse 86   Temp 36.8 °C (98.3 °F) (Temporal)   Wt 63.6 kg (140 lb 3.2 oz)   SpO2 95%   BMI 20.12 kg/m²     Physical Exam  Vitals and nursing note reviewed. Exam conducted with a chaperone present.     Cardiovascular:      Rate and Rhythm: Normal rate and regular rhythm.   Pulmonary:      Effort: Pulmonary effort is normal.      Breath sounds: Normal breath sounds.     Musculoskeletal:      Cervical back: Neck supple.   Lymphadenopathy:      Cervical: No cervical adenopathy.     Neurological:      Mental Status: He is alert.     Psychiatric:         Mood and Affect: Affect is inappropriate.         Speech: Speech is tangential.         Behavior: Behavior is hyperactive.         Cognition and Memory: Cognition is impaired.         Assessment/Plan   Diagnoses and all orders for this visit:  Schizoaffective disorder, bipolar type (Multi)  -     QUEtiapine (SEROquel) 100 mg tablet; Take 1 tablet (100 mg) by mouth once daily at bedtime.  -     Referral to Psychiatry; Future  Mild intermittent asthma without complication ([facility])  -     albuterol 90 mcg/actuation inhaler; Inhale 2-4 puffs every 4-6 hours as needed for shortness of breath or wheezing  -     budesonide-formoterol (Symbicort) 80-4.5 mcg/actuation inhaler; Inhale 2 puffs 2 times a day. And 2 puffs every 4 hours as needed. 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  -     hydroxyurea  (Hydrea) 500 mg capsule; Take 2 capsules (1,000 mg total) by mouth once daily.  Take at the same time each day.  Drug-induced constipation  -     bisacodyl (Dulcolax) 5 mg EC tablet; Take 1 tablet (5 mg) by mouth once daily as needed for constipation (1 to 2 prn). Do not crush, chew, or split.  Other orders  -     Follow Up In Primary Care - Health Maintenance; Future

## 2025-08-14 DIAGNOSIS — J45.20 MILD INTERMITTENT ASTHMA WITHOUT COMPLICATION (HHS-HCC): ICD-10-CM

## 2025-08-15 ENCOUNTER — TELEPHONE (OUTPATIENT)
Dept: PRIMARY CARE | Facility: CLINIC | Age: 20
End: 2025-08-15
Payer: MEDICAID

## 2025-08-15 RX ORDER — BUDESONIDE AND FORMOTEROL FUMARATE DIHYDRATE 160; 4.5 UG/1; UG/1
2 AEROSOL RESPIRATORY (INHALATION) 2 TIMES DAILY
Qty: 10.2 G | Refills: 11 | Status: SHIPPED | OUTPATIENT
Start: 2025-08-15 | End: 2026-08-15

## 2026-01-15 ENCOUNTER — APPOINTMENT (OUTPATIENT)
Dept: DERMATOLOGY | Facility: CLINIC | Age: 21
End: 2026-01-15
Payer: MEDICAID

## 2026-01-29 ENCOUNTER — APPOINTMENT (OUTPATIENT)
Dept: DERMATOLOGY | Facility: CLINIC | Age: 21
End: 2026-01-29
Payer: MEDICAID

## 2026-08-14 ENCOUNTER — APPOINTMENT (OUTPATIENT)
Dept: PRIMARY CARE | Facility: CLINIC | Age: 21
End: 2026-08-14
Payer: MEDICAID

## (undated) DEVICE — Device

## (undated) DEVICE — SUTURE, CHROMIC GUT, 3-0, SH 27"

## (undated) DEVICE — TIP, SUCTION, FRAZIER, W/CONTROL VENT, 12 FR

## (undated) DEVICE — CAUTERY, PENCIL, PUSH BUTTON, SMOKE EVAC, 70MM

## (undated) DEVICE — STAPLER, SKIN PROXIMATE, 35 WIDE

## (undated) DEVICE — SUTURE, CHROMIC, 3-0, 27 IN, PS2, BROWN

## (undated) DEVICE — DRESSING, GAUZE, SPONGE, 12 PLY, CURITY, 4 X 4 IN, STERILE

## (undated) DEVICE — ADMINISTRATION SET, VENTED, FLASHBALL, 109 IN

## (undated) DEVICE — BUR, FISSURE, CROSS-CUT, TAPER, 1.6 X 44.5 MM, CARBIDE, STERILE

## (undated) DEVICE — DRAPE, SHEET, UTILITY, NON ABSORBENT, 18 X 26 IN, LF

## (undated) DEVICE — DRESSING, SPONGE, GAUZE, CURITY, 4 X 4 IN, STERILE

## (undated) DEVICE — SPONGE, HEMOSTATIC, GELATIN, SURGIFOAM, 8 X 6.25 CM X 10 MM

## (undated) DEVICE — DRAPE, PAD, INSTRUMENT, MAGNETIC, MEDIUM, 10 X 16 IN, DISPOSABLE

## (undated) DEVICE — BANDAGE, GAUZE, CONFORMING, KERLIX, 6 PLY, 4.5 IN X 4.1 YD

## (undated) DEVICE — CONTAINER, SPECIMEN, 120 ML, STERILE

## (undated) DEVICE — TRAY, MINOR, SINGLE BASIN, STERILE